# Patient Record
Sex: FEMALE | Race: BLACK OR AFRICAN AMERICAN | NOT HISPANIC OR LATINO | Employment: OTHER | URBAN - METROPOLITAN AREA
[De-identification: names, ages, dates, MRNs, and addresses within clinical notes are randomized per-mention and may not be internally consistent; named-entity substitution may affect disease eponyms.]

---

## 2017-01-13 ENCOUNTER — COMMUNICATION - HEALTHEAST (OUTPATIENT)
Dept: FAMILY MEDICINE | Facility: CLINIC | Age: 72
End: 2017-01-13

## 2017-01-13 DIAGNOSIS — I10 ESSENTIAL HYPERTENSION: ICD-10-CM

## 2017-01-19 ENCOUNTER — COMMUNICATION - HEALTHEAST (OUTPATIENT)
Dept: FAMILY MEDICINE | Facility: CLINIC | Age: 72
End: 2017-01-19

## 2017-01-19 ENCOUNTER — COMMUNICATION - HEALTHEAST (OUTPATIENT)
Dept: INTENSIVE CARE | Facility: CLINIC | Age: 72
End: 2017-01-19

## 2017-01-19 DIAGNOSIS — I10 UNSPECIFIED ESSENTIAL HYPERTENSION: ICD-10-CM

## 2017-01-20 ENCOUNTER — COMMUNICATION - HEALTHEAST (OUTPATIENT)
Dept: FAMILY MEDICINE | Facility: CLINIC | Age: 72
End: 2017-01-20

## 2017-01-22 ENCOUNTER — COMMUNICATION - HEALTHEAST (OUTPATIENT)
Dept: FAMILY MEDICINE | Facility: CLINIC | Age: 72
End: 2017-01-22

## 2017-01-22 DIAGNOSIS — K08.89 PAIN, DENTAL: ICD-10-CM

## 2017-02-03 ENCOUNTER — COMMUNICATION - HEALTHEAST (OUTPATIENT)
Dept: SCHEDULING | Facility: CLINIC | Age: 72
End: 2017-02-03

## 2017-02-08 ENCOUNTER — OFFICE VISIT - HEALTHEAST (OUTPATIENT)
Dept: FAMILY MEDICINE | Facility: CLINIC | Age: 72
End: 2017-02-08

## 2017-02-08 ENCOUNTER — RECORDS - HEALTHEAST (OUTPATIENT)
Dept: GENERAL RADIOLOGY | Facility: CLINIC | Age: 72
End: 2017-02-08

## 2017-02-08 ENCOUNTER — COMMUNICATION - HEALTHEAST (OUTPATIENT)
Dept: NURSING | Facility: CLINIC | Age: 72
End: 2017-02-08

## 2017-02-08 DIAGNOSIS — S06.0XAA CONCUSSION: ICD-10-CM
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
## 2017-02-08 DIAGNOSIS — M25.562 LEFT KNEE PAIN: ICD-10-CM
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
## 2017-02-08 DIAGNOSIS — R42 VERTIGO: ICD-10-CM

## 2017-02-08 DIAGNOSIS — M62.838 MUSCLE SPASMS OF NECK: ICD-10-CM

## 2017-02-08 DIAGNOSIS — M25.562 PAIN IN LEFT KNEE: ICD-10-CM

## 2017-02-17 ENCOUNTER — COMMUNICATION - HEALTHEAST (OUTPATIENT)
Dept: FAMILY MEDICINE | Facility: CLINIC | Age: 72
End: 2017-02-17

## 2017-02-21 ENCOUNTER — COMMUNICATION - HEALTHEAST (OUTPATIENT)
Dept: FAMILY MEDICINE | Facility: CLINIC | Age: 72
End: 2017-02-21

## 2017-02-21 DIAGNOSIS — K21.9 ESOPHAGEAL REFLUX: ICD-10-CM

## 2017-02-23 ENCOUNTER — HOSPITAL ENCOUNTER (OUTPATIENT)
Dept: NEUROLOGY | Facility: CLINIC | Age: 72
Setting detail: THERAPIES SERIES
Discharge: STILL ADMITTED OR ADMINISTRATIVE | End: 2017-02-23
Attending: FAMILY MEDICINE

## 2017-02-23 DIAGNOSIS — Z86.73 HX OF ISCHEMIC LEFT MCA STROKE: ICD-10-CM

## 2017-02-23 DIAGNOSIS — R41.0 CONFUSION: ICD-10-CM

## 2017-02-23 DIAGNOSIS — R42 DIZZINESS: ICD-10-CM

## 2017-02-23 DIAGNOSIS — F07.81 POST CONCUSSION SYNDROME: ICD-10-CM

## 2017-03-17 ENCOUNTER — COMMUNICATION - HEALTHEAST (OUTPATIENT)
Dept: SCHEDULING | Facility: CLINIC | Age: 72
End: 2017-03-17

## 2017-03-17 ENCOUNTER — COMMUNICATION - HEALTHEAST (OUTPATIENT)
Dept: FAMILY MEDICINE | Facility: CLINIC | Age: 72
End: 2017-03-17

## 2017-03-17 DIAGNOSIS — I10 HYPERTENSION: ICD-10-CM

## 2017-03-21 ENCOUNTER — AMBULATORY - HEALTHEAST (OUTPATIENT)
Dept: VASCULAR SURGERY | Facility: CLINIC | Age: 72
End: 2017-03-21

## 2017-03-21 ENCOUNTER — RECORDS - HEALTHEAST (OUTPATIENT)
Dept: ADMINISTRATIVE | Facility: OTHER | Age: 72
End: 2017-03-21

## 2017-03-21 ENCOUNTER — COMMUNICATION - HEALTHEAST (OUTPATIENT)
Dept: FAMILY MEDICINE | Facility: CLINIC | Age: 72
End: 2017-03-21

## 2017-03-21 DIAGNOSIS — L30.9 DERMATITIS: ICD-10-CM

## 2017-03-21 DIAGNOSIS — I73.9 PAD (PERIPHERAL ARTERY DISEASE) (H): ICD-10-CM

## 2017-03-29 ENCOUNTER — RECORDS - HEALTHEAST (OUTPATIENT)
Dept: VASCULAR ULTRASOUND | Facility: CLINIC | Age: 72
End: 2017-03-29

## 2017-03-29 ENCOUNTER — OFFICE VISIT - HEALTHEAST (OUTPATIENT)
Dept: VASCULAR SURGERY | Facility: CLINIC | Age: 72
End: 2017-03-29

## 2017-03-29 ENCOUNTER — AMBULATORY - HEALTHEAST (OUTPATIENT)
Dept: VASCULAR SURGERY | Facility: CLINIC | Age: 72
End: 2017-03-29

## 2017-03-29 ENCOUNTER — RECORDS - HEALTHEAST (OUTPATIENT)
Dept: ADMINISTRATIVE | Facility: OTHER | Age: 72
End: 2017-03-29

## 2017-03-29 DIAGNOSIS — I73.9 PERIPHERAL VASCULAR DISEASE, UNSPECIFIED (H): ICD-10-CM

## 2017-03-29 DIAGNOSIS — I83.813 VARICOSE VEINS OF LOWER EXTREMITY WITH PAIN, BILATERAL: ICD-10-CM

## 2017-03-29 DIAGNOSIS — M79.89 SWELLING OF LOWER EXTREMITY: ICD-10-CM

## 2017-03-30 ENCOUNTER — HOSPITAL ENCOUNTER (OUTPATIENT)
Dept: SPEECH THERAPY | Age: 72
Setting detail: THERAPIES SERIES
Discharge: STILL ADMITTED OR ADMINISTRATIVE | End: 2017-03-30
Attending: NURSE PRACTITIONER

## 2017-03-30 ENCOUNTER — HOSPITAL ENCOUNTER (OUTPATIENT)
Dept: PHYSICAL THERAPY | Age: 72
Setting detail: THERAPIES SERIES
Discharge: STILL ADMITTED OR ADMINISTRATIVE | End: 2017-03-30
Attending: NURSE PRACTITIONER

## 2017-03-30 DIAGNOSIS — R42 DIZZINESS: ICD-10-CM

## 2017-03-30 DIAGNOSIS — F07.81 POST CONCUSSION SYNDROME: ICD-10-CM

## 2017-03-30 DIAGNOSIS — Z86.73 HX OF ISCHEMIC LEFT MCA STROKE: ICD-10-CM

## 2017-04-17 ENCOUNTER — AMBULATORY - HEALTHEAST (OUTPATIENT)
Dept: SPEECH THERAPY | Facility: CLINIC | Age: 72
End: 2017-04-17

## 2017-04-18 ENCOUNTER — COMMUNICATION - HEALTHEAST (OUTPATIENT)
Dept: FAMILY MEDICINE | Facility: CLINIC | Age: 72
End: 2017-04-18

## 2017-04-18 ENCOUNTER — COMMUNICATION - HEALTHEAST (OUTPATIENT)
Dept: SCHEDULING | Facility: CLINIC | Age: 72
End: 2017-04-18

## 2017-04-18 DIAGNOSIS — I63.9 CVA (CEREBRAL VASCULAR ACCIDENT) (H): ICD-10-CM

## 2017-04-21 ENCOUNTER — COMMUNICATION - HEALTHEAST (OUTPATIENT)
Dept: FAMILY MEDICINE | Facility: CLINIC | Age: 72
End: 2017-04-21

## 2017-04-26 ENCOUNTER — COMMUNICATION - HEALTHEAST (OUTPATIENT)
Dept: SCHEDULING | Facility: CLINIC | Age: 72
End: 2017-04-26

## 2017-04-26 DIAGNOSIS — I10 HYPERTENSION: ICD-10-CM

## 2017-04-28 ENCOUNTER — OFFICE VISIT - HEALTHEAST (OUTPATIENT)
Dept: FAMILY MEDICINE | Facility: CLINIC | Age: 72
End: 2017-04-28

## 2017-04-28 DIAGNOSIS — R47.01 EXPRESSIVE APHASIA: ICD-10-CM

## 2017-04-28 DIAGNOSIS — I10 ESSENTIAL HYPERTENSION: ICD-10-CM

## 2017-04-28 DIAGNOSIS — M17.9 OSTEOARTHRITIS OF KNEE, UNSPECIFIED LATERALITY, UNSPECIFIED OSTEOARTHRITIS TYPE: ICD-10-CM

## 2017-05-15 ENCOUNTER — COMMUNICATION - HEALTHEAST (OUTPATIENT)
Dept: SCHEDULING | Facility: CLINIC | Age: 72
End: 2017-05-15

## 2017-05-15 DIAGNOSIS — I10 HTN (HYPERTENSION): ICD-10-CM

## 2017-05-19 ENCOUNTER — RECORDS - HEALTHEAST (OUTPATIENT)
Dept: ADMINISTRATIVE | Facility: OTHER | Age: 72
End: 2017-05-19

## 2017-05-22 ENCOUNTER — COMMUNICATION - HEALTHEAST (OUTPATIENT)
Dept: FAMILY MEDICINE | Facility: CLINIC | Age: 72
End: 2017-05-22

## 2017-06-14 ENCOUNTER — COMMUNICATION - HEALTHEAST (OUTPATIENT)
Dept: SCHEDULING | Facility: CLINIC | Age: 72
End: 2017-06-14

## 2017-06-21 ENCOUNTER — COMMUNICATION - HEALTHEAST (OUTPATIENT)
Dept: FAMILY MEDICINE | Facility: CLINIC | Age: 72
End: 2017-06-21

## 2017-06-29 ENCOUNTER — AMBULATORY - HEALTHEAST (OUTPATIENT)
Dept: SPEECH THERAPY | Facility: CLINIC | Age: 72
End: 2017-06-29

## 2017-07-07 ENCOUNTER — COMMUNICATION - HEALTHEAST (OUTPATIENT)
Dept: FAMILY MEDICINE | Facility: CLINIC | Age: 72
End: 2017-07-07

## 2017-07-07 ENCOUNTER — COMMUNICATION - HEALTHEAST (OUTPATIENT)
Dept: TELEHEALTH | Facility: CLINIC | Age: 72
End: 2017-07-07

## 2017-07-07 ENCOUNTER — HOSPITAL ENCOUNTER (OUTPATIENT)
Dept: ULTRASOUND IMAGING | Facility: CLINIC | Age: 72
Discharge: HOME | End: 2017-07-07
Attending: FAMILY MEDICINE

## 2017-07-07 DIAGNOSIS — I10 ESSENTIAL HYPERTENSION: ICD-10-CM

## 2017-07-20 ENCOUNTER — COMMUNICATION - HEALTHEAST (OUTPATIENT)
Dept: FAMILY MEDICINE | Facility: CLINIC | Age: 72
End: 2017-07-20

## 2017-07-20 DIAGNOSIS — I63.9 CVA (CEREBRAL VASCULAR ACCIDENT) (H): ICD-10-CM

## 2017-07-26 ENCOUNTER — COMMUNICATION - HEALTHEAST (OUTPATIENT)
Dept: FAMILY MEDICINE | Facility: CLINIC | Age: 72
End: 2017-07-26

## 2017-07-26 DIAGNOSIS — K08.89 PAIN, DENTAL: ICD-10-CM

## 2017-08-21 ENCOUNTER — COMMUNICATION - HEALTHEAST (OUTPATIENT)
Dept: FAMILY MEDICINE | Facility: CLINIC | Age: 72
End: 2017-08-21

## 2017-08-28 ENCOUNTER — COMMUNICATION - HEALTHEAST (OUTPATIENT)
Dept: FAMILY MEDICINE | Facility: CLINIC | Age: 72
End: 2017-08-28

## 2017-08-28 DIAGNOSIS — K21.9 ESOPHAGEAL REFLUX: ICD-10-CM

## 2017-09-13 ENCOUNTER — COMMUNICATION - HEALTHEAST (OUTPATIENT)
Dept: FAMILY MEDICINE | Facility: CLINIC | Age: 72
End: 2017-09-13

## 2017-09-20 ENCOUNTER — COMMUNICATION - HEALTHEAST (OUTPATIENT)
Dept: FAMILY MEDICINE | Facility: CLINIC | Age: 72
End: 2017-09-20

## 2017-09-20 DIAGNOSIS — L30.9 DERMATITIS: ICD-10-CM

## 2017-09-20 DIAGNOSIS — I10 ESSENTIAL HYPERTENSION: ICD-10-CM

## 2017-10-04 ENCOUNTER — AMBULATORY - HEALTHEAST (OUTPATIENT)
Dept: FAMILY MEDICINE | Facility: CLINIC | Age: 72
End: 2017-10-04

## 2017-10-04 ENCOUNTER — OFFICE VISIT - HEALTHEAST (OUTPATIENT)
Dept: FAMILY MEDICINE | Facility: CLINIC | Age: 72
End: 2017-10-04

## 2017-10-04 DIAGNOSIS — R51.9 HEAD ACHE: ICD-10-CM

## 2017-10-04 DIAGNOSIS — I10 ESSENTIAL HYPERTENSION: ICD-10-CM

## 2017-10-04 DIAGNOSIS — M17.9 OSTEOARTHRITIS OF KNEE, UNSPECIFIED LATERALITY, UNSPECIFIED OSTEOARTHRITIS TYPE: ICD-10-CM

## 2017-10-04 DIAGNOSIS — E66.01 MORBID OBESITY (H): ICD-10-CM

## 2017-10-04 DIAGNOSIS — G47.30 HYPERSOMNIA WITH SLEEP APNEA: ICD-10-CM

## 2017-10-04 DIAGNOSIS — Z86.73 HX OF ISCHEMIC LEFT MCA STROKE: ICD-10-CM

## 2017-10-04 DIAGNOSIS — Z12.31 VISIT FOR SCREENING MAMMOGRAM: ICD-10-CM

## 2017-10-04 DIAGNOSIS — H35.349 MACULAR HOLE: ICD-10-CM

## 2017-10-04 DIAGNOSIS — G47.10 HYPERSOMNIA WITH SLEEP APNEA: ICD-10-CM

## 2017-10-04 DIAGNOSIS — E78.00 PURE HYPERCHOLESTEROLEMIA: ICD-10-CM

## 2017-10-04 ASSESSMENT — MIFFLIN-ST. JEOR: SCORE: 1749.62

## 2017-10-20 ENCOUNTER — COMMUNICATION - HEALTHEAST (OUTPATIENT)
Dept: FAMILY MEDICINE | Facility: CLINIC | Age: 72
End: 2017-10-20

## 2017-10-20 DIAGNOSIS — L30.9 DERMATITIS: ICD-10-CM

## 2017-10-26 ENCOUNTER — HOSPITAL ENCOUNTER (OUTPATIENT)
Dept: MAMMOGRAPHY | Facility: HOSPITAL | Age: 72
Discharge: HOME | End: 2017-10-26
Attending: FAMILY MEDICINE

## 2017-10-26 ENCOUNTER — COMMUNICATION - HEALTHEAST (OUTPATIENT)
Dept: FAMILY MEDICINE | Facility: CLINIC | Age: 72
End: 2017-10-26

## 2017-10-26 DIAGNOSIS — Z12.31 VISIT FOR SCREENING MAMMOGRAM: ICD-10-CM

## 2017-11-10 ENCOUNTER — COMMUNICATION - HEALTHEAST (OUTPATIENT)
Dept: FAMILY MEDICINE | Facility: CLINIC | Age: 72
End: 2017-11-10

## 2017-11-15 ENCOUNTER — COMMUNICATION - HEALTHEAST (OUTPATIENT)
Dept: FAMILY MEDICINE | Facility: CLINIC | Age: 72
End: 2017-11-15

## 2017-11-15 DIAGNOSIS — R47.01 EXPRESSIVE APHASIA: ICD-10-CM

## 2017-11-15 DIAGNOSIS — Z86.73 HX OF ISCHEMIC LEFT MCA STROKE: ICD-10-CM

## 2017-11-15 DIAGNOSIS — R26.9 GAIT DISTURBANCE, POST-STROKE: ICD-10-CM

## 2017-11-15 DIAGNOSIS — I69.398 GAIT DISTURBANCE, POST-STROKE: ICD-10-CM

## 2017-11-20 ENCOUNTER — COMMUNICATION - HEALTHEAST (OUTPATIENT)
Dept: SCHEDULING | Facility: CLINIC | Age: 72
End: 2017-11-20

## 2017-11-20 ENCOUNTER — COMMUNICATION - HEALTHEAST (OUTPATIENT)
Dept: FAMILY MEDICINE | Facility: CLINIC | Age: 72
End: 2017-11-20

## 2017-11-21 ENCOUNTER — OFFICE VISIT - HEALTHEAST (OUTPATIENT)
Dept: FAMILY MEDICINE | Facility: CLINIC | Age: 72
End: 2017-11-21

## 2017-11-21 DIAGNOSIS — Z86.73 HX OF ISCHEMIC LEFT MCA STROKE: ICD-10-CM

## 2017-11-21 DIAGNOSIS — I87.2 VENOUS STASIS DERMATITIS OF BOTH LOWER EXTREMITIES: ICD-10-CM

## 2017-11-21 DIAGNOSIS — I10 ACCELERATED HYPERTENSION: ICD-10-CM

## 2017-11-30 ENCOUNTER — COMMUNICATION - HEALTHEAST (OUTPATIENT)
Dept: SCHEDULING | Facility: CLINIC | Age: 72
End: 2017-11-30

## 2017-12-01 ENCOUNTER — AMBULATORY - HEALTHEAST (OUTPATIENT)
Dept: FAMILY MEDICINE | Facility: CLINIC | Age: 72
End: 2017-12-01

## 2017-12-01 ENCOUNTER — COMMUNICATION - HEALTHEAST (OUTPATIENT)
Dept: FAMILY MEDICINE | Facility: CLINIC | Age: 72
End: 2017-12-01

## 2017-12-01 DIAGNOSIS — I10 HYPERTENSION: ICD-10-CM

## 2017-12-04 ENCOUNTER — COMMUNICATION - HEALTHEAST (OUTPATIENT)
Dept: FAMILY MEDICINE | Facility: CLINIC | Age: 72
End: 2017-12-04

## 2017-12-06 ENCOUNTER — OFFICE VISIT - HEALTHEAST (OUTPATIENT)
Dept: FAMILY MEDICINE | Facility: CLINIC | Age: 72
End: 2017-12-06

## 2017-12-06 ENCOUNTER — COMMUNICATION - HEALTHEAST (OUTPATIENT)
Dept: FAMILY MEDICINE | Facility: CLINIC | Age: 72
End: 2017-12-06

## 2017-12-06 DIAGNOSIS — Z86.73 HX OF ISCHEMIC LEFT MCA STROKE: ICD-10-CM

## 2017-12-06 DIAGNOSIS — I10 ESSENTIAL HYPERTENSION: ICD-10-CM

## 2017-12-06 DIAGNOSIS — R35.0 URINE FREQUENCY: ICD-10-CM

## 2017-12-07 ENCOUNTER — AMBULATORY - HEALTHEAST (OUTPATIENT)
Dept: LAB | Facility: CLINIC | Age: 72
End: 2017-12-07

## 2017-12-07 DIAGNOSIS — R35.0 URINE FREQUENCY: ICD-10-CM

## 2017-12-11 ENCOUNTER — COMMUNICATION - HEALTHEAST (OUTPATIENT)
Dept: FAMILY MEDICINE | Facility: CLINIC | Age: 72
End: 2017-12-11

## 2017-12-13 ENCOUNTER — COMMUNICATION - HEALTHEAST (OUTPATIENT)
Dept: FAMILY MEDICINE | Facility: CLINIC | Age: 72
End: 2017-12-13

## 2017-12-17 ENCOUNTER — COMMUNICATION - HEALTHEAST (OUTPATIENT)
Dept: SCHEDULING | Facility: CLINIC | Age: 72
End: 2017-12-17

## 2017-12-17 DIAGNOSIS — I10 HYPERTENSION: ICD-10-CM

## 2017-12-18 ENCOUNTER — COMMUNICATION - HEALTHEAST (OUTPATIENT)
Dept: FAMILY MEDICINE | Facility: CLINIC | Age: 72
End: 2017-12-18

## 2017-12-18 DIAGNOSIS — I10 ESSENTIAL HYPERTENSION: ICD-10-CM

## 2017-12-19 ENCOUNTER — COMMUNICATION - HEALTHEAST (OUTPATIENT)
Dept: FAMILY MEDICINE | Facility: CLINIC | Age: 72
End: 2017-12-19

## 2017-12-27 ENCOUNTER — OFFICE VISIT - HEALTHEAST (OUTPATIENT)
Dept: PHYSICAL THERAPY | Facility: REHABILITATION | Age: 72
End: 2017-12-27

## 2017-12-27 DIAGNOSIS — Z86.73 HX OF ISCHEMIC LEFT MCA STROKE: ICD-10-CM

## 2017-12-27 DIAGNOSIS — R26.9 GAIT DISTURBANCE, POST-STROKE: ICD-10-CM

## 2017-12-27 DIAGNOSIS — I69.398 GAIT DISTURBANCE, POST-STROKE: ICD-10-CM

## 2018-01-18 ENCOUNTER — OFFICE VISIT - HEALTHEAST (OUTPATIENT)
Dept: PHYSICAL THERAPY | Facility: REHABILITATION | Age: 73
End: 2018-01-18

## 2018-01-18 DIAGNOSIS — R26.9 GAIT DISTURBANCE, POST-STROKE: ICD-10-CM

## 2018-01-18 DIAGNOSIS — Z86.73 HX OF ISCHEMIC LEFT MCA STROKE: ICD-10-CM

## 2018-01-18 DIAGNOSIS — I69.398 GAIT DISTURBANCE, POST-STROKE: ICD-10-CM

## 2018-01-19 ENCOUNTER — COMMUNICATION - HEALTHEAST (OUTPATIENT)
Dept: FAMILY MEDICINE | Facility: CLINIC | Age: 73
End: 2018-01-19

## 2018-01-23 ENCOUNTER — COMMUNICATION - HEALTHEAST (OUTPATIENT)
Dept: NURSING | Facility: CLINIC | Age: 73
End: 2018-01-23

## 2018-01-25 ENCOUNTER — OFFICE VISIT - HEALTHEAST (OUTPATIENT)
Dept: PHYSICAL THERAPY | Facility: REHABILITATION | Age: 73
End: 2018-01-25

## 2018-01-25 DIAGNOSIS — I69.398 GAIT DISTURBANCE, POST-STROKE: ICD-10-CM

## 2018-01-25 DIAGNOSIS — R26.9 GAIT DISTURBANCE, POST-STROKE: ICD-10-CM

## 2018-01-25 DIAGNOSIS — Z86.73 HX OF ISCHEMIC LEFT MCA STROKE: ICD-10-CM

## 2018-01-29 ENCOUNTER — OFFICE VISIT - HEALTHEAST (OUTPATIENT)
Dept: PHYSICAL THERAPY | Facility: REHABILITATION | Age: 73
End: 2018-01-29

## 2018-01-29 DIAGNOSIS — Z86.73 HX OF ISCHEMIC LEFT MCA STROKE: ICD-10-CM

## 2018-01-29 DIAGNOSIS — I69.398 GAIT DISTURBANCE, POST-STROKE: ICD-10-CM

## 2018-01-29 DIAGNOSIS — R26.9 GAIT DISTURBANCE, POST-STROKE: ICD-10-CM

## 2018-01-30 ENCOUNTER — OFFICE VISIT - HEALTHEAST (OUTPATIENT)
Dept: OCCUPATIONAL THERAPY | Facility: REHABILITATION | Age: 73
End: 2018-01-30

## 2018-01-30 DIAGNOSIS — Z78.9 DECREASED ACTIVITIES OF DAILY LIVING (ADL): ICD-10-CM

## 2018-02-07 ENCOUNTER — OFFICE VISIT - HEALTHEAST (OUTPATIENT)
Dept: PHYSICAL THERAPY | Facility: REHABILITATION | Age: 73
End: 2018-02-07

## 2018-02-07 DIAGNOSIS — Z86.73 HX OF ISCHEMIC LEFT MCA STROKE: ICD-10-CM

## 2018-02-07 DIAGNOSIS — R26.9 GAIT DISTURBANCE, POST-STROKE: ICD-10-CM

## 2018-02-07 DIAGNOSIS — I69.398 GAIT DISTURBANCE, POST-STROKE: ICD-10-CM

## 2018-02-11 ENCOUNTER — COMMUNICATION - HEALTHEAST (OUTPATIENT)
Dept: FAMILY MEDICINE | Facility: CLINIC | Age: 73
End: 2018-02-11

## 2018-02-11 ENCOUNTER — COMMUNICATION - HEALTHEAST (OUTPATIENT)
Dept: INTENSIVE CARE | Facility: CLINIC | Age: 73
End: 2018-02-11

## 2018-02-11 DIAGNOSIS — I10 ESSENTIAL HYPERTENSION: ICD-10-CM

## 2018-02-12 ENCOUNTER — COMMUNICATION - HEALTHEAST (OUTPATIENT)
Dept: FAMILY MEDICINE | Facility: CLINIC | Age: 73
End: 2018-02-12

## 2018-02-22 ENCOUNTER — COMMUNICATION - HEALTHEAST (OUTPATIENT)
Dept: FAMILY MEDICINE | Facility: CLINIC | Age: 73
End: 2018-02-22

## 2018-02-23 ENCOUNTER — COMMUNICATION - HEALTHEAST (OUTPATIENT)
Dept: FAMILY MEDICINE | Facility: CLINIC | Age: 73
End: 2018-02-23

## 2018-03-01 ENCOUNTER — OFFICE VISIT - HEALTHEAST (OUTPATIENT)
Dept: FAMILY MEDICINE | Facility: CLINIC | Age: 73
End: 2018-03-01

## 2018-03-01 DIAGNOSIS — R21 RASH: ICD-10-CM

## 2018-03-01 DIAGNOSIS — R47.01 EXPRESSIVE APHASIA: ICD-10-CM

## 2018-03-01 DIAGNOSIS — M17.0 PRIMARY OSTEOARTHRITIS OF BOTH KNEES: ICD-10-CM

## 2018-03-01 DIAGNOSIS — I10 ESSENTIAL HYPERTENSION: ICD-10-CM

## 2018-03-01 DIAGNOSIS — K21.9 GASTROESOPHAGEAL REFLUX DISEASE, ESOPHAGITIS PRESENCE NOT SPECIFIED: ICD-10-CM

## 2018-03-01 DIAGNOSIS — E78.00 PURE HYPERCHOLESTEROLEMIA: ICD-10-CM

## 2018-03-01 ASSESSMENT — MIFFLIN-ST. JEOR: SCORE: 1739.64

## 2018-03-02 ENCOUNTER — COMMUNICATION - HEALTHEAST (OUTPATIENT)
Dept: FAMILY MEDICINE | Facility: CLINIC | Age: 73
End: 2018-03-02

## 2018-03-05 ENCOUNTER — COMMUNICATION - HEALTHEAST (OUTPATIENT)
Dept: SCHEDULING | Facility: CLINIC | Age: 73
End: 2018-03-05

## 2018-03-06 ENCOUNTER — OFFICE VISIT - HEALTHEAST (OUTPATIENT)
Dept: FAMILY MEDICINE | Facility: CLINIC | Age: 73
End: 2018-03-06

## 2018-03-06 ENCOUNTER — AMBULATORY - HEALTHEAST (OUTPATIENT)
Dept: LAB | Facility: HOSPITAL | Age: 73
End: 2018-03-06

## 2018-03-06 DIAGNOSIS — R35.0 URINE FREQUENCY: ICD-10-CM

## 2018-03-06 DIAGNOSIS — I10 HYPERTENSION: ICD-10-CM

## 2018-03-06 DIAGNOSIS — H11.31 SUBCONJUNCTIVAL HEMORRHAGE OF RIGHT EYE: ICD-10-CM

## 2018-03-06 DIAGNOSIS — R73.03 PREDIABETES: ICD-10-CM

## 2018-03-06 DIAGNOSIS — R73.03 DIABETES MELLITUS, LATENT: ICD-10-CM

## 2018-03-06 DIAGNOSIS — I10 ESSENTIAL HYPERTENSION, MALIGNANT: ICD-10-CM

## 2018-03-06 DIAGNOSIS — R35.0 URINARY FREQUENCY: ICD-10-CM

## 2018-03-06 LAB
ALBUMIN UR-MCNC: ABNORMAL MG/DL
ANION GAP SERPL CALCULATED.3IONS-SCNC: 11 MMOL/L (ref 5–18)
APPEARANCE UR: CLEAR
BACTERIA #/AREA URNS HPF: ABNORMAL HPF
BILIRUB UR QL STRIP: NEGATIVE
BUN SERPL-MCNC: 18 MG/DL (ref 8–28)
CALCIUM SERPL-MCNC: 9.4 MG/DL (ref 8.5–10.5)
CHLORIDE BLD-SCNC: 103 MMOL/L (ref 98–107)
CO2 SERPL-SCNC: 27 MMOL/L (ref 22–31)
COLOR UR AUTO: YELLOW
CREAT SERPL-MCNC: 0.59 MG/DL (ref 0.6–1.1)
GFR SERPL CREATININE-BSD FRML MDRD: >60 ML/MIN/1.73M2
GLUCOSE BLD-MCNC: 93 MG/DL (ref 70–125)
GLUCOSE UR STRIP-MCNC: NEGATIVE MG/DL
HGB UR QL STRIP: NEGATIVE
KETONES UR STRIP-MCNC: NEGATIVE MG/DL
LEUKOCYTE ESTERASE UR QL STRIP: NEGATIVE
MUCOUS THREADS #/AREA URNS LPF: ABNORMAL LPF
NITRATE UR QL: NEGATIVE
PH UR STRIP: 5.5 [PH] (ref 4.5–8)
POTASSIUM BLD-SCNC: 4.3 MMOL/L (ref 3.5–5)
RBC #/AREA URNS AUTO: ABNORMAL HPF
SODIUM SERPL-SCNC: 141 MMOL/L (ref 136–145)
SP GR UR STRIP: 1.02 (ref 1–1.03)
SQUAMOUS #/AREA URNS AUTO: ABNORMAL LPF
TRANS CELLS #/AREA URNS HPF: ABNORMAL LPF
UROBILINOGEN UR STRIP-ACNC: ABNORMAL
WBC #/AREA URNS AUTO: ABNORMAL HPF
WBC CLUMPS #/AREA URNS HPF: PRESENT /[HPF]

## 2018-03-06 ASSESSMENT — MIFFLIN-ST. JEOR: SCORE: 1738.74

## 2018-03-07 LAB — HBA1C MFR BLD: 6 % (ref 4.2–6.1)

## 2018-03-08 ENCOUNTER — COMMUNICATION - HEALTHEAST (OUTPATIENT)
Dept: FAMILY MEDICINE | Facility: CLINIC | Age: 73
End: 2018-03-08

## 2018-03-17 ENCOUNTER — COMMUNICATION - HEALTHEAST (OUTPATIENT)
Dept: FAMILY MEDICINE | Facility: CLINIC | Age: 73
End: 2018-03-17

## 2018-03-17 DIAGNOSIS — I10 ACCELERATED HYPERTENSION: ICD-10-CM

## 2018-03-18 ENCOUNTER — COMMUNICATION - HEALTHEAST (OUTPATIENT)
Dept: FAMILY MEDICINE | Facility: CLINIC | Age: 73
End: 2018-03-18

## 2018-03-18 DIAGNOSIS — I10 ACCELERATED HYPERTENSION: ICD-10-CM

## 2018-04-02 ENCOUNTER — OFFICE VISIT - HEALTHEAST (OUTPATIENT)
Dept: FAMILY MEDICINE | Facility: CLINIC | Age: 73
End: 2018-04-02

## 2018-04-02 DIAGNOSIS — M17.0 PRIMARY OSTEOARTHRITIS OF BOTH KNEES: ICD-10-CM

## 2018-04-02 DIAGNOSIS — G89.29 BILATERAL CHRONIC KNEE PAIN: ICD-10-CM

## 2018-04-02 DIAGNOSIS — M25.562 BILATERAL CHRONIC KNEE PAIN: ICD-10-CM

## 2018-04-02 DIAGNOSIS — E66.01 MORBID OBESITY DUE TO EXCESS CALORIES (H): ICD-10-CM

## 2018-04-02 DIAGNOSIS — M25.561 BILATERAL CHRONIC KNEE PAIN: ICD-10-CM

## 2018-04-02 DIAGNOSIS — H53.8 BLURRY VISION: ICD-10-CM

## 2018-04-02 DIAGNOSIS — I10 ACCELERATED HYPERTENSION: ICD-10-CM

## 2018-04-04 ENCOUNTER — COMMUNICATION - HEALTHEAST (OUTPATIENT)
Dept: SURGERY | Facility: CLINIC | Age: 73
End: 2018-04-04

## 2018-04-24 ENCOUNTER — COMMUNICATION - HEALTHEAST (OUTPATIENT)
Dept: FAMILY MEDICINE | Facility: CLINIC | Age: 73
End: 2018-04-24

## 2018-04-24 DIAGNOSIS — M17.0 PRIMARY OSTEOARTHRITIS OF BOTH KNEES: ICD-10-CM

## 2018-04-26 ENCOUNTER — COMMUNICATION - HEALTHEAST (OUTPATIENT)
Dept: FAMILY MEDICINE | Facility: CLINIC | Age: 73
End: 2018-04-26

## 2018-05-03 ENCOUNTER — COMMUNICATION - HEALTHEAST (OUTPATIENT)
Dept: FAMILY MEDICINE | Facility: CLINIC | Age: 73
End: 2018-05-03

## 2018-05-04 ENCOUNTER — COMMUNICATION - HEALTHEAST (OUTPATIENT)
Dept: SCHEDULING | Facility: CLINIC | Age: 73
End: 2018-05-04

## 2018-05-07 ENCOUNTER — COMMUNICATION - HEALTHEAST (OUTPATIENT)
Dept: SCHEDULING | Facility: CLINIC | Age: 73
End: 2018-05-07

## 2018-05-09 ENCOUNTER — OFFICE VISIT - HEALTHEAST (OUTPATIENT)
Dept: FAMILY MEDICINE | Facility: CLINIC | Age: 73
End: 2018-05-09

## 2018-05-09 DIAGNOSIS — R07.89 CHEST WALL PAIN: ICD-10-CM

## 2018-05-09 DIAGNOSIS — M25.511 BILATERAL SHOULDER PAIN: ICD-10-CM

## 2018-05-09 DIAGNOSIS — M25.512 BILATERAL SHOULDER PAIN: ICD-10-CM

## 2018-05-09 DIAGNOSIS — M67.919 ROTATOR CUFF DISORDER: ICD-10-CM

## 2018-05-09 DIAGNOSIS — I10 ESSENTIAL HYPERTENSION WITH GOAL BLOOD PRESSURE LESS THAN 140/90: ICD-10-CM

## 2018-05-09 ASSESSMENT — MIFFLIN-ST. JEOR: SCORE: 1666.73

## 2018-05-16 ENCOUNTER — COMMUNICATION - HEALTHEAST (OUTPATIENT)
Dept: SURGERY | Facility: CLINIC | Age: 73
End: 2018-05-16

## 2018-05-23 ENCOUNTER — COMMUNICATION - HEALTHEAST (OUTPATIENT)
Dept: FAMILY MEDICINE | Facility: CLINIC | Age: 73
End: 2018-05-23

## 2018-05-23 DIAGNOSIS — M17.0 PRIMARY OSTEOARTHRITIS OF BOTH KNEES: ICD-10-CM

## 2018-06-05 ENCOUNTER — COMMUNICATION - HEALTHEAST (OUTPATIENT)
Dept: FAMILY MEDICINE | Facility: CLINIC | Age: 73
End: 2018-06-05

## 2018-06-08 ENCOUNTER — COMMUNICATION - HEALTHEAST (OUTPATIENT)
Dept: SCHEDULING | Facility: CLINIC | Age: 73
End: 2018-06-08

## 2018-06-08 ENCOUNTER — OFFICE VISIT - HEALTHEAST (OUTPATIENT)
Dept: FAMILY MEDICINE | Facility: CLINIC | Age: 73
End: 2018-06-08

## 2018-06-08 ENCOUNTER — HOME CARE/HOSPICE - HEALTHEAST (OUTPATIENT)
Dept: HOME HEALTH SERVICES | Facility: HOME HEALTH | Age: 73
End: 2018-06-08

## 2018-06-08 DIAGNOSIS — I10 ESSENTIAL HYPERTENSION: ICD-10-CM

## 2018-06-08 ASSESSMENT — MIFFLIN-ST. JEOR: SCORE: 1666.16

## 2018-06-11 ENCOUNTER — COMMUNICATION - HEALTHEAST (OUTPATIENT)
Dept: FAMILY MEDICINE | Facility: CLINIC | Age: 73
End: 2018-06-11

## 2018-06-13 ENCOUNTER — OFFICE VISIT - HEALTHEAST (OUTPATIENT)
Dept: FAMILY MEDICINE | Facility: CLINIC | Age: 73
End: 2018-06-13

## 2018-06-13 DIAGNOSIS — M25.511 BILATERAL SHOULDER PAIN: ICD-10-CM

## 2018-06-13 DIAGNOSIS — T88.7XXA MEDICATION SIDE EFFECTS: ICD-10-CM

## 2018-06-13 DIAGNOSIS — M25.512 BILATERAL SHOULDER PAIN: ICD-10-CM

## 2018-06-13 DIAGNOSIS — I10 ESSENTIAL HYPERTENSION: ICD-10-CM

## 2018-06-16 ENCOUNTER — COMMUNICATION - HEALTHEAST (OUTPATIENT)
Dept: SCHEDULING | Facility: CLINIC | Age: 73
End: 2018-06-16

## 2018-06-16 DIAGNOSIS — I10 HTN (HYPERTENSION): ICD-10-CM

## 2018-06-19 ENCOUNTER — HOME CARE/HOSPICE - HEALTHEAST (OUTPATIENT)
Dept: HOME HEALTH SERVICES | Facility: HOME HEALTH | Age: 73
End: 2018-06-19

## 2018-06-21 ENCOUNTER — HOME CARE/HOSPICE - HEALTHEAST (OUTPATIENT)
Dept: HOME HEALTH SERVICES | Facility: HOME HEALTH | Age: 73
End: 2018-06-21

## 2018-06-22 ENCOUNTER — COMMUNICATION - HEALTHEAST (OUTPATIENT)
Dept: HOME HEALTH SERVICES | Facility: HOME HEALTH | Age: 73
End: 2018-06-22

## 2018-06-22 ENCOUNTER — COMMUNICATION - HEALTHEAST (OUTPATIENT)
Dept: FAMILY MEDICINE | Facility: CLINIC | Age: 73
End: 2018-06-22

## 2018-06-22 DIAGNOSIS — M17.0 PRIMARY OSTEOARTHRITIS OF BOTH KNEES: ICD-10-CM

## 2018-06-25 ENCOUNTER — HOME CARE/HOSPICE - HEALTHEAST (OUTPATIENT)
Dept: HOME HEALTH SERVICES | Facility: HOME HEALTH | Age: 73
End: 2018-06-25

## 2018-06-25 ENCOUNTER — COMMUNICATION - HEALTHEAST (OUTPATIENT)
Dept: FAMILY MEDICINE | Facility: CLINIC | Age: 73
End: 2018-06-25

## 2018-06-28 ENCOUNTER — HOME CARE/HOSPICE - HEALTHEAST (OUTPATIENT)
Dept: HOME HEALTH SERVICES | Facility: HOME HEALTH | Age: 73
End: 2018-06-28

## 2018-06-29 ENCOUNTER — HOME CARE/HOSPICE - HEALTHEAST (OUTPATIENT)
Dept: HOME HEALTH SERVICES | Facility: HOME HEALTH | Age: 73
End: 2018-06-29

## 2018-07-05 ENCOUNTER — HOME CARE/HOSPICE - HEALTHEAST (OUTPATIENT)
Dept: HOME HEALTH SERVICES | Facility: HOME HEALTH | Age: 73
End: 2018-07-05

## 2018-07-06 ENCOUNTER — HOME CARE/HOSPICE - HEALTHEAST (OUTPATIENT)
Dept: HOME HEALTH SERVICES | Facility: HOME HEALTH | Age: 73
End: 2018-07-06

## 2018-07-07 ENCOUNTER — HOME CARE/HOSPICE - HEALTHEAST (OUTPATIENT)
Dept: HOME HEALTH SERVICES | Facility: HOME HEALTH | Age: 73
End: 2018-07-07

## 2018-07-09 ENCOUNTER — HOME CARE/HOSPICE - HEALTHEAST (OUTPATIENT)
Dept: HOME HEALTH SERVICES | Facility: HOME HEALTH | Age: 73
End: 2018-07-09

## 2018-07-11 ENCOUNTER — HOME CARE/HOSPICE - HEALTHEAST (OUTPATIENT)
Dept: HOME HEALTH SERVICES | Facility: HOME HEALTH | Age: 73
End: 2018-07-11

## 2018-07-12 ENCOUNTER — HOME CARE/HOSPICE - HEALTHEAST (OUTPATIENT)
Dept: HOME HEALTH SERVICES | Facility: HOME HEALTH | Age: 73
End: 2018-07-12

## 2018-07-12 ENCOUNTER — COMMUNICATION - HEALTHEAST (OUTPATIENT)
Dept: HOME HEALTH SERVICES | Facility: HOME HEALTH | Age: 73
End: 2018-07-12

## 2018-07-13 ENCOUNTER — HOME CARE/HOSPICE - HEALTHEAST (OUTPATIENT)
Dept: HOME HEALTH SERVICES | Facility: HOME HEALTH | Age: 73
End: 2018-07-13

## 2018-07-17 ENCOUNTER — HOME CARE/HOSPICE - HEALTHEAST (OUTPATIENT)
Dept: HOME HEALTH SERVICES | Facility: HOME HEALTH | Age: 73
End: 2018-07-17

## 2018-07-19 ENCOUNTER — HOME CARE/HOSPICE - HEALTHEAST (OUTPATIENT)
Dept: HOME HEALTH SERVICES | Facility: HOME HEALTH | Age: 73
End: 2018-07-19

## 2018-07-20 ENCOUNTER — HOME CARE/HOSPICE - HEALTHEAST (OUTPATIENT)
Dept: HOME HEALTH SERVICES | Facility: HOME HEALTH | Age: 73
End: 2018-07-20

## 2018-07-23 ENCOUNTER — COMMUNICATION - HEALTHEAST (OUTPATIENT)
Dept: FAMILY MEDICINE | Facility: CLINIC | Age: 73
End: 2018-07-23

## 2018-07-23 DIAGNOSIS — M17.0 PRIMARY OSTEOARTHRITIS OF BOTH KNEES: ICD-10-CM

## 2018-07-25 ENCOUNTER — HOME CARE/HOSPICE - HEALTHEAST (OUTPATIENT)
Dept: HOME HEALTH SERVICES | Facility: HOME HEALTH | Age: 73
End: 2018-07-25

## 2018-07-31 ENCOUNTER — HOME CARE/HOSPICE - HEALTHEAST (OUTPATIENT)
Dept: HOME HEALTH SERVICES | Facility: HOME HEALTH | Age: 73
End: 2018-07-31

## 2018-08-22 ENCOUNTER — COMMUNICATION - HEALTHEAST (OUTPATIENT)
Dept: FAMILY MEDICINE | Facility: CLINIC | Age: 73
End: 2018-08-22

## 2018-08-23 ENCOUNTER — COMMUNICATION - HEALTHEAST (OUTPATIENT)
Dept: FAMILY MEDICINE | Facility: CLINIC | Age: 73
End: 2018-08-23

## 2018-08-23 DIAGNOSIS — M17.0 PRIMARY OSTEOARTHRITIS OF BOTH KNEES: ICD-10-CM

## 2018-08-30 ENCOUNTER — OFFICE VISIT - HEALTHEAST (OUTPATIENT)
Dept: PHARMACY | Facility: CLINIC | Age: 73
End: 2018-08-30

## 2018-08-30 DIAGNOSIS — K21.9 GASTROESOPHAGEAL REFLUX DISEASE, ESOPHAGITIS PRESENCE NOT SPECIFIED: ICD-10-CM

## 2018-08-30 DIAGNOSIS — Z86.73 HX OF ISCHEMIC LEFT MCA STROKE: ICD-10-CM

## 2018-08-30 DIAGNOSIS — J32.9 SINUSITIS, UNSPECIFIED CHRONICITY, UNSPECIFIED LOCATION: ICD-10-CM

## 2018-08-30 DIAGNOSIS — F41.1 ANXIETY STATE: ICD-10-CM

## 2018-08-30 DIAGNOSIS — I10 ESSENTIAL HYPERTENSION WITH GOAL BLOOD PRESSURE LESS THAN 140/90: ICD-10-CM

## 2018-08-30 DIAGNOSIS — M17.9 OSTEOARTHRITIS OF KNEE, UNSPECIFIED LATERALITY, UNSPECIFIED OSTEOARTHRITIS TYPE: ICD-10-CM

## 2018-08-31 ENCOUNTER — COMMUNICATION - HEALTHEAST (OUTPATIENT)
Dept: FAMILY MEDICINE | Facility: CLINIC | Age: 73
End: 2018-08-31

## 2018-09-10 ENCOUNTER — COMMUNICATION - HEALTHEAST (OUTPATIENT)
Dept: FAMILY MEDICINE | Facility: CLINIC | Age: 73
End: 2018-09-10

## 2018-09-17 ENCOUNTER — COMMUNICATION - HEALTHEAST (OUTPATIENT)
Dept: FAMILY MEDICINE | Facility: CLINIC | Age: 73
End: 2018-09-17

## 2018-09-17 DIAGNOSIS — K21.9 ESOPHAGEAL REFLUX: ICD-10-CM

## 2018-09-20 ENCOUNTER — COMMUNICATION - HEALTHEAST (OUTPATIENT)
Dept: SCHEDULING | Facility: CLINIC | Age: 73
End: 2018-09-20

## 2018-09-20 DIAGNOSIS — M17.0 PRIMARY OSTEOARTHRITIS OF BOTH KNEES: ICD-10-CM

## 2018-09-26 ENCOUNTER — HOME CARE/HOSPICE - HEALTHEAST (OUTPATIENT)
Dept: HOME HEALTH SERVICES | Facility: HOME HEALTH | Age: 73
End: 2018-09-26

## 2018-09-27 ENCOUNTER — AMBULATORY - HEALTHEAST (OUTPATIENT)
Dept: CARE COORDINATION | Facility: CLINIC | Age: 73
End: 2018-09-27

## 2018-09-27 DIAGNOSIS — I48.0 PAROXYSMAL ATRIAL FIBRILLATION (H): ICD-10-CM

## 2018-09-27 DIAGNOSIS — I63.9 CVA (CEREBRAL VASCULAR ACCIDENT) (H): ICD-10-CM

## 2018-09-28 ENCOUNTER — COMMUNICATION - HEALTHEAST (OUTPATIENT)
Dept: OTHER | Facility: CLINIC | Age: 73
End: 2018-09-28

## 2018-09-28 ENCOUNTER — COMMUNICATION - HEALTHEAST (OUTPATIENT)
Dept: CARE COORDINATION | Facility: CLINIC | Age: 73
End: 2018-09-28

## 2018-10-01 ENCOUNTER — HOME CARE/HOSPICE - HEALTHEAST (OUTPATIENT)
Dept: HOME HEALTH SERVICES | Facility: HOME HEALTH | Age: 73
End: 2018-10-01

## 2018-10-01 ENCOUNTER — COMMUNICATION - HEALTHEAST (OUTPATIENT)
Dept: NURSING | Facility: CLINIC | Age: 73
End: 2018-10-01

## 2018-10-01 ENCOUNTER — COMMUNICATION - HEALTHEAST (OUTPATIENT)
Dept: SCHEDULING | Facility: CLINIC | Age: 73
End: 2018-10-01

## 2018-10-01 DIAGNOSIS — R94.39 ABNORMAL RESULT OF OTHER CARDIOVASCULAR FUNCTION STUDY: ICD-10-CM

## 2018-10-01 DIAGNOSIS — R79.89 ELEVATED TROPONIN LEVEL: ICD-10-CM

## 2018-10-03 ENCOUNTER — HOME CARE/HOSPICE - HEALTHEAST (OUTPATIENT)
Dept: HOME HEALTH SERVICES | Facility: HOME HEALTH | Age: 73
End: 2018-10-03

## 2018-10-03 ENCOUNTER — OFFICE VISIT - HEALTHEAST (OUTPATIENT)
Dept: FAMILY MEDICINE | Facility: CLINIC | Age: 73
End: 2018-10-03

## 2018-10-03 ENCOUNTER — COMMUNICATION - HEALTHEAST (OUTPATIENT)
Dept: CARDIOLOGY | Facility: CLINIC | Age: 73
End: 2018-10-03

## 2018-10-03 DIAGNOSIS — I48.0 PAROXYSMAL ATRIAL FIBRILLATION (H): ICD-10-CM

## 2018-10-03 DIAGNOSIS — I16.1 HYPERTENSIVE EMERGENCY: ICD-10-CM

## 2018-10-03 DIAGNOSIS — E78.2 MIXED HYPERLIPIDEMIA: ICD-10-CM

## 2018-10-03 DIAGNOSIS — Z86.73 HISTORY OF STROKE: ICD-10-CM

## 2018-10-03 DIAGNOSIS — F03.90 DEMENTIA (H): ICD-10-CM

## 2018-10-03 DIAGNOSIS — R79.89 TROPONIN LEVEL ELEVATED: ICD-10-CM

## 2018-10-04 ENCOUNTER — HOME CARE/HOSPICE - HEALTHEAST (OUTPATIENT)
Dept: HOME HEALTH SERVICES | Facility: HOME HEALTH | Age: 73
End: 2018-10-04

## 2018-10-04 ENCOUNTER — COMMUNICATION - HEALTHEAST (OUTPATIENT)
Dept: SPEECH THERAPY | Facility: CLINIC | Age: 73
End: 2018-10-04

## 2018-10-05 ENCOUNTER — COMMUNICATION - HEALTHEAST (OUTPATIENT)
Dept: FAMILY MEDICINE | Facility: CLINIC | Age: 73
End: 2018-10-05

## 2018-10-08 ENCOUNTER — COMMUNICATION - HEALTHEAST (OUTPATIENT)
Dept: ADMINISTRATIVE | Facility: CLINIC | Age: 73
End: 2018-10-08

## 2018-10-08 ENCOUNTER — HOME CARE/HOSPICE - HEALTHEAST (OUTPATIENT)
Dept: HOME HEALTH SERVICES | Facility: HOME HEALTH | Age: 73
End: 2018-10-08

## 2018-10-09 ENCOUNTER — COMMUNICATION - HEALTHEAST (OUTPATIENT)
Dept: CARDIOLOGY | Facility: CLINIC | Age: 73
End: 2018-10-09

## 2018-10-09 ENCOUNTER — COMMUNICATION - HEALTHEAST (OUTPATIENT)
Dept: FAMILY MEDICINE | Facility: CLINIC | Age: 73
End: 2018-10-09

## 2018-10-09 DIAGNOSIS — G89.29 CHRONIC PAIN: ICD-10-CM

## 2018-10-09 DIAGNOSIS — R51.9 HEADACHE: ICD-10-CM

## 2018-10-10 ENCOUNTER — HOME CARE/HOSPICE - HEALTHEAST (OUTPATIENT)
Dept: HOME HEALTH SERVICES | Facility: HOME HEALTH | Age: 73
End: 2018-10-10

## 2018-10-10 ENCOUNTER — COMMUNICATION - HEALTHEAST (OUTPATIENT)
Dept: FAMILY MEDICINE | Facility: CLINIC | Age: 73
End: 2018-10-10

## 2018-10-11 ENCOUNTER — COMMUNICATION - HEALTHEAST (OUTPATIENT)
Dept: FAMILY MEDICINE | Facility: CLINIC | Age: 73
End: 2018-10-11

## 2018-10-11 ENCOUNTER — HOSPITAL ENCOUNTER (OUTPATIENT)
Dept: CT IMAGING | Facility: CLINIC | Age: 73
Discharge: HOME | End: 2018-10-11
Attending: FAMILY MEDICINE

## 2018-10-11 DIAGNOSIS — R79.89 ELEVATED TROPONIN LEVEL: ICD-10-CM

## 2018-10-11 DIAGNOSIS — R74.8 ABNORMAL LEVELS OF OTHER SERUM ENZYMES: ICD-10-CM

## 2018-10-11 DIAGNOSIS — R94.39 ABNORMAL RESULT OF OTHER CARDIOVASCULAR FUNCTION STUDY: ICD-10-CM

## 2018-10-11 LAB
BSA FOR ECHO PROCEDURE: 2.25 M2
CV CALCIUM SCORE AGATSTON LM: 0
CV CALCIUM SCORING AGATSON LAD: 1
CV CALCIUM SCORING AGATSTON CX: 0
CV CALCIUM SCORING AGATSTON RCA: 0
CV CALCIUM SCORING AGATSTON TOTAL: 1
LEFT VENTRICLE HEART RATE: 72 BPM

## 2018-10-11 ASSESSMENT — MIFFLIN-ST. JEOR: SCORE: 1604

## 2018-10-12 ENCOUNTER — HOME CARE/HOSPICE - HEALTHEAST (OUTPATIENT)
Dept: HOME HEALTH SERVICES | Facility: HOME HEALTH | Age: 73
End: 2018-10-12

## 2018-10-15 ENCOUNTER — COMMUNICATION - HEALTHEAST (OUTPATIENT)
Dept: CARDIOLOGY | Facility: CLINIC | Age: 73
End: 2018-10-15

## 2018-10-17 ENCOUNTER — HOME CARE/HOSPICE - HEALTHEAST (OUTPATIENT)
Dept: HOME HEALTH SERVICES | Facility: HOME HEALTH | Age: 73
End: 2018-10-17

## 2018-10-18 ENCOUNTER — HOME CARE/HOSPICE - HEALTHEAST (OUTPATIENT)
Dept: HOME HEALTH SERVICES | Facility: HOME HEALTH | Age: 73
End: 2018-10-18

## 2018-10-19 ENCOUNTER — HOME CARE/HOSPICE - HEALTHEAST (OUTPATIENT)
Dept: HOME HEALTH SERVICES | Facility: HOME HEALTH | Age: 73
End: 2018-10-19

## 2018-10-22 ENCOUNTER — COMMUNICATION - HEALTHEAST (OUTPATIENT)
Dept: FAMILY MEDICINE | Facility: CLINIC | Age: 73
End: 2018-10-22

## 2018-10-22 ENCOUNTER — HOME CARE/HOSPICE - HEALTHEAST (OUTPATIENT)
Dept: HOME HEALTH SERVICES | Facility: HOME HEALTH | Age: 73
End: 2018-10-22

## 2018-10-22 DIAGNOSIS — M17.0 PRIMARY OSTEOARTHRITIS OF BOTH KNEES: ICD-10-CM

## 2018-10-22 DIAGNOSIS — R21 RASH: ICD-10-CM

## 2018-10-22 DIAGNOSIS — I10 ACCELERATED HYPERTENSION: ICD-10-CM

## 2018-10-25 ENCOUNTER — HOME CARE/HOSPICE - HEALTHEAST (OUTPATIENT)
Dept: HOME HEALTH SERVICES | Facility: HOME HEALTH | Age: 73
End: 2018-10-25

## 2018-10-26 ENCOUNTER — HOME CARE/HOSPICE - HEALTHEAST (OUTPATIENT)
Dept: HOME HEALTH SERVICES | Facility: HOME HEALTH | Age: 73
End: 2018-10-26

## 2018-10-29 ENCOUNTER — COMMUNICATION - HEALTHEAST (OUTPATIENT)
Dept: SPEECH THERAPY | Facility: CLINIC | Age: 73
End: 2018-10-29

## 2018-10-29 ENCOUNTER — HOME CARE/HOSPICE - HEALTHEAST (OUTPATIENT)
Dept: HOME HEALTH SERVICES | Facility: HOME HEALTH | Age: 73
End: 2018-10-29

## 2018-10-30 ENCOUNTER — COMMUNICATION - HEALTHEAST (OUTPATIENT)
Dept: FAMILY MEDICINE | Facility: CLINIC | Age: 73
End: 2018-10-30

## 2018-10-31 ENCOUNTER — HOME CARE/HOSPICE - HEALTHEAST (OUTPATIENT)
Dept: HOME HEALTH SERVICES | Facility: HOME HEALTH | Age: 73
End: 2018-10-31

## 2018-11-01 ENCOUNTER — HOME CARE/HOSPICE - HEALTHEAST (OUTPATIENT)
Dept: HOME HEALTH SERVICES | Facility: HOME HEALTH | Age: 73
End: 2018-11-01

## 2018-11-01 ENCOUNTER — COMMUNICATION - HEALTHEAST (OUTPATIENT)
Dept: SPEECH THERAPY | Facility: CLINIC | Age: 73
End: 2018-11-01

## 2018-11-01 ENCOUNTER — COMMUNICATION - HEALTHEAST (OUTPATIENT)
Dept: OTHER | Facility: CLINIC | Age: 73
End: 2018-11-01

## 2018-11-05 ENCOUNTER — HOME CARE/HOSPICE - HEALTHEAST (OUTPATIENT)
Dept: HOME HEALTH SERVICES | Facility: HOME HEALTH | Age: 73
End: 2018-11-05

## 2018-11-06 ENCOUNTER — COMMUNICATION - HEALTHEAST (OUTPATIENT)
Dept: CARDIOLOGY | Facility: CLINIC | Age: 73
End: 2018-11-06

## 2018-11-07 ENCOUNTER — COMMUNICATION - HEALTHEAST (OUTPATIENT)
Dept: FAMILY MEDICINE | Facility: CLINIC | Age: 73
End: 2018-11-07

## 2018-11-07 DIAGNOSIS — I48.0 PAF (PAROXYSMAL ATRIAL FIBRILLATION) (H): ICD-10-CM

## 2018-11-08 ENCOUNTER — HOME CARE/HOSPICE - HEALTHEAST (OUTPATIENT)
Dept: HOME HEALTH SERVICES | Facility: HOME HEALTH | Age: 73
End: 2018-11-08

## 2018-11-09 ENCOUNTER — HOME CARE/HOSPICE - HEALTHEAST (OUTPATIENT)
Dept: HOME HEALTH SERVICES | Facility: HOME HEALTH | Age: 73
End: 2018-11-09

## 2018-11-12 ENCOUNTER — COMMUNICATION - HEALTHEAST (OUTPATIENT)
Dept: SCHEDULING | Facility: CLINIC | Age: 73
End: 2018-11-12

## 2018-11-13 ENCOUNTER — HOME CARE/HOSPICE - HEALTHEAST (OUTPATIENT)
Dept: HOME HEALTH SERVICES | Facility: HOME HEALTH | Age: 73
End: 2018-11-13

## 2018-11-13 ENCOUNTER — COMMUNICATION - HEALTHEAST (OUTPATIENT)
Dept: CARDIOLOGY | Facility: CLINIC | Age: 73
End: 2018-11-13

## 2018-11-14 ENCOUNTER — HOME CARE/HOSPICE - HEALTHEAST (OUTPATIENT)
Dept: HOME HEALTH SERVICES | Facility: HOME HEALTH | Age: 73
End: 2018-11-14

## 2018-11-16 ENCOUNTER — HOME CARE/HOSPICE - HEALTHEAST (OUTPATIENT)
Dept: HOME HEALTH SERVICES | Facility: HOME HEALTH | Age: 73
End: 2018-11-16

## 2018-11-19 ENCOUNTER — AMBULATORY - HEALTHEAST (OUTPATIENT)
Dept: FAMILY MEDICINE | Facility: CLINIC | Age: 73
End: 2018-11-19

## 2018-11-19 ENCOUNTER — OFFICE VISIT - HEALTHEAST (OUTPATIENT)
Dept: FAMILY MEDICINE | Facility: CLINIC | Age: 73
End: 2018-11-19

## 2018-11-19 DIAGNOSIS — F51.01 PRIMARY INSOMNIA: ICD-10-CM

## 2018-11-19 DIAGNOSIS — T88.7XXA MEDICATION SIDE EFFECTS: ICD-10-CM

## 2018-11-19 DIAGNOSIS — I48.0 PAROXYSMAL ATRIAL FIBRILLATION (H): ICD-10-CM

## 2018-11-19 DIAGNOSIS — I10 ESSENTIAL HYPERTENSION: ICD-10-CM

## 2018-11-20 ENCOUNTER — COMMUNICATION - HEALTHEAST (OUTPATIENT)
Dept: FAMILY MEDICINE | Facility: CLINIC | Age: 73
End: 2018-11-20

## 2018-11-20 DIAGNOSIS — M17.0 PRIMARY OSTEOARTHRITIS OF BOTH KNEES: ICD-10-CM

## 2018-11-26 ENCOUNTER — COMMUNICATION - HEALTHEAST (OUTPATIENT)
Dept: FAMILY MEDICINE | Facility: CLINIC | Age: 73
End: 2018-11-26

## 2018-11-26 DIAGNOSIS — M17.0 PRIMARY OSTEOARTHRITIS OF BOTH KNEES: ICD-10-CM

## 2018-11-27 ENCOUNTER — OFFICE VISIT - HEALTHEAST (OUTPATIENT)
Dept: CARDIOLOGY | Facility: CLINIC | Age: 73
End: 2018-11-27

## 2018-11-27 DIAGNOSIS — R79.89 TROPONIN LEVEL ELEVATED: ICD-10-CM

## 2018-11-27 DIAGNOSIS — E78.00 PURE HYPERCHOLESTEROLEMIA: ICD-10-CM

## 2018-11-27 DIAGNOSIS — I48.0 PAROXYSMAL ATRIAL FIBRILLATION (H): ICD-10-CM

## 2018-11-27 DIAGNOSIS — I63.20 CEREBROVASCULAR ACCIDENT (CVA) DUE TO OCCLUSION OF PRECEREBRAL ARTERY (H): ICD-10-CM

## 2018-12-11 ENCOUNTER — COMMUNICATION - HEALTHEAST (OUTPATIENT)
Dept: CARDIOLOGY | Facility: CLINIC | Age: 73
End: 2018-12-11

## 2018-12-26 ENCOUNTER — COMMUNICATION - HEALTHEAST (OUTPATIENT)
Dept: FAMILY MEDICINE | Facility: CLINIC | Age: 73
End: 2018-12-26

## 2018-12-26 DIAGNOSIS — M17.0 PRIMARY OSTEOARTHRITIS OF BOTH KNEES: ICD-10-CM

## 2018-12-26 DIAGNOSIS — R21 RASH: ICD-10-CM

## 2018-12-27 ENCOUNTER — AMBULATORY - HEALTHEAST (OUTPATIENT)
Dept: ADMINISTRATIVE | Facility: REHABILITATION | Age: 73
End: 2018-12-27

## 2018-12-27 DIAGNOSIS — R47.01 EXPRESSIVE APHASIA: ICD-10-CM

## 2019-01-02 ENCOUNTER — COMMUNICATION - HEALTHEAST (OUTPATIENT)
Dept: FAMILY MEDICINE | Facility: CLINIC | Age: 74
End: 2019-01-02

## 2019-01-02 DIAGNOSIS — M25.562 CHRONIC PAIN OF BOTH KNEES: ICD-10-CM

## 2019-01-02 DIAGNOSIS — G89.29 CHRONIC PAIN OF BOTH KNEES: ICD-10-CM

## 2019-01-02 DIAGNOSIS — M25.561 CHRONIC PAIN OF BOTH KNEES: ICD-10-CM

## 2019-01-02 DIAGNOSIS — M17.0 PRIMARY OSTEOARTHRITIS OF BOTH KNEES: ICD-10-CM

## 2019-01-22 ENCOUNTER — COMMUNICATION - HEALTHEAST (OUTPATIENT)
Dept: FAMILY MEDICINE | Facility: CLINIC | Age: 74
End: 2019-01-22

## 2019-01-24 ENCOUNTER — COMMUNICATION - HEALTHEAST (OUTPATIENT)
Dept: FAMILY MEDICINE | Facility: CLINIC | Age: 74
End: 2019-01-24

## 2019-01-24 DIAGNOSIS — M17.0 PRIMARY OSTEOARTHRITIS OF BOTH KNEES: ICD-10-CM

## 2019-02-22 ENCOUNTER — COMMUNICATION - HEALTHEAST (OUTPATIENT)
Dept: FAMILY MEDICINE | Facility: CLINIC | Age: 74
End: 2019-02-22

## 2019-02-22 DIAGNOSIS — M17.0 PRIMARY OSTEOARTHRITIS OF BOTH KNEES: ICD-10-CM

## 2019-02-24 ENCOUNTER — COMMUNICATION - HEALTHEAST (OUTPATIENT)
Dept: FAMILY MEDICINE | Facility: CLINIC | Age: 74
End: 2019-02-24

## 2019-03-21 ENCOUNTER — COMMUNICATION - HEALTHEAST (OUTPATIENT)
Dept: FAMILY MEDICINE | Facility: CLINIC | Age: 74
End: 2019-03-21

## 2019-03-21 DIAGNOSIS — R52 PAIN: ICD-10-CM

## 2019-03-21 DIAGNOSIS — M17.0 PRIMARY OSTEOARTHRITIS OF BOTH KNEES: ICD-10-CM

## 2019-03-28 ENCOUNTER — OFFICE VISIT - HEALTHEAST (OUTPATIENT)
Dept: FAMILY MEDICINE | Facility: CLINIC | Age: 74
End: 2019-03-28

## 2019-03-28 DIAGNOSIS — M17.0 OSTEOARTHRITIS OF BOTH KNEES, UNSPECIFIED OSTEOARTHRITIS TYPE: ICD-10-CM

## 2019-03-28 DIAGNOSIS — G47.33 OSA (OBSTRUCTIVE SLEEP APNEA): ICD-10-CM

## 2019-03-28 DIAGNOSIS — R73.03 PREDIABETES: ICD-10-CM

## 2019-03-28 DIAGNOSIS — I48.0 PAROXYSMAL ATRIAL FIBRILLATION (H): ICD-10-CM

## 2019-03-28 DIAGNOSIS — I10 BENIGN ESSENTIAL HYPERTENSION: ICD-10-CM

## 2019-03-28 DIAGNOSIS — E78.2 MIXED HYPERLIPIDEMIA: ICD-10-CM

## 2019-03-28 DIAGNOSIS — Z79.899 CONTROLLED SUBSTANCE AGREEMENT SIGNED: ICD-10-CM

## 2019-06-11 ENCOUNTER — OFFICE VISIT - HEALTHEAST (OUTPATIENT)
Dept: FAMILY MEDICINE | Facility: CLINIC | Age: 74
End: 2019-06-11

## 2019-06-11 DIAGNOSIS — I48.0 PAROXYSMAL ATRIAL FIBRILLATION (H): ICD-10-CM

## 2019-06-11 DIAGNOSIS — I63.20 CEREBROVASCULAR ACCIDENT (CVA) DUE TO OCCLUSION OF PRECEREBRAL ARTERY (H): ICD-10-CM

## 2019-06-11 DIAGNOSIS — R31.9 URINARY TRACT INFECTION WITH HEMATURIA, SITE UNSPECIFIED: ICD-10-CM

## 2019-06-11 DIAGNOSIS — N39.0 URINARY TRACT INFECTION WITH HEMATURIA, SITE UNSPECIFIED: ICD-10-CM

## 2019-06-11 DIAGNOSIS — M17.0 PRIMARY OSTEOARTHRITIS OF BOTH KNEES: ICD-10-CM

## 2019-06-11 DIAGNOSIS — R47.01 EXPRESSIVE APHASIA: ICD-10-CM

## 2019-06-11 LAB
ALBUMIN UR-MCNC: ABNORMAL MG/DL
APPEARANCE UR: ABNORMAL
BACTERIA #/AREA URNS HPF: ABNORMAL HPF
BILIRUB UR QL STRIP: NEGATIVE
COLOR UR AUTO: YELLOW
GLUCOSE UR STRIP-MCNC: NEGATIVE MG/DL
HGB UR QL STRIP: ABNORMAL
KETONES UR STRIP-MCNC: NEGATIVE MG/DL
LEUKOCYTE ESTERASE UR QL STRIP: ABNORMAL
NITRATE UR QL: POSITIVE
PH UR STRIP: 6.5 [PH] (ref 5–8)
RBC #/AREA URNS AUTO: ABNORMAL HPF
SP GR UR STRIP: 1.02 (ref 1–1.03)
SQUAMOUS #/AREA URNS AUTO: ABNORMAL LPF
UROBILINOGEN UR STRIP-ACNC: ABNORMAL
WBC #/AREA URNS AUTO: >100 HPF

## 2019-06-11 ASSESSMENT — MIFFLIN-ST. JEOR: SCORE: 1524.97

## 2019-06-13 LAB — BACTERIA SPEC CULT: ABNORMAL

## 2019-06-23 ENCOUNTER — COMMUNICATION - HEALTHEAST (OUTPATIENT)
Dept: SCHEDULING | Facility: CLINIC | Age: 74
End: 2019-06-23

## 2019-06-23 ENCOUNTER — OFFICE VISIT - HEALTHEAST (OUTPATIENT)
Dept: FAMILY MEDICINE | Facility: CLINIC | Age: 74
End: 2019-06-23

## 2019-06-23 DIAGNOSIS — R73.03 PRE-DIABETES: ICD-10-CM

## 2019-06-23 DIAGNOSIS — R80.9 PROTEINURIA, UNSPECIFIED TYPE: ICD-10-CM

## 2019-06-23 DIAGNOSIS — R35.0 URINARY FREQUENCY: ICD-10-CM

## 2019-06-23 DIAGNOSIS — I10 BENIGN ESSENTIAL HYPERTENSION: ICD-10-CM

## 2019-06-23 DIAGNOSIS — M25.512 ACUTE PAIN OF BOTH SHOULDERS: ICD-10-CM

## 2019-06-23 DIAGNOSIS — M25.511 ACUTE PAIN OF BOTH SHOULDERS: ICD-10-CM

## 2019-06-23 LAB
ALBUMIN UR-MCNC: ABNORMAL MG/DL
APPEARANCE UR: CLEAR
BACTERIA #/AREA URNS HPF: ABNORMAL HPF
BILIRUB UR QL STRIP: NEGATIVE
COLOR UR AUTO: YELLOW
GLUCOSE UR STRIP-MCNC: NEGATIVE MG/DL
HGB UR QL STRIP: NEGATIVE
KETONES UR STRIP-MCNC: NEGATIVE MG/DL
LEUKOCYTE ESTERASE UR QL STRIP: NEGATIVE
MUCOUS THREADS #/AREA URNS LPF: ABNORMAL LPF
NITRATE UR QL: NEGATIVE
PH UR STRIP: 7 [PH] (ref 5–8)
RBC #/AREA URNS AUTO: ABNORMAL HPF
SP GR UR STRIP: 1.01 (ref 1–1.03)
SQUAMOUS #/AREA URNS AUTO: ABNORMAL LPF
UROBILINOGEN UR STRIP-ACNC: ABNORMAL
WBC #/AREA URNS AUTO: ABNORMAL HPF

## 2019-06-24 ENCOUNTER — COMMUNICATION - HEALTHEAST (OUTPATIENT)
Dept: CARDIOLOGY | Facility: CLINIC | Age: 74
End: 2019-06-24

## 2019-06-24 DIAGNOSIS — E78.00 PURE HYPERCHOLESTEROLEMIA: ICD-10-CM

## 2019-06-25 ENCOUNTER — OFFICE VISIT - HEALTHEAST (OUTPATIENT)
Dept: FAMILY MEDICINE | Facility: CLINIC | Age: 74
End: 2019-06-25

## 2019-06-25 DIAGNOSIS — I10 ACCELERATED HYPERTENSION: ICD-10-CM

## 2019-06-28 LAB
ATRIAL RATE - MUSE: 61 BPM
DIASTOLIC BLOOD PRESSURE - MUSE: NORMAL MMHG
INTERPRETATION ECG - MUSE: NORMAL
P AXIS - MUSE: NORMAL DEGREES
PR INTERVAL - MUSE: NORMAL MS
QRS DURATION - MUSE: 74 MS
QT - MUSE: 434 MS
QTC - MUSE: 436 MS
R AXIS - MUSE: 141 DEGREES
SYSTOLIC BLOOD PRESSURE - MUSE: NORMAL MMHG
T AXIS - MUSE: 148 DEGREES
VENTRICULAR RATE- MUSE: 61 BPM

## 2019-07-03 ENCOUNTER — COMMUNICATION - HEALTHEAST (OUTPATIENT)
Dept: FAMILY MEDICINE | Facility: CLINIC | Age: 74
End: 2019-07-03

## 2019-07-03 DIAGNOSIS — M17.0 PRIMARY OSTEOARTHRITIS OF BOTH KNEES: ICD-10-CM

## 2019-07-24 ENCOUNTER — AMBULATORY - HEALTHEAST (OUTPATIENT)
Dept: LAB | Facility: HOSPITAL | Age: 74
End: 2019-07-24

## 2019-07-24 DIAGNOSIS — R73.03 PREDIABETES: ICD-10-CM

## 2019-07-24 DIAGNOSIS — E78.2 MIXED HYPERLIPIDEMIA: ICD-10-CM

## 2019-07-24 LAB
ALBUMIN SERPL-MCNC: 3.7 G/DL (ref 3.5–5)
ALP SERPL-CCNC: 87 U/L (ref 45–120)
ALT SERPL W P-5'-P-CCNC: 10 U/L (ref 0–45)
ANION GAP SERPL CALCULATED.3IONS-SCNC: 9 MMOL/L (ref 5–18)
AST SERPL W P-5'-P-CCNC: 11 U/L (ref 0–40)
BILIRUB SERPL-MCNC: 0.5 MG/DL (ref 0–1)
BUN SERPL-MCNC: 11 MG/DL (ref 8–28)
CALCIUM SERPL-MCNC: 9.8 MG/DL (ref 8.5–10.5)
CHLORIDE BLD-SCNC: 104 MMOL/L (ref 98–107)
CHOLEST SERPL-MCNC: 182 MG/DL
CO2 SERPL-SCNC: 28 MMOL/L (ref 22–31)
CREAT SERPL-MCNC: 0.63 MG/DL (ref 0.6–1.1)
FASTING STATUS PATIENT QL REPORTED: YES
GFR SERPL CREATININE-BSD FRML MDRD: >60 ML/MIN/1.73M2
GLUCOSE BLD-MCNC: 90 MG/DL (ref 70–125)
HDLC SERPL-MCNC: 64 MG/DL
LDLC SERPL CALC-MCNC: 105 MG/DL
POTASSIUM BLD-SCNC: 4 MMOL/L (ref 3.5–5)
PROT SERPL-MCNC: 6.9 G/DL (ref 6–8)
SODIUM SERPL-SCNC: 141 MMOL/L (ref 136–145)
TRIGL SERPL-MCNC: 67 MG/DL

## 2019-07-25 LAB — HBA1C MFR BLD: 5.8 % (ref 4.2–6.1)

## 2019-07-31 ENCOUNTER — COMMUNICATION - HEALTHEAST (OUTPATIENT)
Dept: CARDIOLOGY | Facility: CLINIC | Age: 74
End: 2019-07-31

## 2019-07-31 ENCOUNTER — COMMUNICATION - HEALTHEAST (OUTPATIENT)
Dept: FAMILY MEDICINE | Facility: CLINIC | Age: 74
End: 2019-07-31

## 2019-07-31 ENCOUNTER — OFFICE VISIT - HEALTHEAST (OUTPATIENT)
Dept: CARDIOLOGY | Facility: CLINIC | Age: 74
End: 2019-07-31

## 2019-07-31 DIAGNOSIS — E78.2 MIXED HYPERLIPIDEMIA: ICD-10-CM

## 2019-07-31 DIAGNOSIS — I63.20 CEREBROVASCULAR ACCIDENT (CVA) DUE TO OCCLUSION OF PRECEREBRAL ARTERY (H): ICD-10-CM

## 2019-07-31 DIAGNOSIS — E66.01 MORBID OBESITY WITH BMI OF 45.0-49.9, ADULT (H): ICD-10-CM

## 2019-07-31 DIAGNOSIS — M17.0 PRIMARY OSTEOARTHRITIS OF BOTH KNEES: ICD-10-CM

## 2019-07-31 DIAGNOSIS — G47.33 OSA (OBSTRUCTIVE SLEEP APNEA): ICD-10-CM

## 2019-07-31 DIAGNOSIS — I48.0 PAROXYSMAL ATRIAL FIBRILLATION (H): ICD-10-CM

## 2019-07-31 DIAGNOSIS — I10 BENIGN ESSENTIAL HYPERTENSION: ICD-10-CM

## 2019-07-31 ASSESSMENT — MIFFLIN-ST. JEOR: SCORE: 1511.36

## 2019-08-02 ENCOUNTER — COMMUNICATION - HEALTHEAST (OUTPATIENT)
Dept: CARDIOLOGY | Facility: CLINIC | Age: 74
End: 2019-08-02

## 2019-08-06 ENCOUNTER — COMMUNICATION - HEALTHEAST (OUTPATIENT)
Dept: GERIATRIC MEDICINE | Facility: CLINIC | Age: 74
End: 2019-08-06

## 2019-08-20 ENCOUNTER — COMMUNICATION - HEALTHEAST (OUTPATIENT)
Dept: GERIATRIC MEDICINE | Facility: CLINIC | Age: 74
End: 2019-08-20

## 2019-08-21 ENCOUNTER — COMMUNICATION - HEALTHEAST (OUTPATIENT)
Dept: GERIATRIC MEDICINE | Facility: CLINIC | Age: 74
End: 2019-08-21

## 2019-08-27 ENCOUNTER — COMMUNICATION - HEALTHEAST (OUTPATIENT)
Dept: GERIATRIC MEDICINE | Facility: CLINIC | Age: 74
End: 2019-08-27

## 2019-08-28 ENCOUNTER — COMMUNICATION - HEALTHEAST (OUTPATIENT)
Dept: GERIATRIC MEDICINE | Facility: CLINIC | Age: 74
End: 2019-08-28

## 2019-09-09 ENCOUNTER — COMMUNICATION - HEALTHEAST (OUTPATIENT)
Dept: GERIATRIC MEDICINE | Facility: CLINIC | Age: 74
End: 2019-09-09

## 2019-10-09 ENCOUNTER — COMMUNICATION - HEALTHEAST (OUTPATIENT)
Dept: SCHEDULING | Facility: CLINIC | Age: 74
End: 2019-10-09

## 2019-10-10 ENCOUNTER — COMMUNICATION - HEALTHEAST (OUTPATIENT)
Dept: FAMILY MEDICINE | Facility: CLINIC | Age: 74
End: 2019-10-10

## 2019-10-10 DIAGNOSIS — K21.9 ESOPHAGEAL REFLUX: ICD-10-CM

## 2019-10-10 DIAGNOSIS — I10 ACCELERATED HYPERTENSION: ICD-10-CM

## 2019-10-15 ENCOUNTER — COMMUNICATION - HEALTHEAST (OUTPATIENT)
Dept: GERIATRIC MEDICINE | Facility: CLINIC | Age: 74
End: 2019-10-15

## 2019-10-24 ENCOUNTER — COMMUNICATION - HEALTHEAST (OUTPATIENT)
Dept: SCHEDULING | Facility: CLINIC | Age: 74
End: 2019-10-24

## 2019-10-24 DIAGNOSIS — M62.81 GENERALIZED MUSCLE WEAKNESS: ICD-10-CM

## 2019-10-30 ENCOUNTER — COMMUNICATION - HEALTHEAST (OUTPATIENT)
Dept: GERIATRIC MEDICINE | Facility: CLINIC | Age: 74
End: 2019-10-30

## 2019-11-06 ENCOUNTER — COMMUNICATION - HEALTHEAST (OUTPATIENT)
Dept: GERIATRIC MEDICINE | Facility: CLINIC | Age: 74
End: 2019-11-06

## 2019-11-11 ENCOUNTER — RECORDS - HEALTHEAST (OUTPATIENT)
Dept: ADMINISTRATIVE | Facility: OTHER | Age: 74
End: 2019-11-11

## 2019-11-12 ENCOUNTER — COMMUNICATION - HEALTHEAST (OUTPATIENT)
Dept: FAMILY MEDICINE | Facility: CLINIC | Age: 74
End: 2019-11-12

## 2019-11-14 ENCOUNTER — COMMUNICATION - HEALTHEAST (OUTPATIENT)
Dept: GERIATRIC MEDICINE | Facility: CLINIC | Age: 74
End: 2019-11-14

## 2019-11-20 ENCOUNTER — COMMUNICATION - HEALTHEAST (OUTPATIENT)
Dept: GERIATRIC MEDICINE | Facility: CLINIC | Age: 74
End: 2019-11-20

## 2019-11-26 ASSESSMENT — ACTIVITIES OF DAILY LIVING (ADL)
DEPENDENT_IADLS:: CLEANING;COOKING;LAUNDRY;SHOPPING;MEAL PREPARATION;MEDICATION MANAGEMENT;MONEY MANAGEMENT;TRANSPORTATION;INCONTINENCE

## 2019-11-26 ASSESSMENT — PATIENT HEALTH QUESTIONNAIRE - PHQ9: SUM OF ALL RESPONSES TO PHQ QUESTIONS 1-9: 1

## 2019-12-03 ENCOUNTER — OFFICE VISIT - HEALTHEAST (OUTPATIENT)
Dept: CARDIOLOGY | Facility: CLINIC | Age: 74
End: 2019-12-03

## 2019-12-03 DIAGNOSIS — I10 ACCELERATED HYPERTENSION: ICD-10-CM

## 2019-12-03 DIAGNOSIS — I48.91 A-FIB (H): ICD-10-CM

## 2019-12-03 ASSESSMENT — MIFFLIN-ST. JEOR: SCORE: 1534.04

## 2019-12-04 ENCOUNTER — COMMUNICATION - HEALTHEAST (OUTPATIENT)
Dept: GERIATRIC MEDICINE | Facility: CLINIC | Age: 74
End: 2019-12-04

## 2019-12-05 ENCOUNTER — OFFICE VISIT - HEALTHEAST (OUTPATIENT)
Dept: FAMILY MEDICINE | Facility: CLINIC | Age: 74
End: 2019-12-05

## 2019-12-05 DIAGNOSIS — R47.01 EXPRESSIVE APHASIA: ICD-10-CM

## 2019-12-05 DIAGNOSIS — E78.2 MIXED HYPERLIPIDEMIA: ICD-10-CM

## 2019-12-05 DIAGNOSIS — Z12.31 VISIT FOR SCREENING MAMMOGRAM: ICD-10-CM

## 2019-12-05 DIAGNOSIS — Z00.00 ROUTINE MEDICAL EXAM: ICD-10-CM

## 2019-12-05 DIAGNOSIS — I10 BENIGN ESSENTIAL HYPERTENSION: ICD-10-CM

## 2019-12-05 DIAGNOSIS — E66.01 MORBID OBESITY WITH BMI OF 45.0-49.9, ADULT (H): ICD-10-CM

## 2019-12-05 DIAGNOSIS — I63.20 CEREBROVASCULAR ACCIDENT (CVA) DUE TO OCCLUSION OF PRECEREBRAL ARTERY (H): ICD-10-CM

## 2019-12-05 DIAGNOSIS — R73.03 PREDIABETES: ICD-10-CM

## 2019-12-05 LAB
ALBUMIN SERPL-MCNC: 3.4 G/DL (ref 3.5–5)
ALP SERPL-CCNC: 91 U/L (ref 45–120)
ALT SERPL W P-5'-P-CCNC: 25 U/L (ref 0–45)
ANION GAP SERPL CALCULATED.3IONS-SCNC: 12 MMOL/L (ref 5–18)
AST SERPL W P-5'-P-CCNC: 19 U/L (ref 0–40)
BILIRUB SERPL-MCNC: 0.4 MG/DL (ref 0–1)
BUN SERPL-MCNC: 17 MG/DL (ref 8–28)
CALCIUM SERPL-MCNC: 9.3 MG/DL (ref 8.5–10.5)
CHLORIDE BLD-SCNC: 105 MMOL/L (ref 98–107)
CHOLEST SERPL-MCNC: 244 MG/DL
CO2 SERPL-SCNC: 23 MMOL/L (ref 22–31)
CREAT SERPL-MCNC: 0.6 MG/DL (ref 0.6–1.1)
FASTING STATUS PATIENT QL REPORTED: YES
GFR SERPL CREATININE-BSD FRML MDRD: >60 ML/MIN/1.73M2
GLUCOSE BLD-MCNC: 89 MG/DL (ref 70–125)
HDLC SERPL-MCNC: 65 MG/DL
LDLC SERPL CALC-MCNC: 166 MG/DL
POTASSIUM BLD-SCNC: 4.5 MMOL/L (ref 3.5–5)
PROT SERPL-MCNC: 6.6 G/DL (ref 6–8)
SODIUM SERPL-SCNC: 140 MMOL/L (ref 136–145)
TRIGL SERPL-MCNC: 65 MG/DL

## 2019-12-05 ASSESSMENT — MIFFLIN-ST. JEOR: SCORE: 1539.16

## 2019-12-05 ASSESSMENT — PATIENT HEALTH QUESTIONNAIRE - PHQ9: SUM OF ALL RESPONSES TO PHQ QUESTIONS 1-9: 10

## 2019-12-06 ENCOUNTER — COMMUNICATION - HEALTHEAST (OUTPATIENT)
Dept: FAMILY MEDICINE | Facility: CLINIC | Age: 74
End: 2019-12-06

## 2019-12-13 ENCOUNTER — COMMUNICATION - HEALTHEAST (OUTPATIENT)
Dept: GERIATRIC MEDICINE | Facility: CLINIC | Age: 74
End: 2019-12-13

## 2019-12-16 ENCOUNTER — OFFICE VISIT - HEALTHEAST (OUTPATIENT)
Dept: CARDIOLOGY | Facility: CLINIC | Age: 74
End: 2019-12-16

## 2019-12-16 DIAGNOSIS — I48.0 PAROXYSMAL ATRIAL FIBRILLATION (H): ICD-10-CM

## 2019-12-16 DIAGNOSIS — G47.33 OSA (OBSTRUCTIVE SLEEP APNEA): ICD-10-CM

## 2019-12-16 DIAGNOSIS — I63.20 CEREBROVASCULAR ACCIDENT (CVA) DUE TO OCCLUSION OF PRECEREBRAL ARTERY (H): ICD-10-CM

## 2019-12-16 DIAGNOSIS — I10 BENIGN ESSENTIAL HYPERTENSION: ICD-10-CM

## 2019-12-18 ENCOUNTER — RECORDS - HEALTHEAST (OUTPATIENT)
Dept: ADMINISTRATIVE | Facility: OTHER | Age: 74
End: 2019-12-18

## 2019-12-20 ENCOUNTER — RECORDS - HEALTHEAST (OUTPATIENT)
Dept: ADMINISTRATIVE | Facility: OTHER | Age: 74
End: 2019-12-20

## 2019-12-20 ENCOUNTER — COMMUNICATION - HEALTHEAST (OUTPATIENT)
Dept: FAMILY MEDICINE | Facility: CLINIC | Age: 74
End: 2019-12-20

## 2019-12-24 ENCOUNTER — COMMUNICATION - HEALTHEAST (OUTPATIENT)
Dept: FAMILY MEDICINE | Facility: CLINIC | Age: 74
End: 2019-12-24

## 2019-12-24 DIAGNOSIS — R47.01 EXPRESSIVE APHASIA: ICD-10-CM

## 2019-12-24 DIAGNOSIS — I63.20 CEREBROVASCULAR ACCIDENT (CVA) DUE TO OCCLUSION OF PRECEREBRAL ARTERY (H): ICD-10-CM

## 2019-12-30 ENCOUNTER — COMMUNICATION - HEALTHEAST (OUTPATIENT)
Dept: GERIATRIC MEDICINE | Facility: CLINIC | Age: 74
End: 2019-12-30

## 2020-01-02 ENCOUNTER — TRANSCRIBE ORDERS (OUTPATIENT)
Dept: OTHER | Age: 75
End: 2020-01-02

## 2020-01-02 DIAGNOSIS — R47.01 EXPRESSIVE APHASIA: Primary | ICD-10-CM

## 2020-01-02 DIAGNOSIS — I63.20 CEREBROVASCULAR ACCIDENT (CVA) DUE TO OCCLUSION OF PRECEREBRAL ARTERY (H): ICD-10-CM

## 2020-01-03 ENCOUNTER — COMMUNICATION - HEALTHEAST (OUTPATIENT)
Dept: FAMILY MEDICINE | Facility: CLINIC | Age: 75
End: 2020-01-03

## 2020-01-07 ENCOUNTER — COMMUNICATION - HEALTHEAST (OUTPATIENT)
Dept: CARDIOLOGY | Facility: CLINIC | Age: 75
End: 2020-01-07

## 2020-01-08 ENCOUNTER — COMMUNICATION - HEALTHEAST (OUTPATIENT)
Dept: GERIATRIC MEDICINE | Facility: CLINIC | Age: 75
End: 2020-01-08

## 2020-01-10 ENCOUNTER — COMMUNICATION - HEALTHEAST (OUTPATIENT)
Dept: FAMILY MEDICINE | Facility: CLINIC | Age: 75
End: 2020-01-10

## 2020-01-13 ENCOUNTER — OFFICE VISIT - HEALTHEAST (OUTPATIENT)
Dept: CARDIOLOGY | Facility: CLINIC | Age: 75
End: 2020-01-13

## 2020-01-13 ENCOUNTER — HOSPITAL ENCOUNTER (OUTPATIENT)
Dept: CARDIOLOGY | Facility: HOSPITAL | Age: 75
Discharge: HOME | End: 2020-01-13

## 2020-01-13 DIAGNOSIS — I48.0 PAROXYSMAL ATRIAL FIBRILLATION (H): ICD-10-CM

## 2020-01-13 DIAGNOSIS — I10 ACCELERATED HYPERTENSION: ICD-10-CM

## 2020-01-13 ASSESSMENT — MIFFLIN-ST. JEOR: SCORE: 1516.48

## 2020-01-17 ENCOUNTER — COMMUNICATION - HEALTHEAST (OUTPATIENT)
Dept: CARDIOLOGY | Facility: CLINIC | Age: 75
End: 2020-01-17

## 2020-01-17 DIAGNOSIS — I10 ACCELERATED HYPERTENSION: ICD-10-CM

## 2020-01-28 ENCOUNTER — COMMUNICATION - HEALTHEAST (OUTPATIENT)
Dept: GERIATRIC MEDICINE | Facility: CLINIC | Age: 75
End: 2020-01-28

## 2020-01-29 ENCOUNTER — COMMUNICATION - HEALTHEAST (OUTPATIENT)
Dept: CARDIOLOGY | Facility: CLINIC | Age: 75
End: 2020-01-29

## 2020-02-10 ENCOUNTER — COMMUNICATION - HEALTHEAST (OUTPATIENT)
Dept: FAMILY MEDICINE | Facility: CLINIC | Age: 75
End: 2020-02-10

## 2020-02-19 ENCOUNTER — COMMUNICATION - HEALTHEAST (OUTPATIENT)
Dept: ADMINISTRATIVE | Facility: CLINIC | Age: 75
End: 2020-02-19

## 2020-02-25 ENCOUNTER — AMBULATORY - HEALTHEAST (OUTPATIENT)
Dept: CARDIOLOGY | Facility: CLINIC | Age: 75
End: 2020-02-25

## 2020-02-26 ENCOUNTER — COMMUNICATION - HEALTHEAST (OUTPATIENT)
Dept: GERIATRIC MEDICINE | Facility: CLINIC | Age: 75
End: 2020-02-26

## 2020-03-03 ENCOUNTER — COMMUNICATION - HEALTHEAST (OUTPATIENT)
Dept: GERIATRIC MEDICINE | Facility: CLINIC | Age: 75
End: 2020-03-03

## 2020-03-05 ENCOUNTER — COMMUNICATION - HEALTHEAST (OUTPATIENT)
Dept: CARDIOLOGY | Facility: CLINIC | Age: 75
End: 2020-03-05

## 2020-03-20 ENCOUNTER — OFFICE VISIT - HEALTHEAST (OUTPATIENT)
Dept: CARDIOLOGY | Facility: CLINIC | Age: 75
End: 2020-03-20

## 2020-03-20 DIAGNOSIS — R47.01 EXPRESSIVE APHASIA: ICD-10-CM

## 2020-03-20 DIAGNOSIS — I10 BENIGN ESSENTIAL HYPERTENSION: ICD-10-CM

## 2020-03-20 DIAGNOSIS — R42 DIZZINESS: ICD-10-CM

## 2020-03-20 DIAGNOSIS — I10 ACCELERATED HYPERTENSION: ICD-10-CM

## 2020-03-20 DIAGNOSIS — I48.0 PAROXYSMAL ATRIAL FIBRILLATION (H): ICD-10-CM

## 2020-03-23 ENCOUNTER — RECORDS - HEALTHEAST (OUTPATIENT)
Dept: ADMINISTRATIVE | Facility: OTHER | Age: 75
End: 2020-03-23

## 2020-03-31 ENCOUNTER — COMMUNICATION - HEALTHEAST (OUTPATIENT)
Dept: GERIATRIC MEDICINE | Facility: CLINIC | Age: 75
End: 2020-03-31

## 2020-04-29 ENCOUNTER — COMMUNICATION - HEALTHEAST (OUTPATIENT)
Dept: GERIATRIC MEDICINE | Facility: CLINIC | Age: 75
End: 2020-04-29

## 2020-04-29 ENCOUNTER — COMMUNICATION - HEALTHEAST (OUTPATIENT)
Dept: FAMILY MEDICINE | Facility: CLINIC | Age: 75
End: 2020-04-29

## 2020-04-29 DIAGNOSIS — R47.01 EXPRESSIVE APHASIA: ICD-10-CM

## 2020-05-04 ENCOUNTER — OFFICE VISIT - HEALTHEAST (OUTPATIENT)
Dept: FAMILY MEDICINE | Facility: CLINIC | Age: 75
End: 2020-05-04

## 2020-05-04 DIAGNOSIS — M54.41 ACUTE BILATERAL LOW BACK PAIN WITH RIGHT-SIDED SCIATICA: ICD-10-CM

## 2020-05-04 DIAGNOSIS — K21.9 ESOPHAGEAL REFLUX: ICD-10-CM

## 2020-05-04 ASSESSMENT — PATIENT HEALTH QUESTIONNAIRE - PHQ9: SUM OF ALL RESPONSES TO PHQ QUESTIONS 1-9: 0

## 2020-05-06 ENCOUNTER — COMMUNICATION - HEALTHEAST (OUTPATIENT)
Dept: FAMILY MEDICINE | Facility: CLINIC | Age: 75
End: 2020-05-06

## 2020-05-06 ENCOUNTER — AMBULATORY - HEALTHEAST (OUTPATIENT)
Dept: FAMILY MEDICINE | Facility: CLINIC | Age: 75
End: 2020-05-06

## 2020-05-06 DIAGNOSIS — M54.41 ACUTE BILATERAL LOW BACK PAIN WITH RIGHT-SIDED SCIATICA: ICD-10-CM

## 2020-05-19 ENCOUNTER — COMMUNICATION - HEALTHEAST (OUTPATIENT)
Dept: GERIATRIC MEDICINE | Facility: CLINIC | Age: 75
End: 2020-05-19

## 2020-06-19 ENCOUNTER — COMMUNICATION - HEALTHEAST (OUTPATIENT)
Dept: FAMILY MEDICINE | Facility: CLINIC | Age: 75
End: 2020-06-19

## 2020-06-30 ENCOUNTER — COMMUNICATION - HEALTHEAST (OUTPATIENT)
Dept: CARDIOLOGY | Facility: CLINIC | Age: 75
End: 2020-06-30

## 2020-06-30 DIAGNOSIS — I10 HTN (HYPERTENSION): ICD-10-CM

## 2020-07-08 ENCOUNTER — COMMUNICATION - HEALTHEAST (OUTPATIENT)
Dept: GERIATRIC MEDICINE | Facility: CLINIC | Age: 75
End: 2020-07-08

## 2020-07-14 ENCOUNTER — COMMUNICATION - HEALTHEAST (OUTPATIENT)
Dept: FAMILY MEDICINE | Facility: CLINIC | Age: 75
End: 2020-07-14

## 2020-09-18 ENCOUNTER — COMMUNICATION - HEALTHEAST (OUTPATIENT)
Dept: CARDIOLOGY | Facility: CLINIC | Age: 75
End: 2020-09-18

## 2020-09-21 ENCOUNTER — COMMUNICATION - HEALTHEAST (OUTPATIENT)
Dept: SCHEDULING | Facility: CLINIC | Age: 75
End: 2020-09-21

## 2020-09-21 ENCOUNTER — COMMUNICATION - HEALTHEAST (OUTPATIENT)
Dept: CARDIOLOGY | Facility: CLINIC | Age: 75
End: 2020-09-21

## 2020-09-21 DIAGNOSIS — G47.10 HYPERSOMNIA WITH SLEEP APNEA: ICD-10-CM

## 2020-09-21 DIAGNOSIS — G47.30 HYPERSOMNIA WITH SLEEP APNEA: ICD-10-CM

## 2020-09-30 ENCOUNTER — OFFICE VISIT - HEALTHEAST (OUTPATIENT)
Dept: CARDIOLOGY | Facility: CLINIC | Age: 75
End: 2020-09-30

## 2020-09-30 DIAGNOSIS — G47.30 HYPERSOMNIA WITH SLEEP APNEA: ICD-10-CM

## 2020-09-30 DIAGNOSIS — G47.33 OSA (OBSTRUCTIVE SLEEP APNEA): ICD-10-CM

## 2020-09-30 DIAGNOSIS — I10 BENIGN ESSENTIAL HYPERTENSION: ICD-10-CM

## 2020-09-30 DIAGNOSIS — I48.0 PAROXYSMAL ATRIAL FIBRILLATION (H): ICD-10-CM

## 2020-09-30 DIAGNOSIS — G47.10 HYPERSOMNIA WITH SLEEP APNEA: ICD-10-CM

## 2020-09-30 ASSESSMENT — MIFFLIN-ST. JEOR: SCORE: 1530.08

## 2020-10-01 ENCOUNTER — COMMUNICATION - HEALTHEAST (OUTPATIENT)
Dept: CARDIOLOGY | Facility: CLINIC | Age: 75
End: 2020-10-01

## 2020-10-01 DIAGNOSIS — I10 BENIGN ESSENTIAL HYPERTENSION: ICD-10-CM

## 2020-10-07 ENCOUNTER — COMMUNICATION - HEALTHEAST (OUTPATIENT)
Dept: GERIATRIC MEDICINE | Facility: CLINIC | Age: 75
End: 2020-10-07

## 2020-10-09 ENCOUNTER — COMMUNICATION - HEALTHEAST (OUTPATIENT)
Dept: CARDIOLOGY | Facility: CLINIC | Age: 75
End: 2020-10-09

## 2020-10-13 ENCOUNTER — COMMUNICATION - HEALTHEAST (OUTPATIENT)
Dept: FAMILY MEDICINE | Facility: CLINIC | Age: 75
End: 2020-10-13

## 2020-10-13 ENCOUNTER — COMMUNICATION - HEALTHEAST (OUTPATIENT)
Dept: GERIATRIC MEDICINE | Facility: CLINIC | Age: 75
End: 2020-10-13

## 2020-10-13 DIAGNOSIS — R30.0 DYSURIA: ICD-10-CM

## 2020-10-23 ENCOUNTER — COMMUNICATION - HEALTHEAST (OUTPATIENT)
Dept: GERIATRIC MEDICINE | Facility: CLINIC | Age: 75
End: 2020-10-23

## 2020-10-27 ENCOUNTER — COMMUNICATION - HEALTHEAST (OUTPATIENT)
Dept: CARDIOLOGY | Facility: CLINIC | Age: 75
End: 2020-10-27

## 2020-10-27 DIAGNOSIS — K59.01 SLOW TRANSIT CONSTIPATION: ICD-10-CM

## 2020-10-28 ASSESSMENT — ACTIVITIES OF DAILY LIVING (ADL)
DEPENDENT_IADLS:: CLEANING;COOKING;LAUNDRY;SHOPPING;MEAL PREPARATION;MEDICATION MANAGEMENT;MONEY MANAGEMENT;INCONTINENCE;TRANSPORTATION

## 2020-12-01 ENCOUNTER — OFFICE VISIT - HEALTHEAST (OUTPATIENT)
Dept: CARDIOLOGY | Facility: CLINIC | Age: 75
End: 2020-12-01

## 2020-12-01 ENCOUNTER — COMMUNICATION - HEALTHEAST (OUTPATIENT)
Dept: CARDIOLOGY | Facility: CLINIC | Age: 75
End: 2020-12-01

## 2020-12-01 DIAGNOSIS — I10 BENIGN ESSENTIAL HYPERTENSION: ICD-10-CM

## 2020-12-04 ENCOUNTER — RECORDS - HEALTHEAST (OUTPATIENT)
Dept: ADMINISTRATIVE | Facility: OTHER | Age: 75
End: 2020-12-04

## 2020-12-07 ENCOUNTER — OFFICE VISIT - HEALTHEAST (OUTPATIENT)
Dept: FAMILY MEDICINE | Facility: CLINIC | Age: 75
End: 2020-12-07

## 2020-12-07 DIAGNOSIS — T78.40XS ALLERGY, SEQUELA: ICD-10-CM

## 2020-12-07 DIAGNOSIS — I63.20 CEREBROVASCULAR ACCIDENT (CVA) DUE TO OCCLUSION OF PRECEREBRAL ARTERY (H): ICD-10-CM

## 2020-12-07 DIAGNOSIS — I10 BENIGN ESSENTIAL HYPERTENSION: ICD-10-CM

## 2020-12-07 DIAGNOSIS — E78.2 MIXED HYPERLIPIDEMIA: ICD-10-CM

## 2020-12-07 DIAGNOSIS — I48.0 PAROXYSMAL ATRIAL FIBRILLATION (H): ICD-10-CM

## 2020-12-07 DIAGNOSIS — Z00.00 ROUTINE GENERAL MEDICAL EXAMINATION AT A HEALTH CARE FACILITY: ICD-10-CM

## 2020-12-07 ASSESSMENT — MIFFLIN-ST. JEOR: SCORE: 1494.77

## 2020-12-07 ASSESSMENT — PATIENT HEALTH QUESTIONNAIRE - PHQ9: SUM OF ALL RESPONSES TO PHQ QUESTIONS 1-9: 8

## 2020-12-08 ENCOUNTER — COMMUNICATION - HEALTHEAST (OUTPATIENT)
Dept: SCHEDULING | Facility: CLINIC | Age: 75
End: 2020-12-08

## 2020-12-08 ENCOUNTER — AMBULATORY - HEALTHEAST (OUTPATIENT)
Dept: LAB | Facility: HOSPITAL | Age: 75
End: 2020-12-08

## 2020-12-08 DIAGNOSIS — I63.20 CEREBROVASCULAR ACCIDENT (CVA) DUE TO OCCLUSION OF PRECEREBRAL ARTERY (H): ICD-10-CM

## 2020-12-08 DIAGNOSIS — E78.2 MIXED HYPERLIPIDEMIA: ICD-10-CM

## 2020-12-08 DIAGNOSIS — I48.0 PAROXYSMAL ATRIAL FIBRILLATION (H): ICD-10-CM

## 2020-12-08 LAB
ALBUMIN SERPL-MCNC: 3.8 G/DL (ref 3.5–5)
ALP SERPL-CCNC: 94 U/L (ref 45–120)
ALT SERPL W P-5'-P-CCNC: 36 U/L (ref 0–45)
ANION GAP SERPL CALCULATED.3IONS-SCNC: 12 MMOL/L (ref 5–18)
AST SERPL W P-5'-P-CCNC: 18 U/L (ref 0–40)
BILIRUB SERPL-MCNC: 0.4 MG/DL (ref 0–1)
BUN SERPL-MCNC: 13 MG/DL (ref 8–28)
CALCIUM SERPL-MCNC: 9.2 MG/DL (ref 8.5–10.5)
CHLORIDE BLD-SCNC: 104 MMOL/L (ref 98–107)
CHOLEST SERPL-MCNC: 264 MG/DL
CO2 SERPL-SCNC: 26 MMOL/L (ref 22–31)
CREAT SERPL-MCNC: 0.58 MG/DL (ref 0.6–1.1)
ERYTHROCYTE [DISTWIDTH] IN BLOOD BY AUTOMATED COUNT: 15.3 % (ref 11–14.5)
FASTING STATUS PATIENT QL REPORTED: YES
GFR SERPL CREATININE-BSD FRML MDRD: >60 ML/MIN/1.73M2
GLUCOSE BLD-MCNC: 89 MG/DL (ref 70–125)
HBA1C MFR BLD: 5.7 %
HCT VFR BLD AUTO: 41.8 % (ref 35–47)
HDLC SERPL-MCNC: 62 MG/DL
HGB BLD-MCNC: 13 G/DL (ref 12–16)
LDLC SERPL CALC-MCNC: 188 MG/DL
MCH RBC QN AUTO: 26.1 PG (ref 27–34)
MCHC RBC AUTO-ENTMCNC: 31.1 G/DL (ref 32–36)
MCV RBC AUTO: 84 FL (ref 80–100)
PLATELET # BLD AUTO: 154 THOU/UL (ref 140–440)
PMV BLD AUTO: 12.6 FL (ref 8.5–12.5)
POTASSIUM BLD-SCNC: 3.6 MMOL/L (ref 3.5–5)
PROT SERPL-MCNC: 7.3 G/DL (ref 6–8)
RBC # BLD AUTO: 4.98 MILL/UL (ref 3.8–5.4)
SODIUM SERPL-SCNC: 142 MMOL/L (ref 136–145)
TRIGL SERPL-MCNC: 72 MG/DL
WBC: 5.1 THOU/UL (ref 4–11)

## 2020-12-11 ENCOUNTER — COMMUNICATION - HEALTHEAST (OUTPATIENT)
Dept: FAMILY MEDICINE | Facility: CLINIC | Age: 75
End: 2020-12-11

## 2020-12-11 ENCOUNTER — COMMUNICATION - HEALTHEAST (OUTPATIENT)
Dept: SCHEDULING | Facility: CLINIC | Age: 75
End: 2020-12-11

## 2020-12-11 DIAGNOSIS — E78.2 MIXED HYPERLIPIDEMIA: ICD-10-CM

## 2020-12-14 ENCOUNTER — COMMUNICATION - HEALTHEAST (OUTPATIENT)
Dept: CARDIOLOGY | Facility: CLINIC | Age: 75
End: 2020-12-14

## 2020-12-14 ENCOUNTER — COMMUNICATION - HEALTHEAST (OUTPATIENT)
Dept: SCHEDULING | Facility: CLINIC | Age: 75
End: 2020-12-14

## 2020-12-14 DIAGNOSIS — I10 HTN (HYPERTENSION): ICD-10-CM

## 2020-12-17 ENCOUNTER — COMMUNICATION - HEALTHEAST (OUTPATIENT)
Dept: FAMILY MEDICINE | Facility: CLINIC | Age: 75
End: 2020-12-17

## 2020-12-17 DIAGNOSIS — F41.9 ANXIETY DUE TO INVASIVE PROCEDURE: ICD-10-CM

## 2021-01-05 ENCOUNTER — COMMUNICATION - HEALTHEAST (OUTPATIENT)
Dept: FAMILY MEDICINE | Facility: CLINIC | Age: 76
End: 2021-01-05

## 2021-01-05 DIAGNOSIS — K21.9 GASTROESOPHAGEAL REFLUX DISEASE, UNSPECIFIED WHETHER ESOPHAGITIS PRESENT: ICD-10-CM

## 2021-01-15 ENCOUNTER — COMMUNICATION - HEALTHEAST (OUTPATIENT)
Dept: CARDIOLOGY | Facility: CLINIC | Age: 76
End: 2021-01-15

## 2021-01-15 ENCOUNTER — COMMUNICATION - HEALTHEAST (OUTPATIENT)
Dept: FAMILY MEDICINE | Facility: CLINIC | Age: 76
End: 2021-01-15

## 2021-01-15 DIAGNOSIS — F41.9 ANXIETY DUE TO INVASIVE PROCEDURE: ICD-10-CM

## 2021-01-22 ENCOUNTER — OFFICE VISIT - HEALTHEAST (OUTPATIENT)
Dept: CARDIOLOGY | Facility: CLINIC | Age: 76
End: 2021-01-22

## 2021-01-22 ENCOUNTER — COMMUNICATION - HEALTHEAST (OUTPATIENT)
Dept: CARDIOLOGY | Facility: CLINIC | Age: 76
End: 2021-01-22

## 2021-01-22 DIAGNOSIS — I10 HTN (HYPERTENSION): ICD-10-CM

## 2021-02-03 ENCOUNTER — RECORDS - HEALTHEAST (OUTPATIENT)
Dept: ADMINISTRATIVE | Facility: OTHER | Age: 76
End: 2021-02-03

## 2021-02-17 ENCOUNTER — COMMUNICATION - HEALTHEAST (OUTPATIENT)
Dept: CARDIOLOGY | Facility: CLINIC | Age: 76
End: 2021-02-17

## 2021-02-26 ENCOUNTER — COMMUNICATION - HEALTHEAST (OUTPATIENT)
Dept: CARDIOLOGY | Facility: CLINIC | Age: 76
End: 2021-02-26

## 2021-03-01 ENCOUNTER — OFFICE VISIT - HEALTHEAST (OUTPATIENT)
Dept: CARDIOLOGY | Facility: CLINIC | Age: 76
End: 2021-03-01

## 2021-03-01 ASSESSMENT — MIFFLIN-ST. JEOR: SCORE: 1485.78

## 2021-03-08 ENCOUNTER — COMMUNICATION - HEALTHEAST (OUTPATIENT)
Dept: CARDIOLOGY | Facility: CLINIC | Age: 76
End: 2021-03-08

## 2021-03-08 DIAGNOSIS — F41.9 ANXIETY DUE TO INVASIVE PROCEDURE: ICD-10-CM

## 2021-03-09 ENCOUNTER — OFFICE VISIT - HEALTHEAST (OUTPATIENT)
Dept: FAMILY MEDICINE | Facility: CLINIC | Age: 76
End: 2021-03-09

## 2021-03-09 DIAGNOSIS — I48.0 PAROXYSMAL ATRIAL FIBRILLATION (H): ICD-10-CM

## 2021-03-09 DIAGNOSIS — R21 RASH: ICD-10-CM

## 2021-03-10 LAB
ATRIAL RATE - MUSE: 147 BPM
DIASTOLIC BLOOD PRESSURE - MUSE: NORMAL
INTERPRETATION ECG - MUSE: NORMAL
P AXIS - MUSE: NORMAL
PR INTERVAL - MUSE: NORMAL
QRS DURATION - MUSE: 92 MS
QT - MUSE: 294 MS
QTC - MUSE: 458 MS
R AXIS - MUSE: 10 DEGREES
SYSTOLIC BLOOD PRESSURE - MUSE: NORMAL
T AXIS - MUSE: 142 DEGREES
VENTRICULAR RATE- MUSE: 146 BPM

## 2021-03-12 ENCOUNTER — OFFICE VISIT - HEALTHEAST (OUTPATIENT)
Dept: CARDIOLOGY | Facility: CLINIC | Age: 76
End: 2021-03-12

## 2021-03-12 DIAGNOSIS — I48.0 PAROXYSMAL ATRIAL FIBRILLATION (H): ICD-10-CM

## 2021-03-12 DIAGNOSIS — I10 BENIGN ESSENTIAL HYPERTENSION: ICD-10-CM

## 2021-03-12 DIAGNOSIS — G47.33 OSA (OBSTRUCTIVE SLEEP APNEA): ICD-10-CM

## 2021-03-12 ASSESSMENT — MIFFLIN-ST. JEOR: SCORE: 1485.7

## 2021-05-05 ENCOUNTER — COMMUNICATION - HEALTHEAST (OUTPATIENT)
Dept: GERIATRIC MEDICINE | Facility: CLINIC | Age: 76
End: 2021-05-05

## 2021-05-06 ENCOUNTER — COMMUNICATION - HEALTHEAST (OUTPATIENT)
Dept: GERIATRIC MEDICINE | Facility: CLINIC | Age: 76
End: 2021-05-06

## 2021-05-17 ENCOUNTER — OFFICE VISIT - HEALTHEAST (OUTPATIENT)
Dept: FAMILY MEDICINE | Facility: CLINIC | Age: 76
End: 2021-05-17

## 2021-05-17 DIAGNOSIS — I10 ACCELERATED HYPERTENSION: ICD-10-CM

## 2021-05-17 DIAGNOSIS — M79.89 LEG SWELLING: ICD-10-CM

## 2021-05-17 DIAGNOSIS — I48.0 PAROXYSMAL ATRIAL FIBRILLATION (H): ICD-10-CM

## 2021-05-17 ASSESSMENT — PATIENT HEALTH QUESTIONNAIRE - PHQ9: SUM OF ALL RESPONSES TO PHQ QUESTIONS 1-9: 1

## 2021-05-18 ENCOUNTER — COMMUNICATION - HEALTHEAST (OUTPATIENT)
Dept: CARDIOLOGY | Facility: CLINIC | Age: 76
End: 2021-05-18

## 2021-05-26 VITALS — SYSTOLIC BLOOD PRESSURE: 120 MMHG | OXYGEN SATURATION: 98 % | HEART RATE: 60 BPM | DIASTOLIC BLOOD PRESSURE: 80 MMHG

## 2021-05-26 ASSESSMENT — PATIENT HEALTH QUESTIONNAIRE - PHQ9
SUM OF ALL RESPONSES TO PHQ QUESTIONS 1-9: 10
SUM OF ALL RESPONSES TO PHQ QUESTIONS 1-9: 0
SUM OF ALL RESPONSES TO PHQ QUESTIONS 1-9: 1

## 2021-05-27 ASSESSMENT — PATIENT HEALTH QUESTIONNAIRE - PHQ9
SUM OF ALL RESPONSES TO PHQ QUESTIONS 1-9: 8
SUM OF ALL RESPONSES TO PHQ QUESTIONS 1-9: 1

## 2021-05-27 NOTE — PROGRESS NOTES
Assessment/Plan:     1. Benign essential hypertension     2. Mixed hyperlipidemia  Lipid [place] FASTING    Comprehensive Metabolic Panel   3. Controlled substance agreement signed  Drugs of Abuse 1,Urine    CANCELED: Drugs of Abuse 1,Urine   4. Paroxysmal atrial fibrillation (H)     5. Prediabetes  Glycosylated Hemoglobin A1c   6. BELA (obstructive sleep apnea)     7. Osteoarthritis of both knees, unspecified osteoarthritis type  Drugs of Abuse 1,Urine       Diagnoses and all orders for this visit:    Benign essential hypertension  Blood pressure has been under much better control.  She will continue current medications.  She does have concern about metoprolol causing bradycardia.  Discussed this is also meant to help manage her atrial fibrillation.  She does have appointment to see her cardiologist in about 2 weeks.  She will discuss these concerns at that time.  Otherwise recommend follow-up in 6 months.  She is difficult blood draw and prefers to have labs drawn at the hospital.  Future lab orders placed for conference of metabolic panel and lipid [place]    Mixed hyperlipidemia  -     Lipid [place] FASTING  -     Comprehensive Metabolic Panel  -She will continue simvastatin    Controlled substance agreement signed  -Discussed that [facility] policy is for patient to receive regular prescriptions for controlled substances to have a controlled substance agreement.  This was reviewed with her today in clinic and signed.  She has been stable on current regimen for over a year.  Does not experience significant adverse side effects and does get relief of her pain with this pain medication and improvement in function and quality of life.  Agreed to continue to provide Vicodin tablets.  Discussed need for urine drug screen today as well.  Follow-up in 6 months for medication check.  -     Drugs of Abuse 1,Urine    Paroxysmal atrial fibrillation (H)  Continue Eliquis for anticoagulation.  She is on metoprolol for  rate control.  She is planning to follow-up with cardiologist in April    Prediabetes  -     Glycosylated Hemoglobin A1c  -Weight is down 6 pounds.  Future order placed for A1c.  Continue efforts at weight loss.  Continue healthy diet    BELA (obstructive sleep apnea)  Continue CPAP.  Form was completed for [employer]    Osteoarthritis of both knees, unspecified osteoarthritis type  -     Drugs of Abuse 1,Urine  -She will continue Vicodin did help manage pain.  Has tried and failed multiple conservative treatments for osteoarthritis.  She will continue acetaminophen as well as topical Voltaren gel.             The following high BMI interventions were performed this visit: lifestyle education regarding diet    Subjective:      Yecenia Kwan is a 73 y.o. female who comes in today to follow-up on hypertension, chronic pain and for completion of paperwork.  She is accompanied today by her son.  She was last seen in clinic in November.  She has actually been doing quite well and has been stable over the past few months.  She has sleep apnea and uses a CPAP machine.  Has a form from her electric company that she would like filled out so that her electricity will not be turned off.  She is concerned about losing her electricity and not being able to use her CPAP machine.  She continues on all of her medications.  We reviewed and updated these in the chart.  She is back on Eliquis 5 mg twice daily for anticoagulation.  Had been prescribed Xarelto but felt this was causing side effects to return to Eliquis.  She does have appointment to see her cardiologist in about 2 weeks.  She is monitoring her blood pressure at home.  Systolic readings are typically between 140 and 150.  She rarely needs to use her hydralazine.  They do report some concern about lower pulse.  She does report feeling lightheaded at times but no significant increase in fatigue.  No syncopal episodes.  She did see her neurologist in December.  She was  advised to discontinue aspirin.  She has been doing some exercises at home.  She has lost about 6 pounds.  She has bilateral knee pain due to osteoarthritis of the knees.  Has been advised to have knee replacement surgery.  Has failed multiple conservative treatments including steroid injections.  Continues on Vicodin 3 tablets/day to manage her pain.  Does not experience side effects with this medication.  No significant drowsiness.  Occasional constipation but uses prune juice to keep bowel movements regular.  She also uses acetaminophen.  She still continues to get some headaches.  Describes a pressure in the back of her head.  Denies concerns with palpitations, chest pain or pressure.  No increased dyspnea.  No increased lower extremity edema.  Review of systems assessed and otherwise negative.  No other questions today.    Current Outpatient Medications   Medication Sig Dispense Refill     apixaban (ELIQUIS) 5 mg Tab tablet Take 1 tablet (5 mg total) by mouth 2 (two) times a day. 180 tablet 3     blood glucose test (ONETOUCH ULTRA BLUE TEST STRIP) strips bid        blood pressure monitor Kit Use to monitor blood pressure  up to 3 times a day 1 each 0     diclofenac sodium (VOLTAREN) 1 % Gel Lower extremities: 4 g to the affected area 4 times daily using dosing card.  Do not exceed 32 g total dose per day. 100 g 3     hydrALAZINE (APRESOLINE) 25 MG tablet TAKE 1 TABLET BY MOUTH FOUR TIMES DAILY as needed FOR BLOOD PRESSURE GREATER THAN 160/100.. 120 tablet 3     HYDROcodone-acetaminophen (NORCO )  mg per tablet Take 1 tablet by mouth every 6 (six) hours as needed for pain. 90 tablet 0     metoprolol succinate (TOPROL XL) 100 MG 24 hr tablet Take 1 tablet (100 mg total) by mouth daily. 90 tablet 3     olmesartan (BENICAR) 40 MG tablet Take 1 tablet (40 mg total) by mouth daily. 90 tablet 3     omeprazole (PRILOSEC) 40 MG capsule TAKE 1 CAPSULE(40 MG) BY MOUTH DAILY BEFORE BREAKFAST 90 capsule 3      "simvastatin (ZOCOR) 40 MG tablet Take 1 tablet (40 mg total) by mouth every evening. 90 tablet 3     triamcinolone (KENALOG) 0.1 % cream Apply topically 2 (two) times a day. As needed for rash 30 g 3     fluticasone (ALLERGY RELIEF, FLUTICASONE,) 50 mcg/actuation nasal spray 2 sprays into each nostril daily as needed for rhinitis. 16 g 12     polyethylene glycol (GLYCOLAX) 17 gram/dose powder Take 17 g by mouth daily. 1700 g 3     No current facility-administered medications for this visit.        Past Medical History, Family History, and Social History reviewed.  Past Medical History:   Diagnosis Date     Anxiety      Arthritis      Atrial fibrillation (H)      Cellulitis of left leg      CVA (cerebral vascular accident) (H)      GERD (gastroesophageal reflux disease)      Head injury      Hx of nicotine dependence      Hyperlipemia      Hypertension      Proteinuria      Past Surgical History:   Procedure Laterality Date     CT ARTHROTOMY/EXPLORE/TREAT KNEE JOINT      Description: Arthrotomy Of Knee With Joint Exploration;  Recorded: 07/29/2008;  Comments: due to \"accident\" and infection     CT CORRJ HALLUX VALGUS W/SESMDC W/DIST METAR OSTEOT      Description: Bunion Correction With Metatarsal Osteotomy;  Recorded: 07/29/2008;     Patient has no known allergies.  Family History   Problem Relation Age of Onset     Diabetes Mother      Prostate cancer Father      Social History     Socioeconomic History     Marital status: Single     Spouse name: Not on file     Number of children: Not on file     Years of education: Not on file     Highest education level: Not on file   Occupational History     Not on file   Social Needs     Financial resource strain: Not on file     Food insecurity:     Worry: Not on file     Inability: Not on file     Transportation needs:     Medical: Not on file     Non-medical: Not on file   Tobacco Use     Smoking status: Never Smoker     Smokeless tobacco: Never Used   Substance and Sexual " Activity     Alcohol use: No     Drug use: No     Sexual activity: Not on file   Lifestyle     Physical activity:     Days per week: Not on file     Minutes per session: Not on file     Stress: Not on file   Relationships     Social connections:     Talks on phone: Not on file     Gets together: Not on file     Attends [employer] service: Not on file     Active member of club or organization: Not on file     Attends meetings of clubs or organizations: Not on file     Relationship status: Not on file     Intimate partner violence:     Fear of current or ex partner: Not on file     Emotionally abused: Not on file     Physically abused: Not on file     Forced sexual activity: Not on file   Other Topics Concern     Not on file   Social History Narrative    Lives with grandson, and is exposed to second hand smoke.         Review of systems is as stated in HPI, and the remainder of the 10 system review is otherwise negative.    Objective:     Vitals:    03/28/19 1111   BP: 140/82   Patient Site: Left Arm   Patient Position: Sitting   Cuff Size: Adult Large   Pulse: (!) 51   Temp: 97.4  F (36.3  C)   TempSrc: Oral   SpO2: 99%   Weight: (!) 242 lb (109.8 kg)    Body mass index is 42.87 kg/m .    General appearance: alert, appears stated age and cooperative  Head: Normocephalic, without obvious abnormality, atraumatic  Lungs: clear to auscultation bilaterally  Heart: regular rate and rhythm, S1, S2 normal, no murmur, click, rub or gallop  Extremities: extremities normal, atraumatic, no cyanosis or edema  Pulses: 2+ and symmetric           This note has been dictated using voice recognition software. Any grammatical or context distortions are unintentional and inherent to the the software.

## 2021-05-27 NOTE — PATIENT INSTRUCTIONS - HE
Continue to work on weight loss    Continue current medications    Get labs drawn at [facility]

## 2021-05-29 NOTE — PROGRESS NOTES
"Subjective:      Patient ID: Yecenia Kwan is a 74 y.o. female.    Chief Complaint:    HPI  Yecenia Kwan is a 74 y.o. female who has had a past medical history for 2 CVAs as well as hypertension, hyperlipidemia, atrial fibrillation who presents today complaining of complaint for a urinary tract infection over the last 3 days.  The mother is accompanied by her daughter in the office encounter who helps with the history.  She states that she has been taking her high blood pressure medication.  At this time, she has no shortness of breath or dyspnea on exertion no fever chills or night sweats she has had some diaphoresis but no overt nausea or vomiting she has had bilateral shoulder pain.  The shoulder pain has been under the right arm and is infrascapular into the left shoulder.  The daughter shares that she has had rotator cuff injuries chronically.  The patient does not admit to having painful urination.  The shoulder pain does wake her up at night.    In regards to her urinary symptoms the patient states that she has not had burning with urination but more frequency with urination.  No gross hematuria flank or back pain.  No noted urinary frequency or urgency or urinary incontinence according to the patient.  Largely, the patient is not describing any urinary tract infection symptoms but the daughter was concerned that she may be \"having a urinary tract infection\".  The daughter shares that the mother was just recently treated for urinary tract infection in June 11.    Patient lives with the daughter who accompanies her on the appointment today and she helps with the history in the office.    Cardiovascular risk factors:  Female 74 years old  Positive hypertension  Positive hyperlipidemia  Positive atrial fibrillation  Positive past medical history of CVA x2  Positive obstructive sleep apnea  Positive depression  Positive anxiety  Positive prediabetes  Positive obesity    Past Medical History:   Diagnosis Date " "    Anxiety      Arthritis      Atrial fibrillation (H)      Cellulitis of left leg      CVA (cerebral vascular accident) (H)      GERD (gastroesophageal reflux disease)      Head injury      Hx of ischemic left MCA stroke 12/7/2015    posterior aspect of the left lobe involving middle frontal gyrus   12/2015      Hx of nicotine dependence      Hyperlipemia      Hypertension      Proteinuria        Past Surgical History:   Procedure Laterality Date     AK ARTHROTOMY/EXPLORE/TREAT KNEE JOINT      Description: Arthrotomy Of Knee With Joint Exploration;  Recorded: 07/29/2008;  Comments: due to \"accident\" and infection     AK CORRJ HALLUX VALGUS W/SESMDC W/DIST METAR OSTEOT      Description: Bunion Correction With Metatarsal Osteotomy;  Recorded: 07/29/2008;       Family History   Problem Relation Age of Onset     Diabetes Mother      Prostate cancer Father        Social History     Tobacco Use     Smoking status: Never Smoker     Smokeless tobacco: Never Used   Substance Use Topics     Alcohol use: No     Drug use: No       Review of Systems  As above in HPI, otherwise balance of Review of Systems are negative.    Objective:     BP (!) 193/92 (Patient Position: Sitting)   Pulse (!) 55   Temp 98.2  F (36.8  C) (Oral)   Resp 19   SpO2 98%   Breastfeeding? No     Physical Exam  General: Patient is resting comfortably no acute distress is afebrile  Patient is alert and oriented to person place and time.  She is ambulating with a help of a wheeled walker.  She does have expressive aphasia listed as a diagnoses but she is actually answering my questions clearly, albeit slowly  HEENT: Head is normocephalic atraumatic   eyes are PERRL EOMI sclera anicteric   There appears to be both direct and consensual stimulation to light no nystagmus  TMs are clear bilaterally  Throat is clear  No cervical lymphadenopathy present  LUNGS: Clear to auscultation bilaterally  HEART: Regular rate and rhythm with cardiac rate of 61 " "bpm.  Skin: Without rash non-diaphoretic  Musculoskeletal: She is able to raise both hands at least to 90 degrees at shoulder height and then slightly higher to about 100 degrees but then complains of shoulder pain located to the left and right shoulders. She also describes \"shoulder\" pain under the right axillae and on the right lateral chest wall and not into the shoulder joint itself.  Gentle passive range of motion is to 90 degrees of forward flexion and 90 degrees of abduction without pain.     Lab:  Recent Results (from the past 24 hour(s))   Urinalysis-UC if Indicated   Result Value Ref Range    Color, UA Yellow Colorless, Yellow, Straw, Light Yellow    Clarity, UA Clear Clear    Glucose, UA Negative Negative    Bilirubin, UA Negative Negative    Ketones, UA Negative Negative    Specific Gravity, UA 1.015 1.005 - 1.030    Blood, UA Negative Negative    pH, UA 7.0 5.0 - 8.0    Protein,  mg/dL (!) Negative mg/dL    Urobilinogen, UA 1.0 E.U./dL 0.2 E.U./dL, 1.0 E.U./dL    Nitrite, UA Negative Negative    Leukocytes, UA Negative Negative   Electrocardiogram Perform - Clinic   Result Value Ref Range    SYSTOLIC BLOOD PRESSURE  mmHg    DIASTOLIC BLOOD PRESSURE  mmHg    VENTRICULAR RATE 61 BPM    ATRIAL RATE 61 BPM    P-R INTERVAL  ms    QRS DURATION 74 ms    Q-T INTERVAL 434 ms    QTC CALCULATION (BEZET) 436 ms    P Axis  degrees    R AXIS 141 degrees    T AXIS 148 degrees    MUSE DIAGNOSIS       ** Suspect arm lead reversal, interpretation assumes no reversal  Sinus rhythm with Premature atrial complexes  Lateral infarct , age undetermined  Abnormal ECG  When compared with ECG of 24-SEP-2018 23:15,  Premature atrial complexes are now Present  QRS axis Shifted right  Lateral infarct is now Present  T wave inversion now evident in Lateral leads             Assessment:     Procedures    1. Urinary frequency  Urinalysis-UC if Indicated   2. Benign essential hypertension  Electrocardiogram Perform - Clinic   3. " Acute pain of both shoulders  Electrocardiogram Perform - Clinic   4. Pre-diabetes  Urine,Microscopic   5. Proteinuria, unspecified type         Plan:     I am concerned that the patient is giving a description of the potential more chest wall and chest pain that could be cardiac related rather than a rotator cuff injury or pain and urinary tract infection symptoms.  Additionally the patient's urine was negative with the exception of protein.  Patient also stated that she did not have any burning with urination.    Patient was seen in the clinic and was evaluated in a timely manner.  Multiple etiologies and diagnoses were considered.  Because the patient did drop off a urine sample right away the urine did come back preliminarily as only proteinuria.    Multiple etiologies and diagnoses were considered.  Because the patient does have quite a bit of cardiovascular risk factors, has elevated hypertension and is symptomatic with reported diaphoresis and shoulder pain that is left and right shoulders and infrascapular as well as manubrium or central chest pain, I am concerned that her elevated blood pressure despite use of her hypertensive medications does put her at risk for either a stroke or for work-up for further cardiac diagnoses.  Therefore I called and spoke with the ER attending physician Dr. Kirkland, and the daughter will take the patient by personal vehicle for definitive evaluation treatment at the [facility].

## 2021-05-29 NOTE — TELEPHONE ENCOUNTER
----- Message from Kierra Joe sent at 2019  9:45 AM CDT -----  Contact: Pt's daughter Estrellita  General phone call:    Caller: Pt's daughter  Primary cardiologist: Sher  Detailed reason for call: Pt was seen in  ED yesterday for high bp and uti- sched fu w [facility] first avail on - daughter has ques re appt and lab work orders  New or active symptoms?   Best phone number: Daughter Estrellita: [phone]  Best time to contact:   Ok to leave a detailed message?   Device? No    Additional Info:           Called back patient's daughter, Estrellita, to address concerns. They were in the ER yesterday for high BP. She thinks that hydralazine does not work for her mother anymore. Records state she is on hydralazine as needed 25 mg 4x a day for BP greater than 160/90. She saw [facility] last in 2018 and was to follow up in 3 months and had not yet. They want another order for repeat Lipids to have done before seeing Dr. Olivarez. New Lipid panel put in as previous had . They will use walk-in Lab at [facility]. Per AVS from ER yesterday- pt was due to follow up with PMD today or tomorrow. They have not scheduled that appointment and question if they should. Encouraged them to please make this appt to address high BP and the BP can be adjusted by PMD. RAC spots are booked until Friday at this time. They wonder if patient can see another cardiologist as it seems Dr. Olivarez is always booked out- explained that they all book out quickly and this is why the ER wants them to see PMD prior to seeing cardiologist to make sure they are seen and follow up on. They stated that BP is 137/90 this AM and HR is 120 on monitor. Explained that HR is not always accurate on monitor if patient has a hx of paroxsymal afib- which she does. Encouraged them to please follow up on PMD appt today or tomorrow so that this can be addressed. Estrellita expressed frustration that doctors do not have time to spend with patients and come in  "and out of rooms quickly- she was speaking about neurology. In last note with Dr. Olivarez in November, there was mention of patient seeing Neurology to see if hydralazine is safe to increase. She states they saw neurology but they recommended another drug, but she can't remember what it was. Again, strongly encouraged patient's daughter to get appt to see PMD today or tomorrow so needs can be addressed in a more urgent manner.         Dr. Olivarez-  For your return: Estrellita called in as her mother was in the ER for high blood pressure yesterday. They got an appt with you July 31st. Pt's BP is high and prn hydralazine does not seem to be as effective for her anymore. Dose is 25 mg 4x a day as needed for BP greater than 160/100. In your last note  11/27/2018, you mentioned increasing dose if OK with neuro, Dr. Rizo. Pt saw him 12/26/2018 and from the note in media tab, it says that he would recommend adding lisinopril to achieve lower BP but does not say dose and was not prescribed. I have deferred them to PMD this week in your absence and also per AVS recommendations from ER doctor to follow up with primary. This is for your return. Neuro's note is in Media tab in Epic dated 12/27/2018 and the document type reads \"Outside Order\".   Thanks,  Mal     "

## 2021-05-29 NOTE — TELEPHONE ENCOUNTER
Daughter (Estrellita) is the caller.  Pt dx with UTI 6/11/19 and has been on Nitrofurantoin, pt decreased taking two times daily to one time daily as she felt this antibiotic is causing her BP to be elevated.  Pt takes Metoprolol daily of which she has taken her dose today, and if BP > 165 systolic will take Hydralazine 25 mg four times daily as needed for this.  Pt took an Hydralazine last narcisa as -190 systolic.  This morning even with daily Metoprolol and Hydralazine 25 mg, /82 and 218/94.  No chest pain or shortness of breath, pt has a slight headache.   Daughter states pt has hx stroke x 2, neuro signs are her baseline, PEÑA's.  Daughter intends to take pt to [facility] now.  Renita Reed RN, [facility] RN Triage Nurse Advisor      Reason for Disposition    [1] Systolic BP  >= 200 OR Diastolic >= 120  AND [2] having NO cardiac or neurologic symptoms    Protocols used: HIGH BLOOD PRESSURE-A-

## 2021-05-29 NOTE — PROGRESS NOTES
1. Urinary tract infection with hematuria, site unspecified    Urinalysis does show that she has a UTI, so we put her on nitrofurantoin 100 mg twice a day for 10 days.  Good hydration encouraged as well.  We will do a culture and make sure that it is sensitive to this medication and follow-up accordingly.    - Urinalysis-UC if Indicated  - nitrofurantoin (MACRODANTIN) 100 MG capsule; Take 1 capsule (100 mg total) by mouth 2 (two) times a day for 10 days.  Dispense: 20 capsule; Refill: 0  - Culture, Urine    2. Primary osteoarthritis of both knees    We do lengthy discussion with the patient and her daughter regarding her pain management program.  I informed them that I do not do chronic narcotic therapy for patients.  I am willing to send her to the pain clinic for evaluation and management of pain medications.  I am also willing to give her a 1 or 2-month supply of the medication until she is able to get in with the pain clinic but I would not be willing to get them chronic narcotics long-term.  They are understanding of my position and are willing to accept going to a pain clinic for further made medication management.  I did give her 1 months worth today, being #90, with no refills and I did put in a referral for an urgent referral to the pain clinic.    - HYDROcodone-acetaminophen (NORCO )  mg per tablet; Take 1 tablet by mouth every 6 (six) hours as needed for pain.  Dispense: 90 tablet; Refill: 0  - Ambulatory referral to Pain Clinic    3. Paroxysmal atrial fibrillation (H)    I like her to see cardiology again because is been a bit since she has been seen by cardiologist.  She does have paroxysmal atrial fibrillation which was confirmed here because she was in it when she came here and then in a sinus rhythm when she left.  We will have to watch that in accordance with her blood pressure management and hopefully the cardiologist can help us out with that as well.  She is on a blood thinner as  mentioned which is good and she will continue to stay on that medication.    - Ambulatory referral to Cardiology    4. Cerebrovascular accident (CVA) due to occlusion of precerebral artery (H)    5. Expressive aphasia    These problems are related and she certainly can express herself, and just takes a little bit of extra time but she becomes frustrated pretty quickly when she can cut her point across in a timely manner.    So overall, I will send her to the pain clinic, get her to cardiology as well.  I like to see her again in about a month here to make sure that she is improving, and she is not getting better after her urinary tract infection treatment she can return and see us again.    25 minutes total time spent together with the patient today, more than 50% of that time spent in counseling, discussing the above topics.         Subjective: 74-year-old female new patient to our clinic but she has been seen by family practice in [facility].  She is transferring her care to us here.  She has multiple problems which are outlined above.    Most important today, there is that she has had some painful urination and frequency of urination over the last week or so.  She denies any fevers or chills..  She denies any flank pain.  She is not really sure if there is been any blood in her urine but she states that it has been a dark color.  She has had these in the past and often needed antibiotics for them.    She has chronic pain syndrome mostly it seems related to her knees but she also has upper back pain and shoulder pain the patient points to several areas of her body that chronically give her pain.  She is on Vicodin 10 mg 3 times a day taking 90 a month.  Sounds like she has done this for some time.  Informed her as mentioned above that I do not do this long-term and that we would need to get her to a pain clinic and they are willing to go through with this.    She has had a stroke 2 of them in the past which  have left her with expressive a aphasia which makes it a bit difficult to communicate but with patience and time she does get her points across.  Her daughter is often here with her that helps her a lot with her communication needs.     She has paroxysmal atrial fibrillation as well which is no doubt related to the strokes that she had before.  She is on Eliquis daily for blood thinning purposes.  She does go in and out of it with some regularity.  She also has hypertension and she certainly was hypertensive when she came into the office today.    Patient is establishing care with me today. Please see past medical history, surgical history, social history and family history, all of which were completed in their entirety today.      Objective: Well-appearing pleasant female in no acute distress she has the obvious expressive a aphasia.  Vital signs as noted.  Ears are clear eyes and nose are normal.  Chest clear to auscultation.  Heart went from irregularly irregular to more of a regular rhythm during the time that I was in the room with her.  Abdomen is benign.  Extremities without significant edema.

## 2021-05-30 VITALS — BODY MASS INDEX: 44.88 KG/M2 | WEIGHT: 269.7 LBS

## 2021-05-30 VITALS — BODY MASS INDEX: 45.26 KG/M2 | WEIGHT: 272 LBS

## 2021-05-30 VITALS — WEIGHT: 269 LBS | BODY MASS INDEX: 44.76 KG/M2

## 2021-05-30 NOTE — TELEPHONE ENCOUNTER
PMD discontinued hydralazine and starting new medication. She is being followed there for her htn - diltiazem started. Pt has appropriate follow up with [facility] scheduled. -[facility]

## 2021-05-30 NOTE — TELEPHONE ENCOUNTER
Message -----  From: Rachael Olivarez MD  Sent: 6/25/2019   8:51 AM  To: Angela Kelsey RN    If taking benicar should not start lisinopril. Would check if can do benicar 80 and if not take hydralazine 50 three times a day. Agree with lipids and we can make changes. Agree with primakry as we can only address a fib.  LF      Noted that medication changes at PMD office today. Will monitor for changes that were made. -daryl

## 2021-05-30 NOTE — TELEPHONE ENCOUNTER
Refill request for medication: 7/3/19  Last visit addressing this medication: 6/11/19  Follow up plan 1  Months around 7/23/19  Last refill on 6/11/19, quantity #90   CSA completed none    checked  07/03/19, last dispensed refill 6/11/19    Appointment: Not due     Vicki Reynolds, VENUSA

## 2021-05-30 NOTE — PROGRESS NOTES
1. Accelerated hypertension  diltiazem (CARDIZEM CD) 120 MG 24 hr capsule        Plan: At this point I will have her stop the hydralazine as taking on a as needed basis does not seem to be working out well to manage her hypertension.  We will start her on some diltiazem at a low dose of 120 mg a day.  We can follow-up with her in about 4 weeks to make sure that her blood pressure is improvin, and also to make sure she is not having any side effects or problems with the medication.    Otherwise she seems to be doing a lot better than last time we talked.  And we will continue with this new medication and follow-up accordingly in about 4 weeks.    Subjective: 74-year-old female is here with her daughter today.  She is recently in the emergency department for some elevated hypertension.  They suggested that the hydralazine may not be the best way to go about treating her hypertension and I would agree.  She was taking on an as-needed basis and by the time she would take it it would be already too high and we will try something different so we can try to prevent her from getting elevated.  She was having the symptoms as described in the ED note, but she is doing much better.  She is back to what I would consider her baseline.    She had no chest pain or shortness of breath or vision changes or blurry vision or double vision recently.    Objective: Well-appearing female no acute distress.  Vital signs as noted.  Blood pressure continues to be a bit elevated.  Chest clear heart regular rate and rhythm.  Extremities unremarkable.

## 2021-05-30 NOTE — TELEPHONE ENCOUNTER
Controlled Substance Refill Request  Medication:   Requested Prescriptions      No prescriptions requested or ordered in this encounter     Date Last Fill: 06/1119  Pharmacy: [id number] - [place], [place] - [address] AT [facility]  [phone]    Submit electronically to pharmacy  Controlled Substance Agreement on File:   Encounter-Level CSA Scan Date - 04/02/2018:    Scan on 4/5/2018  9:52 AM (below)         Last office visit: 6/25/2019 Zac Girjalva MD    PATIENT WAS ADVISED BY MD TO REQUEST EARLY REFILL PRIOR TO HIM BEING OFF.

## 2021-05-31 ENCOUNTER — RECORDS - HEALTHEAST (OUTPATIENT)
Dept: ADMINISTRATIVE | Facility: CLINIC | Age: 76
End: 2021-05-31

## 2021-05-31 VITALS — BODY MASS INDEX: 47.79 KG/M2 | HEIGHT: 64 IN | WEIGHT: 279.9 LBS

## 2021-05-31 NOTE — PROGRESS NOTES
[facility] Care Coordination Contact    TC from Yecenia's daughter Estrellita. They had an annual reassessment completed yesterday and would like a new care coordinator.   After reviewing the concerns a new care coordinator will be assigned 10/1/19.     I informed Estrellita that they will receive a letter with the new care coordinator's contact.     Rukhsana Parks, [facility]  [phone]

## 2021-05-31 NOTE — PATIENT INSTRUCTIONS - HE
Ms Yecenia Kwan,  I enjoyed visiting with you again today.  I am glad to hear you are doing well.  Per our conversation try the HYDRALAZINE 50 mg three times a day or 2 25 mg tablets in addition to the METOPROLOL and the BENICAR.  If able to tolerate this after a week I want you to try to cut the METOPROLOL in 1/2.  I will plan on seeing you in a few months.  Louie Olivarez

## 2021-05-31 NOTE — TELEPHONE ENCOUNTER
----- Message from Jayla Friend sent at 7/31/2019  4:14 PM CDT -----  Regarding: ORDER FOR 24 HOUR URINE TEST  Contact: [phone]  DR CHOW WOULD LIKE THIS PATIENT TO HAVE A 24 HOUR URINE TEST AND PUT IN WRONG ORDER AND COULDN'T FIGURE OUT WHAT ONE TO ORDER. THE PATIENT WANTS TO GO TO [facility] TO DO URINE TEST. I WANTED TO MAKE SURE ORDER WILL BE IN THERE WHEN THEY GO TO [facility].  THANKS

## 2021-05-31 NOTE — PROGRESS NOTES
[facility] Care Coordination Contact    Called adult daughter Estrellita to schedule annual HRA home visit. HRA has been scheduled for 8/13/19.     Roberto Siegel RN, PHN   [facility]  [phone]

## 2021-05-31 NOTE — PROGRESS NOTES
[facility] Care Coordination Contact    Late entry.     rescheduled for 8/20/19.    Roberto Siegel RN, PHN   [facility]  [phone]

## 2021-05-31 NOTE — PROGRESS NOTES
[facility] Care Coordination Contact    TC from Yecenia's daughter Estrellita. She appeared quite upset about the PCA reduction. The PCA agency had just called and went over the assessment with her and reduction amount.   Estrellita feels that the assessment was done incorrectly and her mom's condition hasn't changed.   I went over the policy and informed Estrellita that she would need to wait to receive the DTR information from [employer] and then she can appeal the decision.   We went over the ADL change of toileting and I explained that needing help getting to the bathroom and off the toilet were already credited in mobility and transferring and don't count for toileting. That brought her from 7 to 6 dependencies and changed her from a high ADL category to medium ADL category.   I e-mailed the PCA assessment to Estrellita to review.     She would like the care coordinator change completed for 9/1. I told her I would assign a new care coordinator next week.     Rukhsana Parks, [facility]  [phone]

## 2021-05-31 NOTE — TELEPHONE ENCOUNTER
"Call made to [facility] lab. Lab employee instructed caller which order is the correct lab according to Dr. Olivarez's notes \"24-hour urine for metanephrines to rule out pheochromocytoma as etiology\". Previous order discontinued.       "

## 2021-05-31 NOTE — PROGRESS NOTES
[facility] Care Coordination Contact    Received after visit chart from care coordinator.  Completed following tasks: Mailed plan of care to member.    [employer]:  Faxed completed PCA assessment to PCA Agency and mailed copies to member.  Faxed MD Communication to PCP.  Emailed referral form for auth to [employer].    Miguel Darnell  Care Management Specialist  [facility]  [phone]

## 2021-05-31 NOTE — TELEPHONE ENCOUNTER
Controlled Substance Refill Request  Medication Name:   Requested Prescriptions     Pending Prescriptions Disp Refills     HYDROcodone-acetaminophen (NORCO )  mg per tablet 90 tablet 0     Sig: Take 1 tablet by mouth every 6 (six) hours as needed for pain.     Date Last Fill: 7/5/19  Pharmacy: [facility]      Submit electronically to pharmacy  Controlled Substance Agreement Date Scanned:   Encounter-Level CSA Scan Date - 04/02/2018:    Scan on 4/5/2018  9:52 AM (below)         Last office visit with prescriber/PCP: 6/25/2019 Zac Grijalva MD OR same dept: 6/25/2019 Zac Grijalva MD OR same specialty: 6/25/2019 Zac Grijalva MD  Last physical: Visit date not found Last MTM visit: Visit date not found      LEO, Daughter reports there was a paper mix getting into the pain clinic. So she is still working on that.

## 2021-05-31 NOTE — TELEPHONE ENCOUNTER
Refill request for medication: 7/31/19  Last visit addressing this medication: 6/11/19  Follow up plan referred to pain clinic   months  Last refill on 7/5/19, quantity #90   CSA completed 3/28/19   checked  07/31/19, last dispensed refill 7/5/19    Appointment: Not due     VENUS RashidA

## 2021-05-31 NOTE — PROGRESS NOTES
[facility] Care Coordination Contact  [facility] Home Visit Assessment     Home visit for Health Risk Assessment with Yecenia CHAO Kwan completed on 8/20/2019    Type of residence:: Private home - stairs(stays on main level)  Current living arrangement:: I live in a private home with family     Assessment completed with:: Patient, Care Team Member, Children    Current Care Plan  Member currently receiving the following home care services:     Member currently receiving the following community resources: County Worker, PCA      Medication Review  Medication reconciliation completed in Epic: NO, family does not keep med bottles or have a list of current meds available. They fill monthly pill box with meds.   Medication set-up & administration: Family/informal caregiver sets up monthly  Family caregiver administers medications  Medication Risk Assessment Medication (1 or more, place referral to MTM):  Unknown as meds were not reviewed  MTM Referral Placed: No: meds not reviewed. Worked with MTM last yr.    Mental/Behavioral Health   Depression Screening: See PHQ assessment flowsheet.   Mental health DX:: Yes(Major depressive disorder)   Mental health DX how managed:: None  Mental Health Diagnosis: Yes: see above  Mental Health Services: None: No further intervention needed at this time.    Falls Assessment:   Fallen 2 or more times in the past year?: No   Any fall with injury in the past year?: No    ADL/IADL Dependencies:   Dependent ADLs:: Ambulation-cane, Ambulation-walker, Bathing, Dressing, Grooming, Positioning, Transfers  Dependent IADLs:: Cleaning, Cooking, Laundry, Shopping, Meal Preparation, Medication Management, Money Management, Transportation    [facility] sponsored benefits: Shared information re: Silver Sneakers/gym memberships, ASA, Calcium +D.    PCA Assessment completed at visit: Yes, PCA assessment indicate 4 hrs per day.    Elderly Waiver Eligibility: Yes, but member declines EW  service; will not open to     Care Plan & Recommendations: member will continue to live with family and continue PCA services with ADLs/IADLs. Family will provide informal support as needed.    See [facility] for detailed assessment information.    Follow-Up Plan: Member informed of future contact, plan to f/u with member with a 6 month telephone assessment.  Contact information shared with member and family, encouraged member to call with any questions or concerns at any time.    Roberto Siegel RN, PHN   [facility]  [phone]

## 2021-05-31 NOTE — TELEPHONE ENCOUNTER
Test Results  Who is calling?:  Daughter, Anastasia. consent to communicate is on file  Who ordered the test:  Dr. Gallegos  Type of test: Lab  Date of test:  7/24/19  Where was the test performed:  [facility]  What are your questions/concerns?:  Requesting results  Okay to leave a detailed message?:  Yes

## 2021-05-31 NOTE — PROGRESS NOTES
[facility] Care Coordination Contact    Attempted to reach member and daughter, not available and left  Voice message.    Roberto Siegel RN, PHN   [facility]  [phone]

## 2021-05-31 NOTE — TELEPHONE ENCOUNTER
Detailed message left with lab results which were already posted in patient's mychart.  Leatha Guzman CMA [facility] CMT

## 2021-05-31 NOTE — PROGRESS NOTES
[facility] Care Coordination Contact    Received call from Tila pressley [facility] ([phone]) that member/family is switching to their home care. Home care still in the process of completing PCA' s paperwork and will inform CC of transfer date.    Roberto Siegel RN, PHN   [facility]  [phone]

## 2021-05-31 NOTE — PROGRESS NOTES
[facility] Care Coordination Contact    Called daughter to schedule HV and she requested writer to call back later. Writer will call again on 8/8/19.    Roberto Siegel RN, PHN   [facility]  [phone]

## 2021-05-31 NOTE — TELEPHONE ENCOUNTER
----- Message from Romana Jc sent at 8/2/2019  1:10 PM CDT -----  Contact: Daughter Afua  General phone call:    Caller: Claudette Caal  Primary cardiologist: Dr. Olivarez  Detailed reason for call: Daughter called indicating her mom's pulse is rapid from the new medication change  New or active symptoms? Rapid Puls  Best phone number: Afua [phone]  Best time to contact: Now  Ok to leave a detailed message? Yes  Device? no    Additional Info:   
Called pt and spoke with her daughter regarding Dr. Olivarez's recommendations. Anastasia expresses that she disagrees with Dr. Olivarez's recommendations and does not think that afib is the issue today. She firmly believes that her heart rate issues are related to hydralazine. She expresses unhappiness with Dr. Olivarez's care and states that Yecenia will follow-up with a different cardiologist going forward. Sympathized with Christoph apologized for her unhappiness. Encouraged ED services if HR issues continue. Anastasia verbalized understanding and thanked caller. She stated that pt will be going to the ED today.   
From: Rachael Olivarez MD  Sent: 8/2/2019   1:42 PM  To: Yusra Contreras, TIANA  Sounds like she is back in atrial fibrillation.  I do not think that had anything to do with the drug but is coincidental.  If she is still having heart rates in the 140 suggest she go to urgent care or emergency department.  I would like her to please continue the medicine to control blood pressure.  Either that or I would want to switch to clonidine.  As per discussion I would need more than 1 dose to say that she is having a side effect from it.  If she is adamant to not take the hydralazine and I would strongly recommend clonidine.  LF        
Pt's daughter stated pt is having rapid heart rate after starting Hydralazine following 7/29 FU with Dr. Olivarez. Pt started Hydralazine yesterday and she complains of fatigue, tachycardia, and diaphoresis.  Confirmed pt is taking 50 mg of Hydralazine three times a dayin addition to 100 mg of Metoprolol and 120 mg of Cardizem. She states her HR now is 146 and her /79. Yesterday her BP was 151/67 and her HR she states was normal.        Dr Olivarez, Pt states her HR is 147 and her BP is 131/79. She feels a rapid heart rate, sweaty, and fatigue. Pt started taking 50 mg of hydralazine yesterday and feels this is a reaction to that. Today she has only taken the first 50 mg dose. Any new recommendations?   
n/a

## 2021-06-01 ENCOUNTER — RECORDS - HEALTHEAST (OUTPATIENT)
Dept: ADMINISTRATIVE | Facility: CLINIC | Age: 76
End: 2021-06-01

## 2021-06-01 VITALS — BODY MASS INDEX: 51.35 KG/M2 | HEIGHT: 61 IN | WEIGHT: 272 LBS

## 2021-06-01 VITALS — WEIGHT: 272.13 LBS | BODY MASS INDEX: 51.38 KG/M2 | HEIGHT: 61 IN

## 2021-06-01 VITALS — BODY MASS INDEX: 49.82 KG/M2 | WEIGHT: 281.2 LBS | HEIGHT: 63 IN

## 2021-06-01 VITALS — BODY MASS INDEX: 49.79 KG/M2 | HEIGHT: 63 IN | WEIGHT: 281 LBS

## 2021-06-01 NOTE — PROGRESS NOTES
Internal CC change effective 09/01/19.  Mailed member CC Change letter.    Janny Roblero  Care Management Specialist Manager  [facility]  [phone]

## 2021-06-01 NOTE — PROGRESS NOTES
[facility] Care Coordination Contact    Received request to mail POC, POC already mailed by CMS Miguel on 8.28.19.     Lou Kebede  Care Management Specialist  [facility]  [phone]

## 2021-06-02 ENCOUNTER — RECORDS - HEALTHEAST (OUTPATIENT)
Dept: ADMINISTRATIVE | Facility: CLINIC | Age: 76
End: 2021-06-02

## 2021-06-02 VITALS — BODY MASS INDEX: 43.96 KG/M2 | WEIGHT: 248.13 LBS

## 2021-06-02 VITALS — BODY MASS INDEX: 44.53 KG/M2 | WEIGHT: 251.32 LBS | HEIGHT: 63 IN

## 2021-06-02 VITALS — WEIGHT: 242 LBS | BODY MASS INDEX: 42.87 KG/M2

## 2021-06-02 VITALS — HEIGHT: 63 IN | BODY MASS INDEX: 43.95 KG/M2

## 2021-06-02 VITALS — WEIGHT: 251.31 LBS | BODY MASS INDEX: 44.52 KG/M2

## 2021-06-02 VITALS — BODY MASS INDEX: 49.32 KG/M2 | WEIGHT: 261 LBS

## 2021-06-02 NOTE — TELEPHONE ENCOUNTER
Refill Approved    Rx renewed per Medication Renewal Policy. Medication was last renewed on 9/19/18.    Pattie Whatley, Care Connection Triage/Med Refill 10/11/2019     Requested Prescriptions   Pending Prescriptions Disp Refills     omeprazole (PRILOSEC) 40 MG capsule [Pharmacy Med Name: OMEPRAZOLE 40MG CAPSULES] 90 capsule 0     Sig: TAKE 1 CAPSULE(40 MG) BY MOUTH DAILY BEFORE BREAKFAST       GI Medications Refill Protocol Passed - 10/10/2019 11:50 AM        Passed - PCP or prescribing provider visit in last 12 or next 3 months.     Last office visit with prescriber/PCP: 3/28/2019 Amber Gallegos MD OR same dept: 3/28/2019 Amber Gallegos MD OR same specialty: 6/25/2019 Zac Grijalva MD  Last physical: Visit date not found Last MTM visit: Visit date not found   Next visit within 3 mo: Visit date not found  Next physical within 3 mo: Visit date not found  Prescriber OR PCP: Amber Gallegos MD  Last diagnosis associated with med order: 1. Esophageal reflux  - omeprazole (PRILOSEC) 40 MG capsule [Pharmacy Med Name: OMEPRAZOLE 40MG CAPSULES]; TAKE 1 CAPSULE(40 MG) BY MOUTH DAILY BEFORE BREAKFAST  Dispense: 90 capsule; Refill: 0    If protocol passes may refill for 12 months if within 3 months of last provider visit (or a total of 15 months).

## 2021-06-02 NOTE — PROGRESS NOTES
[facility] Care Coordination Contact    Writer is covering for permanent care coordinator as she is on JASON.     Writer received a message asking to call member's daughter, Estrellita ([phone]) back.  Writer returned call to Estrellita and she asked why so many different care coordinators are covering for member's main care coordinator.  Writer explained that we often covered each other weeks at a time.  Veronica told writer that member's PCA services were drastically reduced and asked that writer's  Estrellita back.  Writer informed [facility] manager of Estrellita's request.    Mary Day RN  [facility]  [phone]

## 2021-06-02 NOTE — PROGRESS NOTES
[facility] Care Coordination Contact     for Estrellita to call back.     Received official notification from [employer] on 10/11 that the PCA appeal was denied. Our decision to reduce to 4 hours was upheld.  The letter included information on how to appeal [employer]'s decision and request a state fair hearing.     IRMA Israel  [facility]  [phone]

## 2021-06-02 NOTE — TELEPHONE ENCOUNTER
Referral Request  Type of referral: PT/Auquatics  Who s requesting: Patient  Why the request: Wishes to go for aches/pains.  The patients insurance will cover if a referral is written. Would like the referral to include the aquatic, massage and PT  Have you been seen for this request: N/A  Does patient have a preference on a group/provider? [facility]  Okay to leave a detailed message?  Yes

## 2021-06-02 NOTE — TELEPHONE ENCOUNTER
URI,  Congestion in sinus's, no fever. Runny nose, clear.  Past 3 days, over the weekend as well.  A little coughing., more congestion in nose area .    Using flonase     Patient daughter is asking for an antibiotic , and an appointment was made for patient to be seen today at clinic with Dr. Day.    Rosio Alvarez RN  Care Connection Triage/refill nurse      Reason for Disposition    Patient wants to be seen    Sinus congestion as part of a cold, present < 10 days    Protocols used: SINUS PAIN AND CONGESTION-A-OH

## 2021-06-02 NOTE — TELEPHONE ENCOUNTER
Refill Approved    Rx renewed per Medication Renewal Policy. Medication was last renewed on 10/22/18.    Pattie Whatley, Care Connection Triage/Med Refill 10/11/2019     Requested Prescriptions   Pending Prescriptions Disp Refills     olmesartan (BENICAR) 40 MG tablet [Pharmacy Med Name: OLMESARTAN MEDOXOMIL 40MG TABLETS] 90 tablet 3     Sig: TAKE 1 TABLET(40 MG) BY MOUTH DAILY       Angiotensin Receptor Blocker Protocol Passed - 10/10/2019 12:11 PM        Passed - PCP or prescribing provider visit in past 12 months       Last office visit with prescriber/PCP: 3/28/2019 Amber Gallegos MD OR same dept: 3/28/2019 Amber Gallegos MD OR same specialty: 6/25/2019 Zac Grijalva MD  Last physical: Visit date not found Last MTM visit: Visit date not found   Next visit within 3 mo: Visit date not found  Next physical within 3 mo: Visit date not found  Prescriber OR PCP: Amber Gallegos MD  Last diagnosis associated with med order: 1. Accelerated hypertension  - olmesartan (BENICAR) 40 MG tablet [Pharmacy Med Name: OLMESARTAN MEDOXOMIL 40MG TABLETS]; TAKE 1 TABLET(40 MG) BY MOUTH DAILY  Dispense: 90 tablet; Refill: 3    If protocol passes may refill for 12 months if within 3 months of last provider visit (or a total of 15 months).             Passed - Serum potassium within the past 12 months     Lab Results   Component Value Date    Potassium 4.0 07/24/2019             Passed - Blood pressure filed in past 12 months     BP Readings from Last 1 Encounters:   07/31/19 (!) 170/100             Passed - Serum creatinine within the past 12 months     Creatinine   Date Value Ref Range Status   07/24/2019 0.63 0.60 - 1.10 mg/dL Final

## 2021-06-02 NOTE — PROGRESS NOTES
[facility] Care Coordination Contact     CHW, contacted member and spoke w/ daughter Nuzhat who will have member schedule appt's for  Eye and Annual exam.      SAIMA Barbosa  [facility]  [phone]

## 2021-06-03 ENCOUNTER — RECORDS - HEALTHEAST (OUTPATIENT)
Dept: ADMINISTRATIVE | Facility: CLINIC | Age: 76
End: 2021-06-03

## 2021-06-03 VITALS — WEIGHT: 240 LBS | HEIGHT: 61 IN | BODY MASS INDEX: 45.31 KG/M2

## 2021-06-03 VITALS — WEIGHT: 237 LBS | BODY MASS INDEX: 44.75 KG/M2 | HEIGHT: 61 IN

## 2021-06-03 NOTE — PATIENT INSTRUCTIONS - HE
Thank you for allowing the [facility] clinic to be a part of your care. Please pay close attention to the following medications as they have been changed during this visit;    1.) Please stop taking hydralazine given the adverse effects of itching that you are exeperiencing  2.) Please start taking Imdur (isosorbide mononitrate) 30 mg one time daily.

## 2021-06-03 NOTE — PROGRESS NOTES
[facility] Care Coordination Contact    Care coordinator was informed by Rukhsana ESPINO To complete a new PCA Assessment for a CIC.  Family is also requesting 4 hours of homemaking due to the reduction of PCA previous.  If PCA hours stay the same, Rukhsana is ok with the 4 hours of homemaking/week.  If the hours increase in again, Homemaking hours will have to be rediscussed.      Care coordinator called joseline Haro and scheduled LTCC for Thursday Nov 14th at 10:00.      RYAN Yanez  [facility]  Phone: [phone]

## 2021-06-03 NOTE — PROGRESS NOTES
[facility] Care Coordination Contact    Care coordinator spoke with daughter Estrellita.  She reported that she is switching to [employer] beginning Dec in hopes to switch care coordination.  Care coordinator explained that care coordination follows the health clinic.  Care coordinator advised that her PCA hours may not change if she changes care coordination because the assessment is good for one year.  Care coordinator advised Estrellita that an exception could be made that homemaking could be approved for 4 hours/week per Rukhsana Parks due to the reduction in PCA.      Estrellita would like to get another PCA Assessment because her mom has tears in both shoulders which makes it hard to wipe.  Care coordinator referred Estrellita to speak to Rukhsana Parks regarding new PCA Assessment.      RYAN Yanez  [facility]  Phone: [phone]

## 2021-06-03 NOTE — PROGRESS NOTES
[facility] Care Coordination Contact    [employer]:  Faxed completed PCA assessment to PCA Agency and mailed copies to member.  Faxed MD Communication to PCP.  Emailed referral form for auth to [employer].    Mailed [employer] Application to member.    Helen Siegel  Care Management Specialist  [facility]  [phone]

## 2021-06-03 NOTE — PROGRESS NOTES
[facility] Care Coordination Contact  [facility] Change in Condition Assessment    Home visit for Change in Condition Health Risk Assessment with Yecenia Kwan completed on 11/14/2019    Reason for Early reassessment: Request for Elderly Waiver Services  Yes, if yes explain Homemaking, reassessment of PCA for CIC    Type of residence:: Private home - stairs  Current living arrangement:: I live in a private home with family     Assessment completed with:: Family, Patient    Current Care Plan  Member currently receiving the following home care services:     Member currently receiving the following community resources: PCA      Medication Review  Medication reconciliation completed in Epic: YES  Medication set-up & administration: Family/informal caregiver sets up daily  Self-administers medications and Family caregiver administers medications  Medication Risk Assessment Medication (1 or more, place referral to MTM):  N/A: No risk factors identified  MTM Referral Placed: No: No risk factors idenified    Mental/Behavioral Health   Depression Screening: See PHQ assessment flowsheet.   Mental health DX:: Yes   Mental health DX how managed:: None  Mental Health Diagnosis: Yes: Depression  Mental Health Services: None: No further intervention needed at this time.    Falls Assessment:   Fallen 2 or more times in the past year?: No   Any fall with injury in the past year?: No    ADL/IADL Dependencies:   Dependent ADLs:: Ambulation-walker, Bathing, Dressing, Eating, Grooming, Incontinence, Positioning, Transfers, Toileting  Dependent IADLs:: Cleaning, Cooking, Laundry, Shopping, Meal Preparation, Medication Management, Money Management, Transportation, Incontinence    [facility] Health Plan sponsored benefits: Shared information re: Silver Sneakers/gym memberships, ASA, Calcium +D.    PCA Assessment completed at visit: Yes     Elderly Waiver Eligibility: Yes - will open to EW    Care Plan & Recommendations: Yecenia  will open to Elderly Waiver.  A change in condition early assessment was completed due to increase in shoulder pain which she is receiving PT for.  Yecenia is approved for an increase of PCA hours of 10 hours daily.  Yecenia is also asking for the following DME: Lift Chair, BP Cuff, Chux 2 Bags x Month, Gloves 2 Boxes x Month Size Large, 2 Wipes Unscented Boxes x Month, and eating utenstils for stroke victims.  Yecenia is aware that she needs a face to face assessment for some of these items.  Yecenia is also interested in applying for [employer].  Care Coordinator will mail the application.  Yecenia will also be seeing Dr. Villalta at [facility] [place].      See [facility] for detailed assessment information.    Follow-Up Plan: Member informed of future contact, plan to f/u with member with a 6 month telephone assessment.  Contact information shared with member and family, encouraged member to call with any questions or concerns at any time.    [place] care continuum providers: Please refer to Health Care Home on the Epic Problem List to view this patient's [facility] Care Plan Summary.    [facility]  Phone: [phone]

## 2021-06-04 VITALS
SYSTOLIC BLOOD PRESSURE: 148 MMHG | HEART RATE: 66 BPM | BODY MASS INDEX: 46.07 KG/M2 | HEIGHT: 61 IN | WEIGHT: 244 LBS | DIASTOLIC BLOOD PRESSURE: 84 MMHG | OXYGEN SATURATION: 99 %

## 2021-06-04 VITALS
RESPIRATION RATE: 12 BRPM | WEIGHT: 239 LBS | HEIGHT: 61 IN | HEART RATE: 61 BPM | BODY MASS INDEX: 45.12 KG/M2 | DIASTOLIC BLOOD PRESSURE: 82 MMHG | SYSTOLIC BLOOD PRESSURE: 178 MMHG

## 2021-06-04 VITALS
HEART RATE: 60 BPM | BODY MASS INDEX: 45.69 KG/M2 | RESPIRATION RATE: 16 BRPM | DIASTOLIC BLOOD PRESSURE: 88 MMHG | HEIGHT: 61 IN | SYSTOLIC BLOOD PRESSURE: 170 MMHG | WEIGHT: 242 LBS

## 2021-06-04 NOTE — TELEPHONE ENCOUNTER
TCB for Pardeeplisette Estrellita (Consent to Comm on file 8/20/19) regarding Yecenia.   We have received many faxed requests for DME for Yeceina and have completed all but one. It is for Vinyl Gloves, Medium, but just need to know the reason for need of these to fill out the ppwk.    Also need to know about how many will be needed per month?  Thank You.

## 2021-06-04 NOTE — PATIENT INSTRUCTIONS - HE
Yecenia Kwan,    It was a pleasure to see you today at the [facility].     My recommendations after this visit include:    Continue Eliquis 5 mg two times a day to prevent stroke. Cut metoprolol down to 50 mg daily.     The heart monitor will tell us how much afib you are having and how fast your heart rates are with afib. It will also tell us if your heart rates are too slow, causing dizziness. If increase in dizziness please let us know at [phone].        Vani Willgohs, NP-C  [facility]  Electrophysiology  [phone]

## 2021-06-04 NOTE — TELEPHONE ENCOUNTER
Name of form/paperwork: Other:  [employer] Form  Have you been seen for this request: Yes:  12/05/2019  Do we have the form: Yes- 01/03/2020  When is form needed by: As soon as possible  How would you like the form returned: Fax  Fax Number: [phone]  Patient Notified form requests are processed in 3-5 business days: Yes  (If patient needs form sooner, please note that in this message.)  Okay to leave a detailed message? Yes

## 2021-06-04 NOTE — PROGRESS NOTES
[facility] Care Coordination Contact    Received after visit chart from care coordinator.  Completed following tasks: Updated services in access and Submitted referrals/auths for Wipes, Bendable Fork, Spoon and Knife w/ [facility].    Provider Signature - No POC Shared:  Member indicates that they do not want their POC shared with any EW providers.    Mailed HCD & AVS to member.    Helen Siegel  Care Management Specialist  [facility]  [phone]

## 2021-06-04 NOTE — PROGRESS NOTES
[facility] Care Coordination Contact    Received after visit chart from care coordinator.  Completed following tasks: Mailed copy of care plan to client.    Helen Siegel  Care Management Specialist  [facility]  [phone]

## 2021-06-04 NOTE — PROGRESS NOTES
[facility] Care Coordination Contact    Care coordinator spoke to Financial Worker at [facility].  She reported that the U code is not removed but will notify me once it is.      RYAN Yanez  [facility]  Phone: [phone]

## 2021-06-04 NOTE — PROGRESS NOTES
[facility] Care Coordination Contact    Care coordinator received email from [facility].  They received medical paperwork back from Yecenia's PCP stating that the lift chair was medically necessary.  However insurance will not cover the lift portion of the chair because she can still semi stand.  Care coordinator approved the cost of the lift chair to be paid under waiver due to her dx of ostheoarthritis in her knees.  Care coordinator has observed Yecenia having difficulty with standing and having an unsteady gait.      Care coordinator tasked cms to enter SA.      [facility]  Phone: [phone]

## 2021-06-04 NOTE — PROGRESS NOTES
[facility] Care Coordination Contact    Care coordinator spoke to Yecenia Haro's daughter and advised that EW is active now.  Estevan remove the u code.  The DME equipment should be received in the next couple of weeks.  Care coordinator also advised that she is due for an AVS Form.  She asked for care coordinator to mail one again.  Care coordinator will tasks CMS to do so.      RYAN Yanez  [facility]  Phone: [phone]

## 2021-06-04 NOTE — TELEPHONE ENCOUNTER
Medication Request  Medication name: Liquid Benadryl or something to help with itching  Pharmacy Name and Location: [facility]  Reason for request: Caller stated the patient complains of feeling itchy. Caller stated there is no rash seen and the patient has an itch on her back and arms. This has been going for 1.5 month now per caller. Caller stated the patient told her she thinks it because of a new medication that Zac Grijalva MD started the patient on recently. Caller does not know that name of the medication.  When did you use medication last?:  It's been awhile per caller  Patient offered appointment:  patient declined  Okay to leave a detailed message: yes  [phone]

## 2021-06-04 NOTE — TELEPHONE ENCOUNTER
Name of form/paperwork: Other:  Insurance forms for 4 wheel bariatric walker     Have you been seen for this request: Yes:  12/5/19     Do we have the form: Yes- Forms were sent on 12/5/19, Charles will fax them again today to: [phone]     When is form needed by: Asap    How would you like the form returned: Fax    Fax Number: [phone]    Patient Notified form requests are processed in 3-5 business days: No  (If patient needs form sooner, please note that in this message.)    Okay to leave a detailed message? Yes    Please expedite per Charles's request.

## 2021-06-04 NOTE — PROGRESS NOTES
[facility] Care Coordination Contact    Entered Liftchair Auth via [employer] electronic transaction. Faxed copy of Auth to [facility].  Access updated.    Helen Siegel  Care Management Specialist  [facility]  [phone]

## 2021-06-04 NOTE — TELEPHONE ENCOUNTER
Per call to Anastasia, pt's daughter, because of patient's shoulders - has tears in both shoulders which makes it hard to wipe.   Anastasia estimates she thinks that they would need about 6 gloves/3 sets daily.     Ppwk completed and faxed.     Anastasia asks if Dr. Grijalva had a recommendation for a referral for a Specialist similar to the Doctor who worked with Stroke/Ahlzeimers at [facility] who retired? She is aware that was a unique practice and wonders if there is a referral/s that could address these issues for Yecenia?

## 2021-06-04 NOTE — TELEPHONE ENCOUNTER
Left a detailed message for Anastasia to request the name of the medication that is causing issue so we can address the issue properly. I did give the number to call back to the clinic as well as reminded of the option to use [employer] Message as well.

## 2021-06-04 NOTE — PROGRESS NOTES
Assessment and Plan:       1. Routine medical exam    She overall is doing pretty well.  Her daughter takes excellent care of her.  She does have some need of some durable medical equipment and has paperwork to go with it today.  She also needs [employer] paperwork which obviously she has a need for given her situation.    2. Cerebrovascular accident (CVA) due to occlusion of precerebral artery (H)    As above, we will get her paperwork done for [employer] mobility and some no other durable medical equipment if there is any questions regarding this and monitors her looking at this chart and wanted to know more information they can obviously give us a call.  But this woman has a lot of disabilities including a aphasia and inability to get around safely without a lot of assistance.    - [facility] (fill in details in comments)    3. Benign essential hypertension    We will check some labs today but her blood pressures actually been pretty good under the current system.      - Comprehensive Metabolic Panel    4. Mixed hyperlipidemia    - Lipid Profile    5. Expressive aphasia    She is stable in this regard, and can eventually get the words out that she would like to in most that she will, she can understand fairly well but has more of an expressive a aphasia.    6. Visit for screening mammogram    - Mammo Screening Bilateral; Future    7. Morbid obesity with BMI of 45.0-49.9, adult (H)      8. Prediabetes          The patient's current medical problems were reviewed.    I have had an Advance Directives discussion with the patient.  The following health maintenance schedule was reviewed with the patient and provided in printed form in the after visit summary:   Health Maintenance   Topic Date Due     DEPRESSION ACTION PLAN  1945     DIABETIC FOOT EXAM  1945     DIABETIC EYE EXAM  1945     DXA SCAN  05/10/2011     MICROALBUMIN  06/28/2012     ZOSTER VACCINES (2 of 2) 02/15/2016     PNEUMOCOCCAL  IMMUNIZATION 65+ LOW/MEDIUM RISK (2 of 2 - PPSV23) 04/02/2016     MAMMOGRAM  10/26/2019     INFLUENZA VACCINE RULE BASED (1) 08/30/2020 (Originally 8/1/2019)     A1C  01/24/2020     MEDICARE ANNUAL WELLNESS VISIT  12/05/2020     BMP  12/05/2020     LIPID  12/05/2020     FALL RISK ASSESSMENT  12/05/2020     TD 18+ HE  04/26/2022     ADVANCE CARE PLANNING  10/03/2023     COLONOSCOPY  10/18/2023     HEPATITIS C SCREENING  Completed        Subjective:   Chief Complaint: Yecenia Kwan is an 74 y.o. female here for an Annual Wellness visit.   HPI: She is here today with her daughter.  She is pleasant and smiling but obviously gets frustrated with her aphasia as she is really not able to communicate terribly well.  Her daughter would like to get her some durable medical equipment and needs this face-to-face today for that.  She obviously needs a great deal of assistance, and she needs [employer] mobility because she cannot ride the public transportation due to her aphasia, and leg being able to communicate very well.  She also has significant physical disabilities which prevent her from walking very far and safely to get to public transportation easily.    She also needs devices to help her be safe and get around the house.    Review of Systems:    Please see above.  The rest of the review of systems are negative for all systems.    Patient Care Team:  Zac Grijalva MD as PCP - General (Family Medicine)  Zac Grijalva MD as Assigned PCP  Selena De La Cruz SW as Lead Care Coordinator (Primary Care - CC)     Patient Active Problem List   Diagnosis     Primary Hypersomnia With Sleep Apnea     Hyperlipidemia LDL goal less than 70     Major Depression, Recurrent     Anxiety     Esophageal reflux     Vitamin D Deficiency     Dizziness     Prediabetes     Expressive aphasia     Osteoarthritis, knee     Salivary duct stone     Gait disturbance, post-stroke     Benign essential hypertension     Morbid obesity with BMI of 45.0-49.9,  "adult (H)     Paroxysmal atrial fibrillation (H)     Cerebrovascular accident (CVA) due to occlusion of precerebral artery (H)     Open angle with borderline findings, low risk     BELA (obstructive sleep apnea)     Past Medical History:   Diagnosis Date     Anxiety      Arthritis      Atrial fibrillation (H)      Cellulitis of left leg      CVA (cerebral vascular accident) (H)      GERD (gastroesophageal reflux disease)      Head injury      Hx of ischemic left MCA stroke 12/7/2015    posterior aspect of the left lobe involving middle frontal gyrus   12/2015      Hx of nicotine dependence      Hyperlipemia      Hypertension      Proteinuria       Past Surgical History:   Procedure Laterality Date     MD ARTHROTOMY/EXPLORE/TREAT KNEE JOINT      Description: Arthrotomy Of Knee With Joint Exploration;  Recorded: 07/29/2008;  Comments: due to \"accident\" and infection     MD CORRJ HALLUX VALGUS W/SESMDC W/DIST METAR OSTEOT      Description: Bunion Correction With Metatarsal Osteotomy;  Recorded: 07/29/2008;      Family History   Problem Relation Age of Onset     Diabetes Mother      Prostate cancer Father       Social History     Socioeconomic History     Marital status: Single     Spouse name: Not on file     Number of children: Not on file     Years of education: Not on file     Highest education level: Not on file   Occupational History     Not on file   Social Needs     Financial resource strain: Not on file     Food insecurity:     Worry: Not on file     Inability: Not on file     Transportation needs:     Medical: Not on file     Non-medical: Not on file   Tobacco Use     Smoking status: Never Smoker     Smokeless tobacco: Never Used   Substance and Sexual Activity     Alcohol use: No     Drug use: No     Sexual activity: Not on file   Lifestyle     Physical activity:     Days per week: Not on file     Minutes per session: Not on file     Stress: Not on file   Relationships     Social connections:     Talks on " phone: Not on file     Gets together: Not on file     Attends [employer] service: Not on file     Active member of club or organization: Not on file     Attends meetings of clubs or organizations: Not on file     Relationship status: Not on file     Intimate partner violence:     Fear of current or ex partner: Not on file     Emotionally abused: Not on file     Physically abused: Not on file     Forced sexual activity: Not on file   Other Topics Concern     Not on file   Social History Narrative    Lives with grandson, and is exposed to second hand smoke.      Current Outpatient Medications   Medication Sig Dispense Refill     apixaban (ELIQUIS) 5 mg Tab tablet Take 1 tablet (5 mg total) by mouth 2 (two) times a day. 180 tablet 3     capsaicin (ZOSTRIX) 0.025 % cream APPLY A SMALL AMOUNT TO SKIN 4 TIMES A DAY AS NEEDED TO THE KNEES  2     diclofenac sodium (VOLTAREN) 1 % Gel Lower extremities: 4 g to the affected area 4 times daily using dosing card.  Do not exceed 32 g total dose per day. (Patient taking differently: Apply 4 g topically 4 (four) times a day as needed (pain). Lower extremities: 4 g to the affected area 4 times daily using dosing card.  Do not exceed 32 g total dose per day.      ) 100 g 3     fluticasone (ALLERGY RELIEF, FLUTICASONE,) 50 mcg/actuation nasal spray 2 sprays into each nostril daily as needed for rhinitis. 16 g 12     HYDROcodone-acetaminophen (NORCO )  mg per tablet Take 1 tablet by mouth every 6 (six) hours as needed for pain. 90 tablet 0     isosorbide mononitrate (IMDUR) 30 MG 24 hr tablet Take 1 tablet (30 mg total) by mouth daily. 90 tablet 3     miscellaneous medical supply (BLOOD PRESSURE CUFF) Misc Please take blood pressure readings two times daily and continue to log daily. 1 each 0     olmesartan (BENICAR) 40 MG tablet TAKE 1 TABLET(40 MG) BY MOUTH DAILY 90 tablet 2     omeprazole (PRILOSEC) 40 MG capsule TAKE 1 CAPSULE(40 MG) BY MOUTH DAILY BEFORE BREAKFAST 90  "capsule 2     oxyCODONE (ROXICODONE) 5 MG immediate release tablet Take 5 mg by mouth daily as needed.  0     polyethylene glycol (MIRALAX) 17 gram packet Take 17 g by mouth daily as needed.       simvastatin (ZOCOR) 40 MG tablet Take 1 tablet (40 mg total) by mouth every evening. (Patient taking differently: Take 40 mg by mouth daily.       ) 90 tablet 3     triamcinolone (KENALOG) 0.1 % cream Apply topically 2 (two) times a day. As needed for rash (Patient taking differently: Apply 1 application topically 2 (two) times a day as needed. As needed for rash      ) 30 g 3     metoprolol succinate (TOPROL-XL) 50 MG 24 hr tablet Take 1 tablet (50 mg total) by mouth daily. 30 tablet 2     No current facility-administered medications for this visit.       Objective:   Vital Signs:   Visit Vitals  /84 (Patient Site: Left Arm, Patient Position: Sitting, Cuff Size: Adult Large)   Pulse 66   Ht 5' 1\" (1.549 m)   Wt (!) 244 lb (110.7 kg)   SpO2 99%   BMI 46.10 kg/m         VisionScreening:  No exam data present     PHYSICAL EXAM      Assessment Results 12/5/2019   Activities of Daily Living 5-6 - Severe functional impairment   Instrumental Activities of Daily Living 2-4 - Moderate impairment   Get Up and Go Score Less than 12 seconds   Mini Cog Total Score 5   Some recent data might be hidden     A Mini-Cog score of 0-2 suggests the possibility of dementia, score of 3-5 suggests no dementia    Identified Health Risks:     "

## 2021-06-04 NOTE — TELEPHONE ENCOUNTER
Dr Randolph has retired, and there really is no one that does what he does with HE anymore.    We could offer a neurology referral if she doesn't already have one that she sees, or we could investigate Physical Medicine doc but [facility] be at [facility].    TS

## 2021-06-04 NOTE — TELEPHONE ENCOUNTER
Reached out to patient's daughter and relayed the below message. Patient's daughter stated she would like a referral placed for Neurology.  Patient's daughter stated she does not want to bring patient back to [facility].  Please advise. Thank you, Mary Virk

## 2021-06-04 NOTE — TELEPHONE ENCOUNTER
Patient's daughter notified a referral was placed was placed. No additional questions at this time. Mary Virk

## 2021-06-05 VITALS
SYSTOLIC BLOOD PRESSURE: 168 MMHG | RESPIRATION RATE: 16 BRPM | BODY MASS INDEX: 45.69 KG/M2 | WEIGHT: 242 LBS | HEART RATE: 92 BPM | DIASTOLIC BLOOD PRESSURE: 94 MMHG | HEIGHT: 61 IN

## 2021-06-05 VITALS
WEIGHT: 228 LBS | DIASTOLIC BLOOD PRESSURE: 90 MMHG | BODY MASS INDEX: 41.96 KG/M2 | HEART RATE: 79 BPM | HEIGHT: 62 IN | SYSTOLIC BLOOD PRESSURE: 166 MMHG | RESPIRATION RATE: 16 BRPM

## 2021-06-05 VITALS
WEIGHT: 233 LBS | BODY MASS INDEX: 44.02 KG/M2 | HEART RATE: 70 BPM | DIASTOLIC BLOOD PRESSURE: 88 MMHG | SYSTOLIC BLOOD PRESSURE: 146 MMHG | RESPIRATION RATE: 16 BRPM | OXYGEN SATURATION: 100 %

## 2021-06-05 VITALS
HEART RATE: 74 BPM | OXYGEN SATURATION: 97 % | SYSTOLIC BLOOD PRESSURE: 170 MMHG | WEIGHT: 230 LBS | HEIGHT: 62 IN | DIASTOLIC BLOOD PRESSURE: 72 MMHG | BODY MASS INDEX: 42.33 KG/M2

## 2021-06-05 VITALS
WEIGHT: 228 LBS | HEART RATE: 64 BPM | HEIGHT: 62 IN | BODY MASS INDEX: 41.96 KG/M2 | DIASTOLIC BLOOD PRESSURE: 72 MMHG | SYSTOLIC BLOOD PRESSURE: 150 MMHG | RESPIRATION RATE: 16 BRPM

## 2021-06-05 VITALS
OXYGEN SATURATION: 97 % | WEIGHT: 222 LBS | SYSTOLIC BLOOD PRESSURE: 177 MMHG | HEART RATE: 115 BPM | DIASTOLIC BLOOD PRESSURE: 87 MMHG | BODY MASS INDEX: 40.33 KG/M2

## 2021-06-05 NOTE — TELEPHONE ENCOUNTER
----- Message from Destinee Lea sent at 1/7/2020  9:56 AM CST -----  Regarding: ITCHING  General phone call:  PATIENTS DAUGHTER IS CALLING, PATIENT STATES SHE IS ITCHY ON HER BACK AND LEGS AND FEELS THIS IS FROM A REACTION FROM A  HEART MEDICATION, PLEASE CALL  Caller: ANNIA BOYER  Primary cardiologist: DR STAPLES  Detailed reason for call: SEE ABOVE  New or active symptoms? YES, SEE ABOVE  Best phone number: [phone]  Best time to contact: ANY TIME  Ok to leave a detailed message? YES  Device? NO    Additional Info:

## 2021-06-05 NOTE — PATIENT INSTRUCTIONS - HE
Thank you for allowing the [facility] to be a part of your care. Please pay close attention to the following medications as they have been changed during this visit.     1.) Please stop taking metoprolol succinate (Toprol XL).    2.) Please start taking carvedilol (Coreg) 12.5 mg two times daily.

## 2021-06-05 NOTE — PROGRESS NOTES
[facility] Care Coordination Contact    Care coordinator received call from Missy.  Yecenia has chosen to switch back to [facility] for PCA.  PCA was never started with [facility].    Care coordinator updated POC and has requested CMS to update SA.      RYAN Yanez  [facility]  Phone: [phone]

## 2021-06-05 NOTE — PROGRESS NOTES
[facility] Care Coordination Contact    Care coordinator called Daughter Estrellita and informed her that Rukhsana Parks contact [employer] to find out if past hours not used a different agency could be transferred to a new agency.  Estrellita reported that she did contact [place] to find out how many hours were not used for PCA for Yecenia from dates from mid Oct to December.  Care coordinator advised that it would take up to 3 days for Rukhsana to find out her answer.  Care coordinator informed Estrellita that PCA hours should not be flexed and used as they should.  Estrellita understands.      RYAN Yanez  [facility]  Phone: [phone]

## 2021-06-05 NOTE — PROGRESS NOTES
[facility] Care Coordination Contact    Care coordinator received call from Daughter Missy.  She reported that Yecenia decided to not have PCA through [facility] and have changed it to [facility].  Care coordinator received confirmation from [facility] and informed [facility].  Care coordinator updated POC and tasked CMS to enter new SA for [facility] and pull the one for [facility] as PCA never started through them.     RYAN Yanez  [facility]  Phone: [phone]

## 2021-06-05 NOTE — TELEPHONE ENCOUNTER
----- Message from Balbina Hillman MD sent at 1/15/2020  9:32 AM CST -----  Regarding: Follow-up  Chucho Estrella;  Can you give Mrs. Kwan a call on Friday (1/17/20) to see how she's doing with the new medication changes?    Thanks;  ~ Balbina

## 2021-06-05 NOTE — TELEPHONE ENCOUNTER
Contacted Yecenia's daughter Estrellita this morning to do a status update after the Carvedilol dose was increased to 25mg two times a day to begin on Saturday 1/18/2020.     She reports that the 25mg dose was given to her on Saturday AM but she did not feel well following this. Her b/p reading was 128/74 which they thought was a whole lot better, but she was unable to walk, she felt dizzy and lightheaded in the shower,and therefore when it was time for the second dose of the day, she only would take the 12.5mg dose of the Carvedilol.     Dr. Hillman, any new recommendations in light of her poor tolerance to this dose. sk/RN

## 2021-06-05 NOTE — PROGRESS NOTES
[facility] Care Coordination Contact    Care coordinator spoke to Yecenia today and advised that [employer] can accept unused hours that were not used since the most recent reassessment and transfer them to the new agency.  Care coordinator advised we need exact documentation from [employer].  Yecenia provided me with the number for Ciara at [facility].      Care coordinator spoke to Ciara and she said she would call this evening and fax me documentation.      RYAN Yanez  [facility]  Phone: [phone]

## 2021-06-05 NOTE — TELEPHONE ENCOUNTER
Patient Returning Call  Reason for call:  Checking on the status of [employer] Form  Information relayed to patient:  Clinic is looking for verify if they have the form  Patient has additional questions:  Yes  If YES, what are your questions/concerns:  Please call and let them know if the clinic has the forms and when they will be ready.  Please follow up with the family regarding the status of these forms.  Okay to leave a detailed message?: Yes

## 2021-06-05 NOTE — TELEPHONE ENCOUNTER
Contacted the patient's daughter Estrellita today to see how Yecenia is tolerating the medication change from the Metoprolol and Isosorbide over to the Carvedilol 12.5mg two times a day w/ meals.    She is feeling well.   B/p are variable some 140/72 up to 160/82   HR are consistent in the 60's     She is experiencing some slight ankle edema, no shortness of breath, not limited by fatigue at all.   Her rash is nearly all gone.     Dr. Hillman, any new recommendations at this time.sk/RN

## 2021-06-05 NOTE — TELEPHONE ENCOUNTER
I called Estrellita and let her know that we filled the [employer] ppwk out when they were here for pt's Physical on 12/5/19 and gave it to them completed before they left. I also let he know that if they cannot locate it they could have another faxed to us to complete. She will have [employer] fax the ppwk to us and then she would like to pick the ppwk up when completed.

## 2021-06-05 NOTE — PROGRESS NOTES
[facility] Care Coordination Contact    Entered Auth via [employer] online for Walker Bag.  Faxed copy to [employer].  Update Access.    Helen Siegel  Care Management Specialist  [facility]  [phone]

## 2021-06-05 NOTE — TELEPHONE ENCOUNTER
MARYANN Haro that per Dr. Hillman, the patient is to HOLD the Imdur medication for now. She is to f/u with Dr. Olivarez in clinic at the earliest opportunity. Message to scheduling to assist in arranging this appt. sk/RN

## 2021-06-05 NOTE — TELEPHONE ENCOUNTER
Name of form/paperwork: Other:  [employer]  Date form received by clinic:  unknown  When is the form needed by? asap  Patient Notified form requests are processed in 3-5 business days: No  (If patient needs for sooner, please note that in this message)  Okay to leave a detailed message: Yes     DaughterEstrellita calling . Consent to communicate is on file. Inquiring about the status of these forms. Reports [employer] has not received anything. Asking for Charmaine to please call her today.

## 2021-06-05 NOTE — PROGRESS NOTES
[facility] Care Coordination Contact    Care coordinator spoke to Estrellita - Daughter, regarding DME that was previously ordered.      Estrellita reported that Yecenia has not received the lift chair, BP Cuff, and rollator walker.  Care coordinator advised that she would call [employer] and look into the orders.      Care coordinator called [employer] and they confirmed that the BF Cuff was delivered to Yecenia on 01/16/20, the walker is in need of a SA for the walker bag, and the Lift Chair SA was approved through [employer] and needs to be released for delivery.      Care coordinator will update POC for the walker bag and task CMS to enter a SA.      Care coordinator left message for Estrellita advising her of the above information.      RYAN Yanez  [facility]  Phone: [phone]

## 2021-06-05 NOTE — TELEPHONE ENCOUNTER
Called and left detailed message on pharmacy phone line of medication plan of care as of 1/17/20. Given writers' direct phone number to return call if any continued questions or clarification needed. BRANDI,RN

## 2021-06-05 NOTE — PROGRESS NOTES
[facility] Care Coordination Contact    Estrellita called care coordinator and advised that she does have the BP Cuff.  It was in an unopened box that she never opened.      RYAN Yanez  [facility]  Phone: [phone]

## 2021-06-05 NOTE — TELEPHONE ENCOUNTER
Patient's daughter Veronica is calling in today to report that her mother Yecenia is experiencing itchy rash symptoms on her legs, back, underarms and and chest area. This began for her in a milder form about one month ago. She has noticed that the symptoms are progressively becoming more noticeable, and wider spread.     She has been on Eliquis since 2/2019  She started taking Metoprolol  10/2015  She started taking Imdur 12/3/19    Her previous use of the Hydralazine also appears to have caused similar rash side effects.     She is going to  some Benadryl OTC today to give to her mother for the itching. She has used some topical products for the itching without successful relief noted.    She denies any changes in her laundry detergents, or personal cleansers in the recent past month. Her correlation of the appearance of symptoms of the rash/itching is when she began the Imdur medication about a month ago.    Dr. Hillman, any additional orders/recommendations at this time? sk/RN

## 2021-06-05 NOTE — TELEPHONE ENCOUNTER
Patient Returning Call  Reason for call:  lmtcb   Information relayed to patient: Relayed msg and Patient states: She thinks the hives or itching is being caused by : isosorbide mononitrate (IMDUR) 30 MG 24 hr tablet, However she thinks all of her medications make her itch. CMT advised Pt to follow up w/ heart care MD. As he is the prescriber of this medication and advised she can also purchase benadryl as an OTC until advised by MD.   Patient has additional questions:  No  If YES, what are your questions/concerns:  As noted   Okay to leave a detailed message?:   Yes

## 2021-06-05 NOTE — TELEPHONE ENCOUNTER
Per Dr. Hillman, Yecenia should increase her Carvedilol dose to 18.75mg two times a day starting tonite. She will take 1.5 tablets in the am and 1.5mg in the pm.   They were advised that the goal of this smaller increment increase was to achieve the lower b/p without the symptoms of dizziness/lightheadedness. Estrellita will contact me mid-week with an update in her tolerance to this new dose. sk/RN

## 2021-06-05 NOTE — TELEPHONE ENCOUNTER
----- Message from Romana Jc sent at 1/29/2020  9:13 AM CST -----  General phone call:    Caller: [facility]  Primary cardiologist: Dr. Hillman  Detailed reason for call: Pharmacist is questioning the 2 medications just received for patient Carvedilol & Metoprolol.  He indicated it is the same class drug    New or active symptoms?  Best phone number: [phone]   Best time to contact: Anytime  Ok to leave a detailed message? Yes  Device? no    Additional Info:

## 2021-06-05 NOTE — PROGRESS NOTES
[facility] Care Coordination Contact    Faxed completed PCA assessment to PCA Agency. Emailed referral form for auth to Raisa Siegel  Care Management Specialist  [facility]  [phone]

## 2021-06-05 NOTE — TELEPHONE ENCOUNTER
Yecenia's daughter Estrellita is calling today with updates regarding the tolerance to the medication changes recently increased to Carvedilol 18.75mg two times a day with food. Since 1/20/2020.  She reports that Yecenia is not a big eater, and has had to modify her diet to increase the food intake at the time of her doses to attempt to reduce nausea. This has backfired on her.   Estrellita reports that she was up in the night around 2AM vomiting (a great deal)  And is now refusing the Carvedilol altogether, blaming the medication for her unsettled stomach over the last several days.    Estrellita is requesting that Dr. Hillman consider an alternative b/p medication to replace the Carvedilol given her sensitivity to it.     Dr. Hillman,   Any new recommendations or orders at this time? sk/RN

## 2021-06-05 NOTE — PROGRESS NOTES
[facility] Care Coordination Contact    1/27/20:   Received call from Sherrill with [facility]. She stated Yecenia's authorization still hasn't been processed and she is receiving angry calls daily from her RP and the PCA.   Yecenia receives 10 hours daily of PCA and the PCA staff haven't been paid since December.   Sherrill asked for support to get the authorization completed ASAP.     CMS Helen and I updated authorization and spoke with Dee at [facility]. Due to severity of payment issues she will get the authorization faxed to [facility] today.     E-mailed Sherrill with update.     IRMA Israel  [facility]  [phone]

## 2021-06-05 NOTE — TELEPHONE ENCOUNTER
The patient's daughter Veronica returned call to advise that Dr. Hillman was their pick to replace Dr. Olivarez as primary Cardiologist and therefore would prefer not to schedule with him.  Yecenia was scheduled for next week in clinic with Dr. Hillman for f/u and advised to HOLD Imdur, continue OTC Benadryl to control the itching /rash symptoms and that they would discuss an alternative medication for Imdur at this appointment that would not cause her reaction/side effects. sk/RN

## 2021-06-06 NOTE — PROGRESS NOTES
[facility] Care Coordination Contact    Care Coordinator left message with Estrellita advising that [employer] has received the unused flex hours and that they have added it on the existing PCA hours.     RYAN Yanez  [facility]  Phone: [phone]

## 2021-06-06 NOTE — PROGRESS NOTES
[facility] Care Coordination Contact    Emailed Sola to add unused flex pca hours from previous Provider to current Auth.  90.5 hours to be added to current date span.    Helen Siegel  Care Management Specialist  [facility]  [phone]

## 2021-06-06 NOTE — TELEPHONE ENCOUNTER
I called to ask if patient has had a DM Eye Exam - Spoke to Anastasia and she was unaware of the need for DM Eye Exam and will call to set up a DM Eye Exam for Yecenia. I did ask her to request that the OV notes/records be sent to Dr. Grijalva so we can update pt's chart and Anastasia is agreeable.

## 2021-06-06 NOTE — TELEPHONE ENCOUNTER
----- Message from Ashley Rios sent at 3/5/2020  8:03 AM CST -----  Regarding: SERGIO PT  General phone call:    Caller: Daughter   Primary cardiologist: SERGIO  Detailed reason for call: Yecenia has an appt with SERGIO but daughter states she has a upper respiratory cold and is wondering if its a good idea to come in today. Patient has appt today at 10:50, please advise     Best phone number: [phone]  Best time to contact: leena  Ok to leave a detailed message? yes  Device? no    Additional Info:

## 2021-06-06 NOTE — TELEPHONE ENCOUNTER
The daughter of Ms. Kwan, Veronica is calling today to cancel the appointment with Dr. Hillman later this morning as the patient is under the weather with a respiratory illness. She is not running a fever, just some sinus congestion etc.    This is the 3rd reschedule of this appointment that has been initiated by Veronica.   She asked if Dr. Hillman could call them at the appointment time today instead, which I told them was not generally an acceptable alternative to a clinic appointment. She shared that her mother's b/p readings have been in good range with 140/70 this morning, even in spite of the cold symptoms.     She was advised that rescheduling may put them out a significant timeframe, as Dr. Hillman's schedule is getting quite booked out. She then asked if seeing a NP instead would be an alternative?    Dr. Hillman any recommendations ?   sk/RN

## 2021-06-06 NOTE — PROGRESS NOTES
Notes recorded by Kenyatta King RN on 2/25/2020 at 2:24 PM CST  PC to pt, spoke to daughter who we have authorization to speak to   Reviewed below information with the pt and daughter  Pt is refusing to start AAD at this time, would like to discuss further options  Discussed need for apt with Vani MONK NP in addition to Dr Hillman apt  Daughter and pt are wondering if she truly needs to come in for apt with Dr Hillman as they have had multiple PC with Delores her nurse and management via phone  Advised pt I would reach out to her to review and determine need  Daughter agreeable  More  ------    Notes recorded by Kenyatta King RN on 2/20/2020 at 1:47 PM CST  Attempted to contact pt, voicemail message was left with contact information and instructing pt to call back.  2/20/2020 1:47 PM  Kenyatta King RN    ------    Notes recorded by Kenyatta King RN on 2/11/2020 at 2:43 PM CST  Attempted to contact pt, voicemail message was left with my contact information instructing pt to call back  2/11/2020 2:43 PM  Kenyatta King RN    ------    Notes recorded by Vani Rey, CNP on 2/11/2020 at 10:22 AM CST  Patient should start antiarrhythmic medication due to frequent episodes of A. fib and very fast episodes.  She can make appointment to discuss with myself.  Clarify this is not a specialty of Dr. Hillman as she has upcoming appointment with her.  That is for blood pressure control.  Otherwise, start flecainide 100 mg twice daily, come in for EKG and follow-up in 2 weeks.   FARHANA Monitor Hook-Up   Order: [id number]   Status:  Final result   Visible to patient:  Yes (MyChart) Dx:  Paroxysmal atrial fibrillation (H)   Details     Reading Physician Reading Date Result Priority   Kevin Alves MD 2/10/2020 Routine      Narrative & Impression     [facility]ARE     MULTI-DAY PATIENT ACTIVATED MONITOR (FARHANA) REPORT     Results:    Indication for study: Paroxysmal  "atrial fibrillation    The monitor was worn: A multichannel patient activated monitor was scheduled for 21 days and worn for 14 days.  Compliance with the monitor was fair with 11 days and 8 hours of data available for review.    The baseline rhythm transmission demonstrated normal sinus rhythm with heart rates in the 60s.  Normal NH, QRS and QT intervals.  PACs were present..    The patient had 2 manually activated rhythm recordings.  Symptoms were reported as \"heart racing\".  Both were associated with atrial fibrillation with rapid ventricular response one occurring on 21 January and the second on the 25th.  Heart rates during the recordings ranged from 136-149 bpm.    The patient had 26 auto triggered recordings.  Symptoms were not reported.  The patient's rhythm demonstrated normal sinus rhythm with frequent PACs estimated at 3% of the total atrial depolarizations.  Additionally the patient had atrial fibrillation demonstrated with a total burden of 16% for the entire recorded interval.  The patients greatest burden for any given 24-hour interval was 17.5 hours.  Ventricular response was frequently elevated above 100 bpm.     Impression:    Abnormal multi-day patient activated monitor by virtue of the presence of atrial fibrillation.    Atrial fibrillation was symptomatic, but there was also apparent atrial fibrillation which was not recorded as being symptomatic.  Ventricular responses tended to be higher in the symptomatic episodes.    No significant ventricular arrhythmia.    No profound bradycardia or pauses.             "

## 2021-06-06 NOTE — PROGRESS NOTES
[facility] Care Coordination Contact    Care Coordinator spoke to Ciara at [facility].  Ciara is in the process of typing up the letter verifying the amount of flex time for Yecenia and will forward to me.      RYAN Yanez  [facility]  Phone: [phone]

## 2021-06-06 NOTE — PROGRESS NOTES
[facility] Care Coordination Contact    Care Coordinator received call from Daughter Kathy.  She reported that they still do not have the flex hours on record.  Care Coordinator advised that Care Coordinator spoke to Ciara and she reported that she would get me hours today.  The owner  of cancer.  Staff have been preoccupied by the owner's death.     RYAN Yanez  [facility]  Phone: [phone]

## 2021-06-07 NOTE — TELEPHONE ENCOUNTER
Please inform patient that Dr Grijalva is out. I sent in a different Muscle relaxant to her pharmacy.

## 2021-06-07 NOTE — TELEPHONE ENCOUNTER
Medication Request  Medication name:    Disp  Refills  Start  End  JELANI    cyclobenzaprine (FLEXERIL) 5 MG tablet  30 tablet  0  2020   --    Si/2 to 1 tab po three times a day prn for back pain    Sent to pharmacy as: cyclobenzaprine 5 mg tablet (FLEXERIL)    E-Prescribing Status: Receipt confirmed by pharmacy (2020 10:01 AM CDT)        Requested Pharmacy: [facility]  Reason for request: caller stated that insurance is not covering this medication. Caller stated that pharmacy did say it will be to go for a prior auth but caller stated that patient can not wait that long and was wondering if Zac Grijalva MD can send an alternative.   When did you use medication last?:    Patient offered appointment:  patient declined  Okay to leave a detailed message: yes

## 2021-06-07 NOTE — PROGRESS NOTES
[facility] Care Coordination Contact    Care Coordinator spoke to Daughter Veronica.  Estrellita asked what the status was of the flex hours unused being transferred to [employer].   Care Coordinator advised that she needs to call the PCA Agency and they need to call [employer] to update their records.  Care Coordinator advised that a message was left on Veronica's voice mail weeks ago about this.  Veronica said she hasn't listened to her voice mail.  Veronica also asked about flexing hours.  Care Coordinator advised hours are to be used as approved - they should all be used daily.      Care Coordinator also asked about how Yecenia is doing.  Veronica reported that Yecenia is doing well and they are staying quarantined.      RYAN Yanez  [facility]  Phone: [phone]

## 2021-06-07 NOTE — TELEPHONE ENCOUNTER
Called pt's daughter, Anastasia, and gave message. Questions answered and others she will talk to the pharmacist about and call back if further questions.

## 2021-06-07 NOTE — TELEPHONE ENCOUNTER
Can you please generate an order, and I can print it and  fax it to [facility]. Thank you, Mary Virk

## 2021-06-07 NOTE — TELEPHONE ENCOUNTER
Orders being requested: Extra large vinyl powder free gloves. And masks  Reason service is needed/diagnosis: Patient has a PCA that needs to use these when she is with patient.  When are orders needed by: soon as can order.  Where to send Orders: [facility]  Okay to leave detailed message?  Yes

## 2021-06-07 NOTE — PROGRESS NOTES
"Yecenia Kwan is a 74 y.o. female who is being evaluated via a billable video visit.      The patient has been notified of following:     \"This video visit will be conducted via a call between you and your physician/provider. We have found that certain health care needs can be provided without the need for an in-person physical exam.  This service lets us provide the care you need with a video conversation.  If a prescription is necessary we can send it directly to your pharmacy.  If lab work is needed we can place an order for that and you can then stop by our lab to have the test done at a later time.    Video visits are billed at different rates depending on your insurance coverage. Please reach out to your insurance provider with any questions.    If during the course of the call the physician/provider feels a video visit is not appropriate, you will not be charged for this service.\"    Patient has given verbal consent to a Video visit? Yes    Patient would like to receive their AVS by AVS Preference: [place].    Patient would like the video invitation sent by: Text to cell phone: [phone]    Will anyone else be joining your video visit? No        Video Start Time: 0947    Additional provider notes:    ASSESSMENT/PLAN:  1. Acute bilateral low back pain with right-sided sciatica  74-year-old female with acute exacerbation of right lower back pain with some radiation down into her buttocks.  Knee suspect this is due to her severe osteoarthritis in her knees and walking a little it regularly as a result.  She also has osteoporosis and likely osteoarthritis in her lower back.  I do encourage her to take Tylenol although in the past she states it caused some stomach upset for her.  She cannot take ibuprofen due to being on an anticoagulant.  She is used Flexeril successfully in the past as she has had a history of some similar low back pain.  We prescribed 5 mg of Flexeril and encouraged her to take 1/2 to 1 " tablet every 8 hours as needed.  She can continue with alternating ice and heat.  Her daughter lives with her and can help to take care of her.  Currently she is able to still ambulate and perform the majority of her own self-care.  Will let me know if this pain is not improving or at any point is worsening.  - cyclobenzaprine (FLEXERIL) 5 MG tablet; 1/2 to 1 tab po three times a day prn for back pain  Dispense: 30 tablet; Refill: 0    2. Esophageal reflux  - omeprazole (PRILOSEC) 40 MG capsule; TAKE 1 CAPSULE(40 MG) BY MOUTH DAILY BEFORE BREAKFAST  Dispense: 90 capsule; Refill: 2      Dragon dictation was used for this note.  Speech recognition errors are a possibility.    No follow-ups on file.  There are no Patient Instructions on file for this visit.    No orders of the defined types were placed in this encounter.    Medications Discontinued During This Encounter   Medication Reason     omeprazole (PRILOSEC) 40 MG capsule Reorder         CHIEF COMPLAINT;  Chief Complaint   Patient presents with     Medication Question Or Clarification     muscle relaxer       HISTORY OF PRESENT ILLNESS:  Yecenia is a 74 y.o. female evaluated via video visit today for low back pain.  She states it started about a week ago.  She feels it is likely due to walking and she is favoring her knee due to severe osteoarthritis.  In the past she was walking fairly independently but now is exclusively needing to rely on her 4 pronged cane.  Her daughter lives with her and it helps with the history.  There is been no other injuries and no other new activities.  The pain radiates down into her buttocks but does not go any lower.  She has a history of similar pain and has used Flexeril in the past.  She does not like to take Tylenol because she feels it upsets her stomach.  She cannot take ibuprofen due to her other chronic medical conditions.  She is otherwise been staying healthy.  There is no numbness or tingling into her leg.  She does need  refills of her omeprazole..    Remainder of 12-point ROS is negative.    Objective:  She is sitting comfortably and is able to articulate for the most part her history  She is in no acute pain and does demonstrate that she can, using her cane, get from the seated to standing position and is able to ambulate a short distance without significant difficulty.  TOBACCO USE:  Social History     Tobacco Use   Smoking Status Never Smoker   Smokeless Tobacco Never Used       RECENT RESULTS  No results found for this or any previous visit (from the past 48 hour(s)).    MEDICATIONS:  Current Outpatient Medications   Medication Sig Dispense Refill     apixaban ANTICOAGULANT (ELIQUIS) 5 mg Tab tablet Take 1 tablet (5 mg total) by mouth 2 (two) times a day. 180 tablet 3     capsaicin (ZOSTRIX) 0.025 % cream APPLY A SMALL AMOUNT TO SKIN 4 TIMES A DAY AS NEEDED TO THE KNEES  2     diclofenac sodium (VOLTAREN) 1 % Gel Lower extremities: 4 g to the affected area 4 times daily using dosing card.  Do not exceed 32 g total dose per day. (Patient taking differently: Apply 4 g topically 4 (four) times a day as needed (pain). Lower extremities: 4 g to the affected area 4 times daily using dosing card.  Do not exceed 32 g total dose per day.      ) 100 g 3     flecainide (TAMBOCOR) 50 MG tablet Take 1 tablet (50 mg total) by mouth 2 (two) times a day. 180 tablet 2     fluticasone (ALLERGY RELIEF, FLUTICASONE,) 50 mcg/actuation nasal spray 2 sprays into each nostril daily as needed for rhinitis. 16 g 12     metoprolol succinate (TOPROL-XL) 50 MG 24 hr tablet Take 1 tablet (50 mg total) by mouth daily. 90 tablet 2     miscellaneous medical supply (BLOOD PRESSURE CUFF) Misc Please take blood pressure readings two times daily and continue to log daily. 1 each 0     olmesartan (BENICAR) 40 MG tablet Take 1 tablet (40 mg total) by mouth daily. 90 tablet 3     omeprazole (PRILOSEC) 40 MG capsule TAKE 1 CAPSULE(40 MG) BY MOUTH DAILY BEFORE  BREAKFAST 90 capsule 2     polyethylene glycol (MIRALAX) 17 gram packet Take 17 g by mouth daily as needed.       triamcinolone (KENALOG) 0.1 % cream Apply topically 2 (two) times a day. As needed for rash (Patient taking differently: Apply 1 application topically 2 (two) times a day as needed. As needed for rash      ) 30 g 3     cyclobenzaprine (FLEXERIL) 5 MG tablet 1/2 to 1 tab po three times a day prn for back pain 30 tablet 0     No current facility-administered medications for this visit.          Video-Visit Details    Type of service:  Video Visit    Video End Time (time video stopped): 0959  Originating Location (pt. Location): Home    Distant Location (provider location):  [facility]/OB     Platform used for Video Visit: Farhana Gonzalez MD

## 2021-06-07 NOTE — PATIENT INSTRUCTIONS - HE
Start flecainide 50 mg twice daily  Continue Eliquis for anticoagulation.    Follow-up in 2 to 3 months  If any concerns with a new medication, please call the [facility] nurses at [phone]    90-day refills have been sent for all cardiac medications

## 2021-06-07 NOTE — PROGRESS NOTES
ROS: Frequent urination, irregular heartbeat, joint and muscle pain, dizziness, depression and anxiety.

## 2021-06-07 NOTE — PROGRESS NOTES
[facility] Care Coordination Contact      [facility] Six-Month Telephone Assessment    6 month telephone assessment completed on 04/29/20.    ER visits: No  Hospitalizations: No  TCU stays: No  Significant health status changes: None  Falls/Injuries: No  ADL/IADL changes: No  Changes in services: No    Caregiver Assessment follow up:  N/A    Goals: See POC in chart for goal progress documentation.    Care Coordinator spoke to Yecenia's Cindy Haro.  She reported no changes or no new needs at this time for Yecenia.  Yecenia is stable with current services.       Will see member in 6 months for an annual health risk assessment.   Encouraged member to call CC with any questions or concerns in the meantime.     RYAN Yanez  [facility]  Phone: [phone]

## 2021-06-07 NOTE — TELEPHONE ENCOUNTER
Medication Question or Clarification    Who is calling: Pharmacy    What medication are you calling about (include dose and sig)?: methocarbamoL (ROBAXIN) 750 MG tablet     Who prescribed the medication?: Lorne    What is your question/concern?: Medication is not covered and needs alternative.  Tizanidine is cover under her insurance plan.    Requested Pharmacy: [id number] - [place], [place] - [address] AT [facility] [address]      Okay to leave a detailed message?: Yes    Please send alternative script to patients pharmacy and inform the patient of the outcome to this request.

## 2021-06-08 NOTE — PROGRESS NOTES
[facility] Care Coordination Contact    Care Coordinator received email from [facility] that  sent in script for an increase in gloves and masks.      Care Coordinator advised that no wipes should be paid under waiver since they are ins covered.  Care Coordinator also advised that masks are not covered under waiver.      Care Coordinator called [facility] and asked if they could send out gloves and masks for the member and PCA's.  They said they could and would call the Daughter Estrellita about this.     Care Coordinator left message for Yecenia's daughter Veronica advising of the above.     RYAN Yanez  [facility]  Phone: [phone]
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
## 2021-06-09 NOTE — TELEPHONE ENCOUNTER
Yecenia really isnt on any medication for BP management, only Toprol more for the AF. I agree they should speak with PCP for BP management. Ok to use hydralazine up to 4x daily as needed.     If her Hrs continue to be low like as noted, then let us know and it may be time for another FARHANA monitor.

## 2021-06-09 NOTE — TELEPHONE ENCOUNTER
We received ppwk from [employer] for pt. This was completed and faxed to: [phone].  A copy was scanned to the chart and the original was mailed to the patient.

## 2021-06-09 NOTE — TELEPHONE ENCOUNTER
Pt was called, corresponding information/recommendations reviewed, verbalized understanding, has no further questions at this time, contact information was given for further concerns/questions, RX was sent to pt pharmacy and medication list updated   Pt reports HR has stayed at her baseline of 50-60's, she will let us know if this changes   Also, Hydralizine was reported y pt as an allergy  I asked pt if she still c/o itching after taking the Hydralazine that she has been taking at home  Pt denied these side effects or response, and will monitor   7/1/2020 9:16 AM  Kenyatta King RN

## 2021-06-09 NOTE — TELEPHONE ENCOUNTER
----- Message from Alyssia Dsouza sent at 6/30/2020 11:10 AM CDT -----      Caller: Daughter    Primary cardiologist: Vani Rey    Detailed reason for call: Patient's daughter called to discuss her mom's BP and how it has been high since last night. Please advise    Best phone number: [phone]- joseline Martin  Best time to contact: today  Ok to leave a detailed message? yes  Device? no

## 2021-06-09 NOTE — TELEPHONE ENCOUNTER
Forms Request  Name of form/paperwork: Other:  [employer] . The caller was very impatient when this writer asked the reason for the form.    Have you been seen for this request: No  Do we have the form: Drop Off  When is form needed by: As soon as possible  How would you like the form returned: Fax:  The caller states the fax # is on the form  Patient Notified form requests are processed in 3-5 business days: Yes    Okay to leave a detailed message? Yes

## 2021-06-09 NOTE — PROGRESS NOTES
HPI: I am consulted in this 71 y.o. female regarding peripheral vascular disease. The patient has complaints of swelling in her bilateral lower extremities with no real discomfort or claudication symptoms with walking.  She does state she has intermittent cramps at night in her legs which resolved spontaneously.  She has no complaints of any nonhealing ulcers or any other wounds.  She has noted a swelling in her bilateral lower family for some time and has never been evaluated for either venous stasis or lymphedema.  No Known Allergies      Current Outpatient Prescriptions:      amLODIPine (NORVASC) 10 MG tablet, TAKE 1 TABLET BY MOUTH EVERY DAY, Disp: 90 tablet, Rfl: 3     aspirin 81 MG EC tablet, Take 81 mg by mouth daily., Disp: , Rfl:      atorvastatin (LIPITOR) 20 MG tablet, Take 1 tablet (20 mg total) by mouth daily., Disp: 90 tablet, Rfl: 1     blood pressure monitor Kit, Use as directed up to 3 times a day, Disp: 1 each, Rfl: 0     clotrimazole-betamethasone (LOTRISONE) cream, Apply topically 2 (two) times a day. To affected area, Disp: 30 g, Rfl: 1     hydroCHLOROthiazide (HYDRODIURIL) 25 MG tablet, TAKE 1 TABLET BY MOUTH EVERY DAY, Disp: 90 tablet, Rfl: 2     HYDROcodone-acetaminophen (NORCO )  mg per tablet, Take 1 tablet by mouth every 6 (six) hours as needed for pain., Disp: 90 tablet, Rfl: 0     loratadine (CLARITIN) 10 mg tablet, Take 1 tablet (10 mg total) by mouth daily., Disp: 30 tablet, Rfl: 0     metoprolol succinate (TOPROL-XL) 50 MG 24 hr tablet, Take 1 tablet (50 mg total) by mouth daily. Patient needs to be seen the end of August per CNP's request., Disp: 30 tablet, Rfl: 0     metoprolol succinate (TOPROL-XL) 50 MG 24 hr tablet, TAKE 1.5 TABLETS BY MOUTH EVERY MORNING, Disp: 45 tablet, Rfl: 10     omeprazole (PRILOSEC) 40 MG capsule, Take 1 capsule (40 mg total) by mouth Daily before breakfast., Disp: 90 capsule, Rfl: 1     valsartan (DIOVAN) 320 MG tablet, TAKE 1 TABLET BY MOUTH  "EVERY DAY, Disp: 90 tablet, Rfl: 3    Past Medical History:   Diagnosis Date     Arthritis      Cellulitis of left leg      GERD (gastroesophageal reflux disease)      Head injury      Hx of nicotine dependence      Hyperlipemia      Hypertension      Proteinuria        Past Surgical History:   Procedure Laterality Date     MA ARTHROTOMY/EXPLORE/TREAT KNEE JOINT      Description: Arthrotomy Of Knee With Joint Exploration;  Recorded: 07/29/2008;  Comments: due to \"accident\" and infection     MA CORRJ HALLUX VALGUS W/SESMDC W/DIST METAR OSTEOT      Description: Bunion Correction With Metatarsal Osteotomy;  Recorded: 07/29/2008;       Social History     Social History     Marital status: Single     Spouse name: N/A     Number of children: N/A     Years of education: N/A     Occupational History     Not on file.     Social History Main Topics     Smoking status: Never Smoker     Smokeless tobacco: Never Used     Alcohol use No     Drug use: No     Sexual activity: Not on file     Other Topics Concern     Not on file     Social History Narrative    Lives with grandson, and is exposed to second hand smoke.         Allergies:Review of patient's allergies indicates no known allergies.    Past Medical History:   Diagnosis Date     Arthritis      Cellulitis of left leg      GERD (gastroesophageal reflux disease)      Head injury      Hx of nicotine dependence      Hyperlipemia      Hypertension      Proteinuria        Past Surgical History:   Procedure Laterality Date     MA ARTHROTOMY/EXPLORE/TREAT KNEE JOINT      Description: Arthrotomy Of Knee With Joint Exploration;  Recorded: 07/29/2008;  Comments: due to \"accident\" and infection     MA CORRJ HALLUX VALGUS W/SESMDC W/DIST METAR OSTEOT      Description: Bunion Correction With Metatarsal Osteotomy;  Recorded: 07/29/2008;       CURRENT MEDS:  Current Outpatient Prescriptions   Medication Sig Dispense Refill     amLODIPine (NORVASC) 10 MG tablet TAKE 1 TABLET BY MOUTH EVERY " DAY 90 tablet 3     aspirin 81 MG EC tablet Take 81 mg by mouth daily.       atorvastatin (LIPITOR) 20 MG tablet Take 1 tablet (20 mg total) by mouth daily. 90 tablet 1     blood pressure monitor Kit Use as directed up to 3 times a day 1 each 0     clotrimazole-betamethasone (LOTRISONE) cream Apply topically 2 (two) times a day. To affected area 30 g 1     hydroCHLOROthiazide (HYDRODIURIL) 25 MG tablet TAKE 1 TABLET BY MOUTH EVERY DAY 90 tablet 2     HYDROcodone-acetaminophen (NORCO )  mg per tablet Take 1 tablet by mouth every 6 (six) hours as needed for pain. 90 tablet 0     loratadine (CLARITIN) 10 mg tablet Take 1 tablet (10 mg total) by mouth daily. 30 tablet 0     metoprolol succinate (TOPROL-XL) 50 MG 24 hr tablet Take 1 tablet (50 mg total) by mouth daily. Patient needs to be seen the end of August per CNP's request. 30 tablet 0     metoprolol succinate (TOPROL-XL) 50 MG 24 hr tablet TAKE 1.5 TABLETS BY MOUTH EVERY MORNING 45 tablet 10     omeprazole (PRILOSEC) 40 MG capsule Take 1 capsule (40 mg total) by mouth Daily before breakfast. 90 capsule 1     valsartan (DIOVAN) 320 MG tablet TAKE 1 TABLET BY MOUTH EVERY DAY 90 tablet 3     No current facility-administered medications for this visit.        Family History   Problem Relation Age of Onset     Diabetes Mother      Prostate cancer Father         reports that she has never smoked. She has never used smokeless tobacco. She reports that she does not drink alcohol or use illicit drugs.                      Review of Systems -   The 12 system review is within normal limits except for as mentioned above.  General ROS: No complaints or constitutional symptoms  Ophthalmic ROS: No complaints of visual changes  Skin: As per HPI  Endocrine: No complaints or symptoms  Hematologic/Lymphatic: No symptoms or complaints  Psychiatric: No symptoms or complaints  Respiratory ROS: no cough, shortness of breath, or wheezing  Cardiovascular ROS: no chest pain or  dyspnea on exertion  Gastrointestinal ROS: no GI symptoms or complaints  Genito-Urinary ROS: no dysuria, trouble voiding, or hematuria  Musculoskeletal ROS: As per HPI  Neurological ROS: no TIA or stroke symptoms      /64 (Patient Site: Right Arm, Patient Position: Sitting, Cuff Size: Adult Large)  Pulse 60  Resp 18  There is no height or weight on file to calculate BMI.    EXAM:  GENERAL: This is a 71 y.o. female in no distress.  SKIN: Grossly intact  HEAD AND NECK: Cranial nerves intact.   CARDIAC: RRR w/out murmur  CHEST/LUNG: Clear  ABDOMEN: Soft, non-tender, B/S present, no pulsatile mass  GROINS: Both femoral pulse palpable.  EXTREM: Bilateral lower extremity 1+ pitting edema.  Bilateral posterior tibial and dorsalis pedis pulses palpable.  Bilateral feet are warm  PSYCHIATRIC: Affect pleasant      IMAGES:   ABIs of 1.05 on the right and 1.06 on the left    Assessment/Plan: Yecenia Kwan has signs and symptoms consistent with lymphedema which may be attributed to venous stasis versus cardiac.  The plan will be for referral to lymphedema clinic as well as ordering compression stockings for the time being.  From an arterial vascular standpoint, her ABIs are normal and I do not think any further evaluation is warranted at this time.  She understands her treatment was discussed may follow up with me on a when necessary basis.  If at anytime she has claudication symptoms with ambulation she may return to see me for formal angiography.    Bill Fuentes ,DO ANY   Surgery
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
## 2021-06-09 NOTE — PROGRESS NOTES
[facility] Care Coordination Contact    Completed following tasks:      Re-submitted referrals/auths for Lift Chair of $1250.00 to [employer] for updating date span from 12/31/19-11/30/20 per member's daughter and CC advised. Faxed copy of auth to provider and updated access.     Miguel Darnell  Care Management Specialist  [facility]  [phone]

## 2021-06-10 NOTE — PROGRESS NOTES
Assessment/Plan:     1. Essential hypertension  Patient is somewhat hesitant to make medication changes and is on multiple medications.  She is tolerating the amlodipine okay at this point so she will plan to continue.  We will plan to continue Diovan.  We will plan to continue metoprolol.  Will change hydrochlorothiazide to Maxzide to see if she tolerates better.  If blood pressure still elevated may plan to change beta-blocker to more selective medication.  Also will take hydralazine regularly 1-2 tabs 3 times a day.  They will keep track and will call me with blood pressure readings early next week.  We will also recheck labs.  We will also get renal ultrasound to rule out renal artery stenosis due to the ongoing difficulty controlling patient's blood pressure.  - triamterene-hydrochlorothiazide (MAXZIDE-25) 37.5-25 mg per tablet; Take 1 tablet by mouth daily.  Dispense: 30 tablet; Refill: 3  - US Renal Artery Bilateral; Future  - Basic Metabolic Panel; Future    2.  Osteoarthritis  Gave patient and son a prescription for a new cane.  This is a repeated prescription but the to state that they will get it filled.  Patient does need a assistive device for ambulation due to stroke.  Current cane has a broken handle may put patient at risk for injury.      Subjective:      Yecenia Kwan is a 71 y.o. female comes in today with son to review blood pressure.  She has been difficult to keep blood pressure controlled.  We had found a regimen that has seemed to work but then patient stated that she thought amlodipine was causing some of her leg swelling so she stopped it.  She also stopped diuretic hydrochlorothiazide because she feels that she was using the bathroom too frequently at night.  She states that blood pressure did go up to around 160.  Reviewed the note from nurse triage.  Patient has restarted amlodipine and hydrochlorothiazide over the last week but still elevated blood pressure.  We did send in  hydralazine she used it just one time and states that it did seem to help.  She is tolerating her other medications.  She is wondering if there is a different diuretic she could try.  Had previously tried Spironolactone and clonidine.  She cannot tell particular reaction that she has had but notes that she does not like those.  Also states she may have gotten a cough with lisinopril.  She has no chest pain shortness of breath.  She does have some chronic swelling in her extremities.  Is actually improving over the last several months.  She has had consultation with vascular and is planning to get compression stockings but does not have them today.  The venous stasis changes in her leg have actually significantly improved since I met her.  She is intermittently using a Lotrisone cream on them.  Still has the broken cane.  Discussed with them that I was concerned that if she continues use of broken cane and be her put her at risk for injury.  The do state that they will get a cane and prescription was given.  Review of the chart shows that patient did go to one speech therapy appointment.  Has upcoming appointment coming up soon.  No other new concerns today.    Current Outpatient Prescriptions   Medication Sig Dispense Refill     amLODIPine (NORVASC) 10 MG tablet TAKE 1 TABLET BY MOUTH EVERY DAY 90 tablet 3     aspirin 81 MG EC tablet Take 81 mg by mouth daily.       atorvastatin (LIPITOR) 20 MG tablet Take 1 tablet (20 mg total) by mouth daily. 90 tablet 1     blood pressure monitor Kit Use as directed up to 3 times a day 1 each 0     clotrimazole-betamethasone (LOTRISONE) cream Apply topically 2 (two) times a day. To affected area 30 g 1     hydrALAZINE (APRESOLINE) 25 MG tablet Take 1 tablet up to 4 times a day for blood pressure greater than 160/100 90 tablet 0     HYDROcodone-acetaminophen (NORCO )  mg per tablet Take 1 tablet by mouth every 6 (six) hours as needed for pain. 90 tablet 0      "loratadine (CLARITIN) 10 mg tablet Take 1 tablet (10 mg total) by mouth daily. (Patient taking differently: Take 10 mg by mouth daily as needed. ) 30 tablet 0     metoprolol succinate (TOPROL-XL) 50 MG 24 hr tablet Take 1 tablet (50 mg total) by mouth daily. Patient needs to be seen the end of August per CNP's request. 30 tablet 0     metoprolol succinate (TOPROL-XL) 50 MG 24 hr tablet TAKE 1.5 TABLETS BY MOUTH EVERY MORNING 45 tablet 10     omeprazole (PRILOSEC) 40 MG capsule Take 1 capsule (40 mg total) by mouth Daily before breakfast. 90 capsule 1     valsartan (DIOVAN) 320 MG tablet TAKE 1 TABLET BY MOUTH EVERY DAY 90 tablet 3     clopidogrel (PLAVIX) 75 mg tablet TAKE 1 TABLET BY MOUTH DAILY 90 tablet 0     triamterene-hydrochlorothiazide (MAXZIDE-25) 37.5-25 mg per tablet Take 1 tablet by mouth daily. 30 tablet 3     No current facility-administered medications for this visit.        Past Medical History, Family History, and Social History reviewed.  Past Medical History:   Diagnosis Date     Arthritis      Cellulitis of left leg      GERD (gastroesophageal reflux disease)      Head injury      Hx of nicotine dependence      Hyperlipemia      Hypertension      Proteinuria      Past Surgical History:   Procedure Laterality Date     MD ARTHROTOMY/EXPLORE/TREAT KNEE JOINT      Description: Arthrotomy Of Knee With Joint Exploration;  Recorded: 07/29/2008;  Comments: due to \"accident\" and infection     MD CORRJ HALLUX VALGUS W/SESMDC W/DIST METAR OSTEOT      Description: Bunion Correction With Metatarsal Osteotomy;  Recorded: 07/29/2008;     Review of patient's allergies indicates no known allergies.  Family History   Problem Relation Age of Onset     Diabetes Mother      Prostate cancer Father      Social History     Social History     Marital status: Single     Spouse name: N/A     Number of children: N/A     Years of education: N/A     Occupational History     Not on file.     Social History Main Topics     " Smoking status: Never Smoker     Smokeless tobacco: Never Used     Alcohol use No     Drug use: No     Sexual activity: Not on file     Other Topics Concern     Not on file     Social History Narrative    Lives with grandson, and is exposed to second hand smoke.         Review of systems is as stated in HPI, and the remainder of the 10 system review is otherwise negative.    Objective:     Vitals:    04/28/17 1329 04/28/17 1332   BP: 148/70 154/70   Patient Site: Left Arm    Patient Position: Sitting    Cuff Size: Adult Large    Pulse: 60    Weight: (!) 269 lb 11.2 oz (122.3 kg)     Body mass index is 44.88 kg/(m^2).    General Appearance:    Alert, cooperative, no distress, appears stated age   Head:    Normocephalic, without obvious abnormality, atraumatic   Eyes:    PERRL, EOM's intact   Ears:    Normal TM's and external ear canals   Nose:   Mucosa normal, no drainage     or sinus tenderness   Throat:   Oropharynx is clear   Neck:   Supple, symmetrical, no adenopathy, no thyromegally, no carotid bruit        Lungs:     Clear to auscultation bilaterally, respirations unlabored   Chest Wall:    No tenderness or deformity    Heart:    Regular rate and rhythm, S1 and S2 normal, no murmur, rub    or gallop       Abdomen:     Soft, non-tender, bowel sounds active all four quadrants,     no masses, no organomegaly, no abdominal bruit            Extremities:   1+ edema to mid shin, using cane for ambulation    Pulses:   2+ and symmetric all extremities   Skin:   No rashes or lesions         This note has been dictated using voice recognition software. Any grammatical or context distortions are unintentional and inherent to the the software.

## 2021-06-10 NOTE — PROGRESS NOTES
Speech Language/Pathology  Patient called to cancel and reschedule appointment today.     Shirin Oliveros MA,CFY-SLP

## 2021-06-11 NOTE — TELEPHONE ENCOUNTER
----- Message from Urszula Siegel sent at 9/18/2020 12:18 PM CDT -----  Regarding: TSB PT / DISCUSS SYMPTOMS  General phone call:    Caller: Pt's Anastasia majano    Primary cardiologist: SERGIO     Detailed reason for call: Anastasia is requesting a call back to discuss if been pt's been over-medicating since she's been experiencing symptoms of low bp, lightheadedness and nausea. Please return call.     New or active symptoms? Yes     Best phone number: [phone], Pt's dtr Anastasai.     Best time to contact: Anytime     Ok to leave a detailed message? Yes     Device? No     Additional Info:

## 2021-06-11 NOTE — TELEPHONE ENCOUNTER
Yecenia's daughter Estrellita is calling today to f/u regarding her mother's b/p concerns from last week.   She had not heard back from the oncall provider so called back today.  Yecenia has a history of hypertension and PAF.   She takes  Metoprolol Succinate 50mg daily QAM,   Olmesartan 40mg daily QAM  Flecainide 50mg daily two times a day   Hydralazine is used PRN 25mg up to 4 tabs daily for b/p over 160 systolic. ( she has used this x 2  Over this past week)     She stays well hydrated with 60 oz of fluids daily. Her wt is stable.     She took a dose of Hydralazine on Thurs for b/p of 179/60, HR 69  and then responded poorly over the following 72 hrs with lower b/p 100/40 range HR 72  dizziness and generally feeling of fatigue. She has someone with her (nurse aide) during the day to assist her, but the daughter fears she may get up on her own and risk falling etc.     Since they did not hear back over the weekend, Estrellita decided to reduce the Flecainide medication to half dose as she was not sure if this was causing her more problems as it was most recently added to her regimen.    Dr. Mccollum, In Dr. Hillman's absence,  Can you offer some opinion/recommendations in Yecenia's medication regimen at this time? Sk/TIANA

## 2021-06-11 NOTE — TELEPHONE ENCOUNTER
Daughter and pt reports BP 9/17 was 179/60 so pt took one tablet of hydralazine.  Today she is dizzy with N/V, and her BP was 106/40 earlier.  They believe it is flecainide that is causing issues - pt gets up 4-5 times nightly to urinate.  HR has been 50 - 60, up to 80.  BP was up yesterday because pt's brother passed away last week.    Flecainide 50 mg two times a day was started 3/20/20 by Vani.  Pt also takes metoprolol succinate 50 mg daily, olmesartan 40 mg daily, and hydralazine 25 mg as needed up to 4 tabs per day for elevated BP.    Pt will see Jan 9/30/20.  Had a hard time really defining what the issue is.  One moment they are concerned about dizziness and low BP today.  Discussion quickly turned to blaming flecainide for having to get up multiple times at night to void.  Pt denies shortness of breath or weight gain.    Message sent to scheduling, pt overdue to see Dr Rafy Hillman -   Hany - Do either of you have any recommendations?  -[facility]

## 2021-06-11 NOTE — TELEPHONE ENCOUNTER
Estrellita is contacted today in f/u to Yecenia's  med changes to discuss b/p readings and pt tolerance. Yecenia's b/p readings are improved with most falling between 140/54 and 161/55 she generally feels well. She at times will have some lightheadedness. 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 She has not used the prn Hydralazine since our last discussion on 9/21/20.     Yecenia will be seeing Hany Simmons CNP tomorrow in f/u clinic appointment. Sk/RN    FYI to Dr. Hillman

## 2021-06-11 NOTE — TELEPHONE ENCOUNTER
Estrellita is calling with concerns that Yecenia's b/p this morning is elevated at 142/108   irreg  She states that they did not take the Metoprolol Succinate this AM today as they were directed to wait and take it in the PM at , as per Hany Simmons CNP from her visit yesterday.    She is wondering if there is any other recommendations at this time.    She denies dizziness, no lightheadedness, no headache at this time. Sk/TIANA Simmons CNP what are your recommendations ? sk/RN

## 2021-06-11 NOTE — TELEPHONE ENCOUNTER
----- Message -----  From: Marysol Simmons CNP  Sent: 9/22/2020   4:39 PM CDT  To: Bernice Kwan RN    No further changes since Dr. Mccollum made 2.  Hany

## 2021-06-11 NOTE — PATIENT INSTRUCTIONS - HE
Yecenia Kwan,    It was a pleasure to see you today at the [facility].     My recommendations after this visit include:    Metoprolol 50 mg 11/2 tabs orally at bedtime.    Call if needing to use hydralazine more than 3 times in a week or 2 doses in a day.    Please call if atrial fibrillation episodes more frequent .      To followup with me in 6 months and see Dr. Grijalva in next month or so.      My contact information:  Hany Simmons, ENMANUEL  After Hours or Scheduling  [phone]  My Nurse---Jasmyne Arreola [phone]

## 2021-06-11 NOTE — TELEPHONE ENCOUNTER
Discussed with Marysol Simmons CNP plan IF patient has elevated b/p greater than 140/90 tomorrow with return phone call. To discontinue the Metoprolol Succinate, and begin Diltiazem CD 120mg once daily in AM. Then to continue monitoring b/p daily with reports to the clinic the end of the next week. FIDE Simmons CNP/[place]

## 2021-06-11 NOTE — TELEPHONE ENCOUNTER
"Daughter calling., she is reporting that   Her mom has had recent Low BP readings.  She is seen at the [facility]  For her cardiac care. She was transferred to the front  Desk at [facility] to discuss her mother's medication with a cardiologist.  BP has been low since Friday.  Readings ;  114/85      Rosio Alvarez RN  Care Connection Triage/refill nurse             Reason for Disposition    Diastolic BP < 50 mm Hg    Additional Information    Negative: Systolic BP < 90 and feeling dizzy, lightheaded, or weak    Negative: Started suddenly after an allergic medicine, an allergic food, or bee sting    Negative: Shock suspected (e.g., cold/pale/clammy skin, too weak to stand, low BP, rapid pulse)    Negative: Difficult to awaken or acting confused  (e.g., disoriented, slurred speech)    Negative: Fainted    Negative: Chest pain    Negative: Bleeding (e.g., vomiting blood, rectal bleeding or tarry stools, severe vaginal bleeding)    Negative: Extra heart beats or heart is beating fast  (i.e., \"palpitations\")    Negative: Sounds like a life-threatening emergency to the triager    Negative: Systolic BP < 80 and NOT dizzy, lightheaded or weak (feels normal)    Negative: Abdominal pain    Negative: Major surgery in the past month    Negative: Fever > 100.5 F (38.1 C)    Negative: Drinking very little and has signs of dehydration (e.g., no urine > 12 hours, very dry mouth, very lightheaded)    Negative: Fall in systolic BP > 20 mm Hg from normal and feeling dizzy, lightheaded, or weak    Negative: Patient sounds very sick or weak to the triager    Negative: Systolic BP < 90 and NOT dizzy, lightheaded or weak    Negative: Systolic BP  while taking blood pressure medications and NOT dizzy, lightheaded or weak    Negative: Fall in systolic BP > 20 mmHg after eating a meal    Negative: Fall in systolic BP > 20 mm Hg from normal and NOT dizzy, lightheaded, or weak    Protocols used: LOW BLOOD PRESSURE-A-OH      "

## 2021-06-11 NOTE — TELEPHONE ENCOUNTER
Estrellita was contacted with the recommendation to wait until tomorrow to see how the b/p responds once her mother has taken the Metoprolol at HS dose time this PM. She will plan to call me tomorrow with this reading to share with Hany GONZALEZ.   If her b/p is running higher than 140/90 tomorrow AM then Estrellita will want to have recommendations for the Calcium Channel blocker dosing that you discussed with her.   Since you are not in the office on Friday nor Monday, do you wish to offer this information in advance? Sk/TIANA Simmons CNP what are your thoughts? sk/RN

## 2021-06-11 NOTE — TELEPHONE ENCOUNTER
Estrellita is contacted with Dr. Mccollum's recommendations to increase the Metoprolol to 75mg daily, to return the Flecainide dose to 50mg two times a day. She will have f/u with Hany Simmons CNP on 9/30/20, so will have Hany review her response to this change. It is the hope that she will need the prn Hydralazine less in the future.   It is also recognized that her emotional distress due to her recent loss of her brother was contributing to the elevation at this time.   I have asked Estrellita to call with a week of b/p tracking next Tuesday to share with me to see how things are going.This will make sure that the f/u is managed sk/RN      FYI to Dr. Hillman

## 2021-06-11 NOTE — PROGRESS NOTES
Speech Language/Pathology   Discharge Summary    Yecenia Kwan  YOB: 1945      [id number]   Referring Provider: Iraida Pedroza FNP  Date of Onset:17  Start of Care: 3/30/17  Date of Last Visit: 3/30/17    Medical Diagnosis: mTBI  Treatment Diagnosis: cognition  The patient was seen for a total of 1 visits with 1 canceled/no show visits since the start of care.    Summary of Goals and Functional Progress  Long term goals: Improve cognitive/linguistic skills to allow for completion of daily activities and interactions. - NOT MET  Short term goals:   1) demonstrate use of word finding strategies in 3/5 opportunities per therapist audit. -NOT MET  2) complete further cognitive evaluation to best determine cognitive goals. -NOT MET  3) complete simple memory tasks with 80% acc with min cues. -NOT MET  4) recall 3/4 functional memory strategies per therapist audit -NOT MET    Goals were not addressed. Pt only showed up for initial evaluation. Care plan .     Plan  Discontinue speech therapy. Pt did not show beyond initial evaluation.  patient did not return      Shirin Oliveros MA, CCC-SLP      Physician Recommendation:  1. I certify the need for these services furnished within this jplan and while under my care.  I agree with the therapist's recommendtion for plan of care.  2. If there is any recommendation for modification of therapy plan, please indicate below.    Physician Signature: ____________________________________________

## 2021-06-12 NOTE — PROGRESS NOTES
[facility] Care Coordination Contact    Called adult daughter Estrellita to schedule annual HRA home visit. HRA has been scheduled for 10/13/20 at 2:30.     RYAN Yanez  [facility]  Phone: [phone]

## 2021-06-12 NOTE — TELEPHONE ENCOUNTER
Estrellita is calling today with b/p of her mother from this morning of 185/83. She was advised to discontinue the Metoprolol and to start the Diltiazem CD 120mg daily as per Hany Simmons CNP. They will take b/p readings daily and track them, report them to the clinic next week or sooner if concerned. She was in agreement with this plan. sk/RN

## 2021-06-12 NOTE — TELEPHONE ENCOUNTER
Orders being requested: UA/UC  Reason service is needed/diagnosis: Frequency  When are orders needed by: ASAP  Where to send Orders: Epic, daughter would like to collect and bring it in.  Okay to leave detailed message?  Yes

## 2021-06-12 NOTE — PROGRESS NOTES
[facility] Care Coordination Contact    [facility]:  Faxed completed PCA assessment to PCA Agency and mailed copies to member.  Faxed MD Communication to PCP.  Emailed referral form for auth to [employer].    Mailed PCA Signature page and POC Signature page to member w/ self-stamped envelope for return.    Helen Siegel  Care Management Specialist  [facility]  [phone]

## 2021-06-12 NOTE — PROGRESS NOTES
[facility] Care Coordination Contact    Received after visit chart from care coordinator.  Completed following tasks: Mailed copy of care plan to client, Updated services in access and Submitted referrals/auths for [facility].     Provider Signature - No POC Shared:  Member indicates that they do not want their POC shared with any EW providers.    Helen Siegel  Care Management Specialist  [facility]  [phone]

## 2021-06-12 NOTE — TELEPHONE ENCOUNTER
Estrellita is calling today to discuss her concerns with regard to constipation issues that have arisen since the start of the Cardizem medication use for her mom.  Although her Epic chart reflects Miralax has been used in the past, she does not recall this, and she states she does not have this, and feels a Dulcolax tablet or other tablet is preferable.    Dr. Grijalva, can you order something for Yecenia to assist with her Constipation symptoms both as a preventative, and as a more aggressive treatment ?    Thank you so much     Delores/TIANA

## 2021-06-12 NOTE — TELEPHONE ENCOUNTER
Estrellita is calling to share her concerns with taking the Cardiazem CD. She takes her dose in the PM as she did the Metoprolol. She has had some heartburn with this.   She will try taking this with some food to reduce this.   She also experiences some warm facial and neck area flushing for 30-60 minutes afterwards. Yecenia has also noticed a tendency for her Afib to act up more now than it has on the beta blocker.     She is wondering if this is normal or if something else should be done. Sk/RN      Hany Simmons CNP what are your thoughts ?

## 2021-06-12 NOTE — TELEPHONE ENCOUNTER
Estrellita is contacted with the response from Hany Simmons CNP regarding the Cardizem CD use for HTN. sk/RN

## 2021-06-12 NOTE — PROGRESS NOTES
[facility] Care Coordination Contact  [facility] Home Visit Assessment     Home visit for Health Risk Assessment with Yecenia Kwan completed on 10/30/20.    Type of residence:: Private home - stairs  Current living arrangement:: I live in a private home with family     Assessment completed with: Patient, Children    Current Care Plan  Member currently receiving the following home care services:   EW - PCA  Member currently receiving the following community resources: PCA      Phone assessment due to COVID - 19.      Medication Review  Medication reconciliation completed in Epic: IF NO, PLEASE EXPLAIN No face to face reconcile due to COVID-19.  Medication set-up & administration: Family/informal caregiver sets up daily  Family caregiver administers medications  Medication Risk Assessment Medication (1 or more, place referral to MTM):  N/A: No risk factors identified  MTM Referral Placed: No: No risk factors idenified    Mental/Behavioral Health   Depression Screening:    PHQ-2 Total Score: 0       Mental health DX:: No        Falls Assessment:   Fallen 2 or more times in the past year?: No   Any fall with injury in the past year?: No    ADL/IADL Dependencies:   Dependent ADLs:: Ambulation-walker, Ambulation-cane, Bathing, Dressing, Eating, Grooming, Incontinence, Positioning, Transfers, Toileting  Dependent IADLs:: Cleaning, Cooking, Laundry, Shopping, Meal Preparation, Medication Management, Money Management, Incontinence, Transportation    [facility] sponsored benefits: Shared information re: Silver Sneakers/gym memberships, ASA, Calcium +D.    PCA Assessment completed at visit: Yes Annual PCA assessment indicated 40 units per day of PCA. This is the same as the previous assessment.     Elderly Waiver Eligibility: Yes - will continue on EW    Care Plan & Recommendations:     Yecenia is active on Elderly Waiver.  She receives PCA.  She currently lives with her Daughter and Son. She is stable  with the current services.  She has no new DME needs at this time.  She is currently dealing with A-Fib.  She has been working with her Cardiologist on this.    See [facility] for detailed assessment information.    Follow-Up Plan: Member informed of future contact, plan to f/u with member with a 6 month telephone assessment.  Contact information shared with member and family, encouraged member to call with any questions or concerns at any time.    [place] care continuum providers: Please refer to Health Care Home on the [facility] Problem List to view this patient's [facility] Care Plan Summary.    RYAN Yanez  [facility]  Phone: [phone]

## 2021-06-12 NOTE — TELEPHONE ENCOUNTER
Yecenia's daughter Estrellita is calling today to report her morning b/p reading after a few days of being on the Diltiazem CD 120mg. She is taking this at HS.to replace the Metoprolol Succinate.   Her b/p reading this morning is improved at 142/87 and her HR is 104 irregular.   She feels generally well.   Estrellita wanted to share that she had a concern from the standpoint that when she picked up the medication from the pharmacy last week, that the pharmacist stated that this is not a good longterm medication for b/p control.     She is wondering then, why would Hany Simmons CNP have recommended this ? sk/RN

## 2021-06-12 NOTE — TELEPHONE ENCOUNTER
Last Visit: 3/20/20 - Virtual Visit  Virtual Visit: 5/4/20 with Dr. Gonzalez for Acute bilateral low back pain with right-sided sciatica

## 2021-06-13 NOTE — TELEPHONE ENCOUNTER
Daughter Estrellita is calling and states that Yecenia saw MD Gonzalez last week.  Currently blood pressure is 211/80.  Yecenia is currently at dentist.  Dentist Siva Still is requesting to speak with Martha Lazar.  Please phone [facility] as soon as possible as Yecenia is suppose to have her tooth pulled today.  Yecenia denies and chest pain or any symptoms.  Dentist is wanting to ask questions about blood pressure and cholesterol and blood thinner.  Please phone asap at [phone].    COVID 19 Nurse Triage Plan/Patient Instructions    Please be aware that novel coronavirus (COVID-19) may be circulating in the community. If you develop symptoms such as fever, cough, or SOB or if you have concerns about the presence of another infection including coronavirus (COVID-19), please contact your health care provider or visit [email or web address].     Disposition/Instructions    Virtual Visit with provider recommended. Reference Visit Selection Guide.    Thank you for taking steps to prevent the spread of this virus.  o Limit your contact with others.  o Wear a simple mask to cover your cough.  o Wash your hands well and often.    Resources    [facility]: About COVID-19: [email or web address]    CDC: What to Do If You're Sick: www.cdc.gov/coronavirus/2019-ncov/about/steps-when-sick.html    CDC: Ending Home Isolation: www.cdc.gov/coronavirus/2019-ncov/hcp/disposition-in-home-patients.html     CDC: Caring for Someone: www.cdc.gov/coronavirus/2019-ncov/if-you-are-sick/care-for-someone.html     [facility]: Interim Guidance for Hospital Discharge to Home: www.health.[facility].[place].us/diseases/coronavirus/hcp/hospdischarge.pdf    [facility] clinical trials (COVID-19 research studies): clinicalaffairs.[facility]/umn-clinical-trials     Below are the COVID-19 hotlines at the [place] Department of [facility] ([facility]). Interpreters are available.   o For health questions: Call 558-161-1331 or 1-831.477.5971 (7 a.m. to 7  p.m.)  o For questions about schools and childcare: Call 557-195-7912 or 1-440.829.7090 (7 a.m. to 7 p.m.)       Reason for Disposition    BP Systolic BP >= 140 OR Diastolic >= 90 and postpartum (from 0 to 6 weeks after delivery)    Additional Information    Negative: Sounds like a life-threatening emergency to the triager    Negative: Pregnant > 20 weeks or postpartum (< 6 weeks after delivery) and new hand or face swelling    Negative: Pregnant > 20 weeks and BP > 140/90    Negative: Systolic BP >= 160 OR Diastolic >= 100, and any cardiac or neurologic symptoms (e.g., chest pain, difficulty breathing, unsteady gait, blurred vision)    Negative: Patient sounds very sick or weak to the triager    Protocols used: HIGH BLOOD PRESSURE-A-OH

## 2021-06-13 NOTE — TELEPHONE ENCOUNTER
Who is calling:  Patient's daughter  Reason for Call:    Patient's daughter is requesting the cholesterol medication be sent to the pharmacy.  Patient has reacted to simvastatin and atorvastatin.  Please have Covering Provider send an alternate medication to pharmacy.  Sharda [id number].  Thank you.  Date of last appointment with primary care: 12/7/2020  Okay to leave a detailed message: Yes
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
## 2021-06-13 NOTE — TELEPHONE ENCOUNTER
Reason contacted:  Results   Information relayed:  Below message. Spoke with patient's daughter Estrellita and notified her of the below message.    Patients daughter was surprised to hear patients cholesterol is significantly elevated as she is very active and eats a healthy diet.     She states patient had side effects of nausea and itching with both simvastatin and atorvastatin.     Is there a different cholesterol lowering medication you would recommend?  Additional questions:  No  Further follow-up needed:  Yes  Okay to leave a detailed message:  Yes

## 2021-06-13 NOTE — TELEPHONE ENCOUNTER
----- Message from Martha Gonzalez MD sent at 12/11/2020  7:52 AM CST -----  Can you please call pt with her results- her cholesterol is significantly elevated.  I see she has taken simvastatin and atorvastatin in the past.  Is she still taking this medication?  If not,did she have side effects or can we restart.

## 2021-06-13 NOTE — TELEPHONE ENCOUNTER
Patient Returning Call  Reason for call:  Returning clinic call.  Information relayed to patient:  The daughter is calling back.  States that she would like to have the Crestor started, however she would also appreciate a call back with more information on this medication please.  Patient has additional questions:  Yes  If YES, what are your questions/concerns:  At this point the family plans to continue to follow with [facility], daughter will again confirm this when she speaks with the patient today.  Okay to leave a detailed message?: Yes

## 2021-06-13 NOTE — TELEPHONE ENCOUNTER
Question following Office Visit  When did you see your provider:  12/07  What is your question: Daughter states there was some confusion with patient at appointment regarding whether or not she should be off Eliquis for a few days for upcoming 12/14 tooth extraction. Daughter needs to clarify if she should stay on or off medication for a few days.  Okay to leave a detailed message: Yes

## 2021-06-13 NOTE — TELEPHONE ENCOUNTER
Please send Crestor to the pharmacy if appropriate.  What side effects should patient watch for with this medication?

## 2021-06-13 NOTE — TELEPHONE ENCOUNTER
Called with question if elevated cholesterol will cause her dentist to not pull her tooth on Monday.  Reviewed with patent that this will most likely not be the case, that they will go ahead, but she could discuss with the dentist on Monday, since they are the ones doing the procedure.  No further questions.    Rachel Lobo RN 12/11/20 5:39 PM     Additional Information    Health Information question, no triage required and triager able to answer question    Protocols used: INFORMATION ONLY CALL-A-AH

## 2021-06-13 NOTE — TELEPHONE ENCOUNTER
I am not familiar with prescribing cyclodextrin and do not know if it is safe to take with her other medications.  I will ask the pharmacist to help me with this and get back to the family

## 2021-06-13 NOTE — TELEPHONE ENCOUNTER
Left message to call back for: medication question / status update  Information to relay to patient:  Below message

## 2021-06-13 NOTE — TELEPHONE ENCOUNTER
It shouldn't make her cholesterol higher that I am aware of.  I recommend Crestor for cholesterol treatment.  Can you please ask pt's daughter if she will be continuing here for primary care or [place]?  I would like to include her primary there if she will continuing her care there.

## 2021-06-13 NOTE — PROGRESS NOTES
Assessment and Plan:   76 yo female here for AWV and discussion of chronic medical conditions  Patient has been advised of split billing requirements and indicates understanding: Yes  1. Routine general medical examination at a health care facility  - UTD on immunizations including influenza  - Reviewed risk of falls and assistance with ambulation  - due for screening mammogram    2. Cerebrovascular accident (CVA) due to occlusion of precerebral artery (H)  Left temporal ischemia - embolic from AF, on Eliquis.  Word finding difficulties.  - HM2(CBC w/o Differential); Future    3. Paroxysmal atrial fibrillation (H)  Following with cardiology- on flecainide.  - HM2(CBC w/o Differential); Future    4. Benign essential hypertension  Elevated pressures today however just found out she needed to delay her dental procedure. Reviewed BPs at recent visits, including cardiology.  Will hold off on adjusting medications and have them continue to monitor with goals< 140/90    5. Mixed hyperlipidemia  Did not tolerate simvastatin or atorvastatin. Consider Crestor as an alternative, start low dose.  - Comprehensive Metabolic Panel; Future  - Glycosylated Hemoglobin A1c; Future  - Lipid [place] FASTING; Future    6. Allergy, sequela  - fluticasone propionate (ALLERGY RELIEF, FLUTICASONE,) 50 mcg/actuation nasal spray; 2 sprays into each nostril daily as needed for rhinitis.  Dispense: 16 g; Refill: 12        The patient's current medical problems were reviewed.    The following health maintenance schedule was reviewed with the patient and provided in printed form in the after visit summary:   Health Maintenance   Topic Date Due     DEPRESSION ACTION PLAN  1945     DEXA SCAN  1945     ZOSTER VACCINES (2 of 2) 02/15/2016     Pneumococcal Vaccine: 65+ Years (2 of 2 - PPSV23) 04/02/2016     MAMMOGRAM  10/26/2019     INFLUENZA VACCINE RULE BASED (1) 08/01/2020     MEDICARE ANNUAL WELLNESS VISIT  12/07/2021     FALL RISK  ASSESSMENT  12/07/2021     TD 18+ HE  04/26/2022     COLORECTAL CANCER SCREENING  10/18/2023     ADVANCE CARE PLANNING  12/05/2024     LIPID  12/08/2025     HEPATITIS C SCREENING  Completed     Pneumococcal Vaccine: Pediatrics (0 to 5 Years) and At-Risk Patients (6 to 64 Years)  Aged Out     HEPATITIS B VACCINES  Aged Out        Subjective:   Chief Complaint: Yecenia Kwan is an 75 y.o. female here for an Annual Wellness visit.   HPI:  74 yo female here for AWV and discussion regarding recommendations for upcoming dental procedure.  Dental concerns:  Expected to have dental work done today however the dentist recommended ov with primary to make recommendations on anticoagulant.  This was stressful for her and her blood pressure is elevated now.  Hx CVA:  Embolic due to A fib. Has ongoing word finding difficulties.  On Eliquis.  Family helps to care for her  Paroxysmal Afib:  Currently rate controlled, on flecainide  Hyperlipidemia:  Did not tolerate simvastatin and atorvastatin    Review of Systems:    Please see above.  The rest of the review of systems are negative for all systems.    Patient Care Team:  Zac Grijalva MD as PCP - General (Family Medicine)  Zac Grijalva MD as Assigned PCP  Selena De La Cruz SW as Lead Care Coordinator (Primary Care - CC)  Vani Rey CNP as Assigned Heart and Vascular Provider     Patient Active Problem List   Diagnosis     Primary Hypersomnia With Sleep Apnea     Hyperlipidemia LDL goal less than 70     Major Depression, Recurrent     Anxiety     Esophageal reflux     Vitamin D Deficiency     Dizziness     Prediabetes     Expressive aphasia     Osteoarthritis, knee     Salivary duct stone     Gait disturbance, post-stroke     Benign essential hypertension     Morbid obesity with BMI of 45.0-49.9, adult (H)     Paroxysmal atrial fibrillation (H)     Cerebrovascular accident (CVA) due to occlusion of precerebral artery (H)     Open angle with borderline findings, low  "risk     BELA (obstructive sleep apnea)     Past Medical History:   Diagnosis Date     Anxiety      Arthritis      Atrial fibrillation (H)      Cellulitis of left leg      CVA (cerebral vascular accident) (H)      GERD (gastroesophageal reflux disease)      Head injury      Hx of ischemic left MCA stroke 12/7/2015    posterior aspect of the left lobe involving middle frontal gyrus   12/2015      Hx of nicotine dependence      Hyperlipemia      Hypertension      Proteinuria       Past Surgical History:   Procedure Laterality Date     FL ARTHROTOMY/EXPLORE/TREAT KNEE JOINT      Description: Arthrotomy Of Knee With Joint Exploration;  Recorded: 07/29/2008;  Comments: due to \"accident\" and infection     FL CORRJ HALLUX VALGUS W/SESMDC W/DIST METAR OSTEOT      Description: Bunion Correction With Metatarsal Osteotomy;  Recorded: 07/29/2008;      Family History   Problem Relation Age of Onset     Diabetes Mother      Prostate cancer Father       Social History     Socioeconomic History     Marital status: Single     Spouse name: Not on file     Number of children: Not on file     Years of education: Not on file     Highest education level: Not on file   Occupational History     Not on file   Social Needs     Financial resource strain: Not on file     Food insecurity     Worry: Not on file     Inability: Not on file     Transportation needs     Medical: Not on file     Non-medical: Not on file   Tobacco Use     Smoking status: Never Smoker     Smokeless tobacco: Never Used   Substance and Sexual Activity     Alcohol use: No     Drug use: No     Sexual activity: Not on file   Lifestyle     Physical activity     Days per week: Not on file     Minutes per session: Not on file     Stress: Not on file   Relationships     Social connections     Talks on phone: Not on file     Gets together: Not on file     Attends [employer] witness service: Not on file     Active member of club or organization: Not on file     Attends meetings of clubs or " organizations: Not on file     Relationship status: Not on file     Intimate partner violence     Fear of current or ex partner: Not on file     Emotionally abused: Not on file     Physically abused: Not on file     Forced sexual activity: Not on file   Other Topics Concern     Not on file   Social History Narrative    Lives with grandson, and is exposed to second hand smoke.      Current Outpatient Medications   Medication Sig Dispense Refill     apixaban ANTICOAGULANT (ELIQUIS) 5 mg Tab tablet Take 1 tablet (5 mg total) by mouth 2 (two) times a day. 180 tablet 3     bisacodyL (DULCOLAX) 5 mg EC tablet Take 1 tablet (5 mg total) by mouth daily as needed for constipation. 90 tablet 3     capsaicin (ZOSTRIX) 0.025 % cream APPLY A SMALL AMOUNT TO SKIN 4 TIMES A DAY AS NEEDED TO THE KNEES  2     cyclobenzaprine (FLEXERIL) 5 MG tablet 1/2 to 1 tab po three times a day prn for back pain 30 tablet 0     diclofenac sodium (VOLTAREN) 1 % Gel Lower extremities: 4 g to the affected area 4 times daily using dosing card.  Do not exceed 32 g total dose per day. (Patient taking differently: Apply 4 g topically 4 (four) times a day as needed (pain). Lower extremities: 4 g to the affected area 4 times daily using dosing card.  Do not exceed 32 g total dose per day.      ) 100 g 3     diltiazem (CARDIZEM CD) 120 MG 24 hr capsule Take 1 capsule (120 mg total) by mouth daily. 90 capsule 3     flecainide (TAMBOCOR) 50 MG tablet Take 1 tablet (50 mg total) by mouth 2 (two) times a day. 180 tablet 2     fluticasone propionate (ALLERGY RELIEF, FLUTICASONE,) 50 mcg/actuation nasal spray 2 sprays into each nostril daily as needed for rhinitis. 16 g 12     hydrALAZINE (APRESOLINE) 25 MG tablet Take 1 tablet as needed for hypertension, up to 4 times daily to control blood pressure 30 tablet 3     miscellaneous medical supply (BLOOD PRESSURE CUFF) Misc Please take blood pressure readings two times daily and continue to log daily. 1 each 0      "olmesartan (BENICAR) 40 MG tablet Take 1 tablet (40 mg total) by mouth daily. 90 tablet 3     omeprazole (PRILOSEC) 40 MG capsule TAKE 1 CAPSULE(40 MG) BY MOUTH DAILY BEFORE BREAKFAST 90 capsule 2     polyethylene glycol (MIRALAX) 17 gram packet Take 17 g by mouth daily as needed.       TiZANidine (ZANAFLEX) 4 MG capsule Take 1 capsule (4 mg total) by mouth 3 (three) times a day as needed for muscle spasms. 45 capsule 0     triamcinolone (KENALOG) 0.1 % cream Apply topically 2 (two) times a day. As needed for rash (Patient taking differently: Apply 1 application topically 2 (two) times a day as needed. As needed for rash      ) 30 g 3     No current facility-administered medications for this visit.       Objective:   Vital Signs:   Visit Vitals  /72   Pulse 74   Ht 5' 2.21\" (1.58 m)   Wt (!) 230 lb (104.3 kg)   SpO2 97%   BMI 41.79 kg/m           VisionScreening:  No exam data present     PHYSICAL EXAM  Physical Exam  Constitutional:       Appearance: Normal appearance.   HENT:      Head: Normocephalic.      Nose: Nose normal.   Eyes:      Pupils: Pupils are equal, round, and reactive to light.   Neck:      Musculoskeletal: No neck rigidity.   Cardiovascular:      Rate and Rhythm: Normal rate and regular rhythm.      Heart sounds: No murmur.   Pulmonary:      Effort: Pulmonary effort is normal. No respiratory distress.      Breath sounds: Normal breath sounds.   Abdominal:      General: Abdomen is flat. Bowel sounds are normal.      Palpations: Abdomen is soft.   Musculoskeletal: Normal range of motion.   Skin:     General: Skin is warm.      Capillary Refill: Capillary refill takes less than 2 seconds.   Neurological:      Mental Status: She is alert and oriented to person, place, and time.      Comments: Word finding difficuty   Psychiatric:         Mood and Affect: Mood normal.           Assessment Results 12/7/2020   Activities of Daily Living 5-6 - Severe functional impairment   Instrumental Activities of " Daily Living 5-6 - Severe functional impairment   Get Up and Go Score -   Mini Cog Total Score 3   Some recent data might be hidden     A Mini-Cog score of 0-2 suggests the possibility of dementia, score of 3-5 suggests no dementia    Identified Health Risks:     The patient was provided with suggestions to help her develop a healthy physical lifestyle.   The patient reports that she does not have all recommended working emergency equipment available. She was provided with information about emergency preparedness, including smoke detectors.  The patient reports that she has difficulty with activities of daily living.   The patient reports that she has difficulty with instrumental activities of daily living.   The patient was provided with suggestions to help her develop a healthy emotional lifestyle.   The patient's PHQ-9 score is consistent with mild depression. She was provided with information regarding depression  Patient's advanced directive was discussed and I am comfortable with the patient's wishes.

## 2021-06-13 NOTE — TELEPHONE ENCOUNTER
Called and spoke with Estrellita and dentist.  Estrellita called cardiology regarding blood pressure.  Recommended using their equipment at the dentis. Discussed giving blood thinner today and tomorrow, hold the day or procedure

## 2021-06-13 NOTE — TELEPHONE ENCOUNTER
I called daughter Anastasia, who's phone number is only listed on pt's demographics and we spoke. I relayed message from Dr. Gonzalez- to hold eliquis medication day of procedure ONLY. Resume next day. Pt's daughter understands.    Daughter has a question for provider: will the BP meds, valdo the new one prescribed, make pt's cholesterol go up?

## 2021-06-13 NOTE — TELEPHONE ENCOUNTER
Who is calling:  Patient's daughterEstrellita  Reason for Call:  Patient's daughter is asking if medication by the name of cyclodextrin could be prescribed and if this would interfere with other medications? Daughter would like call back to discuss as soon as possible as she would like medication called into pharmacy.  Date of last appointment with primary care:   Okay to leave a detailed message: Yes

## 2021-06-13 NOTE — TELEPHONE ENCOUNTER
Left a detailed message with response from Dr. Grijalva and left the clinic number for ball back for any further questions.

## 2021-06-14 NOTE — TELEPHONE ENCOUNTER
Estrellita is contacted to obtain updates after the interruption of her Diltiazem after she developed GI disturbances that she felt were due to the medication. She has stopped the Diltiazem (last dose Friday 1/22/2021) she continues to have GI Symptoms of burping, denies dizziness, no lightheadedness, no other complaints. Her b/p are as follows:  153/61  HR 63   155/60  HR 70  148/66  HR 72  156/77  HR 70    Will plan to f/u with her on Friday this week to see if there are any improvements in her GI symptoms off of the Diltiazem. Sk/RN    FYI to Dr. Rafy hernandez/RN

## 2021-06-14 NOTE — TELEPHONE ENCOUNTER
Reason for call:  Patient reporting a symptom- daughter calling for pt/Yecenia    Symptom or request: a lot of burping (unusual for pt/yecenia)    Duration (how long have symptoms been present): X1 week    Have you been treated for this before? No    Additional comments: please contact # provided for daughter/Anastasia Haro    Phone Number patient can be reached at:  Home number on file [phone] (home)    Best Time:  anytime    Can we leave a detailed message on this number: Yes    Call taken on 1/5/2021 at 11:35 AM by Joanie Felipe

## 2021-06-14 NOTE — PROGRESS NOTES
Assessment/Plan:     1. Hx of ischemic left MCA stroke  2. Venous stasis dermatitis of both lower extremities  3. Accelerated hypertension  Patient unwilling to continue amlodipine.  So we will have to stop medication.  Blood pressure has been difficult to control and I do not believe it will remain controlled on metoprolol and Diovan alone but cannot increase medication.  At high dose of Diovan.  Metoprolol pulse is already 60 so cannot increase.  Does have hydralazine to use.  Have tried diuretics in the past and unable to tolerate unable to try again.  Wants to try to only take 2 regular medication so we will change Diovan to Benicar in hopes that the regeneration medication may provide patient some stronger relief.  They will keep track let us know and come in 2 weeks for nurse only blood pressure check.  Let us know if symptoms of swelling worsen or do not improve.  Venous stasis rash is significantly improved with the use of the steroid cream.  Declines further workup or treatment at this time.  - olmesartan (BENICAR) 40 MG tablet; Take 1 tablet (40 mg total) by mouth daily.  Dispense: 30 tablet; Refill: 3        Subjective:      Yecenia Kwan is a 72 y.o. female comes in today with her daughter.  They are concerned about swelling in extremities that they believe is from the amlodipine.  States that in the past 4 months he believes that having more swelling.  Describes it as a hard sensation and not a significant pitting sensation but sometimes she does get pitting.  They had called yesterday and had stopped amlodipine.  Has not had significant change.  Blood pressure has been very difficult to control and is elevated today.  She continues to take metoprolol and Diovan.  Takes hydralazine when above 160.  History of stroke has been stable over the last year.  Feels well on the right for all no other significant concerns.  Reviewed last labs.    Current Outpatient Prescriptions   Medication Sig Dispense  Refill     aspirin 81 MG EC tablet Take 81 mg by mouth daily.       atorvastatin (LIPITOR) 20 MG tablet TAKE 1 TABLET(20 MG) BY MOUTH DAILY 90 tablet 2     bisacodyl (LAXATIVE, BISACODYL,) 10 mg suppository Insert 1 suppository (10 mg total) into the rectum 2 (two) times a day as needed (constipation). 12 suppository 1     blood pressure monitor Kit Use as directed up to 3 times a day 1 each 0     fluticasone (FLONASE) 50 mcg/actuation nasal spray 2 sprays into each nostril daily. 48 g 3     hydrALAZINE (APRESOLINE) 25 MG tablet TAKE 1 TABLET BY MOUTH FOUR TIMES DAILY FOR BLOOD PRESSURE GREATER THAN 160/100. 90 tablet 3     HYDROcodone-acetaminophen (NORCO )  mg per tablet Take 1 tablet by mouth every 6 (six) hours as needed for pain. 90 tablet 0     loratadine (CLARITIN) 10 mg tablet Take 1 tablet (10 mg total) by mouth daily. (Patient taking differently: Take 10 mg by mouth daily as needed. ) 30 tablet 0     metoprolol succinate (TOPROL-XL) 50 MG 24 hr tablet Take 1 tablet (50 mg total) by mouth daily. Patient needs to be seen the end of August per CNP's request. 30 tablet 0     omeprazole (PRILOSEC) 40 MG capsule TAKE 1 CAPSULE(40 MG) BY MOUTH DAILY BEFORE BREAKFAST 90 capsule 3     triamcinolone (KENALOG) 0.1 % cream Apply topically 2 (two) times a day. 30 g 3     olmesartan (BENICAR) 40 MG tablet Take 1 tablet (40 mg total) by mouth daily. 30 tablet 3     No current facility-administered medications for this visit.      No Known Allergies  Past Medical History, Family History, and Social History reviewed.  Past Medical History:   Diagnosis Date     Arthritis      Cellulitis of left leg      GERD (gastroesophageal reflux disease)      Head injury      Hx of nicotine dependence      Hyperlipemia      Hypertension      Proteinuria      Past Surgical History:   Procedure Laterality Date     [place] ARTHROTOMY/EXPLORE/TREAT KNEE JOINT      Description: Arthrotomy Of Knee With Joint Exploration;  Recorded:  "07/29/2008;  Comments: due to \"accident\" and infection     [place] CORRJ HALLUX VALGUS W/SESMDC W/DIST METAR OSTEOT      Description: Bunion Correction With Metatarsal Osteotomy;  Recorded: 07/29/2008;     Review of patient's allergies indicates no known allergies.  Family History   Problem Relation Age of Onset     Diabetes Mother      Prostate cancer Father      Social History     Social History     Marital status: Single     Spouse name: N/A     Number of children: N/A     Years of education: N/A     Occupational History     Not on file.     Social History Main Topics     Smoking status: Never Smoker     Smokeless tobacco: Never Used     Alcohol use No     Drug use: No     Sexual activity: Not on file     Other Topics Concern     Not on file     Social History Narrative    Lives with grandson, and is exposed to second hand smoke.         Review of systems is as stated in HPI, and the remainder of the 10 system review is otherwise negative.    Objective:     Vitals:    11/21/17 1523   BP: 158/70   Patient Site: Right Arm   Patient Position: Sitting   Cuff Size: Adult Large   Pulse: 60   SpO2: 98%    There is no height or weight on file to calculate BMI.  Wt Readings from Last 3 Encounters:   10/04/17 (!) 279 lb 14.4 oz (127 kg)   04/28/17 (!) 269 lb 11.2 oz (122.3 kg)   02/23/17 (!) 272 lb (123.4 kg)       General Appearance:    Alert, cooperative, no distress, appears stated age    Head:    Normocephalic, without obvious abnormality, atraumatic   Eyes:    PERRL, , no conjunctivitis    Ears:    Normal  external ear canals   Nose:   Mucosa normal, no drainage        Throat:   Oropharynx is clear   Neck:   Supple, symmetrical, no adenopathy, no thyromegally, no carotid bruit        Lungs:     Clear to auscultation bilaterally, respirations unlabored   Chest Wall:    No tenderness or deformity    Heart:    Regular rate and rhythm, S1 and S2 normal, no murmur, rub    or gallop                   Extremities:  Uses cane for " ambulation.  Chronic degenerative changes.  Patient has edema to ankles and just trace edema above ankle.  There are chronic venous stasis changes.   Pulses:   2+ and symmetric all extremities   Neuro:   cranial nerves grossly intact, grossly normal sensation and reflexes in extremities    Psych:   grossly normal mood and affect without acute anxiety or psychosis    Skin:  Stasis rash bilateral ankles no rashes or lesions   :          This note has been dictated using voice recognition software. Any grammatical or context distortions are unintentional and inherent to the software.

## 2021-06-14 NOTE — TELEPHONE ENCOUNTER
Controlled Substance Refill Request  Medication Name:   Requested Prescriptions     Pending Prescriptions Disp Refills     LORazepam (ATIVAN) 0.5 MG tablet 2 tablet 0     Sig: Take 1/2 tab po 30 min prior to procedure, repeat x1 prn     Date Last Fill: 12/7/20  Requested [facility]acy: [facility]  Submit electronically to pharmacy  Controlled Substance Agreement on file:   Encounter-Level CSA Scan Date - 04/02/2018:    Scan on 4/5/2018  9:52 AM        Last office visit:  12/7/20

## 2021-06-14 NOTE — PROGRESS NOTES
Assessment/Plan:     1. Essential hypertension  At this point blood pressure is stable.  Patient has been very difficult to control in the past.  Advised that she could continue to take hydralazine 2-3 times per day as a regular medication rather than a medication on an as-needed basis.  Plan to continue Benicar and metoprolol.  Advise could consider clonidine.  Unfortunately patient has had multiple reported adverse reactions to medications in the past so it makes choosing or adding other blood pressure medications somewhat difficult.  She has been appointment next week for consultation with our pharmacist and appreciate her recommendations in helping to control patient's blood pressure.  They will let me know if her blood pressure begins to elevate again or if she has any further symptoms.    2. Urine frequency  Patient unable to leave a sample today but took a cup and states she will drop it off.  Will rule out infection as cause of urinary frequency at this point she is not really interested in starting a incontinence medication but could consider.  I do not believe that her medications should be worsening her urination issue.  - Urinalysis-UC if Indicated; Future    3.  History of old stroke.  Patient continues to follow with therapy speech is somewhat improving from when I first met her.  Reviewed last CT done in emergency room which was thankfully normal.      Subjective:      Yecenia Kwan is a 72 y.o. female comes in today with daughter Estrellita to follow-up on recent emergency room visit and blood pressure check.  Last time I saw her she had reported that she wanted to get off of amlodipine because she believes that it was causing some leg swelling.  Even before the switch patient's blood pressure was unfortunately suboptimally controlled.  She has reported many side effects with other medications in the past.  We were unable to increase her beta-blocker due to her typically having a low heart rate.  We  had changed her losartan to Benicar to see if that would provide her stronger blood pressure control and advised her she could take her hydralazine regularly 2-3 times a day to help control blood pressure.  States that after she stopped the amlodipine she noticed she had a headache.  Blood pressure was high so she went to the emergency room.  Reviewed the emergency room visit notes as well as the labs and imaging that were done.  They were able to get her blood pressure under control and she was discharged without any acute concerns.  She has been monitoring her blood pressure at home and states it has been typically around 140/60-70.  States the highest it ever was was 156.  She feels good her headache is gone away she thinks that some of the swelling has gone down in her leg.  She states that she feels she is urinating more.  This has been a chronic concern for her.  We reassured her that she is not on a diuretic.  She does not have any burning with urination reviewed labs she has not had recent urinalysis and suggested we should check for that.  Perhaps could try a bladder incontinence medication but at this point she does not want to start any other medications.  She feels well overall is happy with the plan.    Current Outpatient Prescriptions   Medication Sig Dispense Refill     aspirin 81 MG EC tablet Take 81 mg by mouth daily.       atorvastatin (LIPITOR) 20 MG tablet TAKE 1 TABLET(20 MG) BY MOUTH DAILY 90 tablet 2     bisacodyl (LAXATIVE, BISACODYL,) 10 mg suppository Insert 1 suppository (10 mg total) into the rectum 2 (two) times a day as needed (constipation). 12 suppository 1     blood pressure monitor Kit Use as directed up to 3 times a day 1 each 0     fluticasone (FLONASE) 50 mcg/actuation nasal spray 2 sprays into each nostril daily. 48 g 3     hydrALAZINE (APRESOLINE) 25 MG tablet TAKE 1 TABLET BY MOUTH FOUR TIMES DAILY FOR BLOOD PRESSURE GREATER THAN 160/100. 90 tablet 3      "HYDROcodone-acetaminophen (NORCO )  mg per tablet Take 1 tablet by mouth every 6 (six) hours as needed for pain. 90 tablet 0     loratadine (CLARITIN) 10 mg tablet Take 1 tablet (10 mg total) by mouth daily. (Patient taking differently: Take 10 mg by mouth daily as needed. ) 30 tablet 0     metoprolol succinate (TOPROL-XL) 50 MG 24 hr tablet Take 1 tablet (50 mg total) by mouth daily. Patient needs to be seen the end of August per CNP's request. 30 tablet 0     olmesartan (BENICAR) 40 MG tablet Take 1 tablet (40 mg total) by mouth daily. 30 tablet 3     omeprazole (PRILOSEC) 40 MG capsule TAKE 1 CAPSULE(40 MG) BY MOUTH DAILY BEFORE BREAKFAST 90 capsule 3     triamcinolone (KENALOG) 0.1 % cream Apply topically 2 (two) times a day. 30 g 3     No current facility-administered medications for this visit.      No Known Allergies  Past Medical History, Family History, and Social History reviewed.  Past Medical History:   Diagnosis Date     Arthritis      Cellulitis of left leg      CVA (cerebral vascular accident)      GERD (gastroesophageal reflux disease)      Head injury      Hx of nicotine dependence      Hyperlipemia      Hypertension      Proteinuria      Past Surgical History:   Procedure Laterality Date     [place] ARTHROTOMY/EXPLORE/TREAT KNEE JOINT      Description: Arthrotomy Of Knee With Joint Exploration;  Recorded: 07/29/2008;  Comments: due to \"accident\" and infection     [place] CORRJ HALLUX VALGUS W/SESMDC W/DIST METAR OSTEOT      Description: Bunion Correction With Metatarsal Osteotomy;  Recorded: 07/29/2008;     Review of patient's allergies indicates no known allergies.  Family History   Problem Relation Age of Onset     Diabetes Mother      Prostate cancer Father      Social History     Social History     Marital status: Single     Spouse name: N/A     Number of children: N/A     Years of education: N/A     Occupational History     Not on file.     Social History Main Topics     Smoking status: " Never Smoker     Smokeless tobacco: Never Used     Alcohol use No     Drug use: No     Sexual activity: Not on file     Other Topics Concern     Not on file     Social History Narrative    Lives with grandson, and is exposed to second hand smoke.         Review of systems is as stated in HPI, and the remainder of the 10 system review is otherwise negative.    Objective:     Vitals:    12/06/17 1355   BP: 148/76   Patient Site: Right Arm   Patient Position: Sitting   Cuff Size: Adult Large   Pulse: 66   SpO2: 93%    There is no height or weight on file to calculate BMI.  Wt Readings from Last 3 Encounters:   11/30/17 (!) 270 lb (122.5 kg)   10/04/17 (!) 279 lb 14.4 oz (127 kg)   04/28/17 (!) 269 lb 11.2 oz (122.3 kg)       General Appearance:    Alert, cooperative, no distress, appears stated age    Head:    Normocephalic, without obvious abnormality, atraumatic   Eyes:    PERRL, EOM's intact, no conjunctivitis    Ears:    Normal TM's and external ear canals   Nose:   Mucosa normal, no drainage     or sinus tenderness   Throat:   Oropharynx is clear   Neck:   Supple, symmetrical, no adenopathy, no thyromegally, no carotid bruit        Lungs:     Clear to auscultation bilaterally, respirations unlabored   Chest Wall:    No tenderness or deformity    Heart:    Regular rate and rhythm, S1 and S2 normal, no murmur, rub    or gallop                   Extremities:  Chronic weakness is due to old CVA but otherwise extremities normal, atraumatic, no cyanosis or edema   Pulses:   2+ and symmetric all extremities   Neuro:   cranial nerves grossly intact, grossly normal sensation and reflexes in extremities    Psych:   grossly normal mood and affect without acute anxiety or psychosis    Skin:   No rashes or lesions   :          This note has been dictated using voice recognition software. Any grammatical or context distortions are unintentional and inherent to the software.

## 2021-06-14 NOTE — PROGRESS NOTES
Vitals - Patient Reported  Systolic (Patient Reported): 131  Diastolic (Patient Reported): 64  Weight (Patient Reported): 224 lb (101.6 kg)  Pulse (Patient Reported): 64    Review of Systems - ALL WNL    NO OTHER CONCERNS    VIDEO VISIT    Angélica Flood, CMA

## 2021-06-14 NOTE — TELEPHONE ENCOUNTER
Estrellita was contacted today to discuss her mother's continued status following holding Diltiazem throughout this past week. Yecenia underwent an endometrial biopsy this week which went well.  She has had very good b/p control lately, even off of the Diltiazem. 124/84  HR 71 this AM at the MD office  And 135/54 HR 76 at Home.  She continues to have the GI symptoms, although Estrellita feels they are somewhat to a lesser degree now (calming down). They are mostly noted after drinking cold fluids, but sometimes observed at other intervals.    No other new symptoms noted at this time.     Dr. Hillman what are your recommendations at this time? sk/RN

## 2021-06-14 NOTE — TELEPHONE ENCOUNTER
----- Message from Balbina Hillman MD sent at 1/22/2021  3:08 PM CST -----  Regarding: Follow-up  Chucho Estrella;  Can you give Mrs. Kwan a call on Monday (1/25/21) to follow-up her BP log. During today's visit we stopped her diltiazem 2/2 GI concerns with the plan to see if her symptoms resolved, but we will need to follow her BP closely while she's off that Rx.    Thanks;  ~ Balbina

## 2021-06-14 NOTE — TELEPHONE ENCOUNTER
Looks like last time med was prescribed it was prescribed for a qty of 2 to use prior to a procedure. Spoke with pt's daughter who states pt has a biopsy and cortisone injection scheduled for next week and would benefit from the medication.     To PCP to advise request.

## 2021-06-14 NOTE — TELEPHONE ENCOUNTER
Estrellita is calling today to discuss her mother's symptoms of bothersome burping/belching after taking the dose of Diltiazem in the evening. She confirms that she is taking this with food. She is also on the Pantoprazole 40mg daily which she takes at noon. This was recently changed from the Omeprazole by PCP. She is also eliminating carbonated drinks.  Estrellita states that she notices this burping is occurring after the dose is taken, and through out the night.  She is wondering if there is anything that can be done to prevent this.    Hany Simmons, do you have any recommendations?  Dr. Gonzalez, do you have any recommendations ?   Since this is not cardiac in origin should be addressed by her PCP.   Thank you,  Delores/TIANA

## 2021-06-15 ENCOUNTER — COMMUNICATION - HEALTHEAST (OUTPATIENT)
Dept: FAMILY MEDICINE | Facility: CLINIC | Age: 76
End: 2021-06-15

## 2021-06-15 DIAGNOSIS — R21 RASH: ICD-10-CM

## 2021-06-15 NOTE — PATIENT INSTRUCTIONS - HE
Yecenia Kwan,    It was a pleasure to see you today at the [facility].     My recommendations after this visit include:    Continue flecainide 50 mg two times a day  Continue metoprolol tartrate 25 mg two times a day.  Take an extra 25 mg tab at onset of A fib.    Keep a log of A fib episodes (how often, how long, how do you feel)  Call if A fib is frequent or prolonged (lasting for days)    Follow up in 6-8 weeks, or sooner if needed    Caitlin Madsen, [facility], Electrophysiology  [phone]  EP nurses [phone]
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
## 2021-06-15 NOTE — TELEPHONE ENCOUNTER

## 2021-06-15 NOTE — PROGRESS NOTES
[facility] Certification Request    January 30, 2018      Patient: Yecenia Kwan  MR Number: [id number]  YOB: 1945  Date of Visit: 1/30/2018      Dear Dr. Tila Aguilera:    Thank you for this referral.   We are seeing Yecenia Kwan in Occupational Therapy for stroke rehab.    Medicare and/or Medicaid requires physician review and approval of the treatment plan. Please review the plan of care and verify that you agree with the therapy plan of care by co-signing this note.      Plan of Care  Authorization / Certification Start Date: 01/30/18  Authorization / Certification End Date: 04/30/18  Authorization / Certification Number of Visits: 12  Communication with: Referral Source  Patient Related Instruction: Nature of Condition;Treatment plan and rationale;Basis of treatment;Expected outcome  Times per Week: 1  Number of Weeks: 12  Number of Visits: 12  Therapeutic Exercise: Strengthening;ROM  Neuromuscular Reeducation: stroke rehabilitation  Functional Training (ADL's): self care;ADL's;ergonomics  Orthotic Fitting: OTC;custom    Goals:  Pt will: complete LACL to assist in determining level of care needed and appropriate living environment      If you have any questions or concerns, please don't hesitate to call.    Sincerely,      Brigitte Beatty, OT        Physician recommendation:     ___ Follow therapist's recommendation        ___ Modify therapy      *Physician co-signature indicates they certify the need for these services furnished within this plan and while under their care.      [facility]   ADL Initial Evaluation    Patient Name: Yecenia Kwan  Date of evaluation: 1/30/2018  Referral Diagnosis: ischemic left MCA stroke  Referring provider: Tila Aguilera DO  Visit Diagnosis:     ICD-10-CM    1. Decreased activities of daily living (ADL) Z78.9        Assessment:      Pt. is appropriate for skilled OT intervention as outlined in the Plan of Care  (POC).    Goals:  Pt will: complete LACL to assist in determining level of care needed and appropriate living environment    Patient's expectations/goals are realistic.    Barriers to Learning or Achieving Goals:  No Barriers.       Plan / Patient Instructions:        Plan of Care:   Authorization / Certification Start Date: 18  Authorization / Certification End Date: 18  Authorization / Certification Number of Visits: 12  Communication with: Referral Source  Patient Related Instruction: Nature of Condition;Treatment plan and rationale;Basis of treatment;Expected outcome  Times per Week: 1  Number of Weeks: 12  Number of Visits: 12  Therapeutic Exercise: Strengthening;ROM  Neuromuscular Reeducation: stroke rehabilitation  Functional Training (ADL's): self care;ADL's;ergonomics  Orthotic Fitting: OTC;custom    Plan for next visit: no treatment indicated     Subjective:        Social information:   Living Situation:single family home, lives with daughter and family   Occupation:retired   Work Status:NA     History of Present Illness:    Yecenia is a 72 y.o. female who presents to therapy today with complaints of poor memory and difficulty with speech. Date of onset/duration of symptoms is several years ago since she suffered an ischemic left MCA stroke. Onset was sudden. Symptoms are not improving. She describes their previous level of function as not limited prior to the stroke. Functional limitations are described as occurring with ADL's and IADL's.    Pain Ratin in her legs      Objective:      Note: Items left blank indicates the item was not performed or not indicated at the time of the evaluation.    Patient Outcome Measures:    Large Aamir Cognitive Level Screen (_/6.0): 3.8    ADL Examination  1. Decreased activities of daily living (ADL)       Precautions/Restrictions: None  Involved side: Left  Posture Observation:      General standing posture is fair.    Prior Level of Function: same                            Current level of function           I  SBA  MIN  MOD MAX DEP   Equipment   Transfers           Bed x         Toilet   x        Tub/shower   x    Shower with help from family, chair didn't fit   Car   x        Bed mobility x      Railing on bed   ADL's          Eating x         Grooming x         Bathing   x    Daughter helps her   UE dressing x         LE dressing   x       Toileting  x        IADL's          Meal prep      x    House work      x    Laundry       x    Telephone      x    Driving      x           n/a   Finances      x    Outdoor ADL      x    Grocery shop      x    Medication mgmt      x    Writing x         Typing       x    Dynamic sitting x         Dynamic standing x               Upper Extremity ROM/Strength:        Right                     Left     * indicates pain   AROM   MMT/5   AROM   MMT/5     Shoulder Flexion 180    WNL      105        Shoulder Extension  60    WNL              Shoulder Abduction  180                  Elbow Flexion  150    WNL      WNL        Elbow Extension 0    WNL      WNL        Forearm Supination  80    WNL      WNL        Forearm Pronation  80    WNL      WNL        Wrist Flexion  80    WNL      WNL        Wrist Extension  80    WNL      WNL         Strength         3 point pinch          Lateral pinch                  Sensation: Patient reports left arm is numb and tingling    Coordination:                            Right                                           Left                    NT   WNL   Impaired    NT     WNL    Impaired     Gross motor      x            x     Fine motor      x            x     9 hole peg   x        x            Hand AROM:                             Right                                           Left                    NT   WNL   Impaired    NT     WNL    Impaired     Claw fist         x         x     Flat fist      x         x        Full fist      x         x        LE Orthoses:   No    UE  Orthoses:  No    Cognitive Evaluation:  Aamir Cognitive Level (ACL) Score: 3.8    Visual - perceptual Assessment:  Visual-perceptual assessment not indicated.    Pre-Driving Screening:  Not Indicated    Treatment Today: Performed LACL today. Patient scored a 3.8 indicating patient needs 24 hour supervision. She should have someone manage her money and administer all medications.   TREATMENT MINUTES COMMENTS   Evaluation 35    Self-care/ Home management     Manual therapy     Neuromuscular Re-education     Therapeutic Exercises     Orthotic fitting           Total 35    Blank areas are intentional and mean the treatment did not include these items.     GOALS AND PLAN OF CARE WERE ESTABLISHED IN COOPERATION WITH THE PATIENT    OT Evaluation Code: (Please list factors)   Comorbidities: depression, anxiety, HTN, ischemic left MCA stroke, expressive aphasia, knee OA, legally blind in right eye  Profile/History Review: Brief    Need for eval modification: No    # Treatment options:  Several   Clinical Decision Making:  Low      Occupational Profile/ Medical and Therapy History and Comorbidities Occupational Performance Clinical Decision Making   (Complexity)   brief history with review of medical/therapy records related to the presenting problem.  No comorbidities 1-3 Performance deficits that result in activity limitations and/or participation restrictions.    No Assessment Modification  Low complexity, which includes  problem-focused assessments, and consideration of a limited number of treatment options.      expanded review of medical/therapy records and additional review of physical, cognitive and psychosocial history.    May have comorbidities 3-5 Performance deficits that result in activity limitations and/or participation restrictions.    Minimal to moderate modification of assessment Moderate complexity, which includes analysis of data from detailed assessments, and consideration of several treatment options.          Review of medical/therapy records and extensive additional review of physical, cognitive and psychosocial history.  Comorbidities affect occupational performance 5 or more Performance deficits that result in activity limitations and/or participation restrictions.    Significant modification of assessment High complexity, analysis of  Occupational profile and data,  Comprehensive assessments, multiple treatment options.          Brigitte Beatty  1/30/2018  1:58 PM
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
## 2021-06-15 NOTE — PROGRESS NOTES
147/55   HR79  ASSESSMENT/PLAN:  1. Paroxysmal atrial fibrillation (H)  75-year-old female with known history of paroxysmal atrial fibrillation noted to be in A. fib with RVR in the office.  She has continued with flecainide.  She had severe reflux with diltiazem so this was discontinued.  We restarted metoprolol 25 mg twice daily and attempted to reach her cardiologist.  As vital signs were stable, she was discharged to home.  I was not able to connect with her cardiologist.  When I called to see how she was doing that afternoon, her daughter states that she was feeling much better.  Her heart rate was now 79 and her blood pressure was 147/55.  We arrange for her to be seen in cardiology clinic for follow-up.  She is anticoagulated using Eliquis and continues to take her flecainide.  She knows when she is having atrial fibrillation and will be seen if it recurs.  - Electrocardiogram Perform and Read  - metoprolol tartrate (LOPRESSOR) 25 MG tablet; Take 1 tablet (25 mg total) by mouth 2 (two) times a day.  Dispense: 60 tablet; Refill: 11    2. Rash  - hydrocortisone 2.5 % cream; Apply to affected area BID for no more than 2 weeks  Dispense: 30 g; Refill: 0    We discussed that she does need to follow-up with the dentist to get this tooth removed.  She is going to see a different dentist, in fact an oral surgeon for next evaluation.  I did recommend that they use the lorazepam as needed for anxiety related to seeing the dentist.    [employer] dictation was used for this note.  Speech recognition errors are a possibility.      Orders Placed This Encounter   Procedures     Electrocardiogram Perform and Read     There are no discontinued medications.      CHIEF COMPLAINT;  Chief Complaint   Patient presents with     Concerns     Left leg     Other     take out tooth       HISTORY OF PRESENT ILLNESS:  Yecenia is a 75 y.o. female presenting to the clinic today for several concerns.  The first is that of a rash on her left  knee, second is that of tooth.,  And finally just to discuss her progress with gynecology.  Yecenia has multiple medical problems including history of CVA, paroxysmal atrial fibrillation and hypertension.  She has been having difficulty getting her tooth pulled which is quite painful as every time she shows up at the dentist, she becomes very anxious and her blood pressure is elevated.  They are also questioning the accuracy of the blood pressure monitor at the dentist.  At one point he was able to pull just half the tooth but did not pull the whole thing so she still painful.  He is referred her to the oral surgeon for best next steps.  They have noticed a rash on her left medial knee.  When area is more dry and scaling in the surrounding area has a little bit more of a lacy appearance to the skin.  Is not particularly pruritic, no open sores or drainage.  Her blood pressure had been well controlled until recently when she started having indigestion issues with the diltiazem.  Her cardiologist had discontinued this medication as it seems she did not need it and she was having significant heartburn per patient report with this.  Today she states she feels a fast beating in her chest and it feels uncomfortable.  She states it started after she was at the dental office yesterday and has been persistent.    Her son is with her today and he provides most of her transportation.  Her daughter Estrellita is on the phone.  They expressed frustration with their inability to get the tooth fixed.  Estrellita also notes that Yecenia feels that the lorazepam makes her heart beat faster.    Remainder of 12-point ROS is negative.    TOBACCO USE:  Social History     Tobacco Use   Smoking Status Never Smoker   Smokeless Tobacco Never Used       VITALS:  Vitals:    03/09/21 1228 03/09/21 1313   BP: 158/90 177/87   Patient Site: Right Arm Left Arm   Patient Position: Sitting Sitting   Cuff Size: Adult Large Adult Large   Pulse: (!) 144 (!)  115   SpO2: 97%    Weight: 222 lb (100.7 kg)      Wt Readings from Last 3 Encounters:   03/09/21 222 lb (100.7 kg)   03/01/21 (!) 228 lb (103.4 kg)   12/07/20 (!) 230 lb (104.3 kg)     Body mass index is 40.33 kg/m .    PHYSICAL EXAM:  GENERAL APPEARANCE: Alert, cooperative, no distress, appears stated age  NECK: Supple, symmetrical, trachea midline, no adenopathy;    thyroid:  No enlargement/tenderness/nodules; no carotid    bruit or JVD  BACK: Symmetric, no curvature, ROM normal, no CVA tenderness  LUNGS: Clear to auscultation bilaterally, respirations unlabored  CHEST WALL: No tenderness or deformity  HEART: Irregularly irregular with tachycardia  ABDOMEN: Soft, non-tender, bowel sounds active all four quadrants,     no masses, no organomegaly  Skin: Left knee shows a lacy brown discoloration with some scaling to dry patching on the medial knee.    RECENT RESULTS  No results found for this or any previous visit (from the past 48 hour(s)).    MEDICATIONS:  Current Outpatient Medications   Medication Sig Dispense Refill     apixaban ANTICOAGULANT (ELIQUIS) 5 mg Tab tablet Take 1 tablet (5 mg total) by mouth 2 (two) times a day. 180 tablet 3     bisacodyL (DULCOLAX) 5 mg EC tablet Take 1 tablet (5 mg total) by mouth daily as needed for constipation. 90 tablet 3     capsaicin (ZOSTRIX) 0.025 % cream APPLY A SMALL AMOUNT TO SKIN 4 TIMES A DAY AS NEEDED TO THE KNEES  2     diclofenac sodium (VOLTAREN) 1 % Gel Lower extremities: 4 g to the affected area 4 times daily using dosing card.  Do not exceed 32 g total dose per day. (Patient taking differently: Apply 4 g topically 4 (four) times a day as needed (pain). Lower extremities: 4 g to the affected area 4 times daily using dosing card.  Do not exceed 32 g total dose per day.      ) 100 g 3     flecainide (TAMBOCOR) 50 MG tablet Take 1 tablet (50 mg total) by mouth 2 (two) times a day. 180 tablet 2     fluticasone propionate (ALLERGY RELIEF, FLUTICASONE,) 50  mcg/actuation nasal spray 2 sprays into each nostril daily as needed for rhinitis. 16 g 12     LORazepam (ATIVAN) 0.5 MG tablet Take 1/2 tab po 30 min prior to procedure, repeat x1 prn 4 tablet 01     miscellaneous medical supply (BLOOD PRESSURE CUFF) Misc Please take blood pressure readings two times daily and continue to log daily. 1 each 0     olmesartan (BENICAR) 40 MG tablet Take 1 tablet (40 mg total) by mouth daily. 90 tablet 3     pantoprazole (PROTONIX) 40 MG tablet Take 1 tablet (40 mg total) by mouth daily. 30 tablet 1     polyethylene glycol (MIRALAX) 17 gram packet Take 17 g by mouth daily as needed.       rosuvastatin (CRESTOR) 5 MG tablet Take 1/2 tab (2.5 mg) po daily 45 tablet 4     TiZANidine (ZANAFLEX) 4 MG capsule Take 1 capsule (4 mg total) by mouth 3 (three) times a day as needed for muscle spasms. 45 capsule 0     triamcinolone (KENALOG) 0.1 % cream Apply topically 2 (two) times a day. As needed for rash (Patient taking differently: Apply 1 application topically 2 (two) times a day as needed. As needed for rash      ) 30 g 3     hydrocortisone 2.5 % cream Apply to affected area BID for no more than 2 weeks 30 g 0     metoprolol tartrate (LOPRESSOR) 25 MG tablet Take 1 tablet (25 mg total) by mouth 2 (two) times a day. 60 tablet 11     No current facility-administered medications for this visit.

## 2021-06-15 NOTE — TELEPHONE ENCOUNTER
Estrellita is calling in today to share that her mother Yecenia has experienced a normal b/p reading at home today, then took 0.5mg of Ativan prior to leaving for her Dental appt today. When she arrived to the Dental office for her procedure to remove the broken tooth, she had b/p of 228/78.   The dentist declined to do her procedure, and this was upsetting to the entire family.    When it was recommended that she take an additional 0.5mg of the Ativan, her daughter advised me that she would require a refill since they have been referred to an Oral Surgeon.    Dr. Grijalva, can you please refill this for them ?  Thank you,  Delores MONTIEL, Cardiology
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
## 2021-06-16 NOTE — PROGRESS NOTES
Assessment and Plan:     1. Primary osteoarthritis of both knees  I provided a prescription for 90 tablets of Vicodin.  Educated on its indications and side effects.  Discussed that the medication is addictive and habit-forming.  I do not prescribe long-term narcotics.  I offered referral to pain clinic, but she declines.  She says that she will establish with a different provider in the clinic.   - HYDROcodone-acetaminophen (NORCO )  mg per tablet; Take 1 tablet by mouth every 6 (six) hours as needed for pain.  Dispense: 90 tablet; Refill: 0    2. Essential hypertension  This is uncontrolled.  She is unsure of her current dose of metoprolol.  I encouraged her to bring in her medication bottles to her next appointment.  She does not want adjustments made to her antihypertensives today.  She will continue to monitor blood pressure.    3. Pure hypercholesterolemia  She is not fasting and does not want labs performed today.  She continues atorvastatin 20 mg daily.    4. Expressive aphasia    5. Gastroesophageal reflux disease, esophagitis presence not specified  She continues omeprazole 40 mg daily.    6. Rash  She continues triamcinolone cream as needed for dermatitis.  She is content with the plan.  - triamcinolone (KENALOG) 0.1 % cream; Apply topically 2 (two) times a day.  Dispense: 30 g; Refill: 3    I spent 40 minutes with the patient with greater than 50% spent discussing symptoms, treatment options, and coordination of care.       Subjective:     Yecenia is a 72 y.o. female presenting to the clinic for medication management.  Patient has a history of an ischemic stroke 2 years ago.  She continues to experience left-sided weakness and her speech was affected.  She sees Dr. Rizo at [facility].  She is taking atorvastatin for hyperlipidemia.  Last cholesterol check was December 2015 with a total cholesterol 159, triglycerides 73, HDL 49, LDL 95.  She is insisting that she does not want  blood work performed today.  Her hypertension has been difficult to control.  She is currently taking Benicar, metoprolol, and hydralazine.  She denies chest pain, shortness of breath with exertion, edema, orthopnea, syncope.  She has been taking omeprazole for esophageal reflux.  She uses triamcinolone as needed for rash present around her ankles.  This is providing improvement.  She complains of mild pruritus.  She does have bilateral knee osteoarthritis and was told that she needs a knee replacement.  She is currently taking Vicodin 90 tablets per month.  She was frustrated as her last refill was only 30 tablets and she was told to establish with a new provider as Dr. Aguilera has left the clinic.    Review of Systems: A complete 14 point review of systems was obtained and is negative or as stated in the history of present illness.    Social History     Social History     Marital status: Single     Spouse name: N/A     Number of children: N/A     Years of education: N/A     Occupational History     Not on file.     Social History Main Topics     Smoking status: Never Smoker     Smokeless tobacco: Never Used     Alcohol use No     Drug use: No     Sexual activity: Not on file     Other Topics Concern     Not on file     Social History Narrative    Lives with grandson, and is exposed to second hand smoke.       Active Ambulatory Problems     Diagnosis Date Noted     Primary Hypersomnia With Sleep Apnea      Hyperlipidemia LDL goal less than 70      Morbid Obesity      Major Depression, Recurrent      Anxiety      Esophageal Reflux      Hypertension      Menopause      Vitamin D Deficiency      Hx of ischemic left MCA stroke 12/07/2015     Accelerated hypertension 12/08/2015     Prediabetes 12/23/2015     Expressive aphasia 12/23/2015     Goals of care, counseling/discussion- [facility] enrollment 01/15/2016     Osteoarthritis, knee 05/12/2016     Salivary duct stone 05/12/2016     Gait disturbance, post-stroke 11/16/2017  "    Resolved Ambulatory Problems     Diagnosis Date Noted     Temporomandibular Joint-pain Dysfunction Syndrome      Trigger Finger Of The Right Thumb      Joint Pain, Localized In The Knee      Arthritis      Thrombosed External Hemorrhoids      Joint Pain, Localized In The Shoulder      Hand Injury      Rheumatoid Arthritis      Feeling Full Before Finishing Meals (Early Satiety)      Anterior Wall Chest Pain With Respiration      Neck Strain      Visual Disturbances      Salivary Gland Disorder      CHELSIE (acute kidney injury) 12/08/2015     Dizziness 12/21/2015     Dysarthria 12/23/2015     Persistent headaches 01/15/2016     Past Medical History:   Diagnosis Date     Arthritis      Cellulitis of left leg      CVA (cerebral vascular accident)      GERD (gastroesophageal reflux disease)      Head injury      Hx of nicotine dependence      Hyperlipemia      Hypertension      Proteinuria        Family History   Problem Relation Age of Onset     Diabetes Mother      Prostate cancer Father        Objective:     /86  Pulse (!) 56  Ht 5' 3\" (1.6 m)  Wt (!) 281 lb 3.2 oz (127.6 kg)  SpO2 100%  BMI 49.81 kg/m2    Patient is alert, in no obvious distress. Patient is morbidly obese.   Skin: Warm, dry.    Lungs:  Clear to auscultation. Respirations even and unlabored.  No wheezing or rales noted.   Heart:  Regular rate and rhythm.  No murmurs, S3, S4, gallops, or rubs.    Abdomen: Soft, nontender.  No organomegaly. Bowel sounds normoactive. No guarding or masses noted.   Musculoskeletal:  +1 nonpitting edema is present in bilateral lower extremities.                "

## 2021-06-16 NOTE — PROGRESS NOTES
[facility] Discharge Summary  Patient Name: Yecenia Kwan  Date: 3/9/2018  Referral Diagnosis: ischemic left MCA stroke  Referring provider: Tila Aguilera DO  Visit Diagnosis:   1. Decreased activities of daily living (ADL)         Goal status: Unable to assess as patient did not return.    Patient was seen for 1 visit. The patient discontinued therapy, did not return.    The patient will need a new referral to resume.    Thank you for your referral.  Brigitte Beatty  3/9/2018  2:24 PM

## 2021-06-16 NOTE — PROGRESS NOTES
" Patient ID: Yecenia Kwan is a 72 y.o. female.  /78  Pulse 61  Ht 5' 3\" (1.6 m)  Wt (!) 281 lb (127.5 kg)  SpO2 99%  BMI 49.78 kg/m2    Assessment/Plan:                   Diagnoses and all orders for this visit:    Hypertension    Urine frequency  -     Urinalysis-UC if Indicated    Subconjunctival hemorrhage of right eye    Prediabetes         DISCUSSION  Check labs as above.  Return urine test to analyze given urinary frequency.  Continue current and hypertensive therapy.  Schedule visit with pharmacist to workup better blood pressure control.  Subjective:     HPI    Yecenia Kwan is a 72 y.o. female she is history of stroke and rather resistant hypertension.  She has not had good control of blood pressure.  In review of her chart she has been on multiple medications.  She has been intolerant to many.  Her current regiment includes metoprolol, hydralazine and olmesartan.  Patient is here today primarily concerned because her blood pressure was quite elevated yesterday when it was checked on a routine basis at home.  It is reported at 191 systolic.  They were quite concerned about this.  There are a couple of other concerns including a subconjunctival hemorrhage and urinary frequency that were brought up his recent concerns as well.  Repeat blood pressure done at home was 150 systolic this morning.  Her blood pressure readings here are in the 140 range.    In review of her charts from her primary care physician Dr. Aguilera multiple medications have been tried and there have been concerns about reactions.  That settled in on the current regimen.  There seems to be some confusion as to how she is administering the hydralazine.  There is a parameter for blood pressure control however it is uncertain if she is using this and exactly how much she is taking, she told me she took 3 tablets at one point but then at another point told me she only took one during the course of the day yesterday.  She is " agreeable to seeing our pharmacist to help work out a better plan for blood pressure control and to discuss her current medications for blood pressure.  We discussed that she had no other symptoms such as headache, shortness of breath or chest pain yesterday when this blood pressure was elevated.  She does report feeling somewhat anxious yesterday and she does not today.  We discussed checking some laboratory tests to ensure normal renal function and electrolytes.    She does have a history of prediabetes with A1c readings recorded in the 6 range.  The last evaluation was in 2015.  Review of blood sugars taken at the time of basic metabolic profiles does not show any significant elevation.  We discussed reassessment with an A1c.    She reports urinary frequency and a strange color to the urine.  She is concerned about the possibility of infection.  She denies dysuria, hematuria or fevers/chills.  Have a strange discomfort in her right side which she associates with having previous tract infection.    Review of Systems  Complete review of systems is obtained.  Other than the specific considerations noted above complete review of systems is negative.        Objective:   Medications:  Current Outpatient Prescriptions   Medication Sig     aspirin 81 MG EC tablet Take 81 mg by mouth daily.     atorvastatin (LIPITOR) 20 MG tablet TAKE 1 TABLET(20 MG) BY MOUTH DAILY     bisacodyl (LAXATIVE, BISACODYL,) 10 mg suppository Insert 1 suppository (10 mg total) into the rectum 2 (two) times a day as needed (constipation).     blood pressure monitor Kit Use as directed up to 3 times a day     hydrALAZINE (APRESOLINE) 25 MG tablet TAKE 1 TABLET BY MOUTH FOUR TIMES DAILY FOR BLOOD PRESSURE GREATER THAN 160/100.     HYDROcodone-acetaminophen (NORCO )  mg per tablet Take 1 tablet by mouth every 6 (six) hours as needed for pain.     metoprolol succinate (TOPROL-XL) 50 MG 24 hr tablet Take 1 tablet (50 mg total) by mouth  "daily. Patient needs to be seen the end of August per CNP's request.     metoprolol succinate (TOPROL-XL) 50 MG 24 hr tablet TAKE 1.5 TABLETS BY MOUTH EVERY MORNING     olmesartan (BENICAR) 40 MG tablet Take 1 tablet (40 mg total) by mouth daily.     omeprazole (PRILOSEC) 40 MG capsule TAKE 1 CAPSULE(40 MG) BY MOUTH DAILY BEFORE BREAKFAST     triamcinolone (KENALOG) 0.1 % cream Apply topically 2 (two) times a day.     Allergies:  No Known Allergies    Tobacco:   reports that she has never smoked. She has never used smokeless tobacco.     Physical Exam      /78  Pulse 61  Ht 5' 3\" (1.6 m)  Wt (!) 281 lb (127.5 kg)  SpO2 99%  BMI 49.78 kg/m2      General Appearance:    Alert, cooperative, no distress   Eyes:   There is a small subconjunctival hemorrhage in the area superior to the iris the inner portion of the right eye.  Her visual acuity is normal extra ocular movements are normal no other significant allergies no evidence of eye trauma.      Lungs:     Clear to auscultation bilaterally, respirations unlabored   Heart:    Regular rate and rhythm, S1 and S2 normal, no murmur, rub   or gallop   Abdomen:     Soft, non-tender,   Extremities:  Chronic baseline edema   Skin:  Mild venous stasis dermatitis without ulceration   Neurologic:   Normal strength and sensation                        "

## 2021-06-16 NOTE — TELEPHONE ENCOUNTER
Telephone Encounter by Delores Doe RN at 1/23/2020  3:10 PM     Author: Delores Doe RN Service: -- Author Type: Registered Nurse    Filed: 1/23/2020  3:12 PM Encounter Date: 1/17/2020 Status: Signed    : Delores Doe RN (Registered Nurse)       Balbina Hillman MD Keune, Shelly A, RN   Caller: Unspecified (6 days ago,  2:47 PM)             Thanks Delores. Please have her stop the carvedilol and resume her prior dose of metoprolol XL 50 mg one time daily.     Thanks;   ~ Balbina Haro was contacted with advise to have patient take Metoprolol Succinate 50mg daily   Discontinue the Carvedilol medication. She will plan to f/u with us early next week to give updates on status. sk/RN

## 2021-06-16 NOTE — TELEPHONE ENCOUNTER
Telephone Encounter by Delores Doe RN at 1/17/2020  4:29 PM     Author: Delores Doe RN Service: -- Author Type: Registered Nurse    Filed: 1/17/2020  4:34 PM Encounter Date: 1/17/2020 Status: Signed    : Delores Doe RN (Registered Nurse)       Balbina Hillman MD Keune, Shelly A, RN   Caller: Unspecified (Today,  2:47 PM)             Thanks Delores. Can you instruct her to increase her carvedilol to 25 mg two times daily?       ~ Balbina      Contacted her daughter Estrellita with the dose increase for Carvedilol to 25mg two times a day (instructed 2 tablets twice a day)starting tonite. She will call me Monday with an update. Will watch for dizziness, lightheadedness, fatigue. sk/RN

## 2021-06-17 NOTE — TELEPHONE ENCOUNTER
Telephone Encounter by Delores Doe RN at 5/18/2021  1:24 PM     Author: Delores Doe RN Service: -- Author Type: Registered Nurse    Filed: 5/18/2021  1:26 PM Encounter Date: 5/18/2021 Status: Signed    : Delores Doe RN (Registered Nurse)       Balbina Hillman MD  You 39 minutes ago (12:41 PM)     Thanks Delores. No further recommendations at this time.     ~ Balbina    Message carolina Keene and her daughter Estrellita are contacted with agreement of PCP managed medications. She noted that they will continue to monitor b/p and HR twice daily to see if she is tolerating the increased dose of the Metoprolol. Reassured that the Lasix at 20mg daily dose is a very low dose, and if this is helpful to reduce the lower extremity swelling, then that is wonderful.      Delores Doe RN BSN, CHFN

## 2021-06-17 NOTE — PROGRESS NOTES
[facility] Discharge Summary    Patient Name: Yecenia Kwan  Date: 4/4/2018  Referral Diagnosis: S/P Stroke  Referring provider: Tila Aguilera DO      Patient was seen for 5 visits from 12/27/2017 to 2/7/2018 with multiple cancels. Patient did not return for further PT sessions.  See most recent PT note for updated status.   Goals below were not met d/t patient not returning:  Pt. will demonstrate/verbalize independence in self-management of condition in : 12 weeks  Pt. will be independent with home exercise program in : 12 weeks  Pt will: walk with SEC safely 300 ft for in home ambulation in 12 weeks  Pt will: walk 500 ft with 4WW safely for community ambulation in 12 weeks    Physical Therapy will be discharged at this time.    Thank you for your referral.  Suzy Moore, TONYT, CLT  4/4/2018  9:14 AM

## 2021-06-17 NOTE — PROGRESS NOTES
"Yecenia Kwan is a 76 y.o. female who is being evaluated via a billable telephone visit.      What phone number would you like to be contacted at? [phone]  How would you like to obtain your AVS? AVS Preference: [place].    Assessment & Plan     Paroxysmal atrial fibrillation (H)  Heart rates are still maintaining above 100.  We will increase metoprolol to 50 mg twice daily.  - metoprolol tartrate (LOPRESSOR) 50 MG tablet  Dispense: 60 tablet; Refill: 11    Leg swelling  I do not feel the leg swelling is actually due to the metoprolol.  I am wondering if it is due to lack of rate control with her atrial fibrillation.  We will increase the metoprolol to 50 mg twice daily and start a low-dose of Lasix for about 5 to 7 days and encourage support stockings.  Let me know if it is worsening.  - furosemide (LASIX) 20 MG tablet  Dispense: 7 tablet; Refill: 1    Accelerated hypertension  The requesting refills for olmesartan.  - olmesartan (BENICAR) 40 MG tablet  Dispense: 90 tablet; Refill: 3       BMI:   Estimated body mass index is 41.43 kg/m  as calculated from the following:    Height as of 3/12/21: 5' 2.21\" (1.58 m).    Weight as of 3/12/21: 228 lb (103.4 kg).     Return in about 3 weeks (around 6/7/2021) for Recheck leg swelling.    Martha Gonzalez MD  [facility]   Yecenia Kwan is 76 y.o. and presents today for the following health issues   HPI   Swelling in both legs since starting metoprolol.  Indents when pushing into it.  No shortness of breath, activity level is the same.  No chest pain.  Feeling A fib is up, feeling it more.  BPs 139/78 and rate was 108.  She still feels like she can feel her heart beat.  She is feeling like is probably the metoprolol that is creating this issue because ever since starting at the swelling has been present.  It was started however because she was experiencing A. fib without rate control.  Her daughter is the historian and I can " hear Yecenia in the background as they are talking and she asks about the direct questions.  Rate has been hovering above 100.    No chest pain or breathing difficulties.  Her energy levels and except exercise tolerance are normal.      Objective       Vitals:  No vitals were obtained today due to virtual visit.          Phone call duration: 12 minutes

## 2021-06-17 NOTE — TELEPHONE ENCOUNTER
Estrellita is calling in today to share updates for her mother.  Yecenia was seen by her PCP Dr. Gonzalez who identified that she was experiencing some mild swelling in her lower legs, ankles. Her b/p was 139/73  at her visit.  Her PCP added Lasix 20mg daily to her regimen, and also recommended increasing the Metoprolol Tartrate from 25mg two times a day up to 50mg two times a day.  Estrellita has asked for Dr. Hillman to review these recommendations and offer her opinion or agreement of the plan.    Estrellita also would like you to know that she is anticipating Yecenia will have an Oncology consult prior to a total hysterectomy probably in the next couple of months. She may need to sneak in a H&P visit with (you)  Cardiology prior.    Dr. Hillman what are your recommendations regarding the proposed med changes?      Delores Doe RN BSN, CHFN

## 2021-06-17 NOTE — TELEPHONE ENCOUNTER
Telephone Encounter by Delores Doe RN at 1/29/2021  3:17 PM     Author: Delores Doe RN Service: -- Author Type: Registered Nurse    Filed: 1/29/2021  3:30 PM Encounter Date: 1/22/2021 Status: Signed    : Delores Doe RN (Registered Nurse)       Balbina Hillman MD Keune, Shelly A, RN   Caller: Unspecified (4 days ago,  1:30 PM)             Thanks Delores. Let's continue to hold the diltiazem for now. Please have patient continue monitor her BP and contact us if her readings start the trend up.     ~ Balbina Haro was contacted with recommendations to continue to HOLD Diltiazem and track her b/p readings daily. She will contact us with updates. sk/RN

## 2021-06-17 NOTE — PROGRESS NOTES
[facility] Care Coordination    Called adult daughter Estrellita to complete six month assessment and left a message requesting a return call.    RYAN Yanez  [facility]  Phone: [phone]
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
## 2021-06-17 NOTE — PROGRESS NOTES
Assessment/Plan:     1. Accelerated hypertension  olmesartan (BENICAR) 40 MG tablet   2. Primary osteoarthritis of both knees  HYDROcodone-acetaminophen (NORCO )  mg per tablet   3. Morbid obesity due to excess calories  Ambulatory referral to Bariatric Care: Surgical and Non-Surgical   4. Blurry vision  Ambulatory referral to Ophthalmology   5. Bilateral chronic knee pain  Ambulatory referral to Pain Clinic       Diagnoses and all orders for this visit:    Accelerated hypertension  -     olmesartan (BENICAR) 40 MG tablet; Take 1 tablet (40 mg total) by mouth daily.  Dispense: 90 tablet; Refill: 3  -She will continue her current regimen.  Blood pressure today is normal.      Primary osteoarthritis of both knees  -     HYDROcodone-acetaminophen (NORCO )  mg per tablet; Take 1 tablet by mouth every 6 (six) hours as needed for pain.  Dispense: 90 tablet; Refill: 0  -New controlled substance agreement is signed today.  Discussed stipulations of controlled substance agreement.  Encouraged her to continue to work on weight loss efforts so that she can have the knee replacement surgery.  Referral placed to bariatric care clinic for nonsurgical weight loss program.  Recommend referral to pain clinic.  She is agreeable to that and referral to pain clinic is as well.    Morbid obesity due to excess calories  -     Ambulatory referral to Bariatric Care: Surgical and Non-Surgical    Blurry vision  -     Ambulatory referral to Ophthalmology    Bilateral chronic knee pain  -     Ambulatory referral to Pain Clinic             Subjective:      Yecenia Kwan is a 72 y.o. female who comes in today to establish care.  She was previously followed by Dr. Aguilera who has left the practice.  Past medical history notable for prediabetes, sleep apnea, osteoarthritis of both knees, morbid obesity, esophageal reflux, hypertension that has been difficult to control, hyperlipidemia and history of ischemic stroke.   Currently her medications for management of her blood pressure seem to be working well.  Her blood pressure is normal today in clinic.  She has significant osteoarthritis of both knees.  She has been advised that she needs knee replacement surgery.  She reports she has tried cortisone injections and other types of injections for treatment of osteoarthritis with her orthopedic surgeon.  These have not provided significant relief of her pain.  Before she is able to have surgery, she has been advised that she needs to lose weight.  She is limited in her ability to exercise.  She is currently prescribed Vicodin for pain and that does help relieve the pain.  Was receiving 90 tablets per month from Dr. Aguilear.  She reports that this dosage was decreased as she is to receive 120 tablets per month.  She has never been to pain clinic.  She would be open to going to pain clinic to see if there are other treatments for her chronic pain.  She has never been seen at a weight loss clinic and she would be interested in going to  for assistance with weight loss.  She reports that she will be seeing her neurologist soon for follow-up of her stroke.  She reports that she tolerates the Vicodin well.  She has normal bowel movements.  She does not feel drowsiness or nausea with this medication.  Consistently takes it 3 times a day.  She also has tried topical creams and rubs in these have not provided any significant relief of pain either.  We reviewed medications and allergies.  She requests referral for an eye exam and she has noticed some changes in her vision.  She has no other concerns or questions.  Review of systems is otherwise negative.    Current Outpatient Prescriptions   Medication Sig Dispense Refill     aspirin 81 MG EC tablet Take 81 mg by mouth daily.       atorvastatin (LIPITOR) 20 MG tablet TAKE 1 TABLET(20 MG) BY MOUTH DAILY 90 tablet 2     bisacodyl (LAXATIVE, BISACODYL,) 10 mg suppository Insert 1 suppository (10  "mg total) into the rectum 2 (two) times a day as needed (constipation). 12 suppository 1     blood pressure monitor Kit Use as directed up to 3 times a day 1 each 0     hydrALAZINE (APRESOLINE) 25 MG tablet TAKE 1 TABLET BY MOUTH FOUR TIMES DAILY FOR BLOOD PRESSURE GREATER THAN 160/100. 90 tablet 3     HYDROcodone-acetaminophen (NORCO )  mg per tablet Take 1 tablet by mouth every 6 (six) hours as needed for pain. 90 tablet 0     metoprolol succinate (TOPROL-XL) 50 MG 24 hr tablet Take 1 tablet (50 mg total) by mouth daily. Patient needs to be seen the end of August per CNP's request. 30 tablet 0     olmesartan (BENICAR) 40 MG tablet Take 1 tablet (40 mg total) by mouth daily. 90 tablet 3     omeprazole (PRILOSEC) 40 MG capsule TAKE 1 CAPSULE(40 MG) BY MOUTH DAILY BEFORE BREAKFAST 90 capsule 3     triamcinolone (KENALOG) 0.1 % cream Apply topically 2 (two) times a day. 30 g 3     No current facility-administered medications for this visit.        Past Medical History, Family History, and Social History reviewed.  Past Medical History:   Diagnosis Date     Arthritis      Cellulitis of left leg      CVA (cerebral vascular accident)      GERD (gastroesophageal reflux disease)      Head injury      Hx of nicotine dependence      Hyperlipemia      Hypertension      Proteinuria      Past Surgical History:   Procedure Laterality Date     SD ARTHROTOMY/EXPLORE/TREAT KNEE JOINT      Description: Arthrotomy Of Knee With Joint Exploration;  Recorded: 07/29/2008;  Comments: due to \"accident\" and infection     SD CORRJ HALLUX VALGUS W/SESMDC W/DIST METAR OSTEOT      Description: Bunion Correction With Metatarsal Osteotomy;  Recorded: 07/29/2008;     Review of patient's allergies indicates no known allergies.  Family History   Problem Relation Age of Onset     Diabetes Mother      Prostate cancer Father      Social History     Social History     Marital status: Single     Spouse name: N/A     Number of children: N/A "     Years of education: N/A     Occupational History     Not on file.     Social History Main Topics     Smoking status: Never Smoker     Smokeless tobacco: Never Used     Alcohol use No     Drug use: No     Sexual activity: Not on file     Other Topics Concern     Not on file     Social History Narrative    Lives with grandson, and is exposed to second hand smoke.         Review of systems is as stated in HPI, and the remainder of the 10 system review is otherwise negative.    Objective:     Vitals:    04/02/18 1025   BP: 122/80   Patient Site: Left Arm   Patient Position: Sitting   Cuff Size: Adult Large   Pulse: 67   Temp: 97.8  F (36.6  C)   TempSrc: Tympanic   SpO2: 96%    There is no height or weight on file to calculate BMI.    General appearance: alert, appears stated age and cooperative  Psych: mood appropriate    This was a 25 minute visit with greater than 50% of time spent in counseling and coordination of care, including review  of chart and medical records, discussion of treatment options and establishing plan of care.        This note has been dictated using voice recognition software. Any grammatical or context distortions are unintentional and inherent to the the software.

## 2021-06-17 NOTE — PROGRESS NOTES
[facility] Care Coordination      [facility] Six-Month Telephone Assessment    6 month telephone assessment completed on 05/06/21.    ER visits: No  Hospitalizations: No  TCU stays: No  Significant health status changes: none  Falls/Injuries: No  ADL/IADL changes: No  Changes in services: No    Caregiver Assessment follow up:  None    Goals: See POC in chart for goal progress documentation.  Care Coordinator spoke to daughter Estrellita.  She reported that Yecenia is doing good.  Her motion is good and she is walking good.  She is doing excersises with Estrellita 3 x week.    Yecenia is asking for a bath mat.    Care Coordinator requested this with [facility].  They will be sending out to Yecenia shortly.      Will see member in 6 months for an annual health risk assessment.   Encouraged member to call CC with any questions or concerns in the meantime.     RYAN Yanez  [facility]  Phone: [phone]

## 2021-06-17 NOTE — TELEPHONE ENCOUNTER
Telephone Encounter by Delores Doe RN at 9/21/2020  2:31 PM     Author: Delores Doe RN Service: -- Author Type: Registered Nurse    Filed: 9/21/2020  3:03 PM Encounter Date: 9/21/2020 Status: Signed    : Delores Doe RN (Registered Nurse)       Isael Mccollum MD Keune, Shelly A, RN    Caller: Unspecified (Today, 11:12 AM)               Delores,     Would increase metoprolol to 75 mg/day and go back to prior dose of flecainide.     Would see Iraida Zabala or Dorian later this week.     Thanks,     Isael

## 2021-06-17 NOTE — TELEPHONE ENCOUNTER
Telephone Encounter by Delores Doe RN at 1/20/2021 12:30 PM     Author: Delores Doe RN Service: -- Author Type: Registered Nurse    Filed: 1/20/2021 12:33 PM Encounter Date: 1/15/2021 Status: Signed    : Delores Doe RN (Registered Nurse)       Marysol Simmons, CNP  You 50 minutes ago (11:36 AM)     Ok to go back on metoprolol succinate 50 or 75 mg whatever her last dose was.   Hany Leach  Yecenia's daughter Estrellita was contacted with recommendations as above. With review of the records, Yecenia was changed to the Diltiazem from Metoprolol due to poor b/p control. Estrellita is reluctant to return to this medication given this historical result.  Is there another medication that you would recommend as an alternative at this time?    sk/RN

## 2021-06-18 NOTE — PROGRESS NOTES
Assessment/Plan:     1. Essential hypertension  blood pressure monitor Kit    Ambulatory referral to Medication Management   2. Medication side effects  Ambulatory referral to Medication Management       Diagnoses and all orders for this visit:    Essential hypertension  -     blood pressure monitor Kit; Use to monitor blood pressure  up to 3 times a day  Dispense: 1 each; Refill: 0  -     Ambulatory referral to Medication Management  -Encouraged her to obtain new blood pressure monitor kit to monitor blood pressure at home.  Prescription provided today.  Referral placed to medication management pharmacist.  Reviewed past medications.  It appears that she previously took hydrochlorothiazide 25 and 50 mg dosages.  Discussed that these are quite high doses for hydrochlorothiazide and she may benefit from a trial of hydrochlorothiazide at a lower dose of 12.5 mg daily.  She is agreeable to that so prescription for hydrochlorothiazide 12.5 mg daily was sent to her pharmacy to take once daily.  Over the past few days she has been taking metoprolol 100 mg daily but she is having some bradycardia with heart rate of 58 today.  She does complain of some dizziness and fatigue so we will have her reduce the dose back to 50 mg daily of metoprolol.  She will continue olmesartan 40 mg daily.  She will monitor blood pressure at home and bring readings into her appointment with the pharmacist.  Appreciate further recommendations or suggestions from pharmacist regarding alternative medications to manage blood pressure.  Consider possibility of diltiazem or nifedipine but these would also potentially cause lower extremity edema so elected to avoid those at this time.  Clonidine would also potentially be an alternative option but will start with the lower dose of hydrochlorothiazide and see how she tolerates it.  Will plan to recheck blood pressure at her visit with pharmacist.  Otherwise would like to see her back in 1  month.    Medication side effects  -     Ambulatory referral to Medication Management    Other orders  -     hydroCHLOROthiazide (HYDRODIURIL) 12.5 MG tablet; Take 1 tablet (12.5 mg total) by mouth daily.  Dispense: 90 tablet; Refill: 3           Subjective:      Yecenia Kwan is a 73 y.o. female who comes in today to follow-up on uncontrolled hypertension.  She is accompanied by her son.  She was seen at the walk-in clinic last week on June 8, 2018 due to concern about elevated blood pressure.  She has a blood pressure cuff at home that she uses to monitor her blood pressure.  Her blood pressure was quite elevated so she presented to the walk-in clinic.  When her blood pressure machine was checked against the machine at the walk-in clinic, it was found that her blood pressure machine was giving higher readings.  She was advised to get a new blood pressure cuff for home monitoring of her blood pressure.  She did receive a prescription but needs a prescription to state that she needs a large adult cough.  She was advised to increase her dose of metoprolol to a total of 100 mg daily.  She has been taking that for the past few days.  She continues on her olmesartan 40 mg daily.  She also has hydralazine that she takes as needed for blood pressure greater than 160/100.  States that on average she uses a hydralazine about once or twice a day.  She historically has had difficult to control blood pressure.  Was previously working with Dr. Aguilera on medication adjustments.  She had been referred to the medication therapy management pharmacist but has not been able to schedule an appointment.  She would like a new referral.  She has not tolerated multiple medications in the past due to variable side effects.  She previously has not tolerated higher doses of metoprolol due to bradycardia.  She previously was on a diuretic but this was stopped due to urinary frequency at night.  She previously took amlodipine but  experienced increased swelling in her ankles that resolved when the medication was discontinued.  She recently saw an orthopedic specialist for pain in both of her shoulders and was referred to physical therapy.  This is scheduled to start next week.  We reviewed her medications and allergies.  She has no other concerns or questions.  Review of systems is otherwise negative.    Current Outpatient Prescriptions   Medication Sig Dispense Refill     aspirin 81 MG EC tablet Take 81 mg by mouth daily.       blood glucose test (ONETOUCH ULTRA BLUE TEST STRIP) strips bid        blood-gluc,BP meter,adult cuff Melinda Blood pressure cuff. Use as directed. 1 Device 0     hydrALAZINE (APRESOLINE) 25 MG tablet Take 25 mg by mouth 4 (four) times a day as needed (for blood pressure greater than 160/100).       HYDROcodone-acetaminophen (NORCO )  mg per tablet Take 1 tablet by mouth every 6 (six) hours as needed for pain. 90 tablet 0     metoprolol succinate (TOPROL-XL) 50 MG 24 hr tablet Take 1 tablet (50 mg total) by mouth daily. 90 tablet 3     naproxen (NAPROSYN) 500 MG tablet TAKE 1 TABLET BY MOUTH TWICE DAILY WITH FOOD AS NEEDED FOR PAIN 60 tablet 0     olmesartan (BENICAR) 40 MG tablet Take 1 tablet (40 mg total) by mouth daily. 90 tablet 3     omeprazole (PRILOSEC) 40 MG capsule Take 40 mg by mouth daily as needed.       triamcinolone (KENALOG) 0.1 % cream Apply topically 2 (two) times a day. 30 g 3     blood pressure monitor Kit Use to monitor blood pressure  up to 3 times a day 1 each 0     hydroCHLOROthiazide (HYDRODIURIL) 12.5 MG tablet Take 1 tablet (12.5 mg total) by mouth daily. 90 tablet 3     No current facility-administered medications for this visit.        Past Medical History, Family History, and Social History reviewed.  Past Medical History:   Diagnosis Date     Arthritis      Cellulitis of left leg      CVA (cerebral vascular accident) (H)      GERD (gastroesophageal reflux disease)      Head  "injury      Hx of nicotine dependence      Hyperlipemia      Hypertension      Proteinuria      Past Surgical History:   Procedure Laterality Date     KS ARTHROTOMY/EXPLORE/TREAT KNEE JOINT      Description: Arthrotomy Of Knee With Joint Exploration;  Recorded: 07/29/2008;  Comments: due to \"accident\" and infection     KS CORRJ HALLUX VALGUS W/SESMDC W/DIST METAR OSTEOT      Description: Bunion Correction With Metatarsal Osteotomy;  Recorded: 07/29/2008;     Review of patient's allergies indicates no known allergies.  Family History   Problem Relation Age of Onset     Diabetes Mother      Prostate cancer Father      Social History     Social History     Marital status: Single     Spouse name: N/A     Number of children: N/A     Years of education: N/A     Occupational History     Not on file.     Social History Main Topics     Smoking status: Never Smoker     Smokeless tobacco: Never Used     Alcohol use No     Drug use: No     Sexual activity: Not on file     Other Topics Concern     Not on file     Social History Narrative    Lives with grandson, and is exposed to second hand smoke.         Review of systems is as stated in HPI, and the remainder of the 10 system review is otherwise negative.    Objective:     Vitals:    06/13/18 0930   BP: 162/90   Patient Site: Right Arm   Patient Position: Sitting   Cuff Size: Adult Large   Pulse: (!) 58   Temp: 98.7  F (37.1  C)   TempSrc: Tympanic   SpO2: 96%    There is no height or weight on file to calculate BMI.    General appearance: alert, appears stated age and cooperative  Lungs: clear to auscultation bilaterally  Heart: regular rate and rhythm, S1, S2 normal, no murmur, click, rub or gallop  Extremities: mild swelling of ankles  Neurologic: alert and oriented, expressive aphasia present          This note has been dictated using voice recognition software. Any grammatical or context distortions are unintentional and inherent to the the software.       "

## 2021-06-18 NOTE — LETTER
Letter by Amber Gallegos MD at      Author: Amber Gallegos MD Service: -- Author Type: --    Filed:  Encounter Date: 1/22/2019 Status: (Other)        [facility] MEDICINE/OB  [address]  [place] [address]  [phone]         Yecenia Kwan  [address] [place] [address]        01/22/19    Dear Yecenia Kwan,     At [facility] we care about your health and well-being. Your primary care provider is committed to ensuring you receive high quality care and has chosen a network of specialists to assist in providing that care. [facility] Dr [facility].       It is important to your overall health to follow through with the recommendation from your provider. Please call [facility] ([phone]) at your earliest convenience for assistance in scheduling an appointment. If you have already scheduled this appointment, please disregard this notice. Thank you for choosing [facility] for your healthcare needs.       Sincerely,       [facility] Specialty Scheduling   Dr Amber Gallegos

## 2021-06-18 NOTE — PROGRESS NOTES
Assessment/Plan:     1. Essential hypertension  It appears that patient's blood pressure cuff is reading significantly higher than that here in the clinic.  Prescription written for a new blood pressure cuff.  Blood pressure is still much higher than I would like to see it.  Will trial increasing her Metoprolol to 100 mg daily, but will need to monitor for bradycardia.  Recommend continuing hydralazine up to 4x daily for BPs > 160/100.  Can consider adding a CCB but will defer to PCP.  Appointment made for patient follow-up with PCP next week and for blood pressure recheck.  Recommend going to ER with any CP or shortness of breath.  Patient and son agree with plan of care and verbalize understanding.   - blood-gluc,BP meter,adult cuff Melinda; Blood pressure cuff. Use as directed.  Dispense: 1 Device; Refill: 0        Subjective:     Yecenia Kwan is a 73 y.o. female MD by her son who presents with complaints of elevated blood pressure.  Patient has a long history of hypertension.  She was hospitalized last month for complaints of chest wall pain and elevated blood pressure, but left AMA.  She is taking her blood pressures at home and has had elevated readings up to 197/90.  She is taking blood pressures with an electric arm cuff.  Patient currently on olmesartan 40 mg daily, metoprolol 50 mg daily, and hydralazine up to 4 times daily as needed.  She was instructed to take the hydralazine if blood pressures greater than 160/100.  She has been under some increased stress over the last couple of weeks, as she attended the  of her sister.  Previously on amlodipine, which worked quite well for her blood pressure but caused ankle swelling.  Patient denies any chest pain or shortness of breath.  She does have some headaches, but he notices this when she takes the hydralazine.  She does not have any lower extremity swelling.  Echocardiogram completed in  was unremarkable.  History of obesity, ischemic stroke,  "expressive dysphasia, and chronic shoulder pain.      The following portions of the patient's history were reviewed and updated as appropriate: allergies, current medications, past family history, past medical history, past social history, past surgical history and problem list.    Review of Systems  A comprehensive review of systems was performed and was otherwise negative    Objective:     /76 (Patient Site: Right Arm)  Pulse 63  Ht 5' 1\" (1.549 m)  Wt (!) 272 lb (123.4 kg)  SpO2 98%  BMI 51.39 kg/m2    General Appearance: Alert, cooperative, no distress, appears stated age  Lungs: Clear to auscultation bilaterally, respirations unlabored  Heart: Regular rate and rhythm, S1 and S2 normal, no murmur, rub, or gallop. No edema.    Shavon Cartwright NP-C    "

## 2021-06-19 NOTE — LETTER
Letter by Amber Gallegos MD at      Author: Amber Gallegos MD Service: -- Author Type: --    Filed:  Encounter Date: 3/28/2019 Status: (Other)         [facility]/OB  03/28/19    Patient: Yecenia Kwan  YOB: 1945  Medical Record Number: [id number]                                                                  Opioid / Opioid Plus Controlled Substance Agreement    I understand that my care provider has prescribed an opioid (narcotic) controlled substance to help manage my condition(s). I am taking this medicine to help me function or work. I know this is strong medicine, and that it can cause serious side effects. Opioid medicine can be sedating, addicting and may cause a dependency on the drug. They can affect my ability to drive or think, and cause depression. They need to be taken exactly as prescribed. Combining opioids with certain medicines or chemicals (such as cocaine, sedatives and tranquilizers, sleeping pills, meth) can be dangerous or even fatal. Also, if I stop opioids suddenly, I may have severe withdrawal symptoms. Last, I understand that opioids do not work for all types of pain nor for all patients. If not helpful, I may be asked to stop them.        The risks, benefits, and side effects of these medicine(s) were explained to me. I agree that:    1. I will take part in other treatments as advised by my care team. This may be psychiatry or counseling, physical therapy, behavioral therapy, group treatment or a referral to a pain clinic. I will reduce or stop my medicine when my care team tells me to do so.  2. I will take my medicines as prescribed. I will not change the dose or schedule unless my care team tells me to. There will be no refills if I run out early.  I may be contactedwithout warning and asked to complete a urine drug test or pill count at any time.   3. I will keep all my appointments, and understand this is part of the monitoring of opioids. My care team may  require an office visit for EVERY opioid/controlled substance refill. If I miss appointments or dont follow instructions, my care team may stop my medicine.  4. I will not ask other providers to prescribe controlled substances, and I will not accept controlled substances from other people. If I need another prescribed controlled substance for a new reason, I will tell my care team within 1 business day.  5. I will use one pharmacy to fill all of my controlled substance prescriptions, and it is up to me to make sure that I do not run out of my medicines on weekends or holidays. If my care team is willing to refill my opioid prescription without a visit, I must request refills only during office hours, refills may take up to 3 days to process, and it may take up to 5 to 7 days for my medicine to be mailed and ready at my pharmacy. Prescriptions will not be mailed anywhere except my pharmacy.        [id number]  Rev 12/18         Registration to scan to EHR                             Page 1 of 2               Controlled Substance Agreement Opioid        [facility] MEDICINE/OB  03/28/19  Patient: Yecenia Kwan  YOB: 1945  Medical Record Number: [id number]                                                                  6. I am responsible for my prescriptions. If the medicine/prescription is lost or stolen, it will not be replaced. I also agree not to share controlled substance medicines with anyone.  7. I agree to not use ANY illegal or recreational drugs. This includes marijuana, cocaine, bath salts or other drugs. I agree not to use alcohol unless my care team says I may.          I agree to give urine samples whenever asked. If I dont give a urine sample, the care team may stop my medicine.    8. If I enroll in the [place] Medical Marijuana program, I will tell my care team. I will also sign an agreement to share my medical records with my care team.   9. I will bring in my list of medicines (or my  medicine bottles) each time I come to the clinic.   10. I will tell my care team right away if I become pregnant or have a new medical problem treated outside of my regular clinic.  11. I understand that this medicine can affect my thinking and judgment. It may be unsafe for me to drive, use machinery and do dangerous tasks. I will not do any of these things until I know how the medicine affects me. If my dose changes, I will wait to see how it affects me. I will contact my care team if I have concerns about medicine side effects.    I understand that if I do not follow any of the conditions above, my prescriptions or treatment may be stopped.      I agree that my provider, clinic care team, and pharmacy may work with any city, state or federal law enforcement agency that investigates the misuse, sale, or other diversion of my controlled medicine. I will allow my provider to discuss my care with or share a copy of this agreement with any other treating provider, pharmacy or emergency room where I receive care. I agree to give up (waive) any right of privacy or confidentiality with respect to these consents.     I have read this agreement and have asked questions about anything I did not understand.      ________________________________________________________________________  Patient signature - Date/Time -  Yecenia Kwan                                      ________________________________________________________________________  Witness signature                                                            ________________________________________________________________________  Provider signature - Amber Gallegos MD      [id number]  Rev 12/18         Registration to scan to EHR                         Page 2 of 2                   Controlled Substance Agreement Opioid           Page 1 of 2  Opioid Pain Medicines (also known as Narcotics)  What You Need to Know    What are opioids?   Opioids are pain medicines that  must be prescribed by a doctor.  They are also known as narcotics.    Examples are:     morphine (MS Contin, Claudia)    oxycodone (Oxycontin)    oxycodone and acetaminophen (Percocet)    hydrocodone and acetaminophen (Vicodin, Norco)     fentanyl patch (Duragesic)     hydromorphone (Dilaudid)     methadone     What do opioids do well?   Opioids are best for short-term pain after a surgery or injury. They also work well for cancer pain. Unlike other pain medicines, they do not cause liver or kidney failure or ulcers. They may help some people with long-lasting (chronic) pain.     What do opioids NOT do well?   Opioids never get rid of pain entirely, and they do not work well for most patients with chronic pain. Opioids do not reduce swelling, one of the causes of pain. They also dont work well for nerve pain.                           For informational purposes only.  Not to replace the advice of your care provider.  Copyright [facility]. All right reserved. [employer] [id number] 02/18.      Page 2 of 2    Risks and side effects   Talk to your doctor before you start or decide to keep taking one of these medicines. Side effects include:    Lowering your breathing rate enough to cause death    Overdose, including death, especially if taking higher than prescribed doses    Long-term opioid use    Worse depression symptoms; less pleasure in things you usually enjoy    Feeling tired or sluggish    Slower thoughts or cloudy thinking    Being more sensitive to pain over time; pain is harder to control    Trouble sleeping or restless sleep    Changes in hormone levels (for example, less testosterone)    Changes in sex drive or ability to have sex    Constipation    Unsafe driving    Itching and sweating    Feeling dizzy    Nausea, vomiting and dry mouth    What else should I know about opioids?  When someone takes opioids for too long or too often, they become dependent. This means that if you stop or  reduce the medicine too quickly, you will have withdrawal symptoms.    Dependence is not the same as addiction. Addiction is when people keep using a substance that harms their body, their mind or their relations with others. If you have a history of drug or alcohol abuse, taking opioids can cause a relapse.    Over time, opioids dont work as well. Most people will need higher and higher doses. The higher the dose, the more serious the side effects. We dont know the long-term effects of opioids.      Prescribed opioids aren't the best way to manage chronic pain    Other ways to manage pain include:      Ibuprofen or acetaminophen.  You should always try this first.      Treat health problems that may be causing pain.      acupuncture or massage, deep breathing, meditation, visual imagery, aromatherapy.      Use heat or ice at the pain site      Physical therapy and exercise      Stop smoking      See a counselor or therapist                                                  People who have used opioids for a long time may have a lower quality of life, worse depression, higher levels of pain and more visits to doctors.    Never share your opioids with others. Be sure to store opioids in a secure place, locked if possible.Young children can easily swallow them and overdose.     You can overdose on opioids.  Signs of overdose include decrease or loss of consciousness, slowed breathing, trouble waking and blue lips.  If someone is worried about overdose, they should call 911.    If you are at risk for overdose, you may get naloxone (Narcan, a medicine that reverses the effects of opioids.  If you overdose, a friend or family member can give you Narcan while waiting for the ambulance.  They need to know the signs of overdose and how to give Narcan.    While you're taking opioids:    Don't use alcohol or street drugs. Taking them together can cause death.    Don't take any of these medicines unless your doctor says its  okay.  Taking these with opioids can cause death.    Benzodiazepines (such as lorazepam         or diazepam)    Muscle relaxers (such as cyclobenzaprine)    sleeping pills    other opioids    Safe disposal of opioids  Find your area drug take-back program, your pharmacy mail-back program, buy a special disposal bag (such as Deterra) from your pharmacy or flush them down the toilet.  Use the guidelines at:  www.fda.gov/drugs/resourcesforyou

## 2021-06-19 NOTE — LETTER
Letter by Selena De La Cruz SW at      Author: Selena De La Cruz SW Service: -- Author Type: --    Filed:  Encounter Date: 9/9/2019 Status: (Other)           September 9, 2019    Important [employer] Information    GURVINDER DE DIOS  [address] [place] [address]    Your New Care Coordinator  Dear Gurvinder,  My name is RYAN Yanez and I am your new Care Coordinator. You may reach me by calling [phone]. I will be in touch with you shortly to address any questions you may have.   I have also been in contact with Roberto Siegel RN, PHN, your previous care coordinator, to ensure a smooth transition.  Questions?  Call me at [phone] Monday-Friday between 8am and 5pm. TTY/TDD: 711. I look forward to working with you as a [employer]  member.  Sincerely,      RYAN Yanez    E-mail: [email or web address]  Phone: [phone]    Care Manager  [facility]        cc: member records                                    Civil Rights Notice  Discrimination is against the law. [employer] does not discriminate on the basis of any of the following:    Race    Color    National Origin    Creed    Taoist    Age    Public Assistance Status    Receipt of Health Care Services    Disability (including physical or mental impairment)    Sex (including sex stereotypes and gender identity)    Marital Status    Political Beliefs    Medical Condition    Genetic Information    Sexual Orientation    Claims Experience    Medical History    Health Status    Auxiliary Aids and Services:  [employer] provides auxiliary aids and services, like qualified interpreters or information in accessible formats, free of charge and in a timely manner, to ensure an equal opportunity to participate in our health care programs. Contact [employer] Customer Service at [email or web address] or call [phone] (toll free); TTY:711 or at [email or web address].    Language Assistance Services:  [employer] provides translated documents  and spoken language interpreting, free of charge and in a timely manner, when language assistance services are necessary to ensure limited English speakers have meaningful access to our information and services. Contact [employer] at -[phone] (toll free); TTY: 711 or [email or web address].     Civil Rights Complaints  You have the right to file a discrimination complaint if you believe you were treated in a discriminatory way by [employer]. You may contact any of the following four agencies directly to file a discrimination complaint.    U.S. Department of Health and Human Services Office for Civil Rights (OCR)  You have the right to file a complaint with the OCR, a federal agency, if you believe you have been discriminated against because of any of the following:    Race    Disability    Color    Sex    National Origin    Age      Contact the OCR directly to file a complaint:         Director         U.S[place] Department of Health and Human Services Office for Civil Rights         [address] [place] [address]         [phone] (Voice)         [phone] (TDD)         Complaint Portal - https://ocrportal.hhs.gov/ocr/portal/lobby.jsf     [place] Department of Human Rights (MDHR)  In [place], you have the right to file a complaint with the MDHR if you believe you have been discriminated against because of any of the following:      Race    Color    National Origin    Religious    Creed    Sex    Sexual Orientation    Marital Status    Public Assistance Status    Disability    Contact the MDHR directly to file a complaint:         [place] Department of Human Rights         [address] [address]         [phone] (voice)          [phone] (toll free)         [phone] or [phone] ([place] Relay)         [phone] (Fax)         [email or web address] (Email)     [place] Department of Human Services  (DHS)  You have the right to file a complaint with [facility] if you believe you have been discriminated against in our health care programs because of any of the following:    Race    Color    National Origin    Creed    Mosque    Age    Public Assistance Status    Receipt of Health Care Services    Disability (including physical or mental impairment)    Sex (including sex stereotypes and gender identity)    Marital Status    Political Beliefs    Medical Condition    Genetic Information    Sexual Orientation    Claims Experience    Medical History    Health Status    Complaints must be in writing and filed within 180 days of the date you discovered the alleged discrimination. The complaint must contain your name and address and describe the discrimination you are complaining about. After we get your complaint, we will review it and notify you in writing about whether we have authority to investigate. If we do, we will investigate the complaint.      [facility] will notify you in writing of the investigations outcome. You have a right to appeal the outcome if you disagree with the decision. To appeal, you must send a written request to have [facility] review the investigation outcome period. Be brief and state why you disagree with the decision. Include additional information you think is important.      If you file a complaint in this way, the people who work for the agency named in the complaint cannot retaliate against you. This means they cannot punish you in any way for filing a complaint. Filing a complaint in this way does not stop you from seeking out other legal or administration actions.     Contact [facility] directly to file a discrimination complaint:        Civil Rights Coordinator        [place] Department of Human Services        Equal Opportunity and Access Division        [address] [address]        [place] [address] [phone] (voice) or use your preferred relay service     [facility] Complaint Notice   You have the  right to file a complaint with [employer] if you believe you have been discriminated against because of any of the following:       Medical condition    Health status    Receipt of health care services    Claims experience    Medical history    Genetic information    Disability (including mental or physical impairment)    Marital status    Age    Sex (including sex stereotypes and gender identity)    Sexual orientation    National origin    Race    Color    Jainism    Creed    Public assistance status    Political beliefs    You can file a complaint and ask for help in filing a complaint in person or by mail, phone, fax, or email at:     [facility] Civil Rights Coordinator  [email or web address]  [address], [place] [address] [phone] (voice and fax) or TTH:647  Email: [email or web address]    American Indians can continue to use [facility] and [facility] ([facility]) clinics. We will not require prior approval or impose any conditions for you to get services at these clinics. For elders age 65 years and older this includes Elderly Waiver (EW) services accessed through the [facility]. If a doctor or other provider in a [facility] or [facility] clinic refers you to a provider in our network, we will not require you to see your primary care provider prior to the referral.    For accessible formats of this publication or assistance with equal or access to our services, visit [email or web address], or call [phone] (toll free) or use your preferred relay service.

## 2021-06-19 NOTE — LETTER
Letter by Roberto Siegel RN at      Author: Roberto Siegel RN Service: -- Author Type: --    Filed:  Encounter Date: 8/27/2019 Status: (Other)       August 28, 2019    Important [employer] Information    GURVINDER DE DIOS  [address] [place] [address]  Your Care Plan  Dear Gurvinder,  When we spoke recently, I promised to send you a Care Plan. The plan enclosed is a summary of our discussion. It includes the steps we agreed would help you meet your health goals. In addition, I can help you with:  Ntbtqum-S-FyjpTL  This program is available to members who need a ride to medical and dental visits. To schedule a ride, call [phone] or [phone] (toll free). TTY/TTD: 711. You can call 8 a.m. to 8 p.m. Seven days a week. Access to a representative may be limited at times.      [employer]   The [employer] program empowers you to improve your health through education and exercise. To learn more, visit [employer]sic[employer].it, or call [employer] Customer Service at [phone] (toll free) (TTY:711) from 7 a.m. - 7 p.m. Central Time, Monday-Friday.  Health Care Directive   This form helps you outline your health care wishes. You can request a form from me and I will answer any questions you have before you discuss it with your doctor.   Annual Physical  Take a key step on your path to good health and set up an annual physical at your clinic.  Questions?  Call me at [phone] Monday-Friday between 8am and 5pm.  TTY/TTD: 711. As we discussed, I plan to be in touch with you again in 6 months to follow up via phone.  Sincerely,    Roberto Siegel RN, PHN    E-mail: [email or web address]  Phone: [phone]    Care Manager  [place]          cc: member records                Civil Rights Notice  Discrimination is against the law. [employer] does not discriminate on the basis of any of the following:    Race    Color    National Origin    Creed    Pentecostalism    Age    Public Assistance  Status    Receipt of Health Care Services    Disability (including physical or mental impairment)    Sex (including sex stereotypes and gender identity)    Marital Status    Political Beliefs    Medical Condition    Genetic Information    Sexual Orientation    Claims Experience    Medical History    Health Status    Auxiliary Aids and Services:  [employer] provides auxiliary aids and services, like qualified interpreters or information in accessible formats, free of charge and in a timely manner, to ensure an equal opportunity to participate in our health care programs. Contact [employer] Customer Service at [employer]/contact medicaid or call [phone] (toll free); TTY:714 or at [employer]/contactmedicaid.    Language Assistance Services:  [employer] provides translated documents and spoken language interpreting, free of charge and in a timely manner, when language assistance services are necessary to ensure limited English speakers have meaningful access to our information and services. Contact [employer] at [phone] (toll free); TTY: 144 or [employer]/contactmedicaid.     Civil Rights Complaints  You have the right to file a discrimination complaint if you believe you were treated in a discriminatory way by [facility]. You may contact any of the following four agencies directly to file a discrimination complaint.    U.S. Department of Health and Human Services Office for Civil Rights (OCR)  You have the right to file a complaint with the OCR, a federal agency, if you believe you have been discriminated against because of any of the following:    Race    Disability    Color    Sex    National Origin    Age      Contact the OCR directly to file a complaint:         Director         U.S. Department of Health and Human Services Office for Civil Rights         [address] [place] [address], [place] [address] [phone] (Voice)         [phone] (TDD)         Complaint  Portal - [email or web address]     [place] Department of Human Rights (MDHR)  In [place], you have the right to file a complaint with the MDHR if you believe you have been discriminated against because of any of the following:      Race    Color    National Origin    Tenriism    Creed    Sex    Sexual Orientation    Marital Status    Public Assistance Status    Disability    Contact the MDHR directly to file a complaint:         [place] Department of Human Rights         [facility], [address] [place] [address]         [phone] (voice)          [phone] (toll free)         [phone] or [phone] ([place] Relay)         [phone] (Fax)         [email or web address] (Email)     [place] Department of Human Services (DHS)  You have the right to file a complaint with [facility] if you believe you have been discriminated against in our health care programs because of any of the following:    Race    Color    National Origin    Creed    Tenriism    Age    Public Assistance Status    Receipt of Health Care Services    Disability (including physical or mental impairment)    Sex (including sex stereotypes and gender identity)    Marital Status    Political Beliefs    Medical Condition    Genetic Information    Sexual Orientation    Claims Experience    Medical History    Health Status    Complaints must be in writing and filed within 180 days of the date you discovered the alleged discrimination. The complaint must contain your name and address and describe the discrimination you are complaining about. After we get your complaint, we will review it and notify you in writing about whether we have authority to investigate. If we do, we will investigate the complaint.      [facility] will notify you in writing of the investigations outcome. You have a right to appeal the outcome if you disagree with the decision. To appeal, you must send a written request to have [facility] review the  investigation outcome period. Be brief and state why you disagree with the decision. Include additional information you think is important.      If you file a complaint in this way, the people who work for the agency named in the complaint cannot retaliate against you. This means they cannot punish you in any way for filing a complaint. Filing a complaint in this way does not stop you from seeking out other legal or administration actions.     Contact DHS directly to file a discrimination complaint:        Civil Rights Coordinator        [place] Department of Human Services        Equal Opportunity and Access Division        [address] [address]        [place] [address] [phone] (voice) or use your preferred relay service     [employer] Complaint Notice   You have the right to file a complaint with [employer] if you believe you have been discriminated against because of any of the following:       Medical condition    Health status    Receipt of health care services    Claims experience    Medical history    Genetic information    Disability (including mental or physical impairment)    Marital status    Age    Sex (including sex stereotypes and gender identity)    Sexual orientation    National origin    Race    Color    Nondenominational    Creed    Public assistance status    Political beliefs    You can file a complaint and ask for help in filing a complaint in person or by mail, phone, fax, or email at:     [employer] Civil Rights Coordinator  [facility]  [address], [place] [address] [phone] (voice and fax) or TTD:594  Email: [email or web address]    American Indians can continue to use [facility] and [facility] clinics. We will not require prior approval or impose any conditions for you to get services at these clinics. For elders age 65 years and older this includes Elderly Waiver (EW) services accessed through the [place]. If a doctor or other  provider in a [facility] or [facility] clinic refers you to a provider in our network, we will not require you to see your primary care provider prior to the referral.    For accessible formats of this publication or assistance with equal or access to our services, visit [employer]/enModusmedicaid, or call [phone] (toll free) or use your preferred relay service.

## 2021-06-19 NOTE — LETTER
Letter by Amber Gallegos MD at      Author: Amber Gallegos MD Service: -- Author Type: --    Filed:  Encounter Date: 3/28/2019 Status: (Other)         March 28, 2019     Patient: Yecenia Kwan   YOB: 1945   Date of Visit: 3/28/2019       To Whom it May Concern:    Yecenia Kwan was seen in my clinic on 3/28/2019.    If you have any questions or concerns, please don't hesitate to call.    Sincerely,         Electronically signed by Amber Gallegos MD

## 2021-06-19 NOTE — LETTER
Letter by Selena De La Cruz SW at      Author: Selena De La Cruz SW Service: -- Author Type: --    Filed:  Encounter Date: 11/20/2019 Status: Signed       [address], [address]  [place] [address]  Phone:  [phone]  Fax:  [phone]        November 20, 2019    GURVINDER DE DIOS  [address] [place] [address]    Dear Alvaro Keene is a copy of your completed PCA Assessment and Service Plan.  This is for your records and no action is required by you.  If you have additional questions regarding your assessment please contact me at [phone]. If you feel that your needs are not being met, please contact the Clinical Supervisor at [phone].    Sincerely,      RYAN Yanez    E-mail: [email or web address]  Phone: [phone]    Care Manager  [facility]            Enclosure:  Completed PCA assessment

## 2021-06-20 NOTE — PROGRESS NOTES
The Clinic Care Guide called the patient  today at the request of the PCP to discuss possible clinic care coordination enrollment. Clinic care coordination was described to the patient and immediate needs were discussed. The patient declined enrollment in clinic care coordination at this time. The patient was provided with contact information for the clinic care guide if their needs changed.    Order Date: 9/27/2018 Proc Category: Outpatient Referral Orderables   Priority: Routine Class: Internal Referral   Standing Status: Normal Status: Sent [2]   Ordering User: Delores Mustafa RN Department: [facility]   Auth Provider: ELODIA HERNANDEZ [facility] Provider: Delores Mustafa RN   Diagnosis: CVA (cerebral vascular accident) (H)  Paroxysmal atrial fibrillation (H)

## 2021-06-20 NOTE — PROGRESS NOTES
"TCM DISCHARGE FOLLOW UP CALL    Discharge Date:  9/27/2018  Reason for hospital stay (discharge diagnosis)::  Hypertensive emergency. slurred speech, left facial droop, word finding difficulties found to have small acute stroke left lateral temporal lobe with probable type II/III NSTEMi   Are you feeling better, the same or worse since your discharge?:  Patient is feeling better  Do you feel like you have a plan in the event of a health emergency?: Yes    (RN) Patient provided with information to call us in the event of a health emergency: Yes (spoke with daughter)    As part of your discharge plan, were  home care services ordered for you?: Yes    Have you seen them yet, or are they scheduled to visit?: Yes    Do you have any follow up visits scheduled with your PCP or Specialist?:  Yes, with PCP  (RN) Is PCP appt scheduled soon enough (within 14 days of discharge date)?: Yes    RN NOTES::  Hospital discharge follow up call to pt, no answer. LVM and appointment follow up information.  RN will attempt call back at another time.    Spoke with Pts daughter. Pt currently had PT in home for evaluation. Home care scheduled. Per daughter, during follow up with PCP on 10/3 coming up pt and daughter want to discuss new med's as pt states Lipitor \"makes her feel funny.\" and she wants to discuss Eliquis as well. Daughter will be present for appointment.  Given number to call and make CT appointment. Daughter will make.     Hospital discharge follow up call to pt, no answer. LVM and appointment follow up information.  RN will attempt call back at another time.  "

## 2021-06-20 NOTE — PROGRESS NOTES
MTM Initial Encounter  Assessment & Plan                                                     Hypertension: BP is at goal of <140/90 mmHg per JNC8 today after a recheck.  Reviewed that hydralazine was prescribed to take as needed and taking it once a day would not be appropriate due to its short duration (half-life 3-7 hours).  Therefore, will increase metoprolol to 100 mg today and have her stop hydralazine to avoid confusion.  PLAN:   1.  Stop hydralazine.   2.  Increase metoprolol succinate to 100 mg daily.     Stroke History: Patient is on a baby aspirin which is appropriate for stroke history.  Unclear why atorvastatin was discontinued--will restart considering her stroke history.  Consider checking lipids in the future.  Plan:  1.  Restart atorvastatin 20 mg daily  Future consideration = fasting lipid panel    Pain: Stable.  Continue current regimen and avoiding NSAIDs.  Recommended a trial of diclofenac 1% gel to rub on her knees--reviewed dosing and dosing card.  Plan:  1.  Start diclofenac 1% gel 4 g on knees as needed.    GERD: Stable.  Patient did not bring with her today, but will have her continue using for now.    Anxiety/insomnia: Patient is no longer on lorazepam, which is safe considering her memory issues and age.  Okay to continue melatonin as she appears to have no issues with it.    Sinusitis: Okay to restart fluticasone nasal spray--refilled.    Follow Up  1 month    Subjective & Objective                                                     Yecenia Kwan is a 73 y.o. female coming in for an initial visit for Medication Therapy Management. She was referred to me from Amber Gallegos MD for hypertension.  Of note, patient does have poor memory.    Medication Adherence/Access:  Brings pill box today - son helps her set it up.     Hypertension: Currently taking olmesartan 40 mg every morning, hydralazine 25 mg every morning, and metoprolol succinate 50 mg every morning.  Was prescribed  hydrochlorothiazide 12.5 mg, but she reports that she never started it.  It is not in her pillbox today.  Hydralazine directions state to take only one blood pressure is over 160/100, but she is taking it daily.  At home BP 140s/65-70 mmHg, home pulse ?   Daughter had a synovial cyst and cant walk -- is at home which is a stressor for her.    Thinks metoprolol makes her swollen. Likes metoprolol better than hydralazine.   Hydrlazine -- decreases her appetite. Has a fast heartbeat, dizziness, joint pain.   Goal weight is 190 lbs.   Denies chest pain, has headaches with hydralazine.   Denies lightheadedness/dizziness only once in a while.   BP Readings from Last 3 Encounters:   08/30/18 142/80   07/06/18 161/68   06/25/18 138/82     Stroke History: History of ischemic CVA.  Currently taking aspirin 81 mg every morning.  Some memory/speech issues.  Used to be on atorvastatin 20 mg per chart review, but she is not taking anymore.  Last lipids checked in 2015.    Pain: Taking hydrocodone/APAP as needed for joint pain, but it is in her pillbox every day in the evening. Thinks that if her weight went down her pain would improve. Trying to eat vegetables and fruits. Lean ground beef, tuekey, chicken.  No fatigue or constipation.   Told to not take ibu, but she takes APAP.   Naproxen - she stopped.   Knees, shoulder. Will rub bengay.   Wt Readings from Last 3 Encounters:   08/30/18 (!) 261 lb (118.4 kg)   06/08/18 (!) 272 lb (123.4 kg)   05/09/18 (!) 272 lb 2 oz (123.4 kg)     GERD: Currently taking omeprazole 40 mg for acid reflux.  Does not have it with her today, but believes it is at home.  Reports helpful.     Anxiety/insomnia: No longer on lorazepam -- taking melatonin to help her sleep, helpful.     Sinusitis: Would like more flonase nasal spray-- helps with dryness, stuffy, and sinuses.     PMH: reviewed in EPIC   Allergies/ADRs: reviewed in EPIC   Alcohol: reviewed in EPIC   Tobacco:   History   Smoking Status      Never Smoker   Smokeless Tobacco     Never Used     Today's Vitals:   Vitals:    08/30/18 1311   BP: 142/80   Pulse: 66   Weight: (!) 261 lb (118.4 kg)     ----------------    Much or all of the text in this note was generated through the use of Dragon Dictate voice-to-text software. Errors in spelling or words which seem out of context are unintentional. Sound alike errors, in particular, may have escaped editing.    The patient was given a summary of these recommendations as an after visit summary    I spent 30 minutes with this patient today.   All changes were made via collaborative practice agreement with Amber Gallegos MD. A copy of the visit note was provided to the patient's provider.     Meagan Melgar, Pharm.D., [facility]  Medication Therapy Management Pharmacist  [facility]     Current Outpatient Prescriptions   Medication Sig Dispense Refill     aspirin 81 MG EC tablet Take 81 mg by mouth daily.       blood glucose test (ONETOUCH ULTRA BLUE TEST STRIP) strips bid        blood pressure monitor Kit Use to monitor blood pressure  up to 3 times a day 1 each 0     blood-gluc,BP meter,adult cuff Melinda Blood pressure cuff. Use as directed. 1 Device 0     hydrALAZINE (APRESOLINE) 25 MG tablet Take 25 mg by mouth 4 (four) times a day as needed (for blood pressure greater than 160/100).       hydrALAZINE (APRESOLINE) 25 MG tablet TAKE 1 TABLET BY MOUTH FOUR TIMES DAILY FOR BLOOD PRESSURE GREATER THAN 160/100. 90 tablet 1     hydroCHLOROthiazide (HYDRODIURIL) 12.5 MG tablet Take 1 tablet (12.5 mg total) by mouth daily. 90 tablet 3     HYDROcodone-acetaminophen (NORCO )  mg per tablet Take 1 tablet by mouth every 6 (six) hours as needed for pain. 90 tablet 0     metoprolol succinate (TOPROL-XL) 50 MG 24 hr tablet Take 1 tablet (50 mg total) by mouth daily. 90 tablet 3     naproxen (NAPROSYN) 500 MG tablet TAKE 1 TABLET BY MOUTH TWICE DAILY WITH FOOD AS NEEDED FOR PAIN 60 tablet 0      olmesartan (BENICAR) 40 MG tablet Take 1 tablet (40 mg total) by mouth daily. 90 tablet 3     omeprazole (PRILOSEC) 40 MG capsule Take 40 mg by mouth daily as needed.       triamcinolone (KENALOG) 0.1 % cream Apply topically 2 (two) times a day. 30 g 3     No current facility-administered medications for this visit.

## 2021-06-20 NOTE — LETTER
Letter by Asiya Herman at      Author: Asiya Herman Service: -- Author Type: --    Filed:  Encounter Date: 2/19/2020 Status: (Other)         Yecenia Kwan  [address] [place] [address]           02/19/20       Dear Yecenia:      At [facility], we care about your health and well-being.  Your primary care provider is committed to ensuring you receive high quality care and has chosen a network of specialists to assist in providing that care.  Recently Dr. Zac Grijalva referred you to the [facility] for specialty care.      It is important to your overall health to follow through with the referral from your care provider.  Please call [facility] scheduling [phone] at your earliest convenience for assistance in scheduling an appointment with the recommended specialist.  If you have already scheduled an appointment, please disregard this notice.  Thank you for choosing the [facility] for your healthcare needs.        Sincerely,        Electronically signed by Asiya Herman      Scheduling/Referral Coordinator   [facility]

## 2021-06-20 NOTE — PROGRESS NOTES
Assessment/Plan:     1. Hypertensive emergency     2. Dementia  Ambulatory referral to Neurology   3. History of stroke  Ambulatory referral to Neurology   4. Troponin level elevated     5. Paroxysmal atrial fibrillation (H)     6. Mixed hyperlipidemia         Diagnoses and all orders for this visit:    Hypertensive emergency  Resolved.  Continue metoprolol and losartan    Dementia  -     Ambulatory referral to Neurology  -Family has concern about vascular dementia.  Referral placed to neurology    History of stroke  -     Ambulatory referral to Neurology  -Follow-up with neurology.  Continue aspirin.  Continue Eliquis due to concern about cardioembolic stroke.  Continue statin    Troponin level elevated  Discussed reason for cardiology recommendation of CT angiogram.  Order for this has been placed.  Referral has been placed to cardiology for follow-up.    Paroxysmal atrial fibrillation (H)  Continue Eliquis.  Follow-up with cardiology.  Rate controlled with metoprolol    Mixed hyperlipidemia  Patient reports atorvastatin is causing body aches.  She would like to go back to simvastatin which she has previously taken.  Discussed that this is a lower dose of statin medication.  However patient feels that she would be more compliant.  Will discontinue atorvastatin and resume simvastatin 40 mg daily.  Prescription sent to pharmacy.               Subjective:      Yecenia Kwan is a 73 y.o. female who comes in today accompanied by her son for a hospital follow-up visit.  She was hospitalized the 24th through September 27 at [facility] due to hypertensive emergency.  Hospital records are reviewed including discharge summary, admission history and physical, pertinent imaging and blood work as well as specialty consult notes.  Medication reconciliation is performed.  Presented with sudden onset of slurred speech, left facial and word finding difficulties.  Blood pressure was significantly elevated.  Has  history of stroke as well as difficult to control hypertension.  She was evaluated in the emergency room and found to have small acute stroke in the left lateral temporal lobe.  Troponin level was also elevated and it was felt that she had probable N STEMI in setting of hypertensive emergency.  She was also diagnosed with atrial fibrillation.  There was concern stroke could be cardioembolic.  She was seen in consultation by cardiology and neurology.  Previously had been on aspirin.  Also had been on atorvastatin.  Worse of atorvastatin was increased to 80 mg.  She was started on Eliquis.  She also received PT/OT and speech therapy.  Stabilized and was discharged home with goal systolic blood pressure of 160.  Cardiology advised outpatient coronary CT angiogram and cardiology follow-up.  Neurology recommended follow-up in 1 month's time.  Patient reports she is doing well currently.  Tolerating new medications.  Family has several questions.  They wish to discuss whether it is truly necessary to proceed with CT angiogram or whether she could continue to be medically managed.  They also are requesting referral to neurology for evaluation of possible dementia.  Patient reports she is not experiencing any new symptoms of headaches or dizziness.  No lightheadedness.  No new chest pain or pressure.  No dyspnea.  She is eating and drinking okay.  No increased lower extremity edema.  Review of systems is otherwise negative.  No other concerns or questions.    Current Outpatient Prescriptions   Medication Sig Dispense Refill     apixaban (ELIQUIS) 5 mg Tab tablet Take 1 tablet (5 mg total) by mouth 2 (two) times a day. 60 tablet 0     aspirin 81 MG EC tablet Take 81 mg by mouth daily.       blood glucose test (ONETOUCH ULTRA BLUE TEST STRIP) strips bid        blood pressure monitor Kit Use to monitor blood pressure  up to 3 times a day 1 each 0     diclofenac sodium (VOLTAREN) 1 % Gel Lower extremities: 4 g to the affected  "area 4 times daily using dosing card.  Do not exceed 32 g total dose per day. 100 g 0     fluticasone (ALLERGY RELIEF, FLUTICASONE,) 50 mcg/actuation nasal spray 2 sprays into each nostril daily as needed for rhinitis. 16 g 12     HYDROcodone-acetaminophen (NORCO )  mg per tablet Take 1 tablet by mouth every 6 (six) hours as needed for pain. 90 tablet 0     metoprolol succinate (TOPROL-XL) 50 MG 24 hr tablet Take 2 tablets (100 mg total) by mouth daily. 60 tablet 3     olmesartan (BENICAR) 40 MG tablet Take 1 tablet (40 mg total) by mouth daily. 90 tablet 3     omeprazole (PRILOSEC) 40 MG capsule TAKE 1 CAPSULE(40 MG) BY MOUTH DAILY BEFORE BREAKFAST 90 capsule 3     triamcinolone (KENALOG) 0.1 % cream Apply topically 2 (two) times a day. 30 g 3     polyethylene glycol (GLYCOLAX) 17 gram/dose powder Take 17 g by mouth daily. 1700 g 3     simvastatin (ZOCOR) 40 MG tablet Take 1 tablet (40 mg total) by mouth every evening. 90 tablet 3     No current facility-administered medications for this visit.        Past Medical History, Family History, and Social History reviewed.  Past Medical History:   Diagnosis Date     Anxiety      Arthritis      Cellulitis of left leg      CVA (cerebral vascular accident) (H)      GERD (gastroesophageal reflux disease)      Head injury      Hx of nicotine dependence      Hyperlipemia      Hypertension      Proteinuria      Past Surgical History:   Procedure Laterality Date     IL ARTHROTOMY/EXPLORE/TREAT KNEE JOINT      Description: Arthrotomy Of Knee With Joint Exploration;  Recorded: 07/29/2008;  Comments: due to \"accident\" and infection     IL CORRJ HALLUX VALGUS W/SESMDC W/DIST METAR OSTEOT      Description: Bunion Correction With Metatarsal Osteotomy;  Recorded: 07/29/2008;     Review of patient's allergies indicates no known allergies.  Family History   Problem Relation Age of Onset     Diabetes Mother      Prostate cancer Father      Social History     Social History     " Marital status: Single     Spouse name: N/A     Number of children: N/A     Years of education: N/A     Occupational History     Not on file.     Social History Main Topics     Smoking status: Never Smoker     Smokeless tobacco: Never Used     Alcohol use No     Drug use: No     Sexual activity: Not on file     Other Topics Concern     Not on file     Social History Narrative    Lives with grandson, and is exposed to second hand smoke.         Review of systems is as stated in HPI, and the remainder of the 10 system review is otherwise negative.    Objective:     Vitals:    10/03/18 1300 10/03/18 1348   BP: 148/90 142/90   Patient Site: Left Arm Left Arm   Patient Position: Sitting Sitting   Cuff Size: Adult Large Adult Large   Pulse: 64    Temp: 97.4  F (36.3  C)    TempSrc: Oral    SpO2: 96%    Weight: (!) 251 lb 5 oz (114 kg)     Body mass index is 44.52 kg/(m^2).    General appearance: alert, appears stated age and cooperative  Head: Normocephalic, without obvious abnormality, atraumatic  Lungs: clear to auscultation bilaterally  Heart: irregularly irregular rhythm  Extremities: extremities normal, atraumatic, no cyanosis or edema  Neurologic: alert and oriented, slow to speak with some word finding difficulties        This note has been dictated using voice recognition software. Any grammatical or context distortions are unintentional and inherent to the the software.

## 2021-06-20 NOTE — LETTER
Letter by Selena De La Cruz SW at      Author: Selena De La Cruz SW Service: -- Author Type: --    Filed:  Encounter Date: 11/20/2019 Status: Signed       December 24, 2019    Important [employer] Information    GURVINDER DE DIOS  [address] [place] [address]  Your Care Plan  Dear Gurvinder,  When we spoke recently, I promised to send you a Care Plan. The plan enclosed is a summary of our discussion. It includes the steps we agreed would help you meet your health goals. In addition, I can help you with:  [id number]-[id number]  This program is available to members who need a ride to medical and dental visits. To schedule a ride, call [phone] or [phone] (toll free). TTY/TTD: 711. You can call 8 a.m. to 8 p.m. Seven days a week. Access to a representative may be limited at times.      5k Fans   The [employer]k Fans program empowers you to improve your health through education and exercise. To learn more, visit [employer], or call [employer] at [phone] (toll free) (TTY:711) from 7 a.m. - 7 p.m. Central Time, Monday-Friday.  Health Care Directive   This form helps you outline your health care wishes. You can request a form from me and I will answer any questions you have before you discuss it with your doctor.   Annual Physical  Take a key step on your path to good health and set up an annual physical at your clinic.  Questions?  Call me at [phone] Monday-Friday between 8am and 5pm.  TTY/TTD: 711. As we discussed, I plan to be in touch with you again in 6 months to follow up via phone.  Sincerely,      RYAN Yanez    E-mail: [email or web address]  Phone: [phone]    Care Manager  [facility]          cc: member records                Civil Rights Notice  Discrimination is against the law. [employer] does not discriminate on the basis of any of the following:    Race    Color    National Origin    Creed    Mu-ism    Age    Public Assistance  Status    Receipt of Health Care Services    Disability (including physical or mental impairment)    Sex (including sex stereotypes and gender identity)    Marital Status    Political Beliefs    Medical Condition    Genetic Information    Sexual Orientation    Claims Experience    Medical History    Health Status    Auxiliary Aids and Services:  [employer] provides auxiliary aids and services, like qualified interpreters or information in accessible formats, free of charge and in a timely manner, to ensure an equal opportunity to participate in our health care programs. Contact [employer] Customer Service at [employer]/contact medicaid or call [phone] (toll free); TTY:718 or at [employer]/contactmedicaid.    Language Assistance Services:  [employer] provides translated documents and spoken language interpreting, free of charge and in a timely manner, when language assistance services are necessary to ensure limited English speakers have meaningful access to our information and services. Contact [employer] at [phone] (toll free); TTY: 894 or [employer]/contactmedicaid.     Civil Rights Complaints  You have the right to file a discrimination complaint if you believe you were treated in a discriminatory way by [facility]. You may contact any of the following four agencies directly to file a discrimination complaint.    U.S. Department of Health and Human Services Office for Civil Rights (OCR)  You have the right to file a complaint with the OCR, a federal agency, if you believe you have been discriminated against because of any of the following:    Race    Disability    Color    Sex    National Origin    Age      Contact the OCR directly to file a complaint:         Director         U.S. Department of Health and Human Services Office for Civil Rights         [address] [place] [address], [place] [address] [phone] (Voice)         [phone] (TDD)         Complaint  Portal - [email or web address]     [place] Department of Human Rights (MDHR)  In [place], you have the right to file a complaint with the MDHR if you believe you have been discriminated against because of any of the following:      Race    Color    National Origin    Congregation    Creed    Sex    Sexual Orientation    Marital Status    Public Assistance Status    Disability    Contact the MDHR directly to file a complaint:         [place] Department of Human Rights         [facility], [address] [place] [address]         [phone] (voice)          [phone] (toll free)         [phone] or [phone] ([place] Relay)         [phone] (Fax)         [email or web address] (Email)     [place] Department of Human Services (DHS)  You have the right to file a complaint with [facility] if you believe you have been discriminated against in our health care programs because of any of the following:    Race    Color    National Origin    Creed    Congregation    Age    Public Assistance Status    Receipt of Health Care Services    Disability (including physical or mental impairment)    Sex (including sex stereotypes and gender identity)    Marital Status    Political Beliefs    Medical Condition    Genetic Information    Sexual Orientation    Claims Experience    Medical History    Health Status    Complaints must be in writing and filed within 180 days of the date you discovered the alleged discrimination. The complaint must contain your name and address and describe the discrimination you are complaining about. After we get your complaint, we will review it and notify you in writing about whether we have authority to investigate. If we do, we will investigate the complaint.      [facility] will notify you in writing of the investigations outcome. You have a right to appeal the outcome if you disagree with the decision. To appeal, you must send a written request to have [facility] review the  investigation outcome period. Be brief and state why you disagree with the decision. Include additional information you think is important.      If you file a complaint in this way, the people who work for the agency named in the complaint cannot retaliate against you. This means they cannot punish you in any way for filing a complaint. Filing a complaint in this way does not stop you from seeking out other legal or administration actions.     Contact DHS directly to file a discrimination complaint:        Civil Rights Coordinator        [place] Department of Human Services        Equal Opportunity and Access Division        [address] [address]        [place] [address] [phone] (voice) or use your preferred relay service     [employer] Complaint Notice   You have the right to file a complaint with [employer] if you believe you have been discriminated against because of any of the following:       Medical condition    Health status    Receipt of health care services    Claims experience    Medical history    Genetic information    Disability (including mental or physical impairment)    Marital status    Age    Sex (including sex stereotypes and gender identity)    Sexual orientation    National origin    Race    Color    Buddhism    Creed    Public assistance status    Political beliefs    You can file a complaint and ask for help in filing a complaint in person or by mail, phone, fax, or email at:     [employer] Civil Rights Coordinator  [facility]  [address], [place] [address] [phone] (voice and fax) or TTO:803  Email: [email or web address]    American Indians can continue to use [facility] and [facility] ([facility]) clinics. We will not require prior approval or impose any conditions for you to get services at these clinics. For elders age 65 years and older this includes Elderly Waiver (EW) services accessed through the [employer]. If a doctor or other  provider in a [facility] or [facility] clinic refers you to a provider in our network, we will not require you to see your primary care provider prior to the referral.    For accessible formats of this publication or assistance with equal or access to our services, visit [employer] Registrymedicaid, or call [phone] (toll free) or use your preferred relay service.

## 2021-06-21 NOTE — LETTER
Letter by Selena De La Cruz SW at      Author: Selena De La Cruz SW Service: -- Author Type: --    Filed:  Encounter Date: 10/23/2020 Status: (Other)       [facility]  [address], [address]  [place] [address]  Phone:  [phone]  Fax:  [phone]        October 23, 2020    GURVINDER DE DIOS  [address] [place] [address]    Dear Alvaro Keene is a copy of your completed PCA Assessment and Service Plan.  This is for your records and no action is required by you.  If you have additional questions regarding your assessment please contact me at [phone]. If you feel that your needs are not being met, please contact the Clinical Supervisor at [phone].    Sincerely,      RYAN Yanez    E-mail: [email or web address]  Phone: [phone]    Care Manager  [facility]            Enclosure:  Completed PCA assessment

## 2021-06-21 NOTE — PROGRESS NOTES
[facility] Follow-up Note    Assessment & Plan        1. Paroxysmal atrial fibrillation (H) -briefly seen on telemetry and may have had been the etiology for the stroke.  Given this would keep her on chronic anticoagulation.  Family is concerned that the Eliquis could be causing headaches and peripheral edema.  They do not want to use Coumadin or warfarin.  We will switch to Xarelto 20 mg in place, 30-day prescription sent.   2. Pure hypercholesterolemia -LDL markedly elevated to 168.  Will check fasting lipids and if not under good control switch over to atorvastatin 80 mg.  Suspect she may need PCSK9 inhibitor.   3. Troponin level elevated -CTA showed relatively low score with no significant obstructive coronary artery disease.   4. Cerebrovascular accident (CVA) due to occlusion of precerebral artery (H) -felt to be small foci of acute ischemia involving the lateral left temporal lobe, possibly embolic based on transient atrial fibrillation.  Anticoagulation as above.   5.  Hypertension-not under good control.  Would recommend increasing hydralazine if okay with neurology.  6.  Obesity-weight loss.  Would use diet without other supplements.    Plan  1.  Change Eliquis over to Xarelto to see if headaches and edema improved.  2.  Ask neurology to image brain given increased headaches and if able increase hydralazine to 50 mg 3 times a day.  3.  Check fasting lipid profile and if LDL still elevated switch over to atorvastatin 80 mg.  4.  Follow-up with me in about 3 months to address all these issues.  5.  Lose weight.    Subjective  CC: 73-year-old -American female being seen in post hospital discharge follow-up today.  She is back living with her daughter and son-in-law, she is doing a little bit of driving and putting dishes away.  She is walking with a walker which she did beforehand.  She is still aphasic and does admit to occasional palpitations.  She denies any chest discomfort,  "shortness of breath, PND, orthopnea.  There is also a little bit of a headache and some peripheral edema.    Medications  Current Outpatient Medications   Medication Sig Note     aspirin 81 MG EC tablet Take 81 mg by mouth daily.      blood glucose test (ONETOUCH ULTRA BLUE TEST STRIP) strips bid  6/13/2018: Received from: [facility]     blood pressure monitor Kit Use to monitor blood pressure  up to 3 times a day      diclofenac sodium (VOLTAREN) 1 % Gel Lower extremities: 4 g to the affected area 4 times daily using dosing card.  Do not exceed 32 g total dose per day.      fluticasone (ALLERGY RELIEF, FLUTICASONE,) 50 mcg/actuation nasal spray 2 sprays into each nostril daily as needed for rhinitis. 10/3/2018: As needed      hydrALAZINE (APRESOLINE) 25 MG tablet TAKE 1 TABLET BY MOUTH FOUR TIMES DAILY as needed FOR BLOOD PRESSURE GREATER THAN 160/100..      HYDROcodone-acetaminophen (NORCO )  mg per tablet Take 1 tablet by mouth every 6 (six) hours as needed for pain.      metoprolol succinate (TOPROL XL) 100 MG 24 hr tablet Take 1 tablet (100 mg total) by mouth daily.      olmesartan (BENICAR) 40 MG tablet Take 1 tablet (40 mg total) by mouth daily.      omeprazole (PRILOSEC) 40 MG capsule TAKE 1 CAPSULE(40 MG) BY MOUTH DAILY BEFORE BREAKFAST      polyethylene glycol (GLYCOLAX) 17 gram/dose powder Take 17 g by mouth daily.      simvastatin (ZOCOR) 40 MG tablet Take 1 tablet (40 mg total) by mouth every evening.      triamcinolone (KENALOG) 0.1 % cream Apply topically 2 (two) times a day. As needed for rash      rivaroxaban 15 mg (42)- 20 mg (9) DsPk Take 20 mg by mouth daily with supper Take 1 tablet (15mg) po bid for 21 days then take 1 tablet (20mg) daily .        Objective  /80 (Patient Site: Left Arm, Patient Position: Sitting, Cuff Size: Adult Large)   Pulse 60   Resp 16   Ht 5' 3\" (1.6 m)   BMI 43.95 kg/m      General Appearance:    Alert, cooperative, no distress, appears stated age, " moderately obese   Head:    Normocephalic, without obvious abnormality, atraumatic   Throat:   Lips, mucosa, and tongue normal; teeth and gums normal   Neck:   Supple, symmetrical, trachea midline, no adenopathy;        thyroid:  No enlargement/tenderness/nodules; no carotid    bruit or JVD   Back:     Symmetric, no curvature, ROM normal, no CVA tenderness   Lungs:     Clear to auscultation bilaterally, respirations unlabored   Chest wall:    No tenderness or deformity   Heart:    Regular rate and rhythm, S1 and S2 normal, no murmur, rub   or gallop   Abdomen:     Soft, non-tender, bowel sounds active all four quadrants,     no masses, no organomegaly   Extremities:   Normal, atraumatic, no cyanosis, mild 1/4 bipedal edema   Pulses:   2+ and symmetric all extremities   Skin:   Skin color, texture, turgor normal, no rashes or lesions     Results    Lab Results personally reviewed   Lab Results   Component Value Date    CHOL 230 (H) 09/25/2018    CHOL 159 12/08/2015     Lab Results   Component Value Date    HDL 50 09/25/2018    HDL 49 (L) 12/08/2015     Lab Results   Component Value Date    LDLCALC 168 (H) 09/25/2018    LDLCALC 95 12/08/2015     Lab Results   Component Value Date    TRIG 62 09/25/2018    TRIG 73 12/08/2015     Lab Results   Component Value Date    WBC 8.1 09/27/2018    HGB 12.6 09/27/2018    HCT 39.9 09/27/2018     09/27/2018     Lab Results   Component Value Date    CREATININE 0.64 09/27/2018    BUN 22 09/27/2018     09/27/2018    K 4.1 09/27/2018    CO2 23 09/27/2018     Review of Systems:   General: Weight Loss  Eyes: WNL  Ears/Nose/Throat: WNL  Lungs: Cough  Heart: WNL  Stomach: WNL  Bladder: WNL  Muscle/Joints: Joint Pain  Skin: WNL  Nervous System: Dizziness, Loss of Balance  Mental Health: WNL     Blood: Easy Bleeding, Easy Bruising

## 2021-06-21 NOTE — PROGRESS NOTES
Assessment/Plan:     1. Essential hypertension     2. Paroxysmal atrial fibrillation (H)     3. Primary insomnia     4. Medication side effects         Diagnoses and all orders for this visit:    Essential hypertension  Medication reconciliation is performed.  When patient was last seen for hospital discharge in early October she was on metoprolol 100 mg daily and olmesartan 40 mg daily.  Patient now has losartan 50 mg daily on her medication list.  Patient's family reports that she has been taken both the losartan and the olmesartan.  It is unclear where she got the losartan from.  I discussed that losartan and olmesartan are from the same class of antihypertensive medication and taking both as essentially duplication of therapy.  We will have her discontinue the losartan and continue olmesartan and metoprolol.  Discussed other possible medications to add to her regimen.  She does not want to take hydrochlorothiazide.  It has caused adverse side effects in the past.  When she has taken amlodipine in the past it has caused swelling in her legs want to go back on this medication.  Another calcium channel blocker may be an option but it would also potentially cause lower extremity edema and may affect heart rate and she is already slightly bradycardic with current dose of metoprolol.  At this time, daughter would like to discuss further with cardiologist about potential medication changes and continue metoprolol and olmesartan at current doses and continue to use hydralazine as needed for blood pressure above 160/90.    Paroxysmal atrial fibrillation (H)  Currently in sinus rhythm.  Unclear how often she is in atrial fibrillation.  Discussed that there is increased risk of stroke and anticoagulation is recommended.  Discussed there are other options for anticoagulation.  Patient and family would like to discuss these in more detail at their upcoming appointment with cardiology    Primary insomnia  She has tried  melatonin in the past which has been ineffective.  She does not want any more prescription medications to help with sleep.  We discussed sleep hygiene.  Encouraged regular bedtime and wake time.  Discussed avoidance of caffeine in afternoon and evening.  Encouraged regular physical activity.  Avoid watching TV and use of electronics right before bed.    Medication side effects  They will discuss side effects of Eliquis and potential medication changes in more detail with cardiologist.                 Subjective:      Yecenia Kwan is a 73 y.o. female who comes in today to follow-up on hypertension.  She is accompanied by her son and daughter today.  She has history of hypertension that has been difficult to control as well multiple medication intolerances.  Most recently has been on metoprolol 100 mg daily and olmesartan 40 mg daily to manage her blood pressure.  She was hospitalized in September due to a stroke and elevation of troponin.  Subsequently underwent coronary CT angiogram which revealed calcium score of 1 placing her in the 25th percentile for risk of coronary events over the next 10 years.  She does have a follow-up appointment scheduled with cardiology next week.  In the hospital she was found to have an episode of atrial fibrillation and she was started on Eliquis for anticoagulation due to underlying paroxysmal atrial fibrillation.  Since then she has been bothered by side effects that she attributes to the Eliquis.  Complains of headaches as well as sleep difficulties.  Son and daughter are wondering about alternative options to the Eliquis.  Patient's blood pressure has also been running higher over the past week.  Daughter contacted the clinic regarding this concern and patient was scheduled for office visit today.  Previously patient had a prescription for hydralazine to use on an as-needed basis for blood pressure above 160/90.  Patient was given a refill of this prescription last week.   Daughter reports that she has only had to use it once over the past several days.  They cannot identify any changes that would account for the recent increase in her blood pressure.  She has been active and is walking twice daily.  She has not had any dietary changes.  She has not been recently sick.  She is not having any new chest pain or pressure in the chest.  No dyspnea.  No increased lower extremity edema.  Does not notice any racing heart.  Review of systems is assessed and is otherwise negative.  No other questions today.    Current Outpatient Medications   Medication Sig Dispense Refill     apixaban (ELIQUIS) 5 mg Tab tablet Take 1 tablet (5 mg total) by mouth 2 (two) times a day. 60 tablet 5     aspirin 81 MG EC tablet Take 81 mg by mouth daily.       blood glucose test (ONETOUCH ULTRA BLUE TEST STRIP) strips bid        blood pressure monitor Kit Use to monitor blood pressure  up to 3 times a day 1 each 0     diclofenac sodium (VOLTAREN) 1 % Gel Lower extremities: 4 g to the affected area 4 times daily using dosing card.  Do not exceed 32 g total dose per day. 100 g 0     fluticasone (ALLERGY RELIEF, FLUTICASONE,) 50 mcg/actuation nasal spray 2 sprays into each nostril daily as needed for rhinitis. 16 g 12     hydrALAZINE (APRESOLINE) 25 MG tablet TAKE 1 TABLET BY MOUTH FOUR TIMES DAILY as needed FOR BLOOD PRESSURE GREATER THAN 160/100.. 120 tablet 3     HYDROcodone-acetaminophen (NORCO )  mg per tablet Take 1 tablet by mouth every 6 (six) hours as needed for pain. 90 tablet 0     olmesartan (BENICAR) 40 MG tablet Take 1 tablet (40 mg total) by mouth daily. 90 tablet 3     omeprazole (PRILOSEC) 40 MG capsule TAKE 1 CAPSULE(40 MG) BY MOUTH DAILY BEFORE BREAKFAST 90 capsule 3     polyethylene glycol (GLYCOLAX) 17 gram/dose powder Take 17 g by mouth daily. 1700 g 3     simvastatin (ZOCOR) 40 MG tablet Take 1 tablet (40 mg total) by mouth every evening. 90 tablet 3     triamcinolone (KENALOG) 0.1 %  "cream Apply topically 2 (two) times a day. As needed for rash 30 g 3     metoprolol succinate (TOPROL XL) 100 MG 24 hr tablet Take 1 tablet (100 mg total) by mouth daily. 90 tablet 3     No current facility-administered medications for this visit.        Past Medical History, Family History, and Social History reviewed.  Past Medical History:   Diagnosis Date     Anxiety      Arthritis      Atrial fibrillation (H)      Cellulitis of left leg      CVA (cerebral vascular accident) (H)      GERD (gastroesophageal reflux disease)      Head injury      Hx of nicotine dependence      Hyperlipemia      Hypertension      Proteinuria      Past Surgical History:   Procedure Laterality Date     CO ARTHROTOMY/EXPLORE/TREAT KNEE JOINT      Description: Arthrotomy Of Knee With Joint Exploration;  Recorded: 07/29/2008;  Comments: due to \"accident\" and infection     CO CORRJ HALLUX VALGUS W/SESMDC W/DIST METAR OSTEOT      Description: Bunion Correction With Metatarsal Osteotomy;  Recorded: 07/29/2008;     Patient has no known allergies.  Family History   Problem Relation Age of Onset     Diabetes Mother      Prostate cancer Father      Social History     Socioeconomic History     Marital status: Single     Spouse name: Not on file     Number of children: Not on file     Years of education: Not on file     Highest education level: Not on file   Social Needs     Financial resource strain: Not on file     Food insecurity - worry: Not on file     Food insecurity - inability: Not on file     Transportation needs - medical: Not on file     Transportation needs - non-medical: Not on file   Occupational History     Not on file   Tobacco Use     Smoking status: Never Smoker     Smokeless tobacco: Never Used   Substance and Sexual Activity     Alcohol use: No     Drug use: No     Sexual activity: Not on file   Other Topics Concern     Not on file   Social History Narrative    Lives with grandson, and is exposed to second hand smoke. "         Review of systems is as stated in HPI, and the remainder of the 10 system review is otherwise negative.    Objective:     Vitals:    11/19/18 1138 11/19/18 1218   BP: (!) 164/92 (!) 170/98   Patient Site: Right Arm    Patient Position: Sitting    Cuff Size: Adult Large    Pulse: (!) 56    Temp: 97.4  F (36.3  C)    TempSrc: Oral    SpO2: 98%    Weight: (!) 248 lb 2 oz (112.5 kg)     Body mass index is 43.96 kg/m .    General appearance: alert, appears stated age and cooperative  Lungs: clear to auscultation bilaterally  Heart: regular rate and rhythm, S1, S2 normal, no murmur, click, rub or gallop  Neurologic: awake and alert, speech clear, word finding difficulty present      This was a 25 minute visit with greater than 50% of time spent in counseling and coordination of care      This note has been dictated using voice recognition software. Any grammatical or context distortions are unintentional and inherent to the the software.

## 2021-06-21 NOTE — LETTER
Letter by Selena De La Cruz SW at      Author: Selena De La Cruz SW Service: -- Author Type: --    Filed:  Encounter Date: 10/23/2020 Status: (Other)       October 30, 2020    Important [employer] Information    GURVINDER DE DIOS  [address] [place] [address]  Your Care Plan  Dear Gurvinder,  When we spoke recently, I promised to send you a Care Plan. The plan enclosed is a summary of our discussion. It includes the steps we agreed would help you meet your health goals. In addition, I can help you with:  [id number]  This program is available to members who need a ride to medical and dental visits. To schedule a ride, call [phone] or [phone] (toll free). TTY/TTD: 711. You can call 8 a.m. to 8 p.m. Seven days a week. Access to a representative may be limited at times.      [employer]   The [employer] program empowers you to improve your health through education and exercise. To learn more, visit [employer], or call [employer] at [phone] (toll free) (TTY:711) from 7 a.m. - 7 p.m. Central Time, Monday-Friday.  Health Care Directive   This form helps you outline your health care wishes. You can request a form from me and I will answer any questions you have before you discuss it with your doctor.   Annual Physical  Take a key step on your path to good health and set up an annual physical at your clinic.  Questions?  Call me at [phone] Monday-Friday between 8am and 5pm.  TTY/TTD: 711. As we discussed, I plan to be in touch with you again in 6 months to follow up via phone.  Sincerely,      RYAN Yanez    E-mail: [email or web address]  Phone: [phone]    Care Manager  [facility]          cc: member records                Civil Rights Notice  Discrimination is against the law. [employer] does not discriminate on the basis of any of the following:    Race    Color    National Origin    Creed    Gnosticist    Age    Public Assistance  Status    Receipt of Health Care Services    Disability (including physical or mental impairment)    Sex (including sex stereotypes and gender identity)    Marital Status    Political Beliefs    Medical Condition    Genetic Information    Sexual Orientation    Claims Experience    Medical History    Health Status    Auxiliary Aids and Services:  [employer] provides auxiliary aids and services, like qualified interpreters or information in accessible formats, free of charge and in a timely manner, to ensure an equal opportunity to participate in our health care programs. Contact [employer] Customer Service at [employer]/contact medicaid or call [phone] (toll free); TTY:718 or at [employer]/contactmedicaid.    Language Assistance Services:  [employer] provides translated documents and spoken language interpreting, free of charge and in a timely manner, when language assistance services are necessary to ensure limited English speakers have meaningful access to our information and services. Contact [employer] at [phone] (toll free); TTY: 505 or [employer]/contactmedicaid.     Civil Rights Complaints  You have the right to file a discrimination complaint if you believe you were treated in a discriminatory way by [facility]. You may contact any of the following four agencies directly to file a discrimination complaint.    U.S. Department of Health and Human Services Office for Civil Rights (OCR)  You have the right to file a complaint with the OCR, a federal agency, if you believe you have been discriminated against because of any of the following:    Race    Disability    Color    Sex    National Origin    Age      Contact the OCR directly to file a complaint:         Director         U.S. Department of Health and Human Services Office for Civil Rights         [address] [place] [address], [place] [address] [phone] (Voice)         [phone] (TDD)         Complaint  Portal - [email or web address]     [place] Department of Human Rights (MDHR)  In [place], you have the right to file a complaint with the MDHR if you believe you have been discriminated against because of any of the following:      Race    Color    National Origin    Synagogue    Creed    Sex    Sexual Orientation    Marital Status    Public Assistance Status    Disability    Contact the MDHR directly to file a complaint:         [place] Department of Human Rights         [facility], [address] [place] [address]         [phone] (voice)          [phone] (toll free)         716 or [phone] ([place] Relay)         [phone] (Fax)         [email or web address] (Email)     [place] Department of Human Services (DHS)  You have the right to file a complaint with [facility] if you believe you have been discriminated against in our health care programs because of any of the following:    Race    Color    National Origin    Creed    Synagogue    Age    Public Assistance Status    Receipt of Health Care Services    Disability (including physical or mental impairment)    Sex (including sex stereotypes and gender identity)    Marital Status    Political Beliefs    Medical Condition    Genetic Information    Sexual Orientation    Claims Experience    Medical History    Health Status    Complaints must be in writing and filed within 180 days of the date you discovered the alleged discrimination. The complaint must contain your name and address and describe the discrimination you are complaining about. After we get your complaint, we will review it and notify you in writing about whether we have authority to investigate. If we do, we will investigate the complaint.      [facility] will notify you in writing of the investigations outcome. You have a right to appeal the outcome if you disagree with the decision. To appeal, you must send a written request to have [facility] review the  investigation outcome period. Be brief and state why you disagree with the decision. Include additional information you think is important.      If you file a complaint in this way, the people who work for the agency named in the complaint cannot retaliate against you. This means they cannot punish you in any way for filing a complaint. Filing a complaint in this way does not stop you from seeking out other legal or administration actions.     Contact DHS directly to file a discrimination complaint:        Civil Rights Coordinator        [place] Department of Human Services        Equal Opportunity and Access Division        [address] [address]        [place] [address] [phone] (voice) or use your preferred relay service     [facility] Complaint Notice   You have the right to file a complaint with [employer] if you believe you have been discriminated against because of any of the following:       Medical condition    Health status    Receipt of health care services    Claims experience    Medical history    Genetic information    Disability (including mental or physical impairment)    Marital status    Age    Sex (including sex stereotypes and gender identity)    Sexual orientation    National origin    Race    Color    Anabaptism    Creed    Public assistance status    Political beliefs    You can file a complaint and ask for help in filing a complaint in person or by mail, phone, fax, or email at:     [employer] Civil Rights Coordinator  [facility]  [address], [place] [address] [phone] (voice and fax) or TTW:106  Email: [email or web address]    American Indians can continue to use [facility] and [facility] ([facility]) clinics. We will not require prior approval or impose any conditions for you to get services at these clinics. For elders age 65 years and older this includes Elderly Waiver (EW) services accessed through the [employer]. If a doctor or other  provider in a [facility] or [facility] clinic refers you to a provider in our network, we will not require you to see your primary care provider prior to the referral.    For accessible formats of this publication or assistance with equal or access to our services, visit [place] [employer], or call [phone] (toll free) or use your preferred relay service.

## 2021-06-21 NOTE — LETTER
Letter by Balbina Hillman MD at      Author: Balbina Hillman MD Service: -- Author Type: --    Filed:  Encounter Date: 12/1/2020 Status: (Other)

## 2021-06-22 NOTE — TELEPHONE ENCOUNTER
Medication Question or Clarification  Who is calling: Other: Daughter  What medication are you calling about?: HYDROcodone-acetaminophen (NORCO )  mg per tablet  What dose do you take?:  mg  How often are you taking the medication?: Prn every 6 hours  Who prescribed the medication?: Amber Gallegos MD  What is your question/concern?: daughter calling because only 60 tablets got sent in on medication and they normally get 90.  They already picked up the 60 tablets.  Can Amber Gallegos MD adjust RX for the refills of 90 start again.  Pharmacy: [facility] #[id number]  Okay to leave a detailed message?: No  Site CMT - Please call the pharmacy to obtain any additional needed information.

## 2021-06-22 NOTE — TELEPHONE ENCOUNTER
Referral Request  Type of referral: Pool therapy  Who s requesting: Daughter Estrellita  Why the request: Wanting to have a referral for pool therapy.  Have you been seen for this request: No:  Appointment Offered:  declined  Does patient have a preference on a group/provider? [facility] - off of [address]  Okay to leave a detailed message?  Yes

## 2021-06-22 NOTE — TELEPHONE ENCOUNTER
Please see message below from patient. Please place order for pool therapy if appropriate. Thank you!

## 2021-06-23 NOTE — TELEPHONE ENCOUNTER
Controlled Substance Refill Request  Medication Name:   Requested Prescriptions     Pending Prescriptions Disp Refills     HYDROcodone-acetaminophen (NORCO )  mg per tablet 30 tablet 0     Sig: Take 1 tablet by mouth every 6 (six) hours as needed for pain.     Date Last Fill: 01/04/2019  Pharmacy: [facility] [place]      Submit electronically to pharmacy  Controlled Substance Agreement Date Scanned:   Encounter-Level CSA Scan Date - 04/02/2018:    Scan on 4/5/2018  9:52 AM (below)         Last office visit with prescriber/PCP: 11/19/2018 Amber Gallegos MD OR same dept: 11/19/2018 Amber Gallegos MD OR same specialty: 11/19/2018 Amber Gallegos MD  Last physical: Visit date not found Last MTM visit: Visit date not found      Patient requesting 90 tablets.

## 2021-06-24 NOTE — TELEPHONE ENCOUNTER
Please check with patient on refill request for atorvastatin.  Simvastatin is currently on patient's med list.  Does she take simvastatin or atorvastatin?

## 2021-06-24 NOTE — TELEPHONE ENCOUNTER
Reason contacted:  Medication question  Information relayed:  Estrellita notified of the below message and will pick this prescription up for patient.   Additional questions:  No  Further follow-up needed:  No  Okay to leave a detailed message:  No

## 2021-06-24 NOTE — TELEPHONE ENCOUNTER
RN cannot approve Refill Request    RN can NOT refill this medication medication not on med list.         Pattie Whatley, [facility] Triage/Med Refill 2/25/2019    Requested Prescriptions   Pending Prescriptions Disp Refills     atorvastatin (LIPITOR) 20 MG tablet [Pharmacy Med Name: ATORVASTATIN 20MG TABLETS] 90 tablet 0     Sig: TAKE 1 TABLET(20 MG) BY MOUTH DAILY    Statins Refill Protocol (Hmg CoA Reductase Inhibitors) Passed - 2/24/2019  3:12 AM       Passed - PCP or prescribing provider visit in past 12 months     Last office visit with prescriber/PCP: 11/19/2018 Amber Gallegos MD OR same dept: 11/19/2018 Amber Gallegos MD OR same specialty: 11/19/2018 Amber Gallegos MD  Last physical: Visit date not found Last MTM visit: Visit date not found   Next visit within 3 mo: Visit date not found  Next physical within 3 mo: Visit date not found  Prescriber OR PCP: Amber Gallegos MD  Last diagnosis associated with med order: There are no diagnoses linked to this encounter.  If protocol passes may refill for 12 months if within 3 months of last provider visit (or a total of 15 months).

## 2021-06-24 NOTE — TELEPHONE ENCOUNTER
"Medication Question or Clarification  Who is calling: Patient  What medication are you calling about?: Eliquis   What dose do you take?: 5 mg  How often are you taking the medication?: two times a day   Who prescribed the medication?: Dr Gallegos  What is your question/concern?: Patient states she had tried Xarelto for 1 day, but had a \"bad reaction\", therefor switched back to Eliquis.    Addendum:  Patient was switched to Xarelto due to concerns that her headaches could have been from the Eliquis. (please see telephone encounter dated 12/11/2018) It appears patient did not wish to go on warfarin and may have restarted Eliquis on her own. She has since been on this medication and has been getting refills from the prescription that was sent on 11/07/2018 under Dr Gallegos's name.  Pharmacy: [facility] #[id number]  Okay to leave a detailed message?: Yes  Site CMT - Please call the pharmacy to obtain any additional needed information.  "

## 2021-06-24 NOTE — TELEPHONE ENCOUNTER
Patient states she is running low on this medication and is hopful it could be sent in today.        Controlled Substance Refill Request  Medication Name:   Requested Prescriptions     Pending Prescriptions Disp Refills     HYDROcodone-acetaminophen (NORCO )  mg per tablet 90 tablet 0     Sig: Take 1 tablet by mouth every 6 (six) hours as needed for pain.     Date Last Fill: 1/24/2019  Pharmacy: [id number]      Submit electronically to pharmacy  Controlled Substance Agreement Date Scanned:   Encounter-Level CSA Scan Date - 04/02/2018:    Scan on 4/5/2018  9:52 AM (below)         Last office visit with prescriber/PCP: 11/19/2018 Amber Gallegos MD OR same dept: 11/19/2018 Amber Gallegos MD OR same specialty: 11/19/2018 Amber Gallegos MD  Last physical: Visit date not found Last MTM visit: Visit date not found

## 2021-06-24 NOTE — TELEPHONE ENCOUNTER
Spoke with patient and her daughter Estrellita.   Patient takes simvastatin 40 mg     Rx pended for approval

## 2021-06-24 NOTE — TELEPHONE ENCOUNTER
Spoke with patients daughter   She will be home in the next 25 minutes and will check to see if patient is taking simvastatin or atorvastatin.

## 2021-06-25 NOTE — TELEPHONE ENCOUNTER
Controlled Substance Refill Request  Medication Name:   Requested Prescriptions     Pending Prescriptions Disp Refills     HYDROcodone-acetaminophen (NORCO )  mg per tablet 90 tablet 0     Sig: Take 1 tablet by mouth every 6 (six) hours as needed for pain.     Date Last Fill: 02/22/2019  Pharmacy: [facility] [place]     Submit electronically to pharmacy  Controlled Substance Agreement Date Scanned:   Encounter-Level CSA Scan Date - 04/02/2018:    Scan on 4/5/2018  9:52 AM (below)         Last office visit with prescriber/PCP: 11/19/2018 Amber Gallegos MD OR same dept: 11/19/2018 Amber Gallegos MD OR same specialty: 11/19/2018 Amber Gallegos MD  Last physical: Visit date not found Last MTM visit: Visit date not found

## 2021-06-25 NOTE — TELEPHONE ENCOUNTER
RN cannot approve Refill Request    RN can NOT refill this medication Protocol failed and NO refill given. Last office visit: 6/25/2019 Zac Grijalva MD Last Physical: 12/5/2019 Last MTM visit: Visit date not found Last visit same specialty: 3/9/2021 Martha Gonzalez MD.  Next visit within 3 mo: Visit date not found  Next physical within 3 mo: Visit date not found      Leidy Stahl, [facility] Triage/Med Refill 6/15/2021    Requested Prescriptions   Pending Prescriptions Disp Refills     hydrocortisone 2.5 % cream 30 g 0     Sig: Apply to affected area BID for no more than 2 weeks       GI Medications Refill Protocol Failed - 6/15/2021  2:04 PM        Failed - PCP or prescribing provider visit in last 12 or next 3 months.     Last office visit with prescriber/PCP: 6/25/2019 Zac Grijalva MD OR same dept: 3/9/2021 Martha Gonzalez MD OR same specialty: 3/9/2021 Martha Gonzalez MD  Last physical: 12/5/2019 Last MTM visit: Visit date not found   Next visit within 3 mo: Visit date not found  Next physical within 3 mo: Visit date not found  Prescriber OR PCP: Zac Grijalva MD  Last diagnosis associated with med order: 1. Rash  - hydrocortisone 2.5 % cream; Apply to affected area BID for no more than 2 weeks  Dispense: 30 g; Refill: 0    If protocol passes may refill for 12 months if within 3 months of last provider visit (or a total of 15 months).

## 2021-06-25 NOTE — TELEPHONE ENCOUNTER
RN cannot approve Refill Request    RN can NOT refill this medication med is not covered by policy/route to provider.    Stu Jones, [facility] Triage/Med Refill 3/21/2019    Requested Prescriptions   Pending Prescriptions Disp Refills     diclofenac sodium (VOLTAREN) 1 % Gel 100 g 0     Sig: Lower extremities: 4 g to the affected area 4 times daily using dosing card.  Do not exceed 32 g total dose per day.    There is no refill protocol information for this order

## 2021-06-28 NOTE — PROGRESS NOTES
"Progress Notes by Vani Rey CNP at 3/20/2020 12:50 PM     Author: Vani Rey CNP Service: -- Author Type: Nurse Practitioner    Filed: 3/20/2020  1:19 PM Encounter Date: 3/20/2020 Status: Signed    : Vani Rey CNP (Nurse Practitioner)           The patient has been notified of following:     \"This telephone visit will be conducted via a call between you and your physician/provider. We have found that certain health care needs can be provided without the need for a physical exam.  This service lets us provide the care you need with a phone conversation.  If a prescription is necessary we can send it directly to your pharmacy.  If lab work is needed we can place an order for that and you can then stop by our lab to have the test done at a later time. If during the course of the call the physician/provider feels a telephone visit is not appropriate, you will not be charged for this service.\" Verbal consent has been obtained for this service by care team member:         HEART CARE PHONE ENCOUNTER        The patient has chosen to have the visit conducted as a telephone visit, to reduce risk of exposure given the current status of Coronavirus in our community. This telephone visit is being conducted via a call between the patient and physician/provider. Health care needs are being provided without a physical exam.     Assessment/Recommendations   Assessment:    1.  Paroxysmal atrial fibrillation  2.  Hypertension  3.  Expressive aphasia  4.  Dizziness    Plan:  1.  As previously recommended to the patient she should start antiarrhythmic therapy due to A. fib with RVR, and being symptomatic on FARHANA monitor with rapid ventricular rates.  She will start flecainide 50 mg twice daily in addition to her Toprol-XL 50 mg daily.  We again discussed that we cannot uptitrate the Toprol due to prior bradycardia.  Last option would be pacemaker due to tachybradycardia syndrome and " uptitrate Toprol.  2.  Estrellita reports much better blood pressure control, blood pressure today 140/85 with a heart rate of 85  3.  Secondary to CVA.  This is the reason when it is involved in the phone visit  4.  Symptoms resolved after decreasing Toprol.        Follow Up Plan: Follow up in 2 to 3 months  I have reviewed the note as documented.  This accurately captures the substance of my conversation with the patient.    Estrellita was also given the phone number for our EP nurse group, if Yecenia does not tolerate the flecainide, advised to inform us.    Start Time:1243   Stop Time:1250       History of Present Illness/Subjective    Yecenia Kwan is a 74 y.o. female who is being evaluated via a billable telephone visit.  All communication is being done through patient's daughter and emergency contact Estrellita because patient has expressive aphasia from prior stroke.    She has a past medical history significant for hyperlipidemia, on statin therapy, obstructive sleep apnea on CPAP, morbid obesity, CVA, and paroxysmal atrial fibrillation.  She was first documented to have A. fib in 2018 when she presented with CVA which was thought to be cardioembolic.  She noticed an increase in feelings of anxiety and feeling her heart race in the recent past.  She also had intermittent complaints of dizziness when she saw me back in December 2019.    She did wear a heart monitor which did not show any bradycardia.  Due to the dizziness at the office visit in December we decreased her metoprolol to 50 mg daily.  That did resolve the dizziness.  She did make some medication changes for her blood pressure by Dr. Hillman.  She was intolerant to carvedilol so is back on the metoprolol 50 daily.    The reason Estrellita wanted an appointment today is because her mom has been complaining of increasing palpitations when she wakes up at night to urinate.  No associated dizziness.    FARHANA 1/13/2020    Abnormal multi-day patient activated  "monitor by virtue of the presence of atrial fibrillation.    Atrial fibrillation was symptomatic, but there was also apparent atrial fibrillation which was not recorded as being symptomatic.  Ventricular responses tended to be higher in the symptomatic episodes.    No significant ventricular arrhythmia.    No profound bradycardia or pauses.    I have reviewed and updated the patient's Past Medical History, Social History, Family History and Medication List.     Physical Examination not performed given phone encounter Review of Systems                                                Medical History  Surgical History Family History Social History   Past Medical History:   Diagnosis Date   ? Anxiety    ? Arthritis    ? Atrial fibrillation (H)    ? Cellulitis of left leg    ? CVA (cerebral vascular accident) (H)    ? GERD (gastroesophageal reflux disease)    ? Head injury    ? Hx of ischemic left MCA stroke 12/7/2015    posterior aspect of the left lobe involving middle frontal gyrus   12/2015    ? Hx of nicotine dependence    ? Hyperlipemia    ? Hypertension    ? Proteinuria     Past Surgical History:   Procedure Laterality Date   ? [place] ARTHROTOMY/EXPLORE/TREAT KNEE JOINT      Description: Arthrotomy Of Knee With Joint Exploration;  Recorded: 07/29/2008;  Comments: due to \"accident\" and infection   ? [place] CORRJ HALLUX VALGUS W/SESMDC W/DIST METAR OSTEOT      Description: Bunion Correction With Metatarsal Osteotomy;  Recorded: 07/29/2008;    Family History   Problem Relation Age of Onset   ? Diabetes Mother    ? Prostate cancer Father     Social History     Socioeconomic History   ? Marital status: Single     Spouse name: Not on file   ? Number of children: Not on file   ? Years of education: Not on file   ? Highest education level: Not on file   Occupational History   ? Not on file   Social Needs   ? Financial resource strain: Not on file   ? Food insecurity     Worry: Not on file     Inability: Not on file   ? " Transportation needs     Medical: Not on file     Non-medical: Not on file   Tobacco Use   ? Smoking status: Never Smoker   ? Smokeless tobacco: Never Used   Substance and Sexual Activity   ? Alcohol use: No   ? Drug use: No   ? Sexual activity: Not on file   Lifestyle   ? Physical activity     Days per week: Not on file     Minutes per session: Not on file   ? Stress: Not on file   Relationships   ? Social connections     Talks on phone: Not on file     Gets together: Not on file     Attends Jainism service: Not on file     Active member of club or organization: Not on file     Attends meetings of clubs or organizations: Not on file     Relationship status: Not on file   ? Intimate partner violence     Fear of current or ex partner: Not on file     Emotionally abused: Not on file     Physically abused: Not on file     Forced sexual activity: Not on file   Other Topics Concern   ? Not on file   Social History Narrative    Lives with grandson, and is exposed to second hand smoke.          Medications  Allergies   Current Outpatient Medications   Medication Sig Dispense Refill   ? apixaban ANTICOAGULANT (ELIQUIS) 5 mg Tab tablet Take 1 tablet (5 mg total) by mouth 2 (two) times a day. 180 tablet 3   ? capsaicin (ZOSTRIX) 0.025 % cream APPLY A SMALL AMOUNT TO SKIN 4 TIMES A DAY AS NEEDED TO THE KNEES  2   ? diclofenac sodium (VOLTAREN) 1 % Gel Lower extremities: 4 g to the affected area 4 times daily using dosing card.  Do not exceed 32 g total dose per day. (Patient taking differently: Apply 4 g topically 4 (four) times a day as needed (pain). Lower extremities: 4 g to the affected area 4 times daily using dosing card.  Do not exceed 32 g total dose per day.      ) 100 g 3   ? fluticasone (ALLERGY RELIEF, FLUTICASONE,) 50 mcg/actuation nasal spray 2 sprays into each nostril daily as needed for rhinitis. 16 g 12   ? metoprolol succinate (TOPROL-XL) 50 MG 24 hr tablet Take 1 tablet (50 mg total) by mouth daily. 90  tablet 2   ? miscellaneous medical supply (BLOOD PRESSURE CUFF) Misc Please take blood pressure readings two times daily and continue to log daily. 1 each 0   ? olmesartan (BENICAR) 40 MG tablet Take 1 tablet (40 mg total) by mouth daily. 90 tablet 3   ? omeprazole (PRILOSEC) 40 MG capsule TAKE 1 CAPSULE(40 MG) BY MOUTH DAILY BEFORE BREAKFAST 90 capsule 2   ? polyethylene glycol (MIRALAX) 17 gram packet Take 17 g by mouth daily as needed.     ? triamcinolone (KENALOG) 0.1 % cream Apply topically 2 (two) times a day. As needed for rash (Patient taking differently: Apply 1 application topically 2 (two) times a day as needed. As needed for rash      ) 30 g 3   ? flecainide (TAMBOCOR) 50 MG tablet Take 1 tablet (50 mg total) by mouth 2 (two) times a day. 180 tablet 2     No current facility-administered medications for this visit.     Allergies   Allergen Reactions   ? Clonidine Swelling   ? Hydralazine Itching   ? Coreg [Carvedilol] Nausea Only         Lab Results    Chemistry/lipid CBC Cardiac Enzymes/BNP/TSH/INR   Lab Results   Component Value Date    CHOL 244 (H) 12/05/2019    HDL 65 12/05/2019    LDLCALC 166 (H) 12/05/2019    TRIG 65 12/05/2019    CREATININE 0.60 12/05/2019    BUN 17 12/05/2019    K 4.5 12/05/2019     12/05/2019     12/05/2019    CO2 23 12/05/2019    Lab Results   Component Value Date    WBC 6.8 06/23/2019    HGB 10.8 (L) 06/23/2019    HCT 35.4 06/23/2019    MCV 84 06/23/2019     06/23/2019    Lab Results   Component Value Date    CKTOTAL 131 02/22/2016    CKMB 13 (HH) 09/25/2018    TROPONINI 0.01 06/23/2019    TSH 2.50 10/26/2015    INR 1.01 09/24/2018        Arnulfo Rey

## 2021-06-28 NOTE — PROGRESS NOTES
Progress Notes by Balbina Hillman MD at 1/13/2020  2:30 PM     Author: Balbina Hillman MD Service: -- Author Type: Physician    Filed: 1/13/2020  3:14 PM Encounter Date: 1/13/2020 Status: Signed    : Balbina Hillman MD (Physician)             [facility] Note      Assessment:       Yecenia Kwan is a 74 y.o. old female with a PMhx significant for Htn on olmesartan, CVA (? Cardio-embolic) on Eliquis, HLP on statin therapy, BELA on CPAP, morbid obesity and paroxysmal afib on Eliquis and metoprolol XL who presents to cardiology clinic for follow-up care.     ECG: Personally reviewed. sinus bradycardia.     ECHO (personnaly Reviewed):    When compared to the previous study dated 12/8/2015, no significant change.    Left ventricle ejection fraction is normal. The calculated left ventricular ejection fraction is 67%.    Normal left ventricular size and systolic function.    Normal right ventricular size and systolic function.    No hemodynamically significant valvular heart abnormalities.     Plan:  - Patient reports side effects of itching with isosorbide mononitrate causing her to stop that Rx and take benadryl for relief.  - Will stop metoprolol XL and start carvedilol in the hopes of getting better BP control.    - [facility] clinic will call on Friday (1/17/20) to follow-up      ______________________________________________________________________     Subjective:  CC: Today, Mrs. Kwan and daughter (patient has some expressive aphagia) report that she has been doing relatively well since her last clinic visit with the exception of the itching caused by the Imdur. She reports that while in the Imdur her BP was well controlled at home, but has been trending up since stopping. She reports BP as high as 160s ar home. Patient denies any lightheadedness, or chest pain. Does report some intermittent palpitations which she believes is her afib (FARHANA placed today). She  "denies any HF symptoms of orthopnea, PND or fluid retention/edema.   ______________________________________________________________________      Review of Systems:   A complete 10 systems ROS was reviewed  And is negative except what is listed in the HPI.       Problem List:  Patient Active Problem List   Diagnosis   ? Primary Hypersomnia With Sleep Apnea   ? Hyperlipidemia LDL goal less than 70   ? Major Depression, Recurrent   ? Anxiety   ? Esophageal reflux   ? Vitamin D Deficiency   ? Dizziness   ? Prediabetes   ? Expressive aphasia   ? Osteoarthritis, knee   ? Salivary duct stone   ? Gait disturbance, post-stroke   ? Benign essential hypertension   ? Morbid obesity with BMI of 45.0-49.9, adult (H)   ? Paroxysmal atrial fibrillation (H)   ? Cerebrovascular accident (CVA) due to occlusion of precerebral artery (H)   ? Open angle with borderline findings, low risk   ? BELA (obstructive sleep apnea)     Medical History:  Past Medical History:   Diagnosis Date   ? Anxiety    ? Arthritis    ? Atrial fibrillation (H)    ? Cellulitis of left leg    ? CVA (cerebral vascular accident) (H)    ? GERD (gastroesophageal reflux disease)    ? Head injury    ? Hx of ischemic left MCA stroke 12/7/2015    posterior aspect of the left lobe involving middle frontal gyrus   12/2015    ? Hx of nicotine dependence    ? Hyperlipemia    ? Hypertension    ? Proteinuria      Surgical History:  Past Surgical History:   Procedure Laterality Date   ? [place] ARTHROTOMY/EXPLORE/TREAT KNEE JOINT      Description: Arthrotomy Of Knee With Joint Exploration;  Recorded: 07/29/2008;  Comments: due to \"accident\" and infection   ? [place] CORRJ HALLUX VALGUS W/SESMDC W/DIST METAR OSTEOT      Description: Bunion Correction With Metatarsal Osteotomy;  Recorded: 07/29/2008;     Social History:  Social History     Socioeconomic History   ? Marital status: Single     Spouse name: Not on file   ? Number of children: Not on file   ? Years of education: Not on file   ? " Highest education level: Not on file   Occupational History   ? Not on file   Social Needs   ? Financial resource strain: Not on file   ? Food insecurity:     Worry: Not on file     Inability: Not on file   ? Transportation needs:     Medical: Not on file     Non-medical: Not on file   Tobacco Use   ? Smoking status: Never Smoker   ? Smokeless tobacco: Never Used   Substance and Sexual Activity   ? Alcohol use: No   ? Drug use: No   ? Sexual activity: Not on file   Lifestyle   ? Physical activity:     Days per week: Not on file     Minutes per session: Not on file   ? Stress: Not on file   Relationships   ? Social connections:     Talks on phone: Not on file     Gets together: Not on file     Attends Yazidi service: Not on file     Active member of club or organization: Not on file     Attends meetings of clubs or organizations: Not on file     Relationship status: Not on file   ? Intimate partner violence:     Fear of current or ex partner: Not on file     Emotionally abused: Not on file     Physically abused: Not on file     Forced sexual activity: Not on file   Other Topics Concern   ? Not on file   Social History Narrative    Lives with grandson, and is exposed to second hand smoke.       Family History:  Family History   Problem Relation Age of Onset   ? Diabetes Mother    ? Prostate cancer Father        Allergies:  Allergies   Allergen Reactions   ? Clonidine Swelling   ? Hydralazine Itching       Medications:  Current Outpatient Medications   Medication Sig Dispense Refill   ? apixaban (ELIQUIS) 5 mg Tab tablet Take 1 tablet (5 mg total) by mouth 2 (two) times a day. 180 tablet 3   ? capsaicin (ZOSTRIX) 0.025 % cream APPLY A SMALL AMOUNT TO SKIN 4 TIMES A DAY AS NEEDED TO THE KNEES  2   ? diclofenac sodium (VOLTAREN) 1 % Gel Lower extremities: 4 g to the affected area 4 times daily using dosing card.  Do not exceed 32 g total dose per day. (Patient taking differently: Apply 4 g topically 4 (four) times a  "day as needed (pain). Lower extremities: 4 g to the affected area 4 times daily using dosing card.  Do not exceed 32 g total dose per day.      ) 100 g 3   ? fluticasone (ALLERGY RELIEF, FLUTICASONE,) 50 mcg/actuation nasal spray 2 sprays into each nostril daily as needed for rhinitis. 16 g 12   ? HYDROcodone-acetaminophen (NORCO )  mg per tablet Take 1 tablet by mouth every 6 (six) hours as needed for pain. 90 tablet 0   ? metoprolol succinate (TOPROL-XL) 50 MG 24 hr tablet Take 1 tablet (50 mg total) by mouth daily. 30 tablet 2   ? miscellaneous medical supply (BLOOD PRESSURE CUFF) Misc Please take blood pressure readings two times daily and continue to log daily. 1 each 0   ? olmesartan (BENICAR) 40 MG tablet TAKE 1 TABLET(40 MG) BY MOUTH DAILY 90 tablet 2   ? omeprazole (PRILOSEC) 40 MG capsule TAKE 1 CAPSULE(40 MG) BY MOUTH DAILY BEFORE BREAKFAST 90 capsule 2   ? oxyCODONE (ROXICODONE) 5 MG immediate release tablet Take 5 mg by mouth daily as needed.  0   ? polyethylene glycol (MIRALAX) 17 gram packet Take 17 g by mouth daily as needed.     ? simvastatin (ZOCOR) 40 MG tablet Take 1 tablet (40 mg total) by mouth every evening. (Patient taking differently: Take 40 mg by mouth daily.       ) 90 tablet 3   ? triamcinolone (KENALOG) 0.1 % cream Apply topically 2 (two) times a day. As needed for rash (Patient taking differently: Apply 1 application topically 2 (two) times a day as needed. As needed for rash      ) 30 g 3     No current facility-administered medications for this visit.        Objective:   Vital signs:  /82 (Patient Site: Left Arm, Patient Position: Sitting, Cuff Size: Adult Regular)   Pulse 61   Resp 12   Ht 5' 1\" (1.549 m)   Wt (!) 239 lb (108.4 kg)   BMI 45.16 kg/m        Physical Exam:    GENERAL APPEARANCE: Alert, cooperative and in no acute distress.   HEENT: No scleral icterus. Oral mucuos membranes pink and moist.   NECK: no JVD. No Hepatojugular reflux. Thyroid not " visualized. No lymphadenopathy   CHEST: clear to auscultation   CARDIOVASCULAR: S1, S2 without murmur ,clicks or rubs. Brachial, radial and posterior tibial pulses are intact and symetric. No carotid bruits noted. No edema  ABDOMEN: Nontender. BS+. No bruits.   SKIN: No Xanthelasma   Musculoskeletal: No cyanosis, clubbing or swelling.      Lab Results:  LIPIDS:  Lab Results   Component Value Date    CHOL 244 (H) 12/05/2019    CHOL 182 07/24/2019    CHOL 230 (H) 09/25/2018     Lab Results   Component Value Date    HDL 65 12/05/2019    HDL 64 07/24/2019    HDL 50 09/25/2018     Lab Results   Component Value Date    LDLCALC 166 (H) 12/05/2019    LDLCALC 105 07/24/2019    LDLCALC 168 (H) 09/25/2018     Lab Results   Component Value Date    TRIG 65 12/05/2019    TRIG 67 07/24/2019    TRIG 62 09/25/2018     No components found for: CHOLHDL    BMP:  Lab Results   Component Value Date    CREATININE 0.60 12/05/2019    BUN 17 12/05/2019     12/05/2019    K 4.5 12/05/2019     12/05/2019    CO2 23 12/05/2019         Yan Hillman MD., S  [facility]

## 2021-06-28 NOTE — PROGRESS NOTES
Progress Notes by Vani Rey CNP at 12/16/2019  1:30 PM     Author: Vani Rey CNP Service: -- Author Type: Nurse Practitioner    Filed: 12/16/2019  2:23 PM Encounter Date: 12/16/2019 Status: Signed    : Vani Rey CNP (Nurse Practitioner)         Thank you, Dr. Hillman, for asking the [facility] team to see Ms. Yecenia Kwan.      Assessment/Recommendations   Assessment:    Diagnoses and all orders for this visit:    Paroxysmal atrial fibrillation (H)  Discussed with patient that she will be on lifelong Eliquis, she does reiterate that is what she was told by Dr. Hillman as well, due to documented PAF and CVA.  She does have a [id number] score of 5.  It is unclear how frequent her episodes of A. fib are based on symptoms.  Her current heart rate is 60 bpm, controlled in normal rhythm.    Because of dizziness, decrease metoprolol to 50 mg daily and placed FARHANA monitor as below    Dizziness  Discussed with patient, and her daughter over the phone, will decrease dose of metoprolol to 50 mg daily, have her wear a FARHANA monitor to assess for etiology of dizziness.    Cerebrovascular accident (CVA) due to occlusion of precerebral artery (H)  Continues to have expressive aphasia, difficult to get a full subjective history.  She will continue on lifelong anticoagulation as cause could be cardioembolic    Benign essential hypertension  Some degree of dizziness could be related to actually controlling her blood pressure.  Continue the Imdur for now although she does have some lower extremity edema, which could be related to the vasodilator    BELA (obstructive sleep apnea)  Continue with CPAP        [id number] score of 5 and on Eliquis.  Follow up in 1 month.    Plan:  Cut metoprolol to 50 mg daily, FARHANA monitor in 1 week and follow-up with me in 1 month     History of Present Illness/Subjective    Ms. Yecenia Kwan is a 74 y.o. female whom is a very sweet  woman, with expressive aphasia following CVA 2018.  She presents today with her grandson, him and his sister help her with her medications at home.  Patient has a past medical history of hyperlipidemia, on statin therapy, obstructive sleep apnea on CPAP, morbid obesity, CVA, and paroxysmal atrial fibrillation.  Appears she was first documented to have atrial fibrillation in 2018, again when she presented with her CVA, thought to be cardioembolic.  There appears to have been no specific follow-up.  Patient herself says that she does get feelings of anxiety, and feeling her heart race.  This happens approximately every 1 to 2 weeks.  Yecenia did see Dr. Hillman on 12/3/2019, she was started on Imdur.  Patient now notes dizziness and possible lower extremity edema.  She is very unclear if the edema is new since the medication start.  Could definitely be related.  She does keep her feet elevated when resting at home.      She  denies chest pain,shortness of breath, complains of dizziness, because of her expressive aphasia, it is difficult to get specifics about when the dizziness occurs..    ECG: Personally reviewed 6/23/19. SR 59 bpm.    ECHOCARDIOGRAM 9/25/2018:     When compared to the previous study dated 12/8/2015, no significant change.    Left ventricle ejection fraction is normal. The calculated left ventricular ejection fraction is 67%.    Normal left ventricular size and systolic function.    Normal right ventricular size and systolic function.    No hemodynamically significant valvular heart abnormalities.         Coronary CTA 10/11/2018    The total Agatston calcium score is 1       Physical Examination Review of Systems   Vitals:    12/16/19 1341   BP: 120/80   Pulse: 60   SpO2: 98%     There is no height or weight on file to calculate BMI.  Wt Readings from Last 3 Encounters:   12/05/19 (!) 244 lb (110.7 kg)   12/03/19 (!) 242 lb (109.8 kg)   07/31/19 (!) 237 lb (107.5 kg)       General Appearance:   No  "acute distress, normal body habitus   ENT/Mouth: membranes moist, no oral lesions or bleeding gums.      EYES:  no scleral icterus, normal conjunctivae   Neck: no carotid bruits or thyromegaly   Chest/Lungs:   lungs are clear to auscultation, no rales or wheezing,    Cardiovascular:   Regular Rhythm. Normal first and second heart sounds with no murmurs, rubs, or gallops; the radial and posterior tibial pulses are intact, no JVD, +1 edema bilaterally at ankles only   Abdomen:  no organomegaly, masses, bruits, or tenderness; bowel sounds are present   Extremities: no cyanosis or clubbing   Skin: Dry, warm, intact.    Neurologic: normal  bilateral, + expressive aphasia     Psychiatric: alert and oriented x3, calm     General: WNL  Eyes: WNL  Ears/Nose/Throat: WNL  Lungs: WNL  Heart: Leg Swelling  Stomach: WNL     Muscle/Joints: WNL  Skin: WNL  Nervous System: Dizziness              Medical History  Surgical History Family History Social History   Past Medical History:   Diagnosis Date   ? Anxiety    ? Arthritis    ? Atrial fibrillation (H)    ? Cellulitis of left leg    ? CVA (cerebral vascular accident) (H)    ? GERD (gastroesophageal reflux disease)    ? Head injury    ? Hx of ischemic left MCA stroke 12/7/2015    posterior aspect of the left lobe involving middle frontal gyrus   12/2015    ? Hx of nicotine dependence    ? Hyperlipemia    ? Hypertension    ? Proteinuria     Past Surgical History:   Procedure Laterality Date   ? NH ARTHROTOMY/EXPLORE/TREAT KNEE JOINT      Description: Arthrotomy Of Knee With Joint Exploration;  Recorded: 07/29/2008;  Comments: due to \"accident\" and infection   ? NH CORRJ HALLUX VALGUS W/SESMDC W/DIST METAR OSTEOT      Description: Bunion Correction With Metatarsal Osteotomy;  Recorded: 07/29/2008;    Family History   Problem Relation Age of Onset   ? Diabetes Mother    ? Prostate cancer Father     Social History     Socioeconomic History   ? Marital status: Single     Spouse name: " Not on file   ? Number of children: Not on file   ? Years of education: Not on file   ? Highest education level: Not on file   Occupational History   ? Not on file   Social Needs   ? Financial resource strain: Not on file   ? Food insecurity:     Worry: Not on file     Inability: Not on file   ? Transportation needs:     Medical: Not on file     Non-medical: Not on file   Tobacco Use   ? Smoking status: Never Smoker   ? Smokeless tobacco: Never Used   Substance and Sexual Activity   ? Alcohol use: No   ? Drug use: No   ? Sexual activity: Not on file   Lifestyle   ? Physical activity:     Days per week: Not on file     Minutes per session: Not on file   ? Stress: Not on file   Relationships   ? Social connections:     Talks on phone: Not on file     Gets together: Not on file     Attends Confucianism service: Not on file     Active member of club or organization: Not on file     Attends meetings of clubs or organizations: Not on file     Relationship status: Not on file   ? Intimate partner violence:     Fear of current or ex partner: Not on file     Emotionally abused: Not on file     Physically abused: Not on file     Forced sexual activity: Not on file   Other Topics Concern   ? Not on file   Social History Narrative    Lives with grandson, and is exposed to second hand smoke.          Medications  Allergies   Current Outpatient Medications   Medication Sig Dispense Refill   ? apixaban (ELIQUIS) 5 mg Tab tablet Take 1 tablet (5 mg total) by mouth 2 (two) times a day. 180 tablet 3   ? capsaicin (ZOSTRIX) 0.025 % cream APPLY A SMALL AMOUNT TO SKIN 4 TIMES A DAY AS NEEDED TO THE KNEES  2   ? diclofenac sodium (VOLTAREN) 1 % Gel Lower extremities: 4 g to the affected area 4 times daily using dosing card.  Do not exceed 32 g total dose per day. (Patient taking differently: Apply 4 g topically 4 (four) times a day as needed (pain). Lower extremities: 4 g to the affected area 4 times daily using dosing card.  Do not exceed  32 g total dose per day.      ) 100 g 3   ? fluticasone (ALLERGY RELIEF, FLUTICASONE,) 50 mcg/actuation nasal spray 2 sprays into each nostril daily as needed for rhinitis. 16 g 12   ? HYDROcodone-acetaminophen (NORCO )  mg per tablet Take 1 tablet by mouth every 6 (six) hours as needed for pain. 90 tablet 0   ? isosorbide mononitrate (IMDUR) 30 MG 24 hr tablet Take 1 tablet (30 mg total) by mouth daily. 90 tablet 3   ? metoprolol succinate (TOPROL-XL) 50 MG 24 hr tablet Take 1 tablet (50 mg total) by mouth daily. 30 tablet 2   ? miscellaneous medical supply (BLOOD PRESSURE CUFF) Misc Please take blood pressure readings two times daily and continue to log daily. 1 each 0   ? olmesartan (BENICAR) 40 MG tablet TAKE 1 TABLET(40 MG) BY MOUTH DAILY 90 tablet 2   ? omeprazole (PRILOSEC) 40 MG capsule TAKE 1 CAPSULE(40 MG) BY MOUTH DAILY BEFORE BREAKFAST 90 capsule 2   ? oxyCODONE (ROXICODONE) 5 MG immediate release tablet Take 5 mg by mouth daily as needed.  0   ? polyethylene glycol (MIRALAX) 17 gram packet Take 17 g by mouth daily as needed.     ? simvastatin (ZOCOR) 40 MG tablet Take 1 tablet (40 mg total) by mouth every evening. (Patient taking differently: Take 40 mg by mouth daily.       ) 90 tablet 3   ? triamcinolone (KENALOG) 0.1 % cream Apply topically 2 (two) times a day. As needed for rash (Patient taking differently: Apply 1 application topically 2 (two) times a day as needed. As needed for rash      ) 30 g 3     No current facility-administered medications for this visit.     Allergies   Allergen Reactions   ? Clonidine Swelling   ? Hydralazine Itching         Lab Results    Chemistry/lipid CBC Cardiac Enzymes/BNP/TSH/INR   Lab Results   Component Value Date    CHOL 244 (H) 12/05/2019    HDL 65 12/05/2019    LDLCALC 166 (H) 12/05/2019    TRIG 65 12/05/2019    CREATININE 0.60 12/05/2019    BUN 17 12/05/2019    K 4.5 12/05/2019     12/05/2019     12/05/2019    CO2 23 12/05/2019    Lab  Results   Component Value Date    WBC 6.8 06/23/2019    HGB 10.8 (L) 06/23/2019    HCT 35.4 06/23/2019    MCV 84 06/23/2019     06/23/2019    Lab Results   Component Value Date    CKTOTAL 131 02/22/2016    CKMB 13 (HH) 09/25/2018    TROPONINI 0.01 06/23/2019    TSH 2.50 10/26/2015    INR 1.01 09/24/2018

## 2021-06-28 NOTE — PROGRESS NOTES
Progress Notes by Balbina Hillman MD at 12/3/2019  1:50 PM     Author: Balbina Hillman MD Service: -- Author Type: Physician    Filed: 12/3/2019  2:47 PM Encounter Date: 12/3/2019 Status: Signed    : Balbina Hillman MD (Physician)             [facility] Note    Thank you, Dr. Grijalva, for asking the [facility] team to see Yecenia Kwan to establish care.    Assessment:    Yecenia Kwan is a 74 y.o. old female with a PMhx significant for Htn on olmesartan, CVA (? Cardio-embolic) on Eliquis, HLP on statin therapy, BELA on CPAP, morbid obesity and paroxysmal afib on Eliquis and metoprolol XL who presents to cardiology clinic to establish care.      ECG: Personally reviewed. sinus bradycardia.    ECHO (personnaly Reviewed):    When compared to the previous study dated 12/8/2015, no significant change.    Left ventricle ejection fraction is normal. The calculated left ventricular ejection fraction is 67%.    Normal left ventricular size and systolic function.    Normal right ventricular size and systolic function.    No hemodynamically significant valvular heart abnormalities.    Plan:  - Patient reports side effects of itching with hydralazine which caused her to only take once daily. Will stop hydralazine and start Imdur 30 mg daily. Of note, patient reports that she no longer takes diltiazem  - Will place referral to afib clinic  - Patient will follow-up with me in 1 week     ______________________________________________________________________    Subjective:  CC: Today, Mrs. Kwan and daughter (patient has some expressive aphagia) report that she has been doing relatively. OK. BP noted to be elevated in clinic today, but both patient and daughter report that BP is much better controlled at home and ranges from 130s - 140s systolic at home. They report itching with hydralazine which has improved since reducing the frequency to one sven daily, but has  "not completely resolved. Patient denies any lightheadedness, chest pain. Does report some intermittent palpitations which she believes is her afib. She denies any HF symptoms of orthopnea, PND or fluid retention/edema.     ______________________________________________________________________      Review of Systems:   A complete 10 systems ROS was reviewed  And is negative except what is listed in the HPI.     Problem List:  Patient Active Problem List   Diagnosis   ? Primary Hypersomnia With Sleep Apnea   ? Hyperlipidemia LDL goal less than 70   ? Major Depression, Recurrent   ? Anxiety   ? Esophageal reflux   ? Vitamin D Deficiency   ? Prediabetes   ? Expressive aphasia   ? Osteoarthritis, knee   ? Salivary duct stone   ? Gait disturbance, post-stroke   ? Benign essential hypertension   ? Morbid obesity with BMI of 45.0-49.9, adult (H)   ? Paroxysmal atrial fibrillation (H)   ? Cerebrovascular accident (CVA) due to occlusion of precerebral artery (H)   ? Open angle with borderline findings, low risk   ? BELA (obstructive sleep apnea)     Medical History:  Past Medical History:   Diagnosis Date   ? Anxiety    ? Arthritis    ? Atrial fibrillation (H)    ? Cellulitis of left leg    ? CVA (cerebral vascular accident) (H)    ? GERD (gastroesophageal reflux disease)    ? Head injury    ? Hx of ischemic left MCA stroke 12/7/2015    posterior aspect of the left lobe involving middle frontal gyrus   12/2015    ? Hx of nicotine dependence    ? Hyperlipemia    ? Hypertension    ? Proteinuria      Surgical History:  Past Surgical History:   Procedure Laterality Date   ? NE ARTHROTOMY/EXPLORE/TREAT KNEE JOINT      Description: Arthrotomy Of Knee With Joint Exploration;  Recorded: 07/29/2008;  Comments: due to \"accident\" and infection   ? NE CORRJ HALLUX VALGUS W/SESMDC W/DIST METAR OSTEOT      Description: Bunion Correction With Metatarsal Osteotomy;  Recorded: 07/29/2008;     Social History:  Social History     Socioeconomic " History   ? Marital status: Single     Spouse name: Not on file   ? Number of children: Not on file   ? Years of education: Not on file   ? Highest education level: Not on file   Occupational History   ? Not on file   Social Needs   ? Financial resource strain: Not on file   ? Food insecurity:     Worry: Not on file     Inability: Not on file   ? Transportation needs:     Medical: Not on file     Non-medical: Not on file   Tobacco Use   ? Smoking status: Never Smoker   ? Smokeless tobacco: Never Used   Substance and Sexual Activity   ? Alcohol use: No   ? Drug use: No   ? Sexual activity: Not on file   Lifestyle   ? Physical activity:     Days per week: Not on file     Minutes per session: Not on file   ? Stress: Not on file   Relationships   ? Social connections:     Talks on phone: Not on file     Gets together: Not on file     Attends Pentecostalism service: Not on file     Active member of club or organization: Not on file     Attends meetings of clubs or organizations: Not on file     Relationship status: Not on file   ? Intimate partner violence:     Fear of current or ex partner: Not on file     Emotionally abused: Not on file     Physically abused: Not on file     Forced sexual activity: Not on file   Other Topics Concern   ? Not on file   Social History Narrative    Lives with grandson, and is exposed to second hand smoke.           Family History:  Family History   Problem Relation Age of Onset   ? Diabetes Mother    ? Prostate cancer Father          Allergies:  No Known Allergies    Medications:  Current Outpatient Medications   Medication Sig Dispense Refill   ? apixaban (ELIQUIS) 5 mg Tab tablet Take 1 tablet (5 mg total) by mouth 2 (two) times a day. 180 tablet 3   ? diclofenac sodium (VOLTAREN) 1 % Gel Lower extremities: 4 g to the affected area 4 times daily using dosing card.  Do not exceed 32 g total dose per day. (Patient taking differently: Apply 4 g topically 4 (four) times a day as needed (pain).  "Lower extremities: 4 g to the affected area 4 times daily using dosing card.  Do not exceed 32 g total dose per day.      ) 100 g 3   ? diltiazem (CARDIZEM CD) 120 MG 24 hr capsule Take 1 capsule (120 mg total) by mouth daily. 30 capsule 3   ? fluticasone (ALLERGY RELIEF, FLUTICASONE,) 50 mcg/actuation nasal spray 2 sprays into each nostril daily as needed for rhinitis. 16 g 12   ? HYDROcodone-acetaminophen (NORCO )  mg per tablet Take 1 tablet by mouth every 6 (six) hours as needed for pain. 90 tablet 0   ? metoprolol succinate (TOPROL XL) 100 MG 24 hr tablet Take 1 tablet (100 mg total) by mouth daily. 90 tablet 3   ? olmesartan (BENICAR) 40 MG tablet TAKE 1 TABLET(40 MG) BY MOUTH DAILY 90 tablet 2   ? omeprazole (PRILOSEC) 40 MG capsule TAKE 1 CAPSULE(40 MG) BY MOUTH DAILY BEFORE BREAKFAST 90 capsule 2   ? polyethylene glycol (MIRALAX) 17 gram packet Take 17 g by mouth daily as needed.     ? simvastatin (ZOCOR) 40 MG tablet Take 1 tablet (40 mg total) by mouth every evening. (Patient taking differently: Take 40 mg by mouth daily.       ) 90 tablet 3   ? triamcinolone (KENALOG) 0.1 % cream Apply topically 2 (two) times a day. As needed for rash (Patient taking differently: Apply 1 application topically 2 (two) times a day as needed. As needed for rash      ) 30 g 3     No current facility-administered medications for this visit.        Objective:   Vital signs:  /88 (Patient Site: Left Arm, Patient Position: Sitting, Cuff Size: Adult Large)   Pulse 60   Resp 16   Ht 5' 1.25\" (1.556 m)   Wt (!) 242 lb (109.8 kg)   BMI 45.35 kg/m        Physical Exam:    GENERAL APPEARANCE: Alert, cooperative and in no acute distress.   HEENT: No scleral icterus. Oral mucuos membranes pink and moist.   NECK:  No JVD. No Hepatojugular reflux. Thyroid not visualized. No lymphadenopathy   CHEST: clear to auscultation   CARDIOVASCULAR: S1, S2 without murmur ,clicks or rubs. Brachial, radial and posterior tibial " pulses are intact and symetric. No carotid bruits noted. No edema  ABDOMEN: Nontender. BS+. No bruits.   SKIN: No Xanthelasma   Musculoskeletal: No cyanosis, clubbing or swelling.      Lab Results:  LIPIDS:  Lab Results   Component Value Date    CHOL 182 07/24/2019    CHOL 230 (H) 09/25/2018    CHOL 159 12/08/2015     Lab Results   Component Value Date    HDL 64 07/24/2019    HDL 50 09/25/2018    HDL 49 (L) 12/08/2015     Lab Results   Component Value Date    LDLCALC 105 07/24/2019    LDLCALC 168 (H) 09/25/2018    LDLCALC 95 12/08/2015     Lab Results   Component Value Date    TRIG 67 07/24/2019    TRIG 62 09/25/2018    TRIG 73 12/08/2015     No components found for: CHOLHDL    BMP:  Lab Results   Component Value Date    CREATININE 0.63 07/24/2019    BUN 11 07/24/2019     07/24/2019    K 4.0 07/24/2019     07/24/2019    CO2 28 07/24/2019         Yan Hillman MD., S  [facility]

## 2021-06-29 NOTE — PROGRESS NOTES
Progress Notes by Marysol Simmons CNP at 9/30/2020  2:50 PM     Author: Marysol Simmons CNP Service: -- Author Type: Nurse Practitioner    Filed: 9/30/2020  4:04 PM Encounter Date: 9/30/2020 Status: Signed    : Marysol Simmons CNP (Nurse Practitioner)            Thank you, Dr. Grijalva, for asking the [facility] team to see Ms. Yecenia Kwan to evaluate paroxysmal atrial fibrillation.    Assessment/Recommendations     Assessment/Plan:    Diagnoses and all orders for this visit:    Paroxysmal atrial fibrillation (H) and reporting good control on flecainide 50 mg p.o. twice daily.  If Yecenia has increased A. fib burden I would switch her from flecainide to amiodarone as cannot increase antiarrhythmic further given tendency towards lower heart rates.    Benign essential hypertension and systolic and diastolic hypertension today.  She is on high-dose Benicar and metoprolol succinate 50 mg orally every day and dose was not increased to 75 mg as previously recommended.  I recommend to increase metoprolol succinate to 75 mg orally every day.  To call if lightheaded or dizzy.  She may do better on a calcium channel blocker given race.  If her blood pressure is not well controlled on 75 mg or becomes bradycardic I would try switching her to diltiazem 120 mg orally every day.  To see Dr. Grijalva within the month for reevaluation of hypertension.  With mild peripheral edema I would be in favor of adding Dyazide for blood pressure control as next agent.  -     metoprolol succinate (TOPROL-XL) 50 MG 24 hr tablet; Take 1.5 tablets (75 mg total) by mouth daily.  Dispense: 135 tablet; Refill: 1    BELA (obstructive sleep apnea) and on CPAP.    [id number] score of 5 and soon will be 6 and on Eliquis.  Her PCA sets up her medications and assures me she is taking her Eliquis routinely.  She has some bruising on both lower legs.  I discussed the watchman as an alternative to Eliquis.  I have given  her a brochure.  I recommended she also asked Dr. Girjalva for input on whether he would recommend the watchman device for her since he knows her better.  I discussed procedure briefly and what it would entail.  Follow up in clinic with me in 6 months.     History of Present Illness/Subjective     Yecenia Kwan is a very pleasant 75 y.o. female who comes in today for EP follow-up for paroxysmal atrial fibrillation.  Yecenia Kwan has a known history of paroxysmal atrial fib which was documented when she presented with CVA in 2018.  CVA was thought to be cardioembolic.  She feels heart racing, anxiety and complaining of frequent urination with A. fib episodes.  She went into A. fib on the way to the visit today.  She tells me that she knows she is in A. fib today.  Her medical history is also significant for tendency for lower heart rates, hyperlipidemia, BELA on CPAP and morbid obesity.  She states that she has had a second stroke.  She has expressive aphasia from her stroke.  This is made communication difficult with her.  During the visit we had to call her daughter Estrellita to help her express some things that she was trying to get out but could not find the words.  She is also accompanied by a male family member who is her PCA.  He reports that he is only giving her metoprolol succinate 50 mg every day.    Yecenia reports that she has A. fib  episodes sometimes 2 or 3 times a week for short periods of time and then will go several weeks where she has no atrial fib.  She has no side effects on flecainide.  Her biggest issue is that her blood pressure is labile.  Her blood pressure had been running high and she was using PRN hydralazine fairly often.  She then called in with low blood pressures and lightheaded and dizzy.    Cardiographics (reviewed):  Results for orders placed during the hospital encounter of 09/24/18   Echo Complete [ECH10] 09/25/2018    Narrative   When compared to the previous study dated  12/8/2015, no significant   change.    Left ventricle ejection fraction is normal. The calculated left   ventricular ejection fraction is 67%.    Normal left ventricular size and systolic function.    Normal right ventricular size and systolic function.    No hemodynamically significant valvular heart abnormalities.          Cardiac testing personally reviewed:    Results for orders placed or performed during the hospital encounter of 09/24/18   ECG 12 lead with MUSE   Result Value Ref Range    SYSTOLIC BLOOD PRESSURE  mmHg    DIASTOLIC BLOOD PRESSURE  mmHg    VENTRICULAR RATE 66 BPM    ATRIAL RATE 66 BPM    P-R INTERVAL 166 ms    QRS DURATION 76 ms    Q-T INTERVAL 418 ms    QTC CALCULATION (BEZET) 438 ms    P Axis 56 degrees    R AXIS 6 degrees    T AXIS 19 degrees    MUSE DIAGNOSIS       Normal sinus rhythm  Normal ECG  When compared with ECG of 24-SEP-2018 16:44,  No significant change was found  Confirmed by BURKE KRUGER, VIDAL LOC:JN ([id number]) on 9/25/2018 3:07:23 PM            Problem List:  Patient Active Problem List   Diagnosis   ? Primary Hypersomnia With Sleep Apnea   ? Hyperlipidemia LDL goal less than 70   ? Major Depression, Recurrent   ? Anxiety   ? Esophageal reflux   ? Vitamin D Deficiency   ? Dizziness   ? Prediabetes   ? Expressive aphasia   ? Osteoarthritis, knee   ? Salivary duct stone   ? Gait disturbance, post-stroke   ? Benign essential hypertension   ? Morbid obesity with BMI of 45.0-49.9, adult (H)   ? Paroxysmal atrial fibrillation (H)   ? Cerebrovascular accident (CVA) due to occlusion of precerebral artery (H)   ? Open angle with borderline findings, low risk   ? BELA (obstructive sleep apnea)     Revi  e  Physical Examination Review of Systems   w [facility]  Vitals:    09/30/20 1451   BP: (!) 168/94   Pulse: 92   Resp: 16     Body mass index is 45.73 kg/m .  Wt Readings from Last 3 Encounters:   09/30/20 (!) 242 lb (109.8 kg)   01/13/20 (!) 239 lb (108.4 kg)   12/05/19 (!) 244 lb (110.7 kg)  "    General Appearance:   Alert, well-appearing and in no acute distress.   HEENT: Atraumatic, normocephalic.  No scleral icterus, normal conjunctivae; mucous membranes pink and moist.     Chest: Chest symmetric, spine straight.   Lungs:   Respirations unlabored: Lungs are clear to auscultation.   Cardiovascular:   Normal first and second heart sounds with no murmurs, rubs, or gallops.  Irregular, irregular.  Radial and posterior tibial pulses are intact.  Normal JVD, 1 + bilat ankle edema.       Extremities: No cyanosis or clubbing   Musculoskeletal: Moves all extremities   Skin: Warm, dry, intact.    Neurologic: Mood and affect are appropriate, alert and oriented to person, place, time, and situation    General: WNL  Eyes: WNL  Ears/Nose/Throat: WNL  Lungs: WNL  Heart: WNL  Stomach: WNL  Bladder: Frequent Urination at Night  Muscle/Joints: Muscle Weakness, Muscle Pain  Skin: WNL  Nervous System: Dizziness  Mental Health: WNL     Blood: WNL       Medical History  Surgical History Family History Social History     Past Medical History:   Diagnosis Date   ? Anxiety    ? Arthritis    ? Atrial fibrillation (H)    ? Cellulitis of left leg    ? CVA (cerebral vascular accident) (H)    ? GERD (gastroesophageal reflux disease)    ? Head injury    ? Hx of ischemic left MCA stroke 12/7/2015    posterior aspect of the left lobe involving middle frontal gyrus   12/2015    ? Hx of nicotine dependence    ? Hyperlipemia    ? Hypertension    ? Proteinuria     Past Surgical History:   Procedure Laterality Date   ? IN ARTHROTOMY/EXPLORE/TREAT KNEE JOINT      Description: Arthrotomy Of Knee With Joint Exploration;  Recorded: 07/29/2008;  Comments: due to \"accident\" and infection   ? IN CORRJ HALLUX VALGUS W/SESMDC W/DIST METAR OSTEOT      Description: Bunion Correction With Metatarsal Osteotomy;  Recorded: 07/29/2008;    Family History   Problem Relation Age of Onset   ? Diabetes Mother    ? Prostate cancer Father     Social History "     Tobacco Use   Smoking Status Never Smoker   Smokeless Tobacco Never Used     Social History     Substance and Sexual Activity   Alcohol Use No        Medications  Allergies     Current Outpatient Medications   Medication Sig Dispense Refill   ? apixaban ANTICOAGULANT (ELIQUIS) 5 mg Tab tablet Take 1 tablet (5 mg total) by mouth 2 (two) times a day. 180 tablet 3   ? capsaicin (ZOSTRIX) 0.025 % cream APPLY A SMALL AMOUNT TO SKIN 4 TIMES A DAY AS NEEDED TO THE KNEES  2   ? cyclobenzaprine (FLEXERIL) 5 MG tablet 1/2 to 1 tab po three times a day prn for back pain 30 tablet 0   ? diclofenac sodium (VOLTAREN) 1 % Gel Lower extremities: 4 g to the affected area 4 times daily using dosing card.  Do not exceed 32 g total dose per day. (Patient taking differently: Apply 4 g topically 4 (four) times a day as needed (pain). Lower extremities: 4 g to the affected area 4 times daily using dosing card.  Do not exceed 32 g total dose per day.      ) 100 g 3   ? flecainide (TAMBOCOR) 50 MG tablet Take 1 tablet (50 mg total) by mouth 2 (two) times a day. 180 tablet 2   ? fluticasone (ALLERGY RELIEF, FLUTICASONE,) 50 mcg/actuation nasal spray 2 sprays into each nostril daily as needed for rhinitis. 16 g 12   ? metoprolol succinate (TOPROL-XL) 50 MG 24 hr tablet Take 1.5 tablets (75 mg total) by mouth daily. 135 tablet 1   ? miscellaneous medical supply (BLOOD PRESSURE CUFF) Misc Please take blood pressure readings two times daily and continue to log daily. 1 each 0   ? olmesartan (BENICAR) 40 MG tablet Take 1 tablet (40 mg total) by mouth daily. 90 tablet 3   ? omeprazole (PRILOSEC) 40 MG capsule TAKE 1 CAPSULE(40 MG) BY MOUTH DAILY BEFORE BREAKFAST 90 capsule 2   ? polyethylene glycol (MIRALAX) 17 gram packet Take 17 g by mouth daily as needed.     ? TiZANidine (ZANAFLEX) 4 MG capsule Take 1 capsule (4 mg total) by mouth 3 (three) times a day as needed for muscle spasms. 45 capsule 0   ? triamcinolone (KENALOG) 0.1 % cream Apply  topically 2 (two) times a day. As needed for rash (Patient taking differently: Apply 1 application topically 2 (two) times a day as needed. As needed for rash      ) 30 g 3   ? hydrALAZINE (APRESOLINE) 25 MG tablet Take 1 tablet as needed for hypertension, up to 4 times daily to control blood pressure 30 tablet 3     No current facility-administered medications for this visit.       Allergies   Allergen Reactions   ? Clonidine Swelling   ? Hydralazine Itching   ? Coreg [Carvedilol] Nausea Only      Medical, surgical, family, social history, and medications were all reviewed and updated as necessary.   Lab Results    Chemistry/lipid CBC Cardiac Enzymes/BNP/TSH/INR     Lab Results   Component Value Date    CREATININE 0.60 12/05/2019    BUN 17 12/05/2019     12/05/2019    K 4.5 12/05/2019     12/05/2019    CO2 23 12/05/2019     Creatinine (mg/dL)   Date Value   12/05/2019 0.60   07/24/2019 0.63   06/23/2019 0.64   09/27/2018 0.64     No results found for: BNP Lab Results   Component Value Date    WBC 6.8 06/23/2019    HGB 10.8 (L) 06/23/2019    HCT 35.4 06/23/2019    MCV 84 06/23/2019     06/23/2019     Lab Results   Component Value Date    INR 1.01 09/24/2018      Lab Results   Component Value Date    CHOL 244 (H) 12/05/2019    HDL 65 12/05/2019    LDLCALC 166 (H) 12/05/2019    TRIG 65 12/05/2019          Total Time- 45 minutes with greater than 50% spent talking to patient and family regarding patient's relevant diagnoses.  This note has been dictated using voice recognition software. Any grammatical, typographical, or context distortions are unintentional and inherent to the software.

## 2021-06-30 NOTE — PROGRESS NOTES
Progress Notes by Caitlin Madsen CNP at 3/12/2021  3:30 PM     Author: Caitlin Madsen CNP Service: -- Author Type: Nurse Practitioner    Filed: 3/12/2021  4:35 PM Encounter Date: 3/12/2021 Status: Signed    : Caitlin Madsen CNP (Nurse Practitioner)             Assessment/Recommendations   Assessment/Plan:    1.  Paroxysmal Atrial Fibrillation: Symptomatic episode of AF with RVR in the 140s that occurred during visit with primary care on 3/9/2021, but ended shortly thereafter.  We reviewed the physiology and natural progression of atrial fibrillation and treatment options including rate control versus rhythm control depending upon the presence of symptoms.  We discussed avoiding possible triggers.  She was encouraged to maintain adequate daily fluid intake and to increase her daily activity.  Antiarrhythmic medication options are limited due to medication intolerances and tendency for sinus bradycardia.  Continue flecainide 50 mg twice daily and metoprolol tartrate 25 mg twice daily.  Take an extra metoprolol 25 mg tablet at onset of A. fib.  She was instructed to keep a log of AF episodes including frequency, duration, and symptoms.    She was reassured that atrial fibrillation is not life-threatening, but carries an increased risk for stroke.  She was also reassured that atrial fibrillation should not negatively impact her quality of life or her ability to be active and if it does, then we need to alter our treatment plan.  She has a [id number] score of 5 for age 65-74, female gender, hypertension, and CVA.  Continue Eliquis 5 mg twice daily for stroke prophylaxis.  A. fib also does not interfere with her plans for tooth extraction.  Okay to hold Eliquis for 1 day for tooth extraction without bridging.     2.  Hypertension: Difficult to control due to multiple drug intolerances.  Blood pressure at target today.  Continue current regimen with olmesartan and metoprolol.  Management per PMD.    3.  Obstructive  sleep apnea: No longer using CPAP.  We discussed the correlation between untreated sleep apnea and atrial fibrillation.  If she continues to have episodes of A. fib, will reevaluate for sleep apnea and encourage treatment if indicated.    Follow up in 6-8 weeks       History of Present Illness/Subjective    Ms. Yecenia Kwan is a 75 y.o. female who comes in today for urgent EP follow-up of atrial fibrillation.  She is accompanied by her son and her daughter is on speaker phone.  She has a history of paroxysmal atrial fibrillation, hypertension, hyperlipidemia, obstructive sleep apnea on CPAP, dizziness, and CVA thought to have been cardioembolic.  She was diagnosed with atrial fibrillation when she presented for CVA in 2018.  She had a second stroke with residual expressive aphasia.  AF symptoms consist of tachypalpitations, anxiety, and frequent urination.  Event monitor showed an AF burden of 16% and she was started on flecainide 50 mg twice daily.  She has had minor breakthrough on low-dose of flecainide.  She has had difficulty tolerating medications.  She had severe reflux from diltiazem which was discontinued.  She was seen at her primary care office on 3/9/2020 and was noted to be in A. fib with RVR.  She was started on metoprolol tartrate 25 mg twice daily.  She remains on Eliquis for stroke prophylaxis.    Yecenia reports that she had frequent episodes of A. fib after the diltiazem was stopped, but has not had another episode since starting metoprolol.  She denies chest discomfort, palpitations, abdominal fullness/bloating or peripheral edema, shortness of breath, paroxysmal nocturnal dyspnea, orthopnea, lightheadedness, dizziness, pre-syncope, or syncope.    Cardiographics (EKG personally reviewed):  EKG done 3/9/2021 shows atrial fibrillation with rapid ventricular response at 146 bpm  EKG done 6/23/2019 shows sinus rhythm at 59 bpm, QT/QTc interval measures 432/427 ms    Event monitor worn 1/13/2020 to  "1/27/2020 node sinus rhythm with rates in the 60s, normal electrocardiographic intervals, PACs estimated at 3%.  Episodes of atrial fibrillation with rapid ventricular response, 2 associated with symptoms of \"heart racing\", but others appear to be asymptomatic.  AF burden of 16%.  No significant bradycardia or pauses.  No significant ventricular ectopy.    ECHO done 9/25/2018:    When compared to the previous study dated 12/8/2015, no significant change.    Left ventricle ejection fraction is normal. The calculated left ventricular ejection fraction is 67%.    Normal left ventricular size and systolic function.    Normal right ventricular size and systolic function.    No hemodynamically significant valvular heart abnormalities.    CTA coronary with calcium score done 10/11/2018:    The total Agatston calcium score is 1. A calcium score in this range places the individual in the 25th percentile when compared to an age and gender matched control group and implies a low risk of cardiac events in the next ten years (less than 1% per year).    Distal portions of the right coronary artery and left anterior descending artery were not well visualized. No significant coronary artery disease detected in the proximal to mid portions of the coronary artery vessels.    I have reviewed and updated the patient's Past Medical History, Social History, Family History and Medication List.     Physical Examination Review of Systems   Vitals:    03/12/21 1516   BP: 150/72   Pulse: 64   Resp: 16     Body mass index is 41.43 kg/m .  Wt Readings from Last 3 Encounters:   03/12/21 (!) 228 lb (103.4 kg)   03/09/21 222 lb (100.7 kg)   03/01/21 (!) 228 lb (103.4 kg)       General Appearance:   Alert, well-appearing and in no acute distress.   HEENT: Atraumatic, normocephalic.  No scleral icterus, normal conjunctivae, EOMs intact, PERRL.  Wearing a mask.  No obvious thyromegaly.   Chest/Lungs:   Chest symmetric, spine straight.  Respirations " "unlabored.  Lungs are clear to auscultation.   Cardiovascular:   Regular rate and rhythm.  Normal first and second heart sounds with no murmurs, rubs, or gallops; radial and posterior tibial pulses are intact, No JVD, no edema.   Abdomen:  Soft, nondistended, bowel sounds present.   Extremities: No cyanosis or clubbing.   Musculoskeletal: Moves all extremities.  Gait unsteady, ambulates with rolling walker.   Skin: Warm, dry, intact.    Neurologic: Mood and affect are appropriate.  Alert and oriented to person, place, time, and situation.    General: WNL  Eyes: Visual Distubance  Ears/Nose/Throat: WNL  Lungs: WNL  Heart: Irregular Heartbeat  Stomach: WNL  Bladder: Frequent Urination at Night  Muscle/Joints: Joint Pain  Skin: WNL  Nervous System: Falls, Daytime Sleepiness  Mental Health: Anxiety     Blood: WNL       Medical History  Surgical History Family History Social History   Past Medical History:   Diagnosis Date   ? Anxiety    ? Arthritis    ? Atrial fibrillation (H)    ? Cellulitis of left leg    ? CVA (cerebral vascular accident) (H)    ? GERD (gastroesophageal reflux disease)    ? Head injury    ? Hx of ischemic left MCA stroke 12/7/2015    posterior aspect of the left lobe involving middle frontal gyrus   12/2015    ? Hx of nicotine dependence    ? Hyperlipemia    ? Hypertension    ? Proteinuria     Past Surgical History:   Procedure Laterality Date   ? [place] ARTHROTOMY/EXPLORE/TREAT KNEE JOINT      Description: Arthrotomy Of Knee With Joint Exploration;  Recorded: 07/29/2008;  Comments: due to \"accident\" and infection   ? [place] CORRJ HALLUX VALGUS W/SESMDC W/DIST METAR OSTEOT      Description: Bunion Correction With Metatarsal Osteotomy;  Recorded: 07/29/2008;    Family History   Problem Relation Age of Onset   ? Diabetes Mother    ? Prostate cancer Father     Social History     Tobacco Use   ? Smoking status: Never Smoker   ? Smokeless tobacco: Never Used   Substance Use Topics   ? Alcohol use: No   ? Drug " use: No          Medications  Allergies   Current Outpatient Medications   Medication Sig Dispense Refill   ? apixaban ANTICOAGULANT (ELIQUIS) 5 mg Tab tablet Take 1 tablet (5 mg total) by mouth 2 (two) times a day. 180 tablet 3   ? bisacodyL (DULCOLAX) 5 mg EC tablet Take 1 tablet (5 mg total) by mouth daily as needed for constipation. 90 tablet 3   ? capsaicin (ZOSTRIX) 0.025 % cream APPLY A SMALL AMOUNT TO SKIN 4 TIMES A DAY AS NEEDED TO THE KNEES  2   ? diclofenac sodium (VOLTAREN) 1 % Gel Lower extremities: 4 g to the affected area 4 times daily using dosing card.  Do not exceed 32 g total dose per day. (Patient taking differently: Apply 4 g topically 4 (four) times a day as needed (pain). Lower extremities: 4 g to the affected area 4 times daily using dosing card.  Do not exceed 32 g total dose per day.      ) 100 g 3   ? flecainide (TAMBOCOR) 50 MG tablet Take 1 tablet (50 mg total) by mouth 2 (two) times a day. 180 tablet 3   ? fluticasone propionate (ALLERGY RELIEF, FLUTICASONE,) 50 mcg/actuation nasal spray 2 sprays into each nostril daily as needed for rhinitis. 16 g 12   ? hydrocortisone 2.5 % cream Apply to affected area BID for no more than 2 weeks 30 g 0   ? LORazepam (ATIVAN) 0.5 MG tablet Take 1/2 tab po 30 min prior to procedure, repeat x1 prn 4 tablet 01   ? metoprolol tartrate (LOPRESSOR) 25 MG tablet Take 1 tablet (25 mg total) by mouth 2 (two) times a day. 60 tablet 11   ? miscellaneous medical supply (BLOOD PRESSURE CUFF) Misc Please take blood pressure readings two times daily and continue to log daily. 1 each 0   ? olmesartan (BENICAR) 40 MG tablet Take 1 tablet (40 mg total) by mouth daily. 90 tablet 3   ? pantoprazole (PROTONIX) 40 MG tablet Take 1 tablet (40 mg total) by mouth daily. 30 tablet 1   ? polyethylene glycol (MIRALAX) 17 gram packet Take 17 g by mouth daily as needed.     ? rosuvastatin (CRESTOR) 5 MG tablet Take 1/2 tab (2.5 mg) po daily 45 tablet 4   ? TiZANidine (ZANAFLEX) 4  MG capsule Take 1 capsule (4 mg total) by mouth 3 (three) times a day as needed for muscle spasms. 45 capsule 0   ? triamcinolone (KENALOG) 0.1 % cream Apply topically 2 (two) times a day. As needed for rash (Patient taking differently: Apply 1 application topically 2 (two) times a day as needed. As needed for rash      ) 30 g 3     No current facility-administered medications for this visit.     Allergies   Allergen Reactions   ? Clonidine Swelling   ? Hydralazine Itching   ? Coreg [Carvedilol] Nausea Only         Lab Results    Chemistry/lipid CBC Other   Lab Results   Component Value Date    CREATININE 0.58 (L) 12/08/2020    BUN 13 12/08/2020     12/08/2020    K 3.6 12/08/2020     12/08/2020    CO2 26 12/08/2020     Creatinine (mg/dL)   Date Value   12/08/2020 0.58 (L)   12/05/2019 0.60   07/24/2019 0.63   06/23/2019 0.64    Lab Results   Component Value Date    WBC 5.1 12/08/2020    HGB 13.0 12/08/2020    HCT 41.8 12/08/2020    MCV 84 12/08/2020     12/08/2020    Lab Results   Component Value Date    CKTOTAL 131 02/22/2016    CKMB 13 (HH) 09/25/2018    TROPONINI 0.01 06/23/2019    TSH 2.50 10/26/2015     Lab Results   Component Value Date    INR 1.01 09/24/2018    INR 1.03 12/07/2015    INR 1.08 08/07/2011      71 minutes were spent on the date of encounter performing chart review, history and exam, documentation, and further activities as noted above.    This note has been dictated using voice recognition software. Any grammatical, typographical, or context distortions are unintentional and inherent to the software.

## 2021-06-30 NOTE — PROGRESS NOTES
Progress Notes by Balbina Hillman MD at 12/1/2020  2:30 PM     Author: Balbina Hillman MD Service: -- Author Type: Physician    Filed: 12/1/2020  3:14 PM Encounter Date: 12/1/2020 Status: Signed    : Balbina Hillman MD (Physician)         HEART CARE NOTE          Assessment/Recommendations     1. HTN  Assessment / Plan    Difficult to control given multiple drug in tolerances    Currently on hydralazine, olmesartan and diltiazem - BP well controlled on current regimen given limited options and how high it tends to run --> no changes today; Mrs. Kwan reports that this is due to the addition of diltiazem which she has been tolerating    2. Paroxysmal Afib  Assessment / Plan    Currently on flecainide    Follows in afib clnic    Patient is medically optimized from a cardiac standpoint for her upcoming surgical procedure (Ob-Gyn D&C).     History of Present Illness/Subjective    Yecenia Kwan is a 74 y.o. old female with a PMhx significant for Htn on olmesartan, CVA (? Cardio-embolic) on Eliquis, HLP on statin therapy, BELA on CPAP, morbid obesity and paroxysmal afib on flecainide who presents to cardiology clinic for follow-up care.    Today, Mrs. Kwan denies any acute events or complaints. She reports that she has been tolerating the relatively new diltiazem regimen      ECG: Personally reviewed. sinus bradycardia.     ECHO (personnaly Reviewed):    When compared to the previous study dated 12/8/2015, no significant change.    Left ventricle ejection fraction is normal. The calculated left ventricular ejection fraction is 67%.    Normal left ventricular size and systolic function.    Normal right ventricular size and systolic function.    No hemodynamically significant valvular heart abnormalities.            Physical Examination Review of Systems   Vitals:    12/01/20 1445   BP: 146/88   Pulse: 70   Resp: 16   SpO2: 100%     Body mass index is 44.02 kg/m .  Wt Readings  "from Last 3 Encounters:   09/30/20 (!) 242 lb (109.8 kg)   01/13/20 (!) 239 lb (108.4 kg)   12/05/19 (!) 244 lb (110.7 kg)     General Appearance:   no distress, normal body habitus   ENT/Mouth: membranes moist, no oral lesions or bleeding gums.      EYES:  no scleral icterus, normal conjunctivae   Neck: no carotid bruits or thyromegaly   Chest/Lungs:   lungs are clear to auscultation, no rales or wheezing, equal chest wall expansion    Cardiovascular:   Regular. Normal first and second heart sounds with no murmurs, rubs, or gallops; the carotid, radial and posterior tibial pulses are intact, no JVD or  edema bilaterally    Abdomen:  no organomegaly, masses, bruits, or tenderness; bowel sounds are present   Extremities: no cyanosis or clubbing   Skin: no xanthelasma, warm.    Neurologic: normal gait, normal  bilateral, no tremors     Psychiatric: alert and oriented x3, calm     A complete 10 systems ROS was reviewed  And is negative except what is listed in the HPI.          Medical History  Surgical History Family History Social History   Past Medical History:   Diagnosis Date   ? Anxiety    ? Arthritis    ? Atrial fibrillation (H)    ? Cellulitis of left leg    ? CVA (cerebral vascular accident) (H)    ? GERD (gastroesophageal reflux disease)    ? Head injury    ? Hx of ischemic left MCA stroke 12/7/2015    posterior aspect of the left lobe involving middle frontal gyrus   12/2015    ? Hx of nicotine dependence    ? Hyperlipemia    ? Hypertension    ? Proteinuria     Past Surgical History:   Procedure Laterality Date   ? DC ARTHROTOMY/EXPLORE/TREAT KNEE JOINT      Description: Arthrotomy Of Knee With Joint Exploration;  Recorded: 07/29/2008;  Comments: due to \"accident\" and infection   ? DC CORRJ HALLUX VALGUS W/SESMDC W/DIST METAR OSTEOT      Description: Bunion Correction With Metatarsal Osteotomy;  Recorded: 07/29/2008;    no family history of premature coronary artery disease Social History "     Socioeconomic History   ? Marital status: Single     Spouse name: Not on file   ? Number of children: Not on file   ? Years of education: Not on file   ? Highest education level: Not on file   Occupational History   ? Not on file   Social Needs   ? Financial resource strain: Not on file   ? Food insecurity     Worry: Not on file     Inability: Not on file   ? Transportation needs     Medical: Not on file     Non-medical: Not on file   Tobacco Use   ? Smoking status: Never Smoker   ? Smokeless tobacco: Never Used   Substance and Sexual Activity   ? Alcohol use: No   ? Drug use: No   ? Sexual activity: Not on file   Lifestyle   ? Physical activity     Days per week: Not on file     Minutes per session: Not on file   ? Stress: Not on file   Relationships   ? Social connections     Talks on phone: Not on file     Gets together: Not on file     Attends Sabianism service: Not on file     Active member of club or organization: Not on file     Attends meetings of clubs or organizations: Not on file     Relationship status: Not on file   ? Intimate partner violence     Fear of current or ex partner: Not on file     Emotionally abused: Not on file     Physically abused: Not on file     Forced sexual activity: Not on file   Other Topics Concern   ? Not on file   Social History Narrative    Lives with grandson, and is exposed to second hand smoke.           Lab Results    Chemistry/lipid CBC Cardiac Enzymes/BNP/TSH/INR   Lab Results   Component Value Date    CHOL 244 (H) 12/05/2019    HDL 65 12/05/2019    LDLCALC 166 (H) 12/05/2019    TRIG 65 12/05/2019    CREATININE 0.60 12/05/2019    BUN 17 12/05/2019    K 4.5 12/05/2019     12/05/2019     12/05/2019    CO2 23 12/05/2019    Lab Results   Component Value Date    WBC 6.8 06/23/2019    HGB 10.8 (L) 06/23/2019    HCT 35.4 06/23/2019    MCV 84 06/23/2019     06/23/2019    Lab Results   Component Value Date    CKTOTAL 131 02/22/2016    CKMB 13 (HH) 09/25/2018  "   TROPONINI 0.01 06/23/2019    TSH 2.50 10/26/2015    INR 1.01 09/24/2018     Lab Results   Component Value Date    CKTOTAL 131 02/22/2016    CKMB 13 () 09/25/2018    TROPONINI 0.01 06/23/2019          Weight:    Wt Readings from Last 3 Encounters:   09/30/20 (!) 242 lb (109.8 kg)   01/13/20 (!) 239 lb (108.4 kg)   12/05/19 (!) 244 lb (110.7 kg)       Allergies  Allergies   Allergen Reactions   ? Clonidine Swelling   ? Hydralazine Itching   ? Coreg [Carvedilol] Nausea Only         Surgical History  Past Surgical History:   Procedure Laterality Date   ? DC ARTHROTOMY/EXPLORE/TREAT KNEE JOINT      Description: Arthrotomy Of Knee With Joint Exploration;  Recorded: 07/29/2008;  Comments: due to \"accident\" and infection   ? DC CORRJ HALLUX VALGUS W/SESMDC W/DIST METAR OSTEOT      Description: Bunion Correction With Metatarsal Osteotomy;  Recorded: 07/29/2008;       Social History  Tobacco:   Social History     Tobacco Use   Smoking Status Never Smoker   Smokeless Tobacco Never Used    Alcohol:   Social History     Substance and Sexual Activity   Alcohol Use No    Illicit Drugs:   Social History     Substance and Sexual Activity   Drug Use No       Family History  Family History   Problem Relation Age of Onset   ? Diabetes Mother    ? Prostate cancer Father           Yan Hillman on 12/1/2020      cc: Zac Grijalva MD,          "

## 2021-06-30 NOTE — PROGRESS NOTES
"Progress Notes by Balbina Hillman MD at 1/22/2021  1:10 PM     Author: Balbina Hillman MD Service: -- Author Type: Physician    Filed: 1/25/2021  3:57 PM Encounter Date: 1/22/2021 Status: Signed    : Balbina Hillman MD (Physician)       The patient has been notified of following:     \"This video visit will be conducted via a call between you and your physician/provider. We have found that certain health care needs can be provided without the need for a physical exam.  This service lets us provide the care you need with a phone conversation.  If a prescription is necessary we can send it directly to your pharmacy.  If lab work is needed we can place an order for that and you can then stop by our lab to have the test done at a later time. If during the course of the call the physician/provider feels a telephone visit is not appropriate, you will not be charged for this service.\" Verbal consent has been obtained for this service by care team member:         HEART CARE PHONE ENCOUNTER        The patient has chosen to have the visit conducted as a video visit, to reduce risk of exposure given the current status of Coronavirus in our community. This telephone visit is being conducted via a call between the patient and physician/provider. Health care needs are being provided without a physical exam.         Assessment/Recommendations       1. HTN  Assessment / Plan    Difficult to control given multiple drug in tolerances    Currently on hydralazine, olmesartan and diltiazem - BP well controlled on current regimen given limited options and how high it tends to run. However, patient does report GI upset/bleching which she believes is related to the diltiazem --> hold dilt for at least and follow-up symptoms and BP next week      2. Paroxysmal Afib  Assessment / Plan    Currently on flecainide    Follows in afib clnic       Follow Up Plan: Follow up in 2 weeks  I have reviewed the " note as documented.  This accurately captures the substance of my conversation with the patient.    Start Time: 1:34 pm   Stop Time: 1:50 pm  Provider location (Distant Site): Place of residence  Patient location (Originating Site): Place of residence  Mode of transmission Gabbie Morrow       History of Present Illness/Subjective    Ms. Yecenia Kwan is a 75 y.o. female who is being evaluated via a billable video visit.      Yecenia Kwan is a 74 y.o. old female with a PMhx significant for Htn on olmesartan, CVA (? Cardio-embolic) on Eliquis, HLP on statin therapy, BELA on CPAP, morbid obesity and paroxysmal afib on flecainide who presents to cardiology clinic for follow-up care.     Today, Mrs. Kwan denies any acute events or complaints with the exception of GI upset/belching which she attributes to diltiazem.     ECG: Personally reviewed. sinus bradycardia.     ECHO (personnaly Reviewed):    When compared to the previous study dated 12/8/2015, no significant change.    Left ventricle ejection fraction is normal. The calculated left ventricular ejection fraction is 67%.    Normal left ventricular size and systolic function.    Normal right ventricular size and systolic function.    No hemodynamically significant valvular heart abnormalities.    I have reviewed and updated the patient's Past Medical History, Social History, Family History and Medication List.       Review of Systems:  A complete 10 systems ROS was reviewed  And is negative except what is listed in the HPI.         Medical History  Surgical History Family History Social History   Past Medical History:   Diagnosis Date   ? Anxiety    ? Arthritis    ? Atrial fibrillation (H)    ? Cellulitis of left leg    ? CVA (cerebral vascular accident) (H)    ? GERD (gastroesophageal reflux disease)    ? Head injury    ? Hx of ischemic left MCA stroke 12/7/2015    posterior aspect of the left lobe involving middle frontal gyrus   12/2015    ? Hx of nicotine  "dependence    ? Hyperlipemia    ? Hypertension    ? Proteinuria     Past Surgical History:   Procedure Laterality Date   ? [place] ARTHROTOMY/EXPLORE/TREAT KNEE JOINT      Description: Arthrotomy Of Knee With Joint Exploration;  Recorded: 07/29/2008;  Comments: due to \"accident\" and infection   ? MN CORRJ HALLUX VALGUS W/SESMDC W/DIST METAR OSTEOT      Description: Bunion Correction With Metatarsal Osteotomy;  Recorded: 07/29/2008;    no family history of premature coronary artery disease Social History     Socioeconomic History   ? Marital status: Single     Spouse name: Not on file   ? Number of children: Not on file   ? Years of education: Not on file   ? Highest education level: Not on file   Occupational History   ? Not on file   Social Needs   ? Financial resource strain: Not on file   ? Food insecurity     Worry: Not on file     Inability: Not on file   ? Transportation needs     Medical: Not on file     Non-medical: Not on file   Tobacco Use   ? Smoking status: Never Smoker   ? Smokeless tobacco: Never Used   Substance and Sexual Activity   ? Alcohol use: No   ? Drug use: No   ? Sexual activity: Not on file   Lifestyle   ? Physical activity     Days per week: Not on file     Minutes per session: Not on file   ? Stress: Not on file   Relationships   ? Social connections     Talks on phone: Not on file     Gets together: Not on file     Attends Yazidi service: Not on file     Active member of club or organization: Not on file     Attends meetings of clubs or organizations: Not on file     Relationship status: Not on file   ? Intimate partner violence     Fear of current or ex partner: Not on file     Emotionally abused: Not on file     Physically abused: Not on file     Forced sexual activity: Not on file   Other Topics Concern   ? Not on file   Social History Narrative    Lives with grandson, and is exposed to second hand smoke.           Lab Results    Chemistry/lipid CBC Cardiac Enzymes/BNP/TSH/INR   Lab " "Results   Component Value Date    CHOL 264 (H) 12/08/2020    HDL 62 12/08/2020    LDLCALC 188 (H) 12/08/2020    TRIG 72 12/08/2020    CREATININE 0.58 (L) 12/08/2020    BUN 13 12/08/2020    K 3.6 12/08/2020     12/08/2020     12/08/2020    CO2 26 12/08/2020    Lab Results   Component Value Date    WBC 5.1 12/08/2020    HGB 13.0 12/08/2020    HCT 41.8 12/08/2020    MCV 84 12/08/2020     12/08/2020    Lab Results   Component Value Date    CKTOTAL 131 02/22/2016    CKMB 13 () 09/25/2018    TROPONINI 0.01 06/23/2019    TSH 2.50 10/26/2015    INR 1.01 09/24/2018     Lab Results   Component Value Date    CKTOTAL 131 02/22/2016    CKMB 13 () 09/25/2018    TROPONINI 0.01 06/23/2019          Weight:    Wt Readings from Last 3 Encounters:   12/07/20 (!) 230 lb (104.3 kg)   12/01/20 (!) 233 lb (105.7 kg)   09/30/20 (!) 242 lb (109.8 kg)       Allergies  Allergies   Allergen Reactions   ? Clonidine Swelling   ? Hydralazine Itching   ? Coreg [Carvedilol] Nausea Only         Surgical History  Past Surgical History:   Procedure Laterality Date   ? IN ARTHROTOMY/EXPLORE/TREAT KNEE JOINT      Description: Arthrotomy Of Knee With Joint Exploration;  Recorded: 07/29/2008;  Comments: due to \"accident\" and infection   ? IN CORRJ HALLUX VALGUS W/SESMDC W/DIST METAR OSTEOT      Description: Bunion Correction With Metatarsal Osteotomy;  Recorded: 07/29/2008;       Social History  Tobacco:   Social History     Tobacco Use   Smoking Status Never Smoker   Smokeless Tobacco Never Used    Alcohol:   Social History     Substance and Sexual Activity   Alcohol Use No    Illicit Drugs:   Social History     Substance and Sexual Activity   Drug Use No       Family History  Family History   Problem Relation Age of Onset   ? Diabetes Mother    ? Prostate cancer Father           Yan Hillman on 1/22/2021      cc: Zac Grijalva MD,          "

## 2021-07-02 ENCOUNTER — TRANSFERRED RECORDS (OUTPATIENT)
Dept: HEALTH INFORMATION MANAGEMENT | Facility: CLINIC | Age: 76
End: 2021-07-02

## 2021-07-03 ENCOUNTER — HEALTH MAINTENANCE LETTER (OUTPATIENT)
Age: 76
End: 2021-07-03

## 2021-07-03 NOTE — ADDENDUM NOTE
Addendum Note by Zac Grijalva MD at 12/31/2019  9:57 AM     Author: Zac Grijalva MD Service: -- Author Type: Physician    Filed: 12/31/2019  9:57 AM Encounter Date: 12/24/2019 Status: Signed    : Zac Grijalva MD (Physician)    Addended by: ZAC GRIJALVA on: 12/31/2019 09:57 AM        Modules accepted: Orders

## 2021-07-03 NOTE — ADDENDUM NOTE
Addendum Note by Delores Cardoso RN at 12/16/2020  1:51 PM     Author: Delores Cardoso RN Service: -- Author Type: Registered Nurse    Filed: 12/16/2020  1:51 PM Encounter Date: 12/14/2020 Status: Signed    : Delores Cardoso RN (Registered Nurse)    Addended by: DELORES CARDOSO on: 12/16/2020 01:51 PM        Modules accepted: Orders

## 2021-07-03 NOTE — ADDENDUM NOTE
Addendum Note by Heather Frey DO at 6/15/2017  6:57 AM     Author: Heather Frey DO Service: -- Author Type: Physician    Filed: 6/15/2017  6:57 AM Encounter Date: 6/14/2017 Status: Signed    : Heather Frey DO (Physician)    Addended by: HEATHER FREY on: 6/15/2017 06:57 AM        Modules accepted: Orders

## 2021-07-03 NOTE — ADDENDUM NOTE
Addendum Note by Delores Cardoso RN at 1/20/2020  3:12 PM     Author: Delores Cardoso RN Service: -- Author Type: Registered Nurse    Filed: 1/20/2020  3:12 PM Encounter Date: 1/17/2020 Status: Signed    : Delores Cardoso RN (Registered Nurse)    Addended by: DELORES CARDOSO on: 1/20/2020 03:12 PM        Modules accepted: Orders

## 2021-07-03 NOTE — ADDENDUM NOTE
Addendum Note by Delores Cardoso RN at 1/23/2020  3:12 PM     Author: Delores Cardoso RN Service: -- Author Type: Registered Nurse    Filed: 1/23/2020  3:12 PM Encounter Date: 1/17/2020 Status: Signed    : Delores Cardoso RN (Registered Nurse)    Addended by: DELORES CARDOSO on: 1/23/2020 03:12 PM        Modules accepted: Orders

## 2021-07-06 ENCOUNTER — COMMUNICATION - HEALTHEAST (OUTPATIENT)
Dept: SCHEDULING | Facility: CLINIC | Age: 76
End: 2021-07-06

## 2021-07-06 NOTE — TELEPHONE ENCOUNTER
Telephone Encounter by Tiki Carreno CMA at 7/6/2021  6:07 PM     Author: Tiki Carreno CMA Service: -- Author Type: Certified Medical Assistant    Filed: 7/6/2021  6:11 PM Encounter Date: 7/6/2021 Status: Signed    : Tiki Carreno CMA (Certified Medical Assistant)       Spoke with joseline Oconnor and took some time to help schedule appts for pt.   Daughter received a phone call to reschedule pt's appt with Dr. Gonzalez on Monday 07/12 for leg swelling and daughter requested for Dr. Carmona since Dr. Gonzalez will be unavailable. Made an OV appt with Dr. Carmona on Fri 07/09/20021.  Daughter also wanted to schedule another appt for pt. Pt is having a procedure in August and I helped schedule a pre op appt with Dr. Gonzalez on 07/27/2021 at 1:50 PM.

## 2021-07-06 NOTE — TELEPHONE ENCOUNTER
Telephone Encounter by Hamzah Sutton CMA at 7/6/2021  3:29 PM     Author: Hamzah Sutton CMA Service: -- Author Type: Certified Medical Assistant    Filed: 7/6/2021  3:31 PM Encounter Date: 7/6/2021 Status: Signed    : Hamzah Sutton CMA (Certified Medical Assistant)       Maybe you can take a look at this Tiki.  I am unsure how much time you want with her (establish care plus pre-op = 60 minutes?)    Plus with the new scheduling I was not sure. Maybe you can look and decide how much time and maybe we can get scheduled in mid to late July with Dr Gonzalez. Thank you

## 2021-07-06 NOTE — TELEPHONE ENCOUNTER
Telephone Encounter by Jenna Serrano at 7/6/2021  3:19 PM     Author: Jenna Serrano Service: -- Author Type: Patient Access    Filed: 7/6/2021  3:27 PM Encounter Date: 7/6/2021 Status: Signed    : Jenna Serrano (Patient Access)       New Appointment Needed  What is the reason for the visit:    Pre-Op Appt Request  When is the surgery? :  8/7  Where is the surgery?:   [facility]   Who is the surgeon? :  Dr. Kothari  What type of surgery is being done?: Oncology  Provider Preference: Dr. Gonzalez -- see below  How soon do you need to be seen?: asap  Waitlist offered?: No  Okay to leave a detailed message:  Yes    Patient and family had a discussion with Dr. Gonzalez at the beginning of COVID regarding taking patient on as an establish care patient. Dr. Gonzalez agreed.     Patient needs to have a pre op. Would like to have this appointment with Dr. Gonzalez and have it as an establish care appointment.     Please call and assist with scheduling appointment with Dr. Gonzalez. Call center is only allowed to schedule pre op's with the PCP listed in the chart.

## 2021-07-09 ENCOUNTER — HOSPITAL ENCOUNTER (OUTPATIENT)
Facility: HOSPITAL | Age: 76
End: 2021-07-09
Attending: OBSTETRICS & GYNECOLOGY | Admitting: OBSTETRICS & GYNECOLOGY
Payer: COMMERCIAL

## 2021-07-09 DIAGNOSIS — Z11.59 ENCOUNTER FOR SCREENING FOR OTHER VIRAL DISEASES: ICD-10-CM

## 2021-07-14 PROBLEM — I16.1 HYPERTENSIVE EMERGENCY: Status: RESOLVED | Noted: 2018-09-24 | Resolved: 2019-03-31

## 2021-07-20 ENCOUNTER — NURSE TRIAGE (OUTPATIENT)
Dept: NURSING | Facility: CLINIC | Age: 76
End: 2021-07-20

## 2021-07-20 ENCOUNTER — ANCILLARY PROCEDURE (OUTPATIENT)
Dept: GENERAL RADIOLOGY | Facility: CLINIC | Age: 76
End: 2021-07-20
Attending: NURSE PRACTITIONER
Payer: COMMERCIAL

## 2021-07-20 ENCOUNTER — OFFICE VISIT (OUTPATIENT)
Dept: FAMILY MEDICINE | Facility: CLINIC | Age: 76
End: 2021-07-20
Payer: COMMERCIAL

## 2021-07-20 VITALS — DIASTOLIC BLOOD PRESSURE: 78 MMHG | OXYGEN SATURATION: 97 % | HEART RATE: 88 BPM | SYSTOLIC BLOOD PRESSURE: 138 MMHG

## 2021-07-20 DIAGNOSIS — F41.1 GENERALIZED ANXIETY DISORDER: ICD-10-CM

## 2021-07-20 DIAGNOSIS — R06.2 WHEEZING: ICD-10-CM

## 2021-07-20 DIAGNOSIS — R06.02 SOB (SHORTNESS OF BREATH): ICD-10-CM

## 2021-07-20 DIAGNOSIS — R60.0 LOWER EXTREMITY EDEMA: Primary | ICD-10-CM

## 2021-07-20 DIAGNOSIS — I48.0 PAROXYSMAL ATRIAL FIBRILLATION (H): ICD-10-CM

## 2021-07-20 PROBLEM — F03.90 DEMENTIA WITHOUT BEHAVIORAL DISTURBANCE, UNSPECIFIED DEMENTIA TYPE: Status: ACTIVE | Noted: 2021-07-20

## 2021-07-20 LAB
ANION GAP SERPL CALCULATED.3IONS-SCNC: 10 MMOL/L (ref 5–18)
BUN SERPL-MCNC: 24 MG/DL (ref 8–28)
CALCIUM SERPL-MCNC: 9.4 MG/DL (ref 8.5–10.5)
CHLORIDE BLD-SCNC: 107 MMOL/L (ref 98–107)
CO2 SERPL-SCNC: 23 MMOL/L (ref 22–31)
CREAT SERPL-MCNC: 0.71 MG/DL (ref 0.6–1.1)
GFR SERPL CREATININE-BSD FRML MDRD: 84 ML/MIN/1.73M2
GLUCOSE BLD-MCNC: 109 MG/DL (ref 70–125)
POTASSIUM BLD-SCNC: 4.6 MMOL/L (ref 3.5–5)
SODIUM SERPL-SCNC: 140 MMOL/L (ref 136–145)

## 2021-07-20 PROCEDURE — 83880 ASSAY OF NATRIURETIC PEPTIDE: CPT | Performed by: NURSE PRACTITIONER

## 2021-07-20 PROCEDURE — 99214 OFFICE O/P EST MOD 30 MIN: CPT | Performed by: NURSE PRACTITIONER

## 2021-07-20 PROCEDURE — 71046 X-RAY EXAM CHEST 2 VIEWS: CPT | Mod: TC | Performed by: NURSE PRACTITIONER

## 2021-07-20 PROCEDURE — 36415 COLL VENOUS BLD VENIPUNCTURE: CPT | Performed by: NURSE PRACTITIONER

## 2021-07-20 PROCEDURE — 80048 BASIC METABOLIC PNL TOTAL CA: CPT | Performed by: NURSE PRACTITIONER

## 2021-07-20 RX ORDER — CITALOPRAM HYDROBROMIDE 10 MG/1
10 TABLET ORAL DAILY
Qty: 30 TABLET | Refills: 3 | Status: SHIPPED | OUTPATIENT
Start: 2021-07-20 | End: 2021-11-09

## 2021-07-20 RX ORDER — ALBUTEROL SULFATE 90 UG/1
2 AEROSOL, METERED RESPIRATORY (INHALATION) EVERY 6 HOURS PRN
Qty: 6.7 G | Refills: 0 | Status: SHIPPED | OUTPATIENT
Start: 2021-07-20 | End: 2021-11-09

## 2021-07-20 RX ORDER — ROSUVASTATIN CALCIUM 5 MG/1
TABLET, COATED ORAL
COMMUNITY
Start: 2020-12-14 | End: 2022-03-11

## 2021-07-20 RX ORDER — POLYETHYLENE GLYCOL 3350 17 G/17G
17 POWDER, FOR SOLUTION ORAL
COMMUNITY
End: 2021-11-09

## 2021-07-20 RX ORDER — BISACODYL 5 MG
TABLET, DELAYED RELEASE (ENTERIC COATED) ORAL
COMMUNITY
Start: 2021-07-13 | End: 2021-11-09

## 2021-07-20 RX ORDER — OLMESARTAN MEDOXOMIL 40 MG/1
40 TABLET ORAL
COMMUNITY
Start: 2021-05-17 | End: 2021-11-09

## 2021-07-20 RX ORDER — NEBULIZER AND COMPRESSOR
EACH MISCELLANEOUS
COMMUNITY
Start: 2019-12-03 | End: 2021-11-09

## 2021-07-20 RX ORDER — METOPROLOL TARTRATE 50 MG
50 TABLET ORAL
COMMUNITY
Start: 2021-05-17 | End: 2021-11-09

## 2021-07-20 RX ORDER — BISACODYL 5 MG/1
5 TABLET, DELAYED RELEASE ORAL
COMMUNITY
Start: 2020-10-27 | End: 2021-11-09

## 2021-07-20 RX ORDER — FLECAINIDE ACETATE 50 MG/1
50 TABLET ORAL DAILY
COMMUNITY
Start: 2021-06-08 | End: 2021-08-19

## 2021-07-20 RX ORDER — HYDROCORTISONE 2.5 %
CREAM (GRAM) TOPICAL
COMMUNITY
Start: 2021-06-15 | End: 2021-11-09

## 2021-07-20 RX ORDER — FUROSEMIDE 20 MG
20 TABLET ORAL DAILY
Qty: 5 TABLET | Refills: 0 | Status: SHIPPED | OUTPATIENT
Start: 2021-07-20 | End: 2021-07-28

## 2021-07-20 RX ORDER — FLUTICASONE PROPIONATE 50 MCG
2 SPRAY, SUSPENSION (ML) NASAL
COMMUNITY
Start: 2020-12-07 | End: 2021-11-09

## 2021-07-20 NOTE — PROGRESS NOTES
Assessment & Plan     Lower extremity edema  SOB (shortness of breath)  Lower extremity swelling and shortness of breath over the last month or longer.  Chest x-ray completed today and is without any obvious findings of congestion or edema, will have radiology confirm.  Will check BMP along with metabolic panel today.  She had significant relief with diuretic previously.  Will prescribe furosemide 20 mg daily for 5 days.  She has a follow-up scheduled already with Dr. Gonzalez within the next 1 to 2 weeks.  Follow-up at that time.  She is encouraged to notify us with any persisting or worsening symptoms in the meantime.  Reviewed red flag symptoms that would warrant immediate evaluation.  - furosemide (LASIX) 20 MG tablet  Dispense: 5 tablet; Refill: 0  - Basic metabolic panel  (Ca, Cl, CO2, Creat, Gluc, K, Na, BUN)  - Basic metabolic panel  (Ca, Cl, CO2, Creat, Gluc, K, Na, BUN)  - BNP-N terminal pro  - BNP-N terminal pro    Paroxysmal atrial fibrillation (H)  Lower extremity swelling thought to be in part due to atrial fibrillation.  She has not been taking metoprolol twice daily as prescribed.  I advised her to start doing this.  She will monitor heart rate intermittently and will determine if further medication adjustments indicated at her follow-up visit.    Wheezing  Chest x-ray without acute findings, some suggestion of asthma which is consistent with patient's history.  I suspect allergens and mold in her current home are contributing to wheezing.  Will provide albuterol inhaler to use as needed.  She is advised to monitor this.  - XR Chest 2 Views  - albuterol (PROAIR HFA/PROVENTIL HFA/VENTOLIN HFA) 108 (90 Base) MCG/ACT inhaler  Dispense: 6.7 g; Refill: 0    Generalized anxiety disorder  Patient has a taking citalopram only as needed and rarely.  We discussed the nature of SSRIs and I advised her to start taking medication daily.  She should notify us of any side effects.  Plan to follow-up on this in  "about 1 month.  - citalopram (CELEXA) 10 MG tablet  Dispense: 30 tablet; Refill: 3       BMI:   Estimated body mass index is 41.43 kg/m  as calculated from the following:    Height as of 3/12/21: 1.58 m (5' 2.21\").    Weight as of 3/12/21: 103.4 kg (228 lb).   See Patient Instructions    No follow-ups on file.    MERON Humphrey CNP  [facility] STACY Keene is a 76 year old who presents for the following health issues  accompanied by her daugher:    [facility]   Patient here today with concerns of breathing difficulty.  Medical history is significant for dementia, obesity, type 2 diabetes, hypertension.  Patient has had swelling in her ankles and feet as well as shortness of breath for a little over a month now.  She was seen for virtual visit in May 2021.  At that time had diuretic prescribed and was told to increase metoprolol to 50 mg twice a day.  Patient recalls improvement of symptoms while taking the diuretic.  It is not clear if she is taking the metoprolol twice daily.  Patient feels that she is still only taking this once a day.  She feels herself going into atrial fibrillation on occasion.  Patient's daughter checks her heart rate a couple of times a week.  Last time she checked her heart rate it was averaging around 135.  She denies heart palpitations or chest pain.  Patient also describes some wheezing and difficulty with nasal congestion and smell.  She reports that the basement of her home has water issues which has led to mold and other allergens.  Her daughter can hear wheezing particularly when she is lying down at night.  She is not using an allergy medication regularly.      Review of Systems   Review of Systems - pertinent positives noted in HPI, otherwise ROS is negative.        Objective    /78 (BP Location: Right arm, Patient Position: Sitting, Cuff Size: Adult Regular)   Pulse 88   SpO2 97%   There is no height or weight on file to calculate " BMI.  Physical Exam   GENERAL: Overweight female, she is alert and no distress  RESP: lungs clear to auscultation - no crackles, rales, rhonchi or wheezes  CV: regular rate and rhythm, normal S1 S2, no S3 or S4, no murmur, click or rub, no peripheral edema and peripheral pulses strong  MS: +1 pitting edema bilateral lower extremities.  There is no defects noted.     CXR - Reviewed and interpreted by me. No acute findings of edema or consolidation.

## 2021-07-20 NOTE — CONSULTS
GYNECOLOGIC ONCOLOGY CONSULT    Patient Name: GURVINDER DE DIOS Patient : 1945   Patient MRN: [id number]    Referring Physician: Kathia Barriga MD (OB/GYN), FAX   Primary GYNOncologist: Kenyatta Kothari (Gynecological/Oncology)  Date of Service: 2021     Reason for Consult:  Consultation and treatment recommendations.    History of Present Illness (Gyn Oncology):  Gurvinder De Dios is a very sweet 76-year-old female with a history of an abnormal uterine ultrasound and post-menopausal bleeding who had an endometrial biopsy on 21, but this was incomplete due to insufficient specimen samples. Per report from the family the patient was diagnosed with complex atypical hyperplasia. The patient presents today for a consultation and treatment recommendations.     Genetic Testing (Gyn Oncology):  N/A    Interval History (Gyn Oncology):  We are still seeking the definitive results with the diagnosis. Gurvinder reports minimal to no bleeding since the endometrial biopsy. She does describe a very minimal discharge. She denies any issues with headaches, dizziness. She does have underlying cognitive disabilities due to her history of stroke x2. She does have a .  She presents today with her daughter who is her caregiver primarily. She denies issues with bowel or bladder habits. She denies abdominal pain.      Review of Systems:  A complete 14-point review of systems is negative except as noted in the above history of present illness.    Past Medical History:  Complex atypical hyperplasia  Surgical History:  Cholecystectomy  Knee surgery  Oophorectomy  Tubal ligation  OB/Gyn History:  , menarche at age 10, menopause at age 53. Last colonoscopy in , last DEXA scan in , last mammogram in .     Allergies  No known medication allergies    Medications:  Flecainide Oral 50 mg tablet  Bisacodyl Oral Delayed Release 5 mg tablet,delayed release (DR/EC)  Clotrimazole-Betamethasone Topical Cream  1 %-0.05 %  Olmesartan Oral 40 mg tablet  Eliquis (Apixaban Oral) 2.5 mg tablet  Hydrocodone-Acetaminophen Oral 10 mg-325 mg  Rosuvastatin Calcium Oral 5 mg tablet  Family History:  No significant family history of malignancy on record.     Social History:  Never smoker.   Drinks 1 cup of caffeinated beverages per day.   Does not drink alcohol.   Health Maintenance:  Up-to-date    Vital Signs:  Blood pressure: 128/68, Pulse: 155, Temperature: 98 F, Respirations: 12, O2 sat: 98%, Pain Scale: 5, Height: 64 in, Weight: 233 lb, BSA: 2.09, BMI: 39.99 kg/m2     Physical Exam (Gyn Oncology):  General: Alert and oriented x3, no acute distress; obese  HEENT: Normocephalic, atraumatic  Lymph node exam: No supraclavicular, submandibular or cervical lymphadenopathy  CV: Regular rate and rhythm, no murmurs, rubs or gallops  Resp: Clear to auscultation bilaterally, no wheezes, rales or rhonchi  Abdomen: Soft, non-tender to palpation, no rebound or guarding; no palpable abdominal mass  Lower Extremity Exam: No lower extremity edema, no palpable cords  Neuro: Cranial Nerves 2-12 intact, gross motor skills intact  Genitourinary exam: Deferred    Laboratory Data:      Imaging:    Problems:         Assessment & Plan (Gyn Oncology):  Yecenia Kwan is a very sweet 76-year-old female with a history of an abnormal uterine ultrasound and post-menopausal bleeding who had an endometrial biopsy on 1/28/21, but this was incomplete due to insufficient specimen samples. Per report from the family the patient was diagnosed with complex atypical hyperplasia. The patient presents today for a consultation and treatment recommendations.     Yecenia, her daughter and I reviewed her recent history of postmenopausal bleeding. This has been occurring over the last 6 to 8 months. They state they were told that they had an endometrial biopsy that revealed complex atypical hyperplasia. We reviewed that the general standard of care for this diagnosis is a  robotic assisted total laparoscopic hysterectomy with bilateral salpingo-oophorectomy and possible sentinel lymph node biopsies depending on intraoperative frozen section analysis. We discussed alternatives that which can include radiation or possibly hormonal therapy. At this point, Yecenia and her daughter are not interested in pursuing surgical management or radiation therapy. I do feel this is very reasonable considering her history of stroke x2 and substantial risks associated with surgery within the setting of the potential need to hold her anticoagulation. We discussed an alternative including hormonal therapy. We discussed tamoxifen in combination with Megace versus placement of a Mirena IUD. Yecenia's daughter states she herself has a Mirena IUD and having the ability to avoid more pills for Yecenia did take may be in her best interest. She is concerned that placing the IUD in the office is to burdensome for her quality of life and asks if we can proceed to the operating room for this procedure. We then discussed the possibility to perform a diagnostic hysteroscopy and hysteroscopic guided curettage with placement of the IUD at the same time. We discussed the risks of surgery which do still include include risk of bleeding or infection and potential damage to surrounding structures. Although the risks of this procedure are substantially lower than hysterectomy. Yecenia and her daughter were in agreement and would like to proceed with this plan. We discussed the need for preoperative history and physical and COVID-19 test prior to surgery. They were very understanding. I feel answered all questions to the best of my ability. We did discuss long-term management will include surveillance with ultrasounds and endometrial biopsies depending on Yecenia status.    Thank you for allowing me to participate in the care of this patient.    A total of 45 minutes were spent with 3 minutes in reviewing records, 40 minutes in  discussion and exam, 2 minutes ordering labs and sending prescriptions.    Pain Care Management:  Pain Scale: 5    Patient Care needs:  Depressions Status: Not recorded on visit  Smoking Status: Not recorded    Documentation assistance provided by judith Ramirez for Dr. Kenyatta Ambrosio, on 07/02/2021. The patient and family/friends if present were apprised of the use of a scribe through remote viewing and all parties consented to conducting the visit in this manner.    Kenyatta Kothari MD  Phone: [phone]  Fax: [phone]

## 2021-07-20 NOTE — TELEPHONE ENCOUNTER
Having shortness of breath and wheezing when laying down  -has had for about 2 weeks  -tonight is way worse    168/102  HR 98     History of stroke    No weakness/numbness  No fever  No chest pain   No confusion     Triaged to a disposition of Go to ED Now. Patient and caller are agreeable.     COVID 19 Nurse Triage Plan/Patient Instructions    Please be aware that novel coronavirus (COVID-19) may be circulating in the community. If you develop symptoms such as fever, cough, or SOB or if you have concerns about the presence of another infection including coronavirus (COVID-19), please contact your health care provider or visit [email or web address].     Disposition/Instructions    ED Visit recommended. Follow protocol based instructions.     Bring Your Own Device:  Please also bring your smart device(s) (smart phones, tablets, laptops) and their charging cables for your personal use and to communicate with your care team during your visit.    Thank you for taking steps to prevent the spread of this virus.  o Limit your contact with others.  o Wear a simple mask to cover your cough.  o Wash your hands well and often.    Resources    [facility]: About COVID-19: [email or web address]    CDC: What to Do If You're Sick: www.cdc.gov/coronavirus/2019-ncov/about/steps-when-sick.html    CDC: Ending Home Isolation: www.cdc.gov/coronavirus/2019-ncov/hcp/disposition-in-home-patients.html     CDC: Caring for Someone: www.cdc.gov/coronavirus/2019-ncov/if-you-are-sick/care-for-someone.html     [facility]: Interim Guidance for Hospital Discharge to Home: www.health.[facility].mn.us/diseases/coronavirus/hcp/hospdischarge.pdf    [facility] clinical trials (COVID-19 research studies): [email or web address]     Below are the COVID-19 hotlines at the [facility] ([facility]). Interpreters are available.   o For health questions: Call 573-693-4427 or 1-862.855.2002 (7 a.m. to 7  p.m.)  o For questions about schools and childcare: Call 328-063-6976 or 1-276.312.8485 (7 a.m. to 7 p.m.)     Reason for Disposition    [1] Systolic BP  >= 160 OR Diastolic >= 100 AND [2] cardiac or neurologic symptoms (e.g., chest pain, difficulty breathing, unsteady gait, blurred vision)    Additional Information    Negative: Difficult to awaken or acting confused (e.g., disoriented, slurred speech)    Negative: Severe difficulty breathing (e.g., struggling for each breath, speaks in single words)    Negative: [1] Weakness of the face, arm or leg on one side of the body AND [2] new onset    Negative: [1] Numbness (i.e., loss of sensation) of the face, arm or leg on one side of the body AND [2] new onset    Negative: [1] Chest pain lasts > 5 minutes AND [2] history of heart disease  (i.e., heart attack, bypass surgery, angina, angioplasty, CHF)    Negative: [1] Chest pain AND [2] took nitrogylcerin AND [3] pain was not relieved    Negative: Sounds like a life-threatening emergency to the triager    Negative: Symptom is main concern  (e.g., headache, chest pain)    Negative: Low blood pressure is main concern    Protocols used: HIGH BLOOD PRESSURE--    Denise Hwang RN on 7/20/2021 at 1:43 AM

## 2021-07-21 ENCOUNTER — PATIENT OUTREACH (OUTPATIENT)
Dept: GERIATRIC MEDICINE | Facility: CLINIC | Age: 76
End: 2021-07-21

## 2021-07-21 ENCOUNTER — RECORDS - HEALTHEAST (OUTPATIENT)
Dept: ADMINISTRATIVE | Facility: CLINIC | Age: 76
End: 2021-07-21

## 2021-07-21 LAB — NT-PROBNP SERPL-MCNC: 5679 PG/ML (ref 0–450)

## 2021-07-21 NOTE — PROGRESS NOTES
[facility] Care Coordination Contact     W,spoke w/mbr daughter Estrellita to remind mbr is due for annual wellness and diabetic eye exam. Estrellita noted mbr scheduled for  annual wellness exam on 7/27 and she will schedule eye exam.        SAIMA Barbosa  [facility]  [phone]

## 2021-07-22 ENCOUNTER — TELEPHONE (OUTPATIENT)
Dept: FAMILY MEDICINE | Facility: CLINIC | Age: 76
End: 2021-07-22

## 2021-07-22 NOTE — TELEPHONE ENCOUNTER
----- Message from MERON Danielson CNP sent at 7/21/2021  9:22 PM CDT -----  Please notify patient that her BNP is significantly elevated. (This is the lab we use to help determine if heart failure could be causing your symptoms.) I recommend closely monitoring for relief of symptoms with the diuretic that I prescribed. It would be okay to increase the furosemide to 40 mg daily until hervisit with Fercho Gonzalez next week. If there is any worsening swelling or difficulty breathing I would recommend going the ER for hospital management. Your kidney function and electrolytes are normal. Please let me know if you have any questions.

## 2021-07-22 NOTE — TELEPHONE ENCOUNTER
Left message for patient to call back.  Please notify patient of the below message upon return phone call.

## 2021-07-27 ENCOUNTER — OFFICE VISIT (OUTPATIENT)
Dept: FAMILY MEDICINE | Facility: CLINIC | Age: 76
End: 2021-07-27
Payer: COMMERCIAL

## 2021-07-27 ENCOUNTER — TELEPHONE (OUTPATIENT)
Dept: CARDIOLOGY | Facility: CLINIC | Age: 76
End: 2021-07-27

## 2021-07-27 VITALS
HEART RATE: 61 BPM | BODY MASS INDEX: 40.34 KG/M2 | HEIGHT: 63 IN | OXYGEN SATURATION: 97 % | WEIGHT: 227.7 LBS | SYSTOLIC BLOOD PRESSURE: 154 MMHG | DIASTOLIC BLOOD PRESSURE: 110 MMHG

## 2021-07-27 DIAGNOSIS — N85.02 COMPLEX ATYPICAL ENDOMETRIAL HYPERPLASIA: Primary | ICD-10-CM

## 2021-07-27 DIAGNOSIS — I48.0 PAROXYSMAL ATRIAL FIBRILLATION (H): ICD-10-CM

## 2021-07-27 DIAGNOSIS — E66.01 MORBID OBESITY (H): ICD-10-CM

## 2021-07-27 DIAGNOSIS — I50.9 ACUTE CONGESTIVE HEART FAILURE, UNSPECIFIED HEART FAILURE TYPE (H): ICD-10-CM

## 2021-07-27 DIAGNOSIS — R79.89 ELEVATED BRAIN NATRIURETIC PEPTIDE (BNP) LEVEL: ICD-10-CM

## 2021-07-27 DIAGNOSIS — Z86.73 HX OF ISCHEMIC LEFT MCA STROKE: ICD-10-CM

## 2021-07-27 DIAGNOSIS — I10 ESSENTIAL HYPERTENSION, BENIGN: ICD-10-CM

## 2021-07-27 DIAGNOSIS — Z11.59 ENCOUNTER FOR SCREENING FOR OTHER VIRAL DISEASES: ICD-10-CM

## 2021-07-27 DIAGNOSIS — E11.69 TYPE 2 DIABETES MELLITUS WITH OTHER SPECIFIED COMPLICATION, WITHOUT LONG-TERM CURRENT USE OF INSULIN (H): ICD-10-CM

## 2021-07-27 LAB
ALBUMIN SERPL-MCNC: 3.8 G/DL (ref 3.5–5)
ALP SERPL-CCNC: 104 U/L (ref 45–120)
ALT SERPL W P-5'-P-CCNC: 39 U/L (ref 0–45)
ANION GAP SERPL CALCULATED.3IONS-SCNC: 15 MMOL/L (ref 5–18)
AST SERPL W P-5'-P-CCNC: 21 U/L (ref 0–40)
BILIRUB SERPL-MCNC: 0.8 MG/DL (ref 0–1)
BNP SERPL-MCNC: 833 PG/ML (ref 0–137)
BUN SERPL-MCNC: 23 MG/DL (ref 8–28)
CALCIUM SERPL-MCNC: 9.5 MG/DL (ref 8.5–10.5)
CHLORIDE BLD-SCNC: 108 MMOL/L (ref 98–107)
CO2 SERPL-SCNC: 20 MMOL/L (ref 22–31)
CREAT SERPL-MCNC: 0.74 MG/DL (ref 0.6–1.1)
ERYTHROCYTE [DISTWIDTH] IN BLOOD BY AUTOMATED COUNT: 16.4 % (ref 10–15)
GFR SERPL CREATININE-BSD FRML MDRD: 80 ML/MIN/1.73M2
GLUCOSE BLD-MCNC: 122 MG/DL (ref 70–125)
HBA1C MFR BLD: 6.1 % (ref 0–5.6)
HCT VFR BLD AUTO: 41.4 % (ref 35–47)
HGB BLD-MCNC: 12.9 G/DL (ref 11.7–15.7)
MCH RBC QN AUTO: 24.9 PG (ref 26.5–33)
MCHC RBC AUTO-ENTMCNC: 31.2 G/DL (ref 31.5–36.5)
MCV RBC AUTO: 80 FL (ref 78–100)
PLAT MORPH BLD: NORMAL
PLATELET # BLD AUTO: 185 10E3/UL (ref 150–450)
POTASSIUM BLD-SCNC: 4.2 MMOL/L (ref 3.5–5)
PROT SERPL-MCNC: 6.4 G/DL (ref 6–8)
RBC # BLD AUTO: 5.18 10E6/UL (ref 3.8–5.2)
RBC MORPH BLD: NORMAL
SODIUM SERPL-SCNC: 143 MMOL/L (ref 136–145)
WBC # BLD AUTO: 7 10E3/UL (ref 4–11)

## 2021-07-27 PROCEDURE — 80053 COMPREHEN METABOLIC PANEL: CPT | Performed by: FAMILY MEDICINE

## 2021-07-27 PROCEDURE — 85027 COMPLETE CBC AUTOMATED: CPT | Performed by: FAMILY MEDICINE

## 2021-07-27 PROCEDURE — 36415 COLL VENOUS BLD VENIPUNCTURE: CPT | Performed by: FAMILY MEDICINE

## 2021-07-27 PROCEDURE — 83880 ASSAY OF NATRIURETIC PEPTIDE: CPT | Performed by: FAMILY MEDICINE

## 2021-07-27 PROCEDURE — 99214 OFFICE O/P EST MOD 30 MIN: CPT | Performed by: FAMILY MEDICINE

## 2021-07-27 PROCEDURE — 83036 HEMOGLOBIN GLYCOSYLATED A1C: CPT | Performed by: FAMILY MEDICINE

## 2021-07-27 ASSESSMENT — MIFFLIN-ST. JEOR: SCORE: 1490.58

## 2021-07-27 NOTE — TELEPHONE ENCOUNTER
----- Message from Luis Aricky WALDEN Robbin sent at 7/27/2021  8:48 AM CDT -----  Regarding: [facility] PT  General phone call:  Caller: Yecenia  Primary cardiologist: SERGIO  Detailed reason for call: Please give daughter a call regarding pt appt with PCP and concerns on breathing issues.   Best phone number: [phone]  Best time to contact: any  Ok to leave a detailed message? yes  Additional Info:      Returned call to pt and her daughter. Yecenia has been following up with Dr. Mcmullen for elevated BNP associated with difficulty laying flat due to SOB and leg swelling. She was advised to increase her lasix to 40 mg daily. She has an appointment with her PCP today for reassessment. She is also having an oncology procedure done and will be seen today additionally for pre-op H&P. She states her weights have been stable - she weighs herself a few times a week. Pt was encouraged to start weighing herself more frequently and record these numbers to assess outcome of increased diuresis. She last saw Dr. Hillman on 1/22/21 and was advised to follow-up in 2 weeks. She is overdo to be seen.     Additionally, they share frustrations with the a-fib clinic. They were expecting follow-up calls to see how Yecenia is doing. Informed them that when atrial fibrillation is stable and well controlled follow-up may not be as frequent. Pt denies issues with heart palpitations. However pt is overdo to be seen in a fib clinic-  pt saw Caitlin 3/12 and was advised to follow-up in 6-8 weeks.    They are agreeable to scheduling follow up appointments. msg sent to scheduling to arrange.

## 2021-07-28 DIAGNOSIS — R60.0 LOWER EXTREMITY EDEMA: ICD-10-CM

## 2021-07-28 RX ORDER — FUROSEMIDE 20 MG
40 TABLET ORAL DAILY
Qty: 60 TABLET | Refills: 3 | Status: SHIPPED | OUTPATIENT
Start: 2021-07-28 | End: 2022-01-18

## 2021-07-28 NOTE — TELEPHONE ENCOUNTER
Patients daughter called requesting a refill of this prescription and stated it was suppose to increased to 40mg. Can this be adjusted if appropriate.     Patients daughter also stated that she would call back to provide the information to a place to help with Pt pain management. The facility is called [facility] in [place] fax number is [phone] esteban Milian and fax over most recent office notes

## 2021-07-29 ENCOUNTER — NURSE TRIAGE (OUTPATIENT)
Dept: NURSING | Facility: CLINIC | Age: 76
End: 2021-07-29

## 2021-07-29 NOTE — TELEPHONE ENCOUNTER
Please notify daughter Estrellita that I looked at the clinic. They provide only medical marijuana certification, they do not prescribe or dispense.  I can provide the certification myself. They do not need to go to this clinic.   I will send it over to the MD, I just need an email for Yecenia to use for the referral (they will send information to this email address) and I need the answer to these 3 questions:  1) average pain daily (1-10)  2) worse pain rating (1-10)  3) how much does it interfere with daily activities

## 2021-07-29 NOTE — TELEPHONE ENCOUNTER
"RN Triage:    Daughter, Estrellita is calling regarding two things:    1.  Second request:  Request for medical marijuana for Yecenia's chronic pain in shoulder and other joints.  Estrellita talked to \"someone\" yesterday who said they would send the request to Dr. Gonzalez.  Please see note titled \"refill\" from 7/28/21 from Vicki Varner    2.  Echocardiogram order.  Writer found order for echo in EHR, but no one from scheduling has called her.  Writer transferred caller to scheduling.    Request:  Estrellita is awaiting a callback from clinic regarding request for medical marijuana.  Estrellita: [phone]    Shawna Swain RN 07/29/21 10:57 AM  [facility] Nurse Advisor  "

## 2021-07-30 NOTE — TELEPHONE ENCOUNTER
Spoke with patients daughter and notified her of the below message.     Email: [email or web address]    6/10  6/10  7/10

## 2021-07-30 NOTE — TELEPHONE ENCOUNTER
Please notify pt's daughter I have submitted the request to [employer] for the medical cannabis program and they should hear from them over the next week

## 2021-08-02 ENCOUNTER — TELEPHONE (OUTPATIENT)
Dept: FAMILY MEDICINE | Facility: CLINIC | Age: 76
End: 2021-08-02

## 2021-08-02 ENCOUNTER — HOSPITAL ENCOUNTER (OUTPATIENT)
Dept: CARDIOLOGY | Facility: CLINIC | Age: 76
Discharge: HOME | End: 2021-08-02
Attending: FAMILY MEDICINE | Admitting: FAMILY MEDICINE
Payer: COMMERCIAL

## 2021-08-02 DIAGNOSIS — I48.0 PAROXYSMAL ATRIAL FIBRILLATION (H): ICD-10-CM

## 2021-08-02 DIAGNOSIS — R79.89 ELEVATED BRAIN NATRIURETIC PEPTIDE (BNP) LEVEL: ICD-10-CM

## 2021-08-02 LAB — LVEF ECHO: NORMAL

## 2021-08-02 PROCEDURE — 93306 TTE W/DOPPLER COMPLETE: CPT | Mod: 26 | Performed by: INTERNAL MEDICINE

## 2021-08-02 PROCEDURE — 93306 TTE W/DOPPLER COMPLETE: CPT

## 2021-08-02 NOTE — TELEPHONE ENCOUNTER
Patients daughter called very upset wanting to talk to Dr. Gonzalez regarding echo that Patient had done. Daughter said she was so upset with someone over at [facility] (cardiologist) telling the patient that should stop taking the flecainide immediately because it is not working and that she has dementia which she doesn't have.     Patients daughter Missy Haro was also denied the information regarding the results when she is on the consent to communicate and is the power of  which was very upsetting to both daughter and patient.     They really would like to address this with Dr.Lockhart phelps if possible.

## 2021-08-03 NOTE — TELEPHONE ENCOUNTER
Patients daughter calling back and states Dr. Gonzalez was supposed to call her first thing this morning and she has yet to hear back.     Patients daughter expressed frustration that she has not heard from Dr. Gonzalez or her assistant yet.       Spoke with Tiki who said she would call her.  Please contact the patients daughter.   Phone number: [phone]

## 2021-08-03 NOTE — TELEPHONE ENCOUNTER
I spoke with patient's daughter Estrellita.  She is wanting to transfer cardiology care to [facility].  We are concerned about her ongoing uncontrolled atrial fibrillation with RVR.  She now has an ejection fraction of 20 to 25% and a dilated left atrium.  She is advised to stop her flecainide and continue with her Lasix 40 mg twice daily.  I spoke directly with the cardiologist at [facility] and they were looking to get her in as soon as possible, hopefully within the week.

## 2021-08-04 ENCOUNTER — TRANSFERRED RECORDS (OUTPATIENT)
Dept: HEALTH INFORMATION MANAGEMENT | Facility: CLINIC | Age: 76
End: 2021-08-04

## 2021-08-05 ENCOUNTER — TRANSFERRED RECORDS (OUTPATIENT)
Dept: HEALTH INFORMATION MANAGEMENT | Facility: CLINIC | Age: 76
End: 2021-08-05

## 2021-08-06 ENCOUNTER — TRANSFERRED RECORDS (OUTPATIENT)
Dept: HEALTH INFORMATION MANAGEMENT | Facility: CLINIC | Age: 76
End: 2021-08-06

## 2021-08-06 NOTE — PATIENT INSTRUCTIONS - HE
Patient Instructions by Martha Gonzalez MD at 12/7/2020  2:00 PM     Author: Martha Gonzalez MD Service: -- Author Type: Physician    Filed: 12/12/2020  8:33 AM Encounter Date: 12/7/2020 Status: Signed    : Martha Gonzalez MD (Physician)         Patient Education     Your Health Risk Assessment indicates you feel you are not in good physical health.    A healthy lifestyle helps keep the body fit and the mind alert. It helps protect you from disease, helps you fight disease, and helps prevent chronic disease (disease that doesn't go away) from getting worse. This is important as you get older and begin to notice twinges in muscles and joints and a decline in the strength and stamina you once took for granted. A healthy lifestyle includes good healthcare, good nutrition, weight control, recreation, and regular exercise. Avoid harmful substances and do what you can to keep safe. Another part of a healthy lifestyle is stay mentally active and socially involved.    Good healthcare     Have a wellness visit every year.     If you have new symptoms, let us know right away. Don't wait until the next checkup.     Take medicines exactly as prescribed and keep your medicines in a safe place. Tell us if your medicine causes problems.   Healthy diet and weight control     Eat 3 or 4 small, nutritious, low-fat, high-fiber meals a day. Include a variety of fruits, vegetables, and whole-grain foods.     Make sure you get enough calcium in your diet. Calcium, vitamin D, and exercise help prevent osteoporosis (bone thinning).     If you live alone, try eating with others when you can. That way you get a good meal and have company while you eat it.     Try to keep a healthy weight. If you eat more calories than your body uses for energy, it will be stored as fat and you will gain weight.     Recreation   Recreation is not limited to sports and team events. It includes any activity that provides relaxation,  interest, enjoyment, and exercise. Recreation provides an outlet for physical, mental, and social energy. It can give a sense of worth and achievement. It can help you stay healthy.       Patient Education    Home Fire Safety  Each year, thousands of people, including children, are injured and killed in home fires. Children are often curious about fire, and may not understand the dangers. This makes home fire safety practices especially important. Three important things you can do to keep your home safe from fire are:    Install smoke alarms in your home and make sure they work properly.    Teach children not to play with matches, lighters, and other materials that can be used to start fires. And keep these materials out of childrens reach.    Teach children what to do in case of fire. Create a fire safety action plan and practice it.  Read on for more details about keeping your family and home safe from fire.        Being Prepared for a Fire  A home fire can happen at any time. The following can help you be prepared:    Install smoke alarms on every level of your home, including the basement and outside all sleeping areas. This simple step cuts your familys risk of dying in a fire nearly in half.    Test smoke alarms monthly, and change the batteries once a year or when the alarm chirps.    Dont disable smoke alarms, even for a short time.    Ask your local fire department for tips on where to place smoke alarms in your home.    Replace all smoke alarms every 10 years.    Consider using voice smoke alarms. These alarms allow you to substitute your own voice for the alarm sound. They are helpful because many children dont wake up to the sound of a regular smoke alarm.    Install carbon monoxide detectors near sleeping areas.    Be aware that carbon monoxide is a byproduct of smoke that can be deadly. Its a gas that you cant see, smell, or taste.    Consider buying a combination smoke alarm / carbon monoxide  detector.    Keep fire extinguishers in the home.    Keep them in accessible locations, especially in the kitchen.    Check usage dates to make sure they are not .    Use fire extinguishers only when the fire is in a contained area and is not spreading. (Otherwise, you should focus on getting out of the home.)    Train adults to use fire extinguishers. (Children should focus on getting out of the home during a fire.)    If you live in an apartment, talk to your landlord about where smoke alarms are and how often they are tested. Also ask about fire extinguisher locations and emergency exit routes.  Indoor Fire Safety  Many things in your home are potential fire hazards. Follow these steps to help keep your home safe.    Be careful in the kitchen.    Never leave food thats cooking unattended.    If a fire breaks out in a cooking pan, put a lid on it to smother it. And never throw water on a grease fire. It will make the fire worse.    Conduct a home safety inspection. Look for anything, such as frayed wires and cords, that can cause a fire. Fix or remove any fire safety hazards you find.    Keep all matches and lighters in a secured drawer or cabinet out of the reach of children. Use childproof lighters.    Check to make sure all appliances, including the stove, are turned off before leaving the home.    Know where the gas main shut-off is located.    Make sure space heaters are stable and have protective covers. Keep them at least 3 feet from anything that can burn, such as curtains. Dont use space heaters in areas where young kids spend time alone.    Keep flammable liquids such as kerosene and gasoline locked up and safely stored away from kids and heat.    Keep all smoking materials out of reach of children. And never smoke in bed. If possible, smoke outdoors only.  Outdoor Fire Safety  Fire can be a hazard outdoors as well as indoors. When outdoors, be sure to do the following:    Always supervise kids  near a barbecue grill, campfire, or portable stove.    Dont use fire pits around children. Kids can fall into them, and pits can be hot even after the fire goes out.    Keep a garden hose or fire extinguisher handy when cooking outdoors in case of fire.  Teaching Your Child About Fire Safety  One of the best ways to keep your home safe from fire is education. Make sure everyone in your family knows fire safety rules, including children.    Teach your children the dangers of matches, lighters, and other dangerous items.    Teach them to never touch these and other objects that are hot, such as candles.    Have them tell you right away whenever they find matches or lighters. Explain that these items are tools for grown-ups, not toys. And never amuse children with matches or lighters.    Round up all matches and lighters and store them safely. In case you missed some, ask your children to tell you where any are located throughout your home.    Never leave a child alone in a room with a lit candle. Dont allow teens to have candles in their rooms.    Show children what to do in case of fire.    Be sure your kids know what the fire alarm sounds like and what to do if it goes off.    Teach kids what to do if their clothes catch fire: Stop, Drop to the ground, and Roll until the fire is put out. They should also cover their face with their hands. Practice these steps with your children. Make sure they understand that running will make the fire burn faster.    Show children how to crawl below smoke during a fire.    Make sure kids know at least two escape routes from each room in the home. These escape routes can be windows.    Teach kids to test doors for nearby fire by feeling for heat with the back of their hand. If the door is warm or hot, they should try their second exit.    Explain to children that they cant hide from a fire. Hiding in a closet or under a bed wont make them safe. Instead, they should try to escape the  home. And if they cant escape, they should let others know they are trapped. They can do this by shutting the door to the room, opening a window, and turning on the lights.    Talk to your local fire department.    Introduce your children to a . Let them know that firefighters will look different when in full protective gear. Tell them to never hide from firefighters, and to follow all directions from firefighters during a fire.    Find out if the fire department has a fire safety program for kids.      Create a Fire Safety Plan  Create a plan for your family to follow in case of a fire. Try making it a family project. Important steps for the plan include leaving the home right away and having a designated meeting place.    Make sure your child understands to get out and stay out. He or she should get out of the home immediately and not go back in, even if family members or pets are still inside.    Decide on a safe meeting place away from the home for everyone to gather.    Teach children to call 911 or emergency services from a cell phone or neighbors phone. Make sure they know to do this only after they are safely out of the home.    Teach your children the fire safety plan. Practice it and make sure they understand it.    Have fire drills twice a year to keep your children prepared in case of fire.    Visit the National Fire Protection Association web site at www.nfpa.org for more information.      0462-6344 [employer]. [address] [place] [address]. All rights reserved. This information is not intended as a substitute for professional medical care. Always follow your healthcare professional's instructions.         Patient Education   Activities of Daily Living  Your Health Risk Assessment indicates you have difficulties with activities of daily living such as eating, getting dressed, grooming, bathing, walking, or using the toilet. Please make a follow up appointment for us  to address this issue in more detail.     Patient Education   Instrumental Activities of Daily Living  Your Health Risk Assessment indicates you have difficulties with instrumental activities of daily living which include laundry, housekeeping, banking, shopping, using the telephone, food preparation, transportation, or taking your own medications. Please make a follow up appointment for us to address this issue in more detail.    [facility] has resources available on the following website: [email or web address]     Also, here is a local agency that provides help with meals and other assistance:   [facility] Line: [phone]     Patient Education   Your Health Risk Assessment indicates you feel you are not in good emotional health.    Recreation   Recreation is not limited to sports and team events. It includes any activity that provides relaxation, interest, enjoyment, and exercise. Recreation provides an outlet for physical, mental, and social energy. It can give a sense of worth and achievement. It can help you stay healthy.    Mental Exercise and Social Involvement  Mental and emotional health is as important as physical health. Keep in touch with friends and family. Stay as active as possible. Continue to learn and challenge yourself.   Things you can do to stay mentally active are:    Learn something new, like a foreign language or musical instrument.     Play SCRABBLE or do crossword puzzles. If you cannot find people to play these games with you at home, you can play them with others on your computer through the Internet.     Join a games club--anything from card games to chess or checkers or lawn bowling.     Start a new hobby.     Go back to school.     Volunteer.     Read.     Keep up with world events.       Patient Education   Depression and Suicide in Older Adults  Nearly 2 million older Americans have some type of depression. Sadly, some of them even take their own lives.  Yet depression among older adults is often ignored. Learn the warning signs. You may help spare a loved one needless pain. You may also save a life.       What Is Depression?  Depression is a mood disorder that affects the way you think and feel. The most common symptom is a feeling of deep sadness. People who are depressed also may seem tired and listless. And nothing seems to give them pleasure. Its normal to grieve or be sad sometimes. But sadness lessens or passes with time. Depression rarely goes away or improves on its own. Other symptoms of depression are:    Sleeping more or less than normal    Eating more or less than normal    Having headaches, stomachaches, or other pains that dont go away    Feeling nervous, empty, or worthless    Crying a great deal    Thinking or talking about suicide or death    Feeling confused or forgetful  What Causes It?  The causes of depression arent fully known. Certain chemicals in the brain play a role. Depression does run in families. And life stresses can also trigger depression in some people. That may be the case with older adults. They often face great burdens, such as the death of friends or a spouse. They may have failing health. And they are more likely to be alone, lonely, or poor.  How You Can Help  Often, depressed people may not want to ask for help. When they do, they may be ignored. Or, they may receive the wrong treatment. You can help by showing parents and older friends love and support. If they seem depressed, help them find the right treatment. Talk to your doctor. Or contact a local mental health center, social service agency, or hospital. With modern treatment, no one has to suffer from depression.  Resources:    National [place] of Mental Health  151.550.1549  www.nimh.nih.gov    National [place] on Mental Illness  194.656.6671  www.brennen.org    [facility]  136.907.7352  www.[facility].org    National Suicide Hotline  979.783.5787 (800-SUICIDE)       6273-2974 [employer]. [address] [place] [address], [place] [address]. All rights reserved. This information is not intended as a substitute for professional medical care. Always follow your healthcare professional's instructions.           Advance Directive  Patients advance directive was discussed and I am comfortable with the patients wishes.  Patient Education   Personalized Prevention Plan  You are due for the preventive services outlined below.  Your care team is available to assist you in scheduling these services.  If you have already completed any of these items, please share that information with your care team to update in your medical record.  Health Maintenance   Topic Date Due   ? DEPRESSION ACTION PLAN  1945   ? DEXA SCAN  1945   ? ZOSTER VACCINES (2 of 2) 02/15/2016   ? Pneumococcal Vaccine: 65+ Years (2 of 2 - PPSV23) 04/02/2016   ? MAMMOGRAM  10/26/2019   ? INFLUENZA VACCINE RULE BASED (1) 08/01/2020   ? MEDICARE ANNUAL WELLNESS VISIT  12/07/2021   ? FALL RISK ASSESSMENT  12/07/2021   ? TD 18+ HE  04/26/2022   ? COLORECTAL CANCER SCREENING  10/18/2023   ? ADVANCE CARE PLANNING  12/07/2025   ? LIPID  12/08/2025   ? HEPATITIS C SCREENING  Completed   ? Pneumococcal Vaccine: Pediatrics (0 to 5 Years) and At-Risk Patients (6 to 64 Years)  Aged Out   ? HEPATITIS B VACCINES  Aged Out

## 2021-08-07 ENCOUNTER — TRANSFERRED RECORDS (OUTPATIENT)
Dept: HEALTH INFORMATION MANAGEMENT | Facility: CLINIC | Age: 76
End: 2021-08-07

## 2021-08-08 PROBLEM — I50.9 CHF (CONGESTIVE HEART FAILURE) (H): Status: ACTIVE | Noted: 2021-08-08

## 2021-08-08 LAB
CREATININE (EXTERNAL): 0.73 MG/DL (ref 0.57–1.11)
GFR ESTIMATED (EXTERNAL): >60 ML/MIN/1.73M2
GFR ESTIMATED (IF AFRICAN AMERICAN) (EXTERNAL): >60 ML/MIN/1.73M2
GLUCOSE (EXTERNAL): 112 MG/DL (ref 65–100)
POTASSIUM (EXTERNAL): 4.5 MMOL/L (ref 3.5–5)

## 2021-08-08 NOTE — PROGRESS NOTES
Assessment & Plan     Complex atypical endometrial hyperplasia  Canceled planned surgery due to acute CHF, will reschedule when medically stable    Paroxysmal atrial fibrillation (H)  PAF in RVR- plan echo for further evaluation, metoprolol dose adjustment, referral to Cardiology.  Patient and family request changing cardiology to [facility].  Referral placed. Personally discussed with Cardiologist with plans for more urgent evaluation  - Comprehensive metabolic panel (BMP + Alb, Alk Phos, ALT, AST, Total. Bili, TP); Future  - B-Type Natriuretic Peptide ( [facility] Only); Future  - Echocardiogram Complete; Future  - Comprehensive metabolic panel (BMP + Alb, Alk Phos, ALT, AST, Total. Bili, TP)  - B-Type Natriuretic Peptide ( [facility] Only)    Essential hypertension, benign  BP elevated, plan to increase metoprolol after review of echo  - CBC with platelets; Future  - Comprehensive metabolic panel (BMP + Alb, Alk Phos, ALT, AST, Total. Bili, TP); Future  - CBC with platelets  - Comprehensive metabolic panel (BMP + Alb, Alk Phos, ALT, AST, Total. Bili, TP)  - RBC and Platelet Morphology    Type 2 diabetes mellitus with other specified complication, without long-term current use of insulin (H)  Well controlled- A1c 6.1  - Hemoglobin A1c; Future  - Hemoglobin A1c    Morbid obesity (H)      Hx of ischemic left MCA stroke  Speech difficulties, no known dementia    Elevated brain natriuretic peptide (BNP) level  Echo shows EF 20-25% which is a significant change from 2018 EF of 67%. Suspect due to Afib in RVR. Continue with furosemide BID, Cards to manage AFib  - Echocardiogram Complete; Future    Acute congestive heart failure, unspecified heart failure type (H)  As above    Martha Gonzalez MD  [facility]     Yecenia Kwan is a 76 year old female who presents to clinic today for the following health issues accompanied by her Grandson with her daughter calling into the  appointment:    HPI   77 yo female with multiple medical problems including A Fib, hx of CVA, HTN, T2DM, and new CHF with volume overload and elevated BNP.  She presented originally for preop exam due for hysteroscopy and possible IUD placement due to postmenopausal bleeding.  Given the recent change in her healthy status, we planned to cancel the surgery and further evaluate and treat her CHF.  She has been taking her furosemide twice daily now and her swelling is improved.She is concerned that this dose is high.  She is not feeling SOB but does feel quite fatigued.  She is sometimes inconsistent with her medications but has been taking her eliquis regularly.  Her family is concerned with her health.    Diabetes Follow-up    How often are you checking your blood sugar? One time daily  What time of day are you checking your blood sugars (select all that apply)?  Before meals  Have you had any blood sugars above 200?  No  Have you had any blood sugars below 70?  No    What symptoms do you notice when your blood sugar is low?  None and Not applicable    What concerns do you have today about your diabetes? None     Do you have any of these symptoms? (Select all that apply)  No numbness or tingling in feet.  No redness, sores or blisters on feet.  No complaints of excessive thirst.  No reports of blurry vision.  No significant changes to weight.                    Hyperlipidemia Follow-Up      Are you regularly taking any medication or supplement to lower your cholesterol?   Yes- crestor    Are you having muscle aches or other side effects that you think could be caused by your cholesterol lowering medication?  No    Hypertension Follow-up      Do you check your blood pressure regularly outside of the clinic? No     Are you following a low salt diet? Yes    Are your blood pressures ever more than 140 on the top number (systolic) OR more   than 90 on the bottom number (diastolic), for example 140/90? Yes    BP Readings  "from Last 2 Encounters:   07/27/21 (!) 154/110   07/20/21 138/78     Hemoglobin A1C (%)   Date Value   07/27/2021 6.1 (H)   12/08/2020 5.7 (H)   02/20/2009 6.2 (H)   03/27/2008 6.0     LDL Cholesterol Calculated (mg/dL)   Date Value   12/08/2020 188 (H)   12/05/2019 166 (H)   02/20/2009 145 (H)   07/11/2007 142 (H)       Cerebrovascular Follow-up      Patient history: ischemic cerebrovascular incident    Residual symptoms: Difficulty speaking    Worsened or new symptoms since last visit: No    Daily aspirin use: No- eliquis    Hypertension controlled: No has had difficult tolerating medications    Heart Failure Follow-up  Are you experiencing any shortness of breath? Yes, with activity only   How would you describe your shortness of breath?  Improved        Are you experiencing any swelling in your legs or feet?  Better than usual    Are you using more pillows than usual? No    Do you cough at night?  No    Do you check your weight daily?  No    Have you had a weight change recently?  No    Are you having any of the following side effects from your medications? (Select all that apply)  Fatigue and Swelling    Since your last visit, how many times have you gone to the cardiologist, urgent care, emergency room, or hospital because of your heart failure?   None      How many days per week do you miss taking your medication? 2    What makes it hard for you to take your medications?  remembering to take      Review of Systems   Constitutional, HEENT, cardiovascular, pulmonary, gi and gu systems are negative, except as otherwise noted.      Objective    BP (!) 154/110 (BP Location: Right arm, Patient Position: Sitting, Cuff Size: Adult Large)   Pulse 61   Ht 1.59 m (5' 2.6\")   Wt 103.3 kg (227 lb 11.2 oz)   SpO2 97%   BMI 40.85 kg/m    Body mass index is 40.85 kg/m .  Physical Exam   GENERAL: healthy, alert and no distress  NECK: no adenopathy, no asymmetry, masses, or scars and thyroid normal to palpation  RESP: " lungs clear to auscultation - no rales, rhonchi or wheezes  CV: tachycardia, irregularly irregular rhythm and 2+ bilateral lower extremity pitting edema to knees    ABDOMEN: soft, nontender, no hepatosplenomegaly, no masses and bowel sounds normal      Office Visit on 07/20/2021   Component Date Value Ref Range Status     Sodium 07/20/2021 140  136 - 145 mmol/L Final     Potassium 07/20/2021 4.6  3.5 - 5.0 mmol/L Final     Chloride 07/20/2021 107  98 - 107 mmol/L Final     Carbon Dioxide (CO2) 07/20/2021 23  22 - 31 mmol/L Final     Anion Gap 07/20/2021 10  5 - 18 mmol/L Final     Urea Nitrogen 07/20/2021 24  8 - 28 mg/dL Final     Creatinine 07/20/2021 0.71  0.60 - 1.10 mg/dL Final     Calcium 07/20/2021 9.4  8.5 - 10.5 mg/dL Final     Glucose 07/20/2021 109  70 - 125 mg/dL Final     GFR Estimate 07/20/2021 84  >60 mL/min/1.73m2 Final    As of July 11, 2021, eGFR is calculated by the CKD-EPI creatinine equation, without race adjustment. eGFR can be influenced by muscle mass, exercise, and diet. The reported eGFR is an estimation only and is only applicable if the renal function is stable.     N Terminal Pro BNP Outpatient 07/20/2021 5,679* 0 - 450 pg/mL Final    Reference range shown and results flagged as abnormal are for the outpatient, non acute settings. Establishing a baseline value for each individual patient is useful for follow-up.    Suggested inpatient cut points for confirming diagnosis of CHF in an acute setting are:  >450 pg/mL (age 18 to less than 50)  >900 pg/mL (age 50 to less than 75)  >1800 pg/mL (75 yrs and older)    An inpatient or emergency department NT-proPBNP <300 pg/mL effectively rules out acute CHF, with 99% negative predictive value.           Results for orders placed or performed in visit on 07/27/21   CBC with platelets     Status: Abnormal   Result Value Ref Range    WBC Count 7.0 4.0 - 11.0 10e3/uL    RBC Count 5.18 3.80 - 5.20 10e6/uL    Hemoglobin 12.9 11.7 - 15.7 g/dL     Hematocrit 41.4 35.0 - 47.0 %    MCV 80 78 - 100 fL    MCH 24.9 (L) 26.5 - 33.0 pg    MCHC 31.2 (L) 31.5 - 36.5 g/dL    RDW 16.4 (H) 10.0 - 15.0 %    Platelet Count 185 150 - 450 10e3/uL   Comprehensive metabolic panel (BMP + Alb, Alk Phos, ALT, AST, Total. Bili, TP)     Status: Abnormal   Result Value Ref Range    Sodium 143 136 - 145 mmol/L    Potassium 4.2 3.5 - 5.0 mmol/L    Chloride 108 (H) 98 - 107 mmol/L    Carbon Dioxide (CO2) 20 (L) 22 - 31 mmol/L    Anion Gap 15 5 - 18 mmol/L    Urea Nitrogen 23 8 - 28 mg/dL    Creatinine 0.74 0.60 - 1.10 mg/dL    Calcium 9.5 8.5 - 10.5 mg/dL    Glucose 122 70 - 125 mg/dL    Alkaline Phosphatase 104 45 - 120 U/L    AST 21 0 - 40 U/L    ALT 39 0 - 45 U/L    Protein Total 6.4 6.0 - 8.0 g/dL    Albumin 3.8 3.5 - 5.0 g/dL    Bilirubin Total 0.8 0.0 - 1.0 mg/dL    GFR Estimate 80 >60 mL/min/1.73m2   Hemoglobin A1c     Status: Abnormal   Result Value Ref Range    Hemoglobin A1C 6.1 (H) 0.0 - 5.6 %   B-Type Natriuretic Peptide ([facility] Only)     Status: Abnormal   Result Value Ref Range     (H) 0 - 137 pg/mL   RBC and Platelet Morphology     Status: None   Result Value Ref Range    Platelet Assessment  Automated Count Confirmed. Platelet morphology is normal.     Automated Count Confirmed. Platelet morphology is normal.    RBC Morphology Confirmed RBC Indices      Echocardiogram Complete    Result Date: 2021  [id number]^SHARON^MAISHA^[place] [address]  Name: GURVINDER DE DIOS MRN: [id number] : 1945 Study Date: 2021 03:13 PM Age: 76 yrs Gender: Female Patient Location: [facility] Reason For Study: Paroxysmal atrial fibrillation (H), Elevated brain natriuretic p Ordering Physician: MAISHA HERRERA Referring Physician: MAISHA HERRERA Performed By: BURTON  BSA: 2.0 m2 Height: 63 in Weight: 227 lb HR: 125 BP: 185/89 mmHg  ______________________________________________________________________________ ______________________________________________________________________________ Interpretation Summary  Normal left ventricular sie, mild eccentric LVH and severe global hypokinesis. Left ventricular filling pressure is elevated. The estimated left ventricular systolic function is 20-25%. Normal right ventricle size and systolic function. Moderate to severely enlarged left atrium. No obvious valvular disease.  The patient is atrial fib with rapid ventricular response 130-155 BPM.  No previous study for comparison.  ______________________________________________________________________________ I      WMSI = 2.00     % Normal = 0  X - Cannot   0 -                      (2) - Mildly 2 -          Segments  Size Interpret    Hyperkinetic 1 - Normal  Hypokinetic  Hypokinetic  1-2     small                                                    7 -          3-5    moderate 3 - Akinetic 4 -          5 -         6 - Akinetic Dyskinetic   6-14    large              Dyskinetic   Aneurysmal  w/scar       w/scar       15-16   diffuse  Left Ventricle The left ventricle is normal in size. There is mild eccentric left ventricular hypertrophy. The visual ejection fraction is 20-25%. Diastolic Doppler findings (E/E' ratio and/or other parameters) suggest left ventricular filling pressures are increased. There is severe global hypokinesia of the left ventricle.  Right Ventricle Normal right ventricle size and systolic function.  Atria The left atrium is moderate to severely dilated. The right atrium is mildly dilated.  Mitral Valve The mitral valve leaflets appear thickened, but open well. There is mild (1+) mitral regurgitation. There is no mitral valve stenosis.  Tricuspid Valve Tricuspid leaflets are thickened. There is trace to mild tricuspid regurgitation. There is no tricuspid stenosis.  Aortic Valve The aortic valve is trileaflet with aortic valve  sclerosis. No aortic regurgitation is present. No aortic stenosis is present.  Pulmonic Valve The pulmonic valve is not well seen, but is grossly normal.  Vessels The aorta root is normal. Inferior vena cava not well visualized for estimation of right atrial pressure.  Pericardium There is no pericardial effusion.  Rhythm The rhythm was rapid atrial fibrillation.  ______________________________________________________________________________ MMode/2D Measurements & Calculations IVSd: 1.5 cm LVIDd: 4.6 cm LVIDs: 3.9 cm LVPWd: 0.94 cm FS: 14.9 % LV mass(C)d: 211.7 grams LV mass(C)dI: 103.7 grams/m2  Ao root diam: 2.0 cm LA dimension: 3.5 cm asc Aorta Diam: 2.7 cm LA/Ao: 1.8 LVOT diam: 2.0 cm LVOT area: 3.1 cm2 LA Volume Indexed (AL/bp): 63.9 ml/m2  Time Measurements MM HR: 146.0 BPM  Doppler Measurements & Calculations MV E max galina: 111.3 cm/sec  MV dec time: 0.17 sec Ao V2 max: 79.7 cm/sec Ao max PG: 3.0 mmHg Ao V2 mean: 56.0 cm/sec Ao mean P.8 mmHg Ao V2 VTI: 11.9 cm JONI(I,D): 2.2 cm2 JONI(V,D): 2.1 cm2 LV V1 max P.2 mmHg LV V1 max: 54.5 cm/sec LV V1 VTI: 8.2 cm SV(LVOT): 25.7 ml SI(LVOT): 12.6 ml/m2 TR max galina: 289.0 cm/sec TR max P.4 mmHg JONI Index (cm2/m2): 1.1 E/E' av.4 Lateral E/e': 15.4 Medial E/e': 25.4  ______________________________________________________________________________ Report approved by: Sinan Gracia 2021 04:56 PM

## 2021-08-10 ENCOUNTER — PATIENT OUTREACH (OUTPATIENT)
Dept: GERIATRIC MEDICINE | Facility: CLINIC | Age: 76
End: 2021-08-10

## 2021-08-10 NOTE — PROGRESS NOTES
TRANSITIONS OF CARE (AMARILYS) LOG   AMARILYS tasks should be completed by the CC within one (1) business day of notification of each transition. Follow up contact with member is required after return to their usual care setting.  Note:  If CC finds out about the transitions fifteen (15) days or more after the member has returned to their usual care setting, no AMARILYS log is needed. However, the CC should check in with the member to discuss the transition process, any changes needed to the care plan and document it in a case note.    Member Name:  Yecenia Kwan [facility] Name:  [employer] Member ID#: [id number]   Product: [facility] Care Coordinator Contact:  Selena De La Cruz [facility] Agency/County/Care System: [employer]   Transition Communication Actions from Care Management Contact   Transition #1   Notification Date: 08/10/2021 Transition Date:   08/07/2021 Transition From: Home     Is this the member s usual care setting?               yes Transition To: Hospital, [facility]   Transition Type:  Unplanned  Reason for Admission/Comments:  CC contacted Hospital /discharge planner  (Care Management Team for Name and Phone Number) and left a message with this CC contact information, reviewed community POC as well requested to be notified of concerns, care conferences and discharge planning.  CC reached out to adult daughter Estrellita regarding transition and left a message requesting a return call.  Reviewed and update care plan as needed.  Notified community service providers and placed services None on hold as needed.  Transition log initiated.   PCP notified of hospitalization via EMR.    Admission reason:   Atrial fibrillation with rapid ventricular response (HC) (Primary Dx);   Weakness of right upper extremity;   Facial paresthesia;   On apixaban therapy;   Cardiomyopathy, unspecified type (HC);   Hypocalcemia;   HYPERTENSION, ESSENTIAL NOS;   Hypokalemia;   acute left Thalamic stroke (HC)     Shared CC  contact info, care plan/services with receiving setting--Date completed: 08/10/2021    Notified PCP of transition--Date completed:  08/10/2021     via  EMR   Transition #2   Transition #3  (if applicable)   Notification Date: 08/10/2021        Transition To:  Home  Transition Date: 08/09/2021     Transition Type:    Planned  Notified PCP -- Date completed: 08/09/2021              Shared CC contact info, care plan/services with receiving setting or, if applicable, home care agency--Date completed:  08/10/2021  *Complete additional tasks below, if this transition is a return to usual care setting.      Comments:      CC contacted adult daughter Veronica and left a message requesting a return call.  Member has a follow-up appointment with PCP in 7 days: No: Offered Assistance with setting up a follow up appointment   Member has had a change in condition: No  Home visit needed: No  Care plan reviewed and updated.  The following home based services None were resumed.  New referrals placed: No  Transition log completed.   PCP notified of transition back to home via EMR.      Notification Date:          Transition To:    Transition Date:              Transition Type:      Notified PCP--Date completed:          Shared CC contact info, care plan/services with receiving setting or, if applicable, home care agency--Date completed:       *Complete additional tasks below, if this transition is a return to usual care setting.      Comments:       *Complete tasks below when the member is discharging TO their usual care setting within one (1) business day of notification.  For situations where the Care Coordinator is notified of the discharge prior to the date of discharge, the Care Coordinator must follow up with the member or designated representative to confirm that discharge actually occurred and discuss required AMARILYS tasks as outlined in the AMARILYS Instructions.  (This includes situations where it may be a  new  usual care setting  for the member. (i.e., a community member who decides upon permanent nursing home placement following hospitalization and rehab).    Date completed: 08/10/2021  Communicated with member or their designated representative about the following:  care transition process; about changes to the member s health status; plan of care updates; education about transitions and how to prevent unplanned transitions/readmissions  Four Pillars for Optimal Transition:    Check  Yes  - if the member, family member and/or SNF/facility staff manages the following:    If  No  provide explanation in the comments section.          [x]  Yes     []  No     Does the member have a follow-up appointment scheduled with primary care or specialist? (Mental health hospitalizations--the appt. should be w/in 7 days)   [x]  Yes     []  No     Can the member manage their medications or is there a system in place to manage medications (e.g. home care set-up)?         [x]  Yes     []  No     Can the member verbalize warning signs and symptoms to watch for and how to respond?         [x]  Yes     []  No     Does the member use a Personal Health Care Record?  Check  Yes  if visit summary, discharge summary, and/or healthcare summary are being used as a PHR.                                                                                                                                                                                    [x] Yes      [] No      Have you updated the member s care plan?  If  No  provide explanation in comments.   Comments:      Care Coordinator received notification of hospital admit after discharge.     RYAN Yanez  [facility]  Phone: [phone]

## 2021-08-17 ENCOUNTER — MEDICAL CORRESPONDENCE (OUTPATIENT)
Dept: HEALTH INFORMATION MANAGEMENT | Facility: CLINIC | Age: 76
End: 2021-08-17

## 2021-08-19 ENCOUNTER — TELEPHONE (OUTPATIENT)
Dept: FAMILY MEDICINE | Facility: CLINIC | Age: 76
End: 2021-08-19

## 2021-08-19 DIAGNOSIS — Z53.9 DIAGNOSIS NOT YET DEFINED: Primary | ICD-10-CM

## 2021-08-19 DIAGNOSIS — I10 ESSENTIAL HYPERTENSION, BENIGN: Primary | ICD-10-CM

## 2021-08-19 PROCEDURE — G0180 MD CERTIFICATION HHA PATIENT: HCPCS | Performed by: FAMILY MEDICINE

## 2021-08-19 NOTE — TELEPHONE ENCOUNTER
Patients daughter is calling with a message and prescription refill for her mom.    She wanted Dr Gonzalez to know that Yecenia has a f/up appointment with cardiology [facility] and they have been checking blood pressures three times a day.    They are requesting a larger/xl size blood pressure cuff.    I have pended it for you for a prescription. They are stating insurance will cover it.    Thank you

## 2021-08-20 ENCOUNTER — APPOINTMENT (OUTPATIENT)
Dept: RADIOLOGY | Facility: CLINIC | Age: 76
End: 2021-08-20
Attending: EMERGENCY MEDICINE
Payer: COMMERCIAL

## 2021-08-20 ENCOUNTER — HOSPITAL ENCOUNTER (EMERGENCY)
Facility: CLINIC | Age: 76
Discharge: HOME | End: 2021-08-20
Attending: EMERGENCY MEDICINE | Admitting: EMERGENCY MEDICINE
Payer: COMMERCIAL

## 2021-08-20 VITALS
SYSTOLIC BLOOD PRESSURE: 161 MMHG | WEIGHT: 220 LBS | OXYGEN SATURATION: 98 % | HEART RATE: 79 BPM | TEMPERATURE: 97.7 F | DIASTOLIC BLOOD PRESSURE: 72 MMHG | RESPIRATION RATE: 22 BRPM | BODY MASS INDEX: 39.47 KG/M2

## 2021-08-20 DIAGNOSIS — I48.91 ATRIAL FIBRILLATION WITH RAPID VENTRICULAR RESPONSE (H): ICD-10-CM

## 2021-08-20 DIAGNOSIS — I10 HYPERTENSION, UNSPECIFIED TYPE: ICD-10-CM

## 2021-08-20 LAB
ANION GAP SERPL CALCULATED.3IONS-SCNC: 10 MMOL/L (ref 5–18)
BNP SERPL-MCNC: 126 PG/ML (ref 0–137)
BUN SERPL-MCNC: 18 MG/DL (ref 8–28)
CALCIUM SERPL-MCNC: 9.1 MG/DL (ref 8.5–10.5)
CHLORIDE BLD-SCNC: 106 MMOL/L (ref 98–107)
CO2 SERPL-SCNC: 25 MMOL/L (ref 22–31)
CREAT SERPL-MCNC: 0.68 MG/DL (ref 0.6–1.1)
ERYTHROCYTE [DISTWIDTH] IN BLOOD BY AUTOMATED COUNT: 17.1 % (ref 10–15)
GFR SERPL CREATININE-BSD FRML MDRD: 86 ML/MIN/1.73M2
GLUCOSE BLD-MCNC: 101 MG/DL (ref 70–125)
HCT VFR BLD AUTO: 43.6 % (ref 35–47)
HGB BLD-MCNC: 13.3 G/DL (ref 11.7–15.7)
INR PPP: 1.15 (ref 0.85–1.15)
MAGNESIUM SERPL-MCNC: 2.1 MG/DL (ref 1.8–2.6)
MCH RBC QN AUTO: 24.2 PG (ref 26.5–33)
MCHC RBC AUTO-ENTMCNC: 30.5 G/DL (ref 31.5–36.5)
MCV RBC AUTO: 79 FL (ref 78–100)
PLATELET # BLD AUTO: 142 10E3/UL (ref 150–450)
POTASSIUM BLD-SCNC: 4 MMOL/L (ref 3.5–5)
RBC # BLD AUTO: 5.5 10E6/UL (ref 3.8–5.2)
SODIUM SERPL-SCNC: 141 MMOL/L (ref 136–145)
TROPONIN I SERPL-MCNC: <0.01 NG/ML (ref 0–0.29)
TSH SERPL DL<=0.005 MIU/L-ACNC: 4.89 UIU/ML (ref 0.3–5)
WBC # BLD AUTO: 5.8 10E3/UL (ref 4–11)

## 2021-08-20 PROCEDURE — 250N000009 HC RX 250: Performed by: EMERGENCY MEDICINE

## 2021-08-20 PROCEDURE — 83735 ASSAY OF MAGNESIUM: CPT | Performed by: EMERGENCY MEDICINE

## 2021-08-20 PROCEDURE — 96374 THER/PROPH/DIAG INJ IV PUSH: CPT

## 2021-08-20 PROCEDURE — 36415 COLL VENOUS BLD VENIPUNCTURE: CPT | Performed by: EMERGENCY MEDICINE

## 2021-08-20 PROCEDURE — 85610 PROTHROMBIN TIME: CPT | Performed by: EMERGENCY MEDICINE

## 2021-08-20 PROCEDURE — 85027 COMPLETE CBC AUTOMATED: CPT | Performed by: EMERGENCY MEDICINE

## 2021-08-20 PROCEDURE — 80048 BASIC METABOLIC PNL TOTAL CA: CPT | Performed by: EMERGENCY MEDICINE

## 2021-08-20 PROCEDURE — 84484 ASSAY OF TROPONIN QUANT: CPT | Performed by: EMERGENCY MEDICINE

## 2021-08-20 PROCEDURE — 71045 X-RAY EXAM CHEST 1 VIEW: CPT

## 2021-08-20 PROCEDURE — 99284 EMERGENCY DEPT VISIT MOD MDM: CPT | Mod: 25

## 2021-08-20 PROCEDURE — 84443 ASSAY THYROID STIM HORMONE: CPT | Performed by: EMERGENCY MEDICINE

## 2021-08-20 PROCEDURE — 83880 ASSAY OF NATRIURETIC PEPTIDE: CPT | Performed by: EMERGENCY MEDICINE

## 2021-08-20 RX ORDER — METOPROLOL TARTRATE 1 MG/ML
5 INJECTION, SOLUTION INTRAVENOUS
Status: DISPENSED | OUTPATIENT
Start: 2021-08-20 | End: 2021-08-20

## 2021-08-20 RX ORDER — METOPROLOL SUCCINATE 25 MG/1
25 TABLET, EXTENDED RELEASE ORAL 2 TIMES DAILY
Qty: 60 TABLET | Refills: 0 | Status: SHIPPED | OUTPATIENT
Start: 2021-08-20 | End: 2022-06-07

## 2021-08-20 RX ORDER — METOPROLOL TARTRATE 1 MG/ML
5 INJECTION, SOLUTION INTRAVENOUS
Status: ACTIVE | OUTPATIENT
Start: 2021-08-20 | End: 2021-08-20

## 2021-08-20 RX ADMIN — METOPROLOL TARTRATE 5 MG: 1 INJECTION, SOLUTION INTRAVENOUS at 21:50

## 2021-08-20 ASSESSMENT — ENCOUNTER SYMPTOMS
NECK PAIN: 0
NUMBNESS: 1
APPETITE CHANGE: 0
WEAKNESS: 1
HEADACHES: 0

## 2021-08-20 NOTE — ED TRIAGE NOTES
Patient here with relative and daughter on phone telling that pt doctor wants pt checked for stroke, patient having R arm numbness since she was released from [facility] on the 7th after having a stroke.  Patient noted to by tachy in triage, ECG performed in triage, hx of Afib and on thinners.

## 2021-08-21 NOTE — DISCHARGE INSTRUCTIONS
Your heart rate was very fast you came in, and it has fluctuated over the past week, likely bringing your blood pressure up and down with it.  This is likely because you had stopped your Toprol.  I sent a prescription for Toprol at half of your normal dose that you can start on Saturday morning.  Please call your cardiology office on Monday, let them know that this change was made, they can schedule a follow-up appointment to make sure that your heart rate and blood pressure are being adequately controlled moving forwards.

## 2021-08-21 NOTE — ED PROVIDER NOTES
EMERGENCY DEPARTMENT ENCOUNTER      NAME: Yecenia Kwan  AGE: 76 year old female  YOB: 1945  MRN: [id number]  EVALUATION DATE & TIME: 8/20/2021  7:06 PM    PCP: Zac Grijalva    ED PROVIDER: Gilberto Camarillo M.D.      Chief Complaint   Patient presents with     Numbness     Atrial Fib         FINAL IMPRESSION:  1. Atrial fibrillation with rapid ventricular response (H)    2. Hypertension, unspecified type          ED COURSE & MEDICAL DECISION MAKING:    Pertinent Labs & Imaging studies reviewed. (See chart for details)  ED Course as of Aug 21 0155   Fri Aug 20, 2021   2200 The patient is a 76-year-old woman with history of stroke, A. fib, sent here from cardiology clinic today concern for patient's blood pressure, possible acute on chronic neurologic deficits.  She has been off her blood pressure medications recently due to difficulty maintaining high enough blood pressure at home.  On arrival here she in A. fib with RVR, rate of 149 and she has been hypertensive since arrival.  Her initial blood pressure was 136 systolic it is stayed about 180-190 systolic since then.  Her heart rate came down to about 100 on its own and she was given 5 mg of IV metoprolol which brought down to the 80s.  She is remains in A. fib.  Her neurologic exam does not show any evidence of acute stroke based on her previous deficits.  She has paresthesias and numbness in the right arm but no new weakness or coordination deficits.  Nothing she needs new MRI at this time.  I believe her blood pressure has likely been labile at home due to difficulty with rate control.  I plan on reintroducing half dose beta-blocker at time of discharge.         7:33 PM I met with the patient, obtained history, performed an initial exam, and discussed options and plan for diagnostics and treatment here in the ED.  9:08 PM I checked up on patient.   10:05 PM I rechecked patient.     At the conclusion of the encounter I discussed the results of all  of the tests and the disposition. The questions were answered. The patient or family acknowledged understanding and was agreeable with the care plan.       MEDICATIONS GIVEN IN THE EMERGENCY:  Medications   metoprolol (LOPRESSOR) injection 5 mg (has no administration in time range)   metoprolol (LOPRESSOR) injection 5 mg (5 mg Intravenous Given 8/20/21 2150)         NEW PRESCRIPTIONS STARTED AT TODAY'S ER VISIT  Discharge Medication List as of 8/20/2021 10:29 PM      START taking these medications    Details   metoprolol succinate ER (TOPROL-XL) 25 MG 24 hr tablet Take 1 tablet (25 mg) by mouth 2 times daily, Disp-60 tablet, R-0, E-Prescribe                =================================================================    HPI    Patient information was obtained from: Patient    Use of : N/A       Yecenia Kwan is a 76 year old female with a pertinent history of cardiomyopathy, CVA, hyperlipidemia, esophageal reflux, HTN, morbid obesity, paroxysmal A-fib, and CHF, who presents to this ED via walk-in for evaluation of numbness and atrial fibrillation.    Patient reports continued right arm numbness and weakness since her stroke on 8/7/21. Patient was at heart clinic earlier today where there was a concern for her low blood pressure due to recent stroke. During evaluation, patient reports that she currently feels ok. Patient denies any chest pain, headache, neck pain, leg pain or swelling, and change in appetite.     Per family, patient has been having low blood pressures last week. Patient was seen by cardiologist where patient was recommended to stop taking Toprol and a second blood pressure medication, and a diuretic. Patient's family reports that this morning in clinic, patient had a blood pressure of 137/82 with a pulse of 85. Family states that blood pressure was taken twice today, once in the morning, and once prior to clinic visit, which were 111/50 with a pulse of 76 around 1015 today, and  "106/43 with a pulse of 50 around 1200 today, respectively.    No other complaints at this time.     REVIEW OF SYSTEMS   Review of Systems   Constitutional: Negative for appetite change.   Cardiovascular: Negative for chest pain and leg swelling.   Musculoskeletal: Negative for neck pain.   Neurological: Positive for weakness (Right arm) and numbness (Right arm). Negative for headaches.   All other systems reviewed and are negative.       PAST MEDICAL HISTORY:  Past Medical History:   Diagnosis Date     Anxiety      Arthritis      Atrial fibrillation (H)      Cellulitis of left leg      CVA (cerebral vascular accident) (H)      Displacement of intervertebral disc, site unspecified, without myelopathy      GERD (gastroesophageal reflux disease)      Head injury      Hidradenitis      Hx of ischemic left MCA stroke 12/7/2015    posterior aspect of the left lobe involving middle frontal gyrus   12/2015      Hx of nicotine dependence      Hyperlipemia      Hypertension      Obesity, unspecified      Proteinuria      Unspecified essential hypertension        PAST SURGICAL HISTORY:  Past Surgical History:   Procedure Laterality Date     HC ARTHROTOMY/EXPLORE/TREAT KNEE JOINT      Description: Arthrotomy Of Knee With Joint Exploration;  Recorded: 07/29/2008;  Comments: due to \"accident\" and infection      CORRECT BUNION,METATARSAL OSTEOTOMY      Description: Bunion Correction With Metatarsal Osteotomy;  Recorded: 07/29/2008;     [facility] NONSPECIFIC PROCEDURE      Knee surgeries ((R) scope)     [facility] NONSPECIFIC PROCEDURE      tubal ligation           CURRENT MEDICATIONS:    No current facility-administered medications for this encounter.     Current Outpatient Medications   Medication     metoprolol succinate ER (TOPROL-XL) 25 MG 24 hr tablet     albuterol (PROAIR HFA/PROVENTIL HFA/VENTOLIN HFA) 108 (90 Base) MCG/ACT inhaler     apixaban ANTICOAGULANT (ELIQUIS) 5 MG tablet     ASCENSIA CONTOUR MONITOR     bisacodyl (DULCOLAX) 5 " MG EC tablet     bisacodyl (DULCOLAX) 5 MG EC tablet     Blood Pressure Monitoring (ADULT BLOOD PRESSURE CUFF LG) KIT     Blood Pressure Monitoring KIT     citalopram (CELEXA) 10 MG tablet     fluticasone (FLONASE) 50 MCG/ACT nasal spray     furosemide (LASIX) 20 MG tablet     hydrocortisone 2.5 % cream     metoprolol tartrate (LOPRESSOR) 50 MG tablet     olmesartan (BENICAR) 40 MG tablet     OMEPRAZOLE 40 MG OR CPDR     ONE TOUCH II SYSTEM KIT     ONE TOUCH ULTRA TEST VI STRP     polyethylene glycol (MIRALAX) 17 GM/Dose powder     rosuvastatin (CRESTOR) 5 MG tablet       ALLERGIES:  Allergies   Allergen Reactions     Clonidine Swelling     Hydralazine Itching     No Known Allergies      Carvedilol Nausea       FAMILY HISTORY:  Family History   Problem Relation Age of Onset     Prostate Cancer Father      C.A.D. Mother          at 80 years of age.     Diabetes Mother        SOCIAL HISTORY:   Social History     Socioeconomic History     Marital status: Single     Spouse name: Not on file     Number of children: Not on file     Years of education: Not on file     Highest education level: Not on file   Occupational History     Not on file   Tobacco Use     Smoking status: Never Smoker     Smokeless tobacco: Never Used   Substance and Sexual Activity     Alcohol use: No     Drug use: No     Sexual activity: Not on file   Other Topics Concern     Not on file   Social History Narrative    Lives with grandson, and is exposed to second hand smoke.     Social Determinants of Health     Financial Resource Strain:      Difficulty of Paying Living Expenses:    Food Insecurity:      Worried About Running Out of Food in the Last Year:      Ran Out of Food in the Last Year:    Transportation Needs:      Lack of Transportation (Medical):      Lack of Transportation (Non-Medical):    Physical Activity:      Days of Exercise per Week:      Minutes of Exercise per Session:    Stress:      Feeling of Stress :    Social  Connections:      Frequency of Communication with Friends and Family:      Frequency of Social Gatherings with Friends and Family:      Attends Yazidism Services:      Active Member of Clubs or Organizations:      Attends Club or Organization Meetings:      Marital Status:    Intimate Partner Violence:      Fear of Current or Ex-Partner:      Emotionally Abused:      Physically Abused:      Sexually Abused:        VITALS:  BP (!) 161/72   Pulse 79   Temp 97.7  F (36.5  C) (Temporal)   Resp 22   Wt 99.8 kg (220 lb)   SpO2 98%   BMI 39.47 kg/m      PHYSICAL EXAM    Constitutional: Well developed, well nourished. Comfortable appearing.  HENT: Normocephalic, atraumatic, mucous membranes moist, nose normal. Neck- Supple, gross ROM intact.   Eyes: Pupils mid-range, conjunctiva without injection, no discharge.   Respiratory: Clear to auscultation bilaterally, no respiratory distress, no wheezing, speaks full sentences easily. No cough.  Cardiovascular: Tachycardic heart rate, irregular rhythm, no murmurs. No pedal edema, 2+ DP pulses.   GI: Soft, no tenderness to deep palpation in all quadrants, no masses.  Musculoskeletal: Moving all 4 extremities intentionally and without pain. No obvious deformity.  Skin: Warm, dry, no rash.  Neurologic: CN II-XII intact. 5/5 strength in upper and lower extremities. No dysarthria or dysphagia. Sensation to light touch diminished in right arm, but intact in remaining 3 extremities. No pronator drift. Normal rapid alternating movements bilaterally.   Psychiatric: Affect normal, cooperative.       LAB:  All pertinent labs reviewed and interpreted.  Labs Ordered and Resulted from Time of ED Arrival Up to the Time of Departure from the ED   CBC WITH PLATELETS - Abnormal; Notable for the following components:       Result Value    RBC Count 5.50 (*)     MCH 24.2 (*)     MCHC 30.5 (*)     RDW 17.1 (*)     Platelet Count 142 (*)     All other components within normal limits   TSH WITH  FREE T4 REFLEX - Normal   INR - Normal   MAGNESIUM - Normal   TROPONIN I - Normal   B-TYPE NATRIURETIC PEPTIDE ([facility] ONLY) - Normal   BASIC METABOLIC PANEL - Normal   PERIPHERAL IV CATHETER   CARDIAC CONTINUOUS MONITORING       RADIOLOGY:  Reviewed all pertinent imaging. Please see official radiology report.  XR Chest Port 1 View   Final Result   IMPRESSION: No acute abnormality.          EKG:    All EKG interpretations will be found in ED course above.    PROCEDURES:       I, Kenyatta Lu am serving as a scribe to document services personally performed by Dr. Gilberto Camarillo based on my observation and the provider's statements to me. I, Gilberto Camarillo MD attest that Kenyatta Lu is acting in a scribe capacity, has observed my performance of the services and has documented them in accordance with my direction.    Gilberto Camarillo M.D.  Emergency Medicine  [facility] EMERGENCY ROOM  [address] [address]  [phone]  Dept: [phone]     Gilberto Camarillo MD  08/21/21 0157

## 2021-08-23 ENCOUNTER — TELEPHONE (OUTPATIENT)
Dept: FAMILY MEDICINE | Facility: CLINIC | Age: 76
End: 2021-08-23

## 2021-08-23 NOTE — TELEPHONE ENCOUNTER
Can this prescription for the BP Cuff be sent to [facility] [place]note 2[facility] instead of the pharmacy since her insurance wont cover it through [facility], only [facility] [place]note 2[facility].

## 2021-08-23 NOTE — TELEPHONE ENCOUNTER
Finally spoke with daughter Estrellita and relayed message about the referral for medical cannabis. I informed Estrellita that Dr. Gonzalez had submitted the request to [facility] and she should check her email. Estrellita states that she did not see anything in her email.   I helped give MD's contact number so that she can call [facility] for an update. Estrellita said she will call MD.

## 2021-08-23 NOTE — TELEPHONE ENCOUNTER
Patients daughter calling about a referral that was suppose to be placed for cannabis and no one has reached out to them yet and wanting to know if that was indeed placed. I don't see anything in the chart regarding a cannabis referral unless im missing something. Can you take a look at this further can contact daughter to give an update on this

## 2021-08-24 ENCOUNTER — MEDICAL CORRESPONDENCE (OUTPATIENT)
Dept: HEALTH INFORMATION MANAGEMENT | Facility: CLINIC | Age: 76
End: 2021-08-24

## 2021-08-24 ENCOUNTER — PATIENT OUTREACH (OUTPATIENT)
Dept: GERIATRIC MEDICINE | Facility: CLINIC | Age: 76
End: 2021-08-24

## 2021-08-24 NOTE — PROGRESS NOTES
[facility] Care Coordination Contact  CC received notification of Emergency Room visit.  ER visit occurred on 08/20/21 at [facility] with Dx of Afib.    CC contacted adult daughter Estrellita and reviewed discharge summary.  Member has a follow-up appointment with PCP: Yes: scheduled on Week of 08/30/21  Member has had a change in condition: No  New referrals placed: No  Home Visit Needed: No  Care plan reviewed and updated.  PCP notified of ED visit via EMR.    RYAN Yanez  [facility]  Phone: [phone]

## 2021-08-31 ENCOUNTER — TELEPHONE (OUTPATIENT)
Dept: FAMILY MEDICINE | Facility: CLINIC | Age: 76
End: 2021-08-31

## 2021-08-31 DIAGNOSIS — L60.9 NAIL ABNORMALITY: Primary | ICD-10-CM

## 2021-08-31 NOTE — TELEPHONE ENCOUNTER
I reached daughter Estrellita and we spoke. I relayed message below from Dr. Peña and gave Estrellita the contact info to: [facility] [phone].

## 2021-08-31 NOTE — TELEPHONE ENCOUNTER
Patients daughter called stating that patient needs to a referral to podiatry to get her toe nails cut. Please place a referral if appropriate.

## 2021-09-08 ENCOUNTER — PATIENT OUTREACH (OUTPATIENT)
Dept: GERIATRIC MEDICINE | Facility: CLINIC | Age: 76
End: 2021-09-08

## 2021-09-08 NOTE — PROGRESS NOTES
[facility] Care Coordination Contact    Called adult daughter Estrellita to schedule annual HRA home visit. Left a message requesting a return call to schedule HRA.     RYAN Yanez  [facility]  Phone: [phone]

## 2021-09-09 ENCOUNTER — OFFICE VISIT (OUTPATIENT)
Dept: PODIATRY | Facility: CLINIC | Age: 76
End: 2021-09-09
Payer: COMMERCIAL

## 2021-09-09 VITALS — HEIGHT: 60 IN | WEIGHT: 218 LBS | HEART RATE: 116 BPM | OXYGEN SATURATION: 98 % | BODY MASS INDEX: 42.8 KG/M2

## 2021-09-09 DIAGNOSIS — B35.1 NAIL FUNGUS: Primary | ICD-10-CM

## 2021-09-09 DIAGNOSIS — L60.2 ONYCHAUXIS: ICD-10-CM

## 2021-09-09 PROCEDURE — 99202 OFFICE O/P NEW SF 15 MIN: CPT | Mod: 25 | Performed by: PODIATRIST

## 2021-09-09 PROCEDURE — 11721 DEBRIDE NAIL 6 OR MORE: CPT | Performed by: PODIATRIST

## 2021-09-09 RX ORDER — ROSUVASTATIN CALCIUM 5 MG/1
2.5 TABLET, COATED ORAL
COMMUNITY
End: 2021-11-09

## 2021-09-09 ASSESSMENT — PAIN SCALES - GENERAL: PAINLEVEL: MODERATE PAIN (5)

## 2021-09-09 ASSESSMENT — MIFFLIN-ST. JEOR: SCORE: 1408.52

## 2021-09-09 NOTE — PROGRESS NOTES
FOOT AND ANKLE SURGERY/PODIATRY CONSULT NOTE          ASSESSMENT:   Onychomycosis, onychauxis      TREATMENT:  Debrided nails 1 through 5 bilateral feet.  I recommended the patient return to the clinic every 2 months for continued foot care.        HPI: I was asked to see Yecenia CHAO Kwan today to evaluate and treat long thick painful nails on both feet.  The patient was accompanied by her PCA.  PCA indicated that at times he attempts to trim these toenails but he does not have the right instrumentation.  The patient stated that when the nails get extremely long they are quite painful.  The nails can be aggravated by her shoe gear.  She has not had any redness, swelling, drainage or bleeding surrounding the nails..  The patient was seen in consultation at the request of Leslie Pñea MD for evaluation treatment of long thick painful nails both feet.     Past Medical History:   Diagnosis Date     Anxiety      Arthritis      Atrial fibrillation (H)      Cellulitis of left leg      CVA (cerebral vascular accident) (H)      Displacement of intervertebral disc, site unspecified, without myelopathy      GERD (gastroesophageal reflux disease)      Head injury      Hidradenitis      Hx of ischemic left MCA stroke 12/7/2015    posterior aspect of the left lobe involving middle frontal gyrus   12/2015      Hx of nicotine dependence      Hyperlipemia      Hypertension      Obesity, unspecified      Proteinuria      Unspecified essential hypertension        Social History     Socioeconomic History     Marital status: Single     Spouse name: Not on file     Number of children: Not on file     Years of education: Not on file     Highest education level: Not on file   Occupational History     Not on file   Tobacco Use     Smoking status: Never Smoker     Smokeless tobacco: Never Used   Substance and Sexual Activity     Alcohol use: No     Drug use: No     Sexual activity: Not on file   Other Topics Concern     Not on file   Social  History Narrative    Lives with grandson, and is exposed to second hand smoke.     Social Determinants of Health     Financial Resource Strain:      Difficulty of Paying Living Expenses:    Food Insecurity:      Worried About Running Out of Food in the Last Year:      Ran Out of Food in the Last Year:    Transportation Needs:      Lack of Transportation (Medical):      Lack of Transportation (Non-Medical):    Physical Activity:      Days of Exercise per Week:      Minutes of Exercise per Session:    Stress:      Feeling of Stress :    Social Connections:      Frequency of Communication with Friends and Family:      Frequency of Social Gatherings with Friends and Family:      Attends Spiritism Services:      Active Member of Clubs or Organizations:      Attends Club or Organization Meetings:      Marital Status:    Intimate Partner Violence:      Fear of Current or Ex-Partner:      Emotionally Abused:      Physically Abused:      Sexually Abused:           Allergies   Allergen Reactions     Clonidine Swelling     Hydralazine Itching     No Known Allergies      Carvedilol Nausea          Current Outpatient Medications:      albuterol (PROAIR HFA/PROVENTIL HFA/VENTOLIN HFA) 108 (90 Base) MCG/ACT inhaler, Inhale 2 puffs into the lungs every 6 hours as needed for shortness of breath / dyspnea or wheezing, Disp: 6.7 g, Rfl: 0     apixaban ANTICOAGULANT (ELIQUIS) 5 MG tablet, Take 5 mg by mouth daily , Disp: , Rfl:      ASCENSIA CONTOUR MONITOR, None Entered, Disp: , Rfl:      bisacodyl (DULCOLAX) 5 MG EC tablet, Take 5 mg by mouth, Disp: , Rfl:      bisacodyl (DULCOLAX) 5 MG EC tablet, , Disp: , Rfl:      Blood Pressure Monitoring (ADULT BLOOD PRESSURE CUFF LG) KIT, Please take blood pressure readings two times daily and continue to log daily., Disp: , Rfl:      Blood Pressure Monitoring KIT, Check blood pressures as directed., Disp: 1 kit, Rfl: 0     citalopram (CELEXA) 10 MG tablet, Take 1 tablet (10 mg) by mouth daily,  Disp: 30 tablet, Rfl: 3     fluticasone (FLONASE) 50 MCG/ACT nasal spray, Spray 2 sprays in nostril, Disp: , Rfl:      furosemide (LASIX) 20 MG tablet, Take 2 tablets (40 mg) by mouth daily, Disp: 60 tablet, Rfl: 3     hydrocortisone 2.5 % cream, Apply to affected area BID for no more than 2 weeks, Disp: , Rfl:      metoprolol succinate ER (TOPROL-XL) 25 MG 24 hr tablet, Take 1 tablet (25 mg) by mouth 2 times daily, Disp: 60 tablet, Rfl: 0     metoprolol tartrate (LOPRESSOR) 50 MG tablet, Take 50 mg by mouth 2 times daily, Disp: , Rfl:      olmesartan (BENICAR) 40 MG tablet, Take 40 mg by mouth, Disp: , Rfl:      OMEPRAZOLE 40 MG OR CPDR, ONE DAILY, Disp: 1 mo, Rfl: 3     ONE TOUCH II SYSTEM KIT, bid, Disp: 1, Rfl: 0     ONE TOUCH ULTRA TEST VI STRP, bid , Disp: 3 MONTHS, Rfl: 1 YEAR     pantoprazole (PROTONIX) 40 MG EC tablet, TAKE 1 TABLET(40 MG) BY MOUTH DAILY, Disp: 90 tablet, Rfl: 4     polyethylene glycol (MIRALAX) 17 GM/Dose powder, Take 17 g by mouth, Disp: , Rfl:      rosuvastatin (CRESTOR) 5 MG tablet, Take 1/2 tab (2.5 mg) po daily, Disp: , Rfl:      Family History   Problem Relation Age of Onset     Prostate Cancer Father      C.A.D. Mother          at 80 years of age.     Diabetes Mother         Social History     Socioeconomic History     Marital status: Single     Spouse name: Not on file     Number of children: Not on file     Years of education: Not on file     Highest education level: Not on file   Occupational History     Not on file   Tobacco Use     Smoking status: Never Smoker     Smokeless tobacco: Never Used   Substance and Sexual Activity     Alcohol use: No     Drug use: No     Sexual activity: Not on file   Other Topics Concern     Not on file   Social History Narrative    Lives with grandson, and is exposed to second hand smoke.     Social Determinants of Health     Financial Resource Strain:      Difficulty of Paying Living Expenses:    Food Insecurity:      Worried About Running  Out of Food in the Last Year:      Ran Out of Food in the Last Year:    Transportation Needs:      Lack of Transportation (Medical):      Lack of Transportation (Non-Medical):    Physical Activity:      Days of Exercise per Week:      Minutes of Exercise per Session:    Stress:      Feeling of Stress :    Social Connections:      Frequency of Communication with Friends and Family:      Frequency of Social Gatherings with Friends and Family:      Attends [employer] Services:      Active Member of Clubs or Organizations:      Attends Club or Organization Meetings:      Marital Status:    Intimate Partner Violence:      Fear of Current or Ex-Partner:      Emotionally Abused:      Physically Abused:      Sexually Abused:         Review of Systems - Patient denies fever, chills, rash, wound, stiffness, limping, numbness, weakness, heart burn, blood in stool, chest pain with activity, calf pain when walking, shortness of breath with activity, chronic cough, easy bleeding/bruising, swelling of ankles, excessive thirst, fatigue, depression, anxiety.  Patient admits to long thick nails both feet.      OBJECTIVE:  Appearance: alert, well appearing, and in no distress and oriented to person, place, and time.    There were no vitals taken for this visit.     There is no height or weight on file to calculate BMI.     General appearance: Patient is alert and fully cooperative with history & exam.  No sign of distress is noted during the visit.  Psychiatric: Affect is pleasant & appropriate.  Patient appears motivated to improve health.  Respiratory: Breathing is regular & unlabored while sitting.  HEENT: Hearing is intact to spoken word.  Speech is clear.  No gross evidence of visual impairment that would impact ambulation.    Vascular: Dorsalis pedis and posterior tibial pulses are palpable. There is no pedal hair growth bilaterally.  CFT < 3 sec from anterior tibial surface to distal digits bilaterally. There is no appreciable  edema noted.  Dermatologic: Nails bilaterally are elongated and 2 times normal thickness.  Turgor and texture are within normal limits. No coloration or temperature changes. No primary or secondary lesions noted.  Neurologic: All epicritic and proprioceptive sensations are grossly intact bilaterally.  Musculoskeletal: All active and passive ankle, subtalar, midtarsal, and 1st MPJ range of motion are grossly intact without pain or crepitus, with the exception of none. Manual muscle strength is within normal limits bilaterally. All dorsiflexors, plantarflexors, invertors, evertors are intact bilaterally.  No tenderness present to bilateral feet or ankles on palpation.  No tenderness to bilateral feet or ankles with range of motion. Calf is soft/non-tender without warmth/induration    Imaging:       No images are attached to the encounter or orders placed in the encounter.     XR Chest Port 1 View    Result Date: 8/20/2021  EXAM: XR CHEST PORT 1 VIEW LOCATION: [facility] DATE/TIME: 8/20/2021 10:11 PM INDICATION: Congestive heart failure. COMPARISON: 7/20/2021. FINDINGS: The heart size is normal. There is no pulmonary edema. The lungs are clear. No pneumothorax. Degenerative disease in the spine and shoulders.     IMPRESSION: No acute abnormality.     XR Chest Port 1 View    Result Date: 8/20/2021  EXAM: XR CHEST PORT 1 VIEW LOCATION: [facility] DATE/TIME: 8/20/2021 10:11 PM INDICATION: Congestive heart failure. COMPARISON: 7/20/2021. FINDINGS: The heart size is normal. There is no pulmonary edema. The lungs are clear. No pneumothorax. Degenerative disease in the spine and shoulders.     IMPRESSION: No acute abnormality.         Russell Mercedes DPM  [facility] Foot & Ankle Surgery/Podiatry

## 2021-09-09 NOTE — LETTER
9/9/2021         RE: Yecenia Kwan  [address]  [place] [address]        Dear Colleague,    Thank you for referring your patient, Yecenia Kwan, to the [facility]. Please see a copy of my visit note below.    FOOT AND ANKLE SURGERY/PODIATRY CONSULT NOTE          ASSESSMENT:   Onychomycosis, onychauxis      TREATMENT:  Debrided nails 1 through 5 bilateral feet.  I recommended the patient return to the clinic every 2 months for continued foot care.        HPI: I was asked to see Yecenia Kwan today to evaluate and treat long thick painful nails on both feet.  The patient was accompanied by her PCA.  PCA indicated that at times he attempts to trim these toenails but he does not have the right instrumentation.  The patient stated that when the nails get extremely long they are quite painful.  The nails can be aggravated by her shoe gear.  She has not had any redness, swelling, drainage or bleeding surrounding the nails..  The patient was seen in consultation at the request of Leslie Peña MD for evaluation treatment of long thick painful nails both feet.     Past Medical History:   Diagnosis Date     Anxiety      Arthritis      Atrial fibrillation (H)      Cellulitis of left leg      CVA (cerebral vascular accident) (H)      Displacement of intervertebral disc, site unspecified, without myelopathy      GERD (gastroesophageal reflux disease)      Head injury      Hidradenitis      Hx of ischemic left MCA stroke 12/7/2015    posterior aspect of the left lobe involving middle frontal gyrus   12/2015      Hx of nicotine dependence      Hyperlipemia      Hypertension      Obesity, unspecified      Proteinuria      Unspecified essential hypertension        Social History     Socioeconomic History     Marital status: Single     Spouse name: Not on file     Number of children: Not on file     Years of education: Not on file     Highest education level: Not on file   Occupational  History     Not on file   Tobacco Use     Smoking status: Never Smoker     Smokeless tobacco: Never Used   Substance and Sexual Activity     Alcohol use: No     Drug use: No     Sexual activity: Not on file   Other Topics Concern     Not on file   Social History Narrative    Lives with grandson, and is exposed to second hand smoke.     Social Determinants of Health     Financial Resource Strain:      Difficulty of Paying Living Expenses:    Food Insecurity:      Worried About Running Out of Food in the Last Year:      Ran Out of Food in the Last Year:    Transportation Needs:      Lack of Transportation (Medical):      Lack of Transportation (Non-Medical):    Physical Activity:      Days of Exercise per Week:      Minutes of Exercise per Session:    Stress:      Feeling of Stress :    Social Connections:      Frequency of Communication with Friends and Family:      Frequency of Social Gatherings with Friends and Family:      Attends Jewish Services:      Active Member of Clubs or Organizations:      Attends Club or Organization Meetings:      Marital Status:    Intimate Partner Violence:      Fear of Current or Ex-Partner:      Emotionally Abused:      Physically Abused:      Sexually Abused:           Allergies   Allergen Reactions     Clonidine Swelling     Hydralazine Itching     No Known Allergies      Carvedilol Nausea          Current Outpatient Medications:      albuterol (PROAIR HFA/PROVENTIL HFA/VENTOLIN HFA) 108 (90 Base) MCG/ACT inhaler, Inhale 2 puffs into the lungs every 6 hours as needed for shortness of breath / dyspnea or wheezing, Disp: 6.7 g, Rfl: 0     apixaban ANTICOAGULANT (ELIQUIS) 5 MG tablet, Take 5 mg by mouth daily , Disp: , Rfl:      ASCENSIA CONTOUR MONITOR, None Entered, Disp: , Rfl:      bisacodyl (DULCOLAX) 5 MG EC tablet, Take 5 mg by mouth, Disp: , Rfl:      bisacodyl (DULCOLAX) 5 MG EC tablet, , Disp: , Rfl:      Blood Pressure Monitoring (ADULT BLOOD PRESSURE CUFF LG) KIT,  Please take blood pressure readings two times daily and continue to log daily., Disp: , Rfl:      Blood Pressure Monitoring KIT, Check blood pressures as directed., Disp: 1 kit, Rfl: 0     citalopram (CELEXA) 10 MG tablet, Take 1 tablet (10 mg) by mouth daily, Disp: 30 tablet, Rfl: 3     fluticasone (FLONASE) 50 MCG/ACT nasal spray, Spray 2 sprays in nostril, Disp: , Rfl:      furosemide (LASIX) 20 MG tablet, Take 2 tablets (40 mg) by mouth daily, Disp: 60 tablet, Rfl: 3     hydrocortisone 2.5 % cream, Apply to affected area BID for no more than 2 weeks, Disp: , Rfl:      metoprolol succinate ER (TOPROL-XL) 25 MG 24 hr tablet, Take 1 tablet (25 mg) by mouth 2 times daily, Disp: 60 tablet, Rfl: 0     metoprolol tartrate (LOPRESSOR) 50 MG tablet, Take 50 mg by mouth 2 times daily, Disp: , Rfl:      olmesartan (BENICAR) 40 MG tablet, Take 40 mg by mouth, Disp: , Rfl:      OMEPRAZOLE 40 MG OR CPDR, ONE DAILY, Disp: 1 mo, Rfl: 3     ONE TOUCH II SYSTEM KIT, bid, Disp: 1, Rfl: 0     ONE TOUCH ULTRA TEST VI STRP, bid , Disp: 3 MONTHS, Rfl: 1 YEAR     pantoprazole (PROTONIX) 40 MG EC tablet, TAKE 1 TABLET(40 MG) BY MOUTH DAILY, Disp: 90 tablet, Rfl: 4     polyethylene glycol (MIRALAX) 17 GM/Dose powder, Take 17 g by mouth, Disp: , Rfl:      rosuvastatin (CRESTOR) 5 MG tablet, Take 1/2 tab (2.5 mg) po daily, Disp: , Rfl:      Family History   Problem Relation Age of Onset     Prostate Cancer Father      C.A.D. Mother          at 80 years of age.     Diabetes Mother         Social History     Socioeconomic History     Marital status: Single     Spouse name: Not on file     Number of children: Not on file     Years of education: Not on file     Highest education level: Not on file   Occupational History     Not on file   Tobacco Use     Smoking status: Never Smoker     Smokeless tobacco: Never Used   Substance and Sexual Activity     Alcohol use: No     Drug use: No     Sexual activity: Not on file   Other Topics  Concern     Not on file   Social History Narrative    Lives with grandson, and is exposed to second hand smoke.     Social Determinants of Health     Financial Resource Strain:      Difficulty of Paying Living Expenses:    Food Insecurity:      Worried About Running Out of Food in the Last Year:      Ran Out of Food in the Last Year:    Transportation Needs:      Lack of Transportation (Medical):      Lack of Transportation (Non-Medical):    Physical Activity:      Days of Exercise per Week:      Minutes of Exercise per Session:    Stress:      Feeling of Stress :    Social Connections:      Frequency of Communication with Friends and Family:      Frequency of Social Gatherings with Friends and Family:      Attends Latter-day Services:      Active Member of Clubs or Organizations:      Attends Club or Organization Meetings:      Marital Status:    Intimate Partner Violence:      Fear of Current or Ex-Partner:      Emotionally Abused:      Physically Abused:      Sexually Abused:         Review of Systems - Patient denies fever, chills, rash, wound, stiffness, limping, numbness, weakness, heart burn, blood in stool, chest pain with activity, calf pain when walking, shortness of breath with activity, chronic cough, easy bleeding/bruising, swelling of ankles, excessive thirst, fatigue, depression, anxiety.  Patient admits to long thick nails both feet.      OBJECTIVE:  Appearance: alert, well appearing, and in no distress and oriented to person, place, and time.    There were no vitals taken for this visit.     There is no height or weight on file to calculate BMI.     General appearance: Patient is alert and fully cooperative with history & exam.  No sign of distress is noted during the visit.  Psychiatric: Affect is pleasant & appropriate.  Patient appears motivated to improve health.  Respiratory: Breathing is regular & unlabored while sitting.  HEENT: Hearing is intact to spoken word.  Speech is clear.  No gross  evidence of visual impairment that would impact ambulation.    Vascular: Dorsalis pedis and posterior tibial pulses are palpable. There is no pedal hair growth bilaterally.  CFT < 3 sec from anterior tibial surface to distal digits bilaterally. There is no appreciable edema noted.  Dermatologic: Nails bilaterally are elongated and 2 times normal thickness.  Turgor and texture are within normal limits. No coloration or temperature changes. No primary or secondary lesions noted.  Neurologic: All epicritic and proprioceptive sensations are grossly intact bilaterally.  Musculoskeletal: All active and passive ankle, subtalar, midtarsal, and 1st MPJ range of motion are grossly intact without pain or crepitus, with the exception of none. Manual muscle strength is within normal limits bilaterally. All dorsiflexors, plantarflexors, invertors, evertors are intact bilaterally.  No tenderness present to bilateral feet or ankles on palpation.  No tenderness to bilateral feet or ankles with range of motion. Calf is soft/non-tender without warmth/induration    Imaging:       No images are attached to the encounter or orders placed in the encounter.     XR Chest Port 1 View    Result Date: 8/20/2021  EXAM: XR CHEST PORT 1 VIEW LOCATION: [facility] DATE/TIME: 8/20/2021 10:11 PM INDICATION: Congestive heart failure. COMPARISON: 7/20/2021. FINDINGS: The heart size is normal. There is no pulmonary edema. The lungs are clear. No pneumothorax. Degenerative disease in the spine and shoulders.     IMPRESSION: No acute abnormality.     XR Chest Port 1 View    Result Date: 8/20/2021  EXAM: XR CHEST PORT 1 VIEW LOCATION: [facility] DATE/TIME: 8/20/2021 10:11 PM INDICATION: Congestive heart failure. COMPARISON: 7/20/2021. FINDINGS: The heart size is normal. There is no pulmonary edema. The lungs are clear. No pneumothorax. Degenerative disease in the spine and shoulders.     IMPRESSION: No  acute abnormality.         Russell Mercedes DPM  [facility] Foot & Ankle Surgery/Podiatry         Again, thank you for allowing me to participate in the care of your patient.        Sincerely,        Russell Gonzalez DPM

## 2021-09-13 ENCOUNTER — TELEPHONE (OUTPATIENT)
Dept: FAMILY MEDICINE | Facility: CLINIC | Age: 76
End: 2021-09-13

## 2021-09-13 DIAGNOSIS — I10 ESSENTIAL HYPERTENSION, BENIGN: Primary | ICD-10-CM

## 2021-09-13 NOTE — TELEPHONE ENCOUNTER
Dr. Gonzalez was able to re order request and I faxed the order to [facility] today. Daughter Estrellita notified today as well.

## 2021-09-13 NOTE — TELEPHONE ENCOUNTER
Pt daughter called stating that [facility] has not received anything regarding the BP cuff.     (note from 8/19 Tiki confirmed that the prescription for the BP cuff was faxed over on 8/23.)     Please resend another prescription for BP cuff.   [phone]    Thank you

## 2021-09-16 ENCOUNTER — TRANSFERRED RECORDS (OUTPATIENT)
Dept: HEALTH INFORMATION MANAGEMENT | Facility: CLINIC | Age: 76
End: 2021-09-16

## 2021-09-20 ENCOUNTER — MEDICAL CORRESPONDENCE (OUTPATIENT)
Dept: HEALTH INFORMATION MANAGEMENT | Facility: CLINIC | Age: 76
End: 2021-09-20

## 2021-09-21 ENCOUNTER — TRANSFERRED RECORDS (OUTPATIENT)
Dept: HEALTH INFORMATION MANAGEMENT | Facility: CLINIC | Age: 76
End: 2021-09-21

## 2021-09-21 ENCOUNTER — MEDICAL CORRESPONDENCE (OUTPATIENT)
Dept: HEALTH INFORMATION MANAGEMENT | Facility: CLINIC | Age: 76
End: 2021-09-21

## 2021-09-22 ENCOUNTER — PATIENT OUTREACH (OUTPATIENT)
Dept: GERIATRIC MEDICINE | Facility: CLINIC | Age: 76
End: 2021-09-22

## 2021-09-22 NOTE — PROGRESS NOTES
[facility] Care Coordination Contact    Called adult daughter Estrellita to schedule annual HRA home visit. Left a message requesting a return call to schedule HRA.     RYAN Yanez  [facility]  Phone: [phone]

## 2021-09-23 ENCOUNTER — PATIENT OUTREACH (OUTPATIENT)
Dept: GERIATRIC MEDICINE | Facility: CLINIC | Age: 76
End: 2021-09-23

## 2021-09-23 NOTE — LETTER
September 23, 2021    Important [employer] Information    GURVINDER DE DIOS  [address] [place] [address]  I've Tried to Contact You  Dear Gurvinder,  My name is RYAN Yanez, and I am your Care Coordinator. I have been trying to contact you, but have not been able to reach you.  As a Care Coordinator, I am here to help. My role is to make sure that your health plan is working for you. I am available to:     Review your health care needs with you over the phone or in-person     Provide support for and information about covered services or supplies to help keep you safe and healthy in your home    Answer questions about your insurance     Help you find a provider, such as a doctor or dentist, to meet your unique needs  I can also help you schedule a free physical at your clinic. To schedule an appointment, please call me at   Monday-Friday between 8am-5pm TTY: 711.    Questions?  Please call me at the phone number listed above. If you d like, a friend or family member may call for you.  For general questions, call [employer] Customer Service at [phone] or [phone] (toll free) from 8 a.m. - 8 p.m. Central, seven days a week. Access to representatives may be limited at times. TTY: 711.  Sincerely,    RYAN Yanez  [phone]  [email or web address]    cc: member records                                                                                            CB5 ([facility]) (5-2020)    Civil Rights Notice  Discrimination is against the law. [employer] does not discriminate on the basis of any of the following:    Race    Color    National Origin    Creed    Rastafarian    Age    Public Assistance Status    Receipt of Health Care Services    Disability (including physical or mental impairment)    Sex (including sex stereotypes and gender identity)    Marital Status    Political Beliefs    Medical Condition    Genetic Information    Sexual Orientation    Claims Experience    Medical History    Health  Status    Auxiliary Aids and Services:  [employer] provides auxiliary aids and services, like qualified interpreters or information in accessible formats, free of charge and in a timely manner, to ensure an equal opportunity to participate in our health care programs. Contact [employer] at [employer]contact medicaid or call [phone] (toll free); TTY:717 or at [employer]contactmedicaid.    Language Assistance Services:  [employer] provides translated documents and spoken language interpreting, free of charge and in a timely manner, when language assistance services are necessary to ensure limited English speakers have meaningful access to our information and services. Contact [employer] at [phone] (toll free); TTY: 718 or [employer]contactSignal Innovations Groupcaid.     Civil Rights Complaints  You have the right to file a discrimination complaint if you believe you were treated in a discriminatory way by [employer]. You may contact any of the following four agencies directly to file a discrimination complaint.        U.S. Department of Health and Human Services  Office for Civil Rights (OCR)  You have the right to file a complaint with the OCR, a federal agency, if you believe you have been discriminated against because of any of the following:    Race    Disability    Color    Sex    National Origin    Age    Shinto (in some cases)    Contact the OCR directly to file a complaint:         Director         U.S. Department of Health and Human Services  Office for Civil Rights         [address] [place] [address]         Customer Response Center: Toll-free: [phone]          TDD: [phone]         Email: [email or web address]    [place] Department of Human Rights (MDHR)  In [place], you have the right to file a complaint with the MD if you believe you have been discriminated against because of any of the following:      Race    Color    National  Origin    Evangelical    Creed    Sex    Sexual Orientation    Marital Status    Public Assistance Status    Disability    Contact the MDHR directly to file a complaint:         [place] Department of Human Rights         [address] [address]         [phone] (voice)          [phone] (toll free)         711 or [phone] ([place] Relay)         [phone] (Fax)         [email or web address]. (Email)     [place] Department of Human Services (DHS)  You have the right to file a complaint with [facility] if you believe you have been discriminated against in our health care programs because of any of the following:    Race    Color    National Origin    Creed    Evangelical    Age    Public Assistance Status    Receipt of Health Care Services    Disability (including physical or mental impairment)    Sex (including sex stereotypes and gender identity)    Marital Status    Political Beliefs    Medical Condition    Genetic Information    Sexual Orientation    Claims Experience    Medical History    Health Status    Complaints must be in writing and filed within 180 days of the date you discovered the alleged discrimination. The complaint must contain your name and address and describe the discrimination you are complaining about. After we get your complaint, we will review it and notify you in writing about whether we have authority to investigate. If we do, we will investigate the complaint.      [facility] will notify you in writing of the investigation s outcome. You have a right to appeal the outcome if you disagree with the decision. To appeal, you must send a written request to have [facility] review the investigation outcome. Be brief and state why you disagree with the decision. Include additional information you think is important.      If you file a complaint in this way, the people who work for the agency named in the complaint cannot retaliate against you. This means they cannot  punish you in any way for filing a complaint. Filing a complaint in this way does not stop you from seeking out other legal or administration actions.     Contact DHS directly to file a discrimination complaint:        Civil Rights Coordinator        [place] Department of Human Services        Equal Opportunity and Access Division        [address] [address]        [place] [address] [phone] (voice) or use your preferred relay service     [employer] Complaint Notice   You have the right to file a complaint with [employer] if you believe you have been discriminated against because of any of the following:       Medical condition    Health status    Receipt of health care services    Claims experience    Medical history    Genetic information    Disability (including mental or physical impairment)    Marital status    Age    Sex (including sex stereotypes and gender identity)    Sexual orientation    National origin    Race    Color    Zoroastrianism    Creed    Public assistance status    Political beliefs    You can file a complaint and ask for help in filing a complaint in person or by mail, phone, fax, or email at:     [employer] Civil Rights Coordinator  [facility] [employer]  [address], [place] [address] [phone] (voice and fax) or TTL:584  Email: [email or web address]    American Indians can begin or continue to use [place] and  Health Services (IHS) clinics. We will not require prior approval or impose any conditions for you to get services at these clinics. For elders age 65 years and older this includes Elderly Waiver (EW) services accessed through the [place]. If a doctor or other provider in a [place] or IHS clinic refers you to a provider in our network, we will not require you to see your primary care provider prior to the referral.

## 2021-09-23 NOTE — PROGRESS NOTES
[facility] Care Coordination Contact    Called adult daughter Estrellita to schedule annual HRA home visit. Left a message requesting a return call to schedule HRA.     Care Coordinator tasked CMS to mail UTR letter.    RYNA Yanez  [facility]  Phone: [phone]

## 2021-09-28 NOTE — PROGRESS NOTES
"[facility] Care Coordination Contact    Per CC, mailed client an \"Unable to Contact\" letter.    Helen Siegel  Care Management Specialist  [facility]  [phone]    "

## 2021-09-29 ENCOUNTER — PATIENT OUTREACH (OUTPATIENT)
Dept: GERIATRIC MEDICINE | Facility: CLINIC | Age: 76
End: 2021-09-29

## 2021-09-29 NOTE — PROGRESS NOTES
[facility] Care Coordination Contact    Called adult daughter Estrellita to schedule annual HRA home visit. HRA has been scheduled for Oct 5th at 2 PM over the phone.     RYAN Yanez  [facility]  Phone: [phone]

## 2021-10-05 ENCOUNTER — PATIENT OUTREACH (OUTPATIENT)
Dept: GERIATRIC MEDICINE | Facility: CLINIC | Age: 76
End: 2021-10-05

## 2021-10-05 DIAGNOSIS — I63.9 CEREBROVASCULAR ACCIDENT (CVA), UNSPECIFIED MECHANISM (H): Primary | ICD-10-CM

## 2021-10-05 RX ORDER — ASPIRIN 81 MG/1
81 TABLET, CHEWABLE ORAL DAILY
COMMUNITY
End: 2024-05-28

## 2021-10-05 NOTE — TELEPHONE ENCOUNTER
Daughter calling stating that she waited to long to get Pt registered for medical cannabis and needs the referral to be resent. Please resend referral if appropriate.

## 2021-10-07 ENCOUNTER — MEDICAL CORRESPONDENCE (OUTPATIENT)
Dept: HEALTH INFORMATION MANAGEMENT | Facility: CLINIC | Age: 76
End: 2021-10-07
Payer: COMMERCIAL

## 2021-10-13 ENCOUNTER — DOCUMENTATION ONLY (OUTPATIENT)
Dept: OTHER | Facility: CLINIC | Age: 76
End: 2021-10-13

## 2021-10-14 ENCOUNTER — MEDICAL CORRESPONDENCE (OUTPATIENT)
Dept: HEALTH INFORMATION MANAGEMENT | Facility: CLINIC | Age: 76
End: 2021-10-14

## 2021-10-14 ENCOUNTER — HOSPITAL ENCOUNTER (OUTPATIENT)
Dept: CARDIOLOGY | Facility: CLINIC | Age: 76
Discharge: HOME | End: 2021-10-14
Attending: INTERNAL MEDICINE | Admitting: INTERNAL MEDICINE
Payer: COMMERCIAL

## 2021-10-14 DIAGNOSIS — I42.0 DILATED CARDIOMYOPATHY (H): ICD-10-CM

## 2021-10-14 DIAGNOSIS — I50.21 ACUTE SYSTOLIC HEART FAILURE (H): ICD-10-CM

## 2021-10-14 DIAGNOSIS — I48.91 ATRIAL FIBRILLATION WITH RAPID VENTRICULAR RESPONSE (H): ICD-10-CM

## 2021-10-14 PROCEDURE — 93306 TTE W/DOPPLER COMPLETE: CPT

## 2021-10-14 PROCEDURE — 93306 TTE W/DOPPLER COMPLETE: CPT | Mod: 26 | Performed by: INTERNAL MEDICINE

## 2021-10-22 ENCOUNTER — PATIENT OUTREACH (OUTPATIENT)
Dept: GERIATRIC MEDICINE | Facility: CLINIC | Age: 76
End: 2021-10-22

## 2021-10-22 NOTE — LETTER
[address], [address]  [place] [address]  Phone:  [phone]  Fax:  [phone]      October 22, 2021    GURVINDER DE DIOS  [address] [place] [address]    Dear Gurvinder,    Enclosed is a copy of your completed PCA Assessment and Service Plan.  This is for your records and no action is required by you.  If you have additional questions regarding your assessment please contact me at [phone]. If you feel that your needs are not being met, please contact the Clinical Supervisor at [phone].    Sincerely,    RYAN Yanez  [phone]  [email or web address]            Enclosure:  Completed PCA assessment

## 2021-10-22 NOTE — TELEPHONE ENCOUNTER
[facility] Care Coordination Contact    [employer]:  Faxed completed PCA assessment to PCA Agency and mailed copies to member.  Faxed MD Communication to PCP.  Emailed referral form for auth to [employer].    Member was mailed a copy of the PCA signature sheet to sign and return mail with a SASE.  This assessment was completed telephonically due to Covid-19.      Sue Rdz  Care Management Specialist  [facility]  [phone]

## 2021-10-22 NOTE — LETTER
October 29, 2021    Important [employer] Information    GURVINDER DE DIOS  [address] [place] [address]  Your Care Plan  Dear Gurvinder,  When we spoke recently, I promised to send you a Care Plan. The plan enclosed is a summary of our discussion. It includes the steps we agreed would help you meet your health goals. In addition, I can help you with:  [id number]  This program is available to members who need a ride to medical and dental visits. To schedule a ride, call [phone] or [phone] (toll free). TTY: 711. You can call 8 a.m. to 8 p.m. Seven days a week. Access to a representative may be limited at times.    [employer]   The [employer] program empowers you to improve your health through education and exercise. To learn more, visit [employer]AK[employer] ????, or call [employer] at [phone] (toll free) (TTY:711) from 7 a.m. - 7 p.m. Central Time, Monday-Friday.  Health Care Directive   This form helps you outline your health care wishes. You can request a form from me and I will answer any questions you have before you discuss it with your doctor.   Annual Physical  Take a key step on your path to good health and set up an annual physical at your clinic.  Questions?  Call me at [phone] Monday-Friday between 8am and 5pm.  TTY: 711. As we discussed, I plan to be in touch with you again in 6 months to follow up via phone.  Sincerely,    RYAN Yanez  [phone]  [email or web address]    cc: member records                                                                                             CB[facility] ([facility]) (5-2020)    Civil Rights Notice  Discrimination is against the law. [employer] does not discriminate on the basis of any of the following:    Race    Color    National Origin    Creed    Jewish    Age    Public Assistance Status    Receipt of Health Care Services    Disability (including physical or mental impairment)    Sex (including sex  stereotypes and gender identity)    Marital Status    Political Beliefs    Medical Condition    Genetic Information    Sexual Orientation    Claims Experience    Medical History    Health Status    Auxiliary Aids and Services:  [employer] provides auxiliary aids and services, like qualified interpreters or information in accessible formats, free of charge and in a timely manner, to ensure an equal opportunity to participate in our health care programs. Contact [employer] at [employer]/contact medicaid or call [phone] (toll free); TTY:719 or at [employer]/contactWhitepagescaid.    Language Assistance Services:  [employer] provides translated documents and spoken language interpreting, free of charge and in a timely manner, when language assistance services are necessary to ensure limited English speakers have meaningful access to our information and services. Contact [employer] at [phone] (toll free); TTY: 461 or [employer]/contactmedicaid.     Civil Rights Complaints  You have the right to file a discrimination complaint if you believe you were treated in a discriminatory way by [employer]. You may contact any of the following four agencies directly to file a discrimination complaint.    U.S. Department of Health and Human Services  Office for Civil Rights (OCR)  You have the right to file a complaint with the OCR, a federal agency, if you believe you have been discriminated against because of any of the following:    Race    Disability    Color    Sex    National Origin    Age    Hinduism (in some cases)    Contact the OCR directly to file a complaint:         Director         U.S. Department of Health and Human Services  Office for Civil Rights         [address] [place] [address]         Customer Response Center: Toll-free: [phone]          TDD: [phone]         Email: [email or web address]    [place] Department of Human Rights ([facility])  In [place], you  have the right to file a complaint with the MDHR if you believe you have been discriminated against because of any of the following:      Race    Color    National Origin    Episcopalian    Creed    Sex    Sexual Orientation    Marital Status    Public Assistance Status    Disability    Contact the MDHR directly to file a complaint:         [facility] of Human Rights         [address] [address]         [phone] (voice)          [phone] (toll free)         711 or [phone] ([place] Relay)         [phone] (Fax)         [email or web address] (Email)     [place] Department of Human Services (DHS)  You have the right to file a complaint with [facility] if you believe you have been discriminated against in our health care programs because of any of the following:    Race    Color    National Origin    Creed    Episcopalian    Age    Public Assistance Status    Receipt of Health Care Services    Disability (including physical or mental impairment)    Sex (including sex stereotypes and gender identity)    Marital Status    Political Beliefs    Medical Condition    Genetic Information    Sexual Orientation    Claims Experience    Medical History    Health Status    Complaints must be in writing and filed within 180 days of the date you discovered the alleged discrimination. The complaint must contain your name and address and describe the discrimination you are complaining about. After we get your complaint, we will review it and notify you in writing about whether we have authority to investigate. If we do, we will investigate the complaint.      [facility] will notify you in writing of the investigation s outcome. You have a right to appeal the outcome if you disagree with the decision. To appeal, you must send a written request to have [facility] review the investigation outcome. Be brief and state why you disagree with the decision. Include additional information you think is  important.      If you file a complaint in this way, the people who work for the agency named in the complaint cannot retaliate against you. This means they cannot punish you in any way for filing a complaint. Filing a complaint in this way does not stop you from seeking out other legal or administration actions.     Contact DHS directly to file a discrimination complaint:        Civil Rights Coordinator        [facility] of Human Services        Equal Opportunity and Access Division        [address] [address]        [place] [address] [phone] (voice) or use your preferred relay service     [employer] Complaint Notice   You have the right to file a complaint with [employer] if you believe you have been discriminated against because of any of the following:       Medical condition    Health status    Receipt of health care services    Claims experience    Medical history    Genetic information    Disability (including mental or physical impairment)    Marital status    Age    Sex (including sex stereotypes and gender identity)    Sexual orientation    National origin    Race    Color    Sabianist    Creed    Public assistance status    Political beliefs    You can file a complaint and ask for help in filing a complaint in person or by mail, phone, fax, or email at:     [facility] Civil Rights Coordinator  [facility] [employer]-r-T [facility]  [address], [place] [address] [phone] (voice and fax) or TTR:063  Email: [email or web address]    American Indians can begin or continue to use [facility] and  Health Services (IHS) clinics. We will not require prior approval or impose any conditions for you to get services at these clinics. For elders age 65 years and older this includes Elderly Waiver (EW) services accessed through the [facility]. If a doctor or other provider in a [facility] or IHS clinic refers you to a provider in our network, we will not require you to see your primary care  provider prior to the referral.

## 2021-10-23 ENCOUNTER — HEALTH MAINTENANCE LETTER (OUTPATIENT)
Age: 76
End: 2021-10-23

## 2021-10-25 ENCOUNTER — TELEPHONE (OUTPATIENT)
Dept: FAMILY MEDICINE | Facility: CLINIC | Age: 76
End: 2021-10-25

## 2021-10-25 NOTE — TELEPHONE ENCOUNTER
Estrellita calls to request an electric scooter for Yecenia. She specifies that it cannot be a wheelchair but a scooter. This can be sent to [facility].     Estrellita also states that the deadline to register for cannabis has . They are requesting the application be sent again.     email for registration: [email or web address]    Please advise. Estrellita would like a call back once these 2 things are taken care of. She reports lack of communication back.

## 2021-10-26 ASSESSMENT — PATIENT HEALTH QUESTIONNAIRE - PHQ9: SUM OF ALL RESPONSES TO PHQ QUESTIONS 1-9: 0

## 2021-10-27 NOTE — TELEPHONE ENCOUNTER
Please notify Estrellita I have sent a referral for evaluation for a motorized scooter to [facility] OT rehab team and have updated the referral to the medical cannabis program

## 2021-10-27 NOTE — TELEPHONE ENCOUNTER
Spoke to Estrellita. She understands the plan of care. I gave her the OT scheduling number if she has not heard from them in 2 business days.

## 2021-10-27 NOTE — PROGRESS NOTES
[facility] Care Coordination Contact    [facility] Home Visit Assessment     Home visit for Health Risk Assessment with Yecenia Kwan completed on 10/05/21    Type of residence:: Private home - stairs  Current living arrangement:: I live in a private home with family     Assessment completed with:: Patient, Children    Current Care Plan  Member currently receiving the following home care services:     Member currently receiving the following community resources: None    Phone assessment due to COVID-19    Medication Review  Medication reconciliation completed in Epic: If no, please explain No face to face reconcile due to COVID-19.  Medication set-up & administration: Family/informal caregiver sets up daily.  Family caregiver administers medications.  Medication Risk Assessment Medication (1 or more, place referral to MTM): N/A: No risk factors identified  MTM Referral Placed: No: No risk factors idenified    Mental/Behavioral Health   Depression Screening:      PHQ-9 Total Score: 0    Mental health DX:: No   Mental health DX how managed:: None    Falls Assessment:   Fallen 2 or more times in the past year?: No   Any fall with injury in the past year?: No    ADL/IADL Dependencies:   Dependent ADLs:: Ambulation-walker, Bathing, Dressing, Grooming, Incontinence, Positioning, Transfers, Wheelchair-with assist, Toileting  Dependent IADLs:: Cleaning, Medication Management, Money Management, Cooking, Laundry, Shopping, Meal Preparation, Incontinence, Transportation    [facility] Plan sponsored benefits: Shared information re: Silver Sneakers/gym memberships, ASA, Calcium +D.    PCA Assessment completed at visit: Yes Annual PCA assessment indicated 40 units per day of PCA. This is the same as the previous assessment.     Elderly Waiver Eligibility: Yes-will continue on EW    Care Plan & Recommendations: Yecenia continues to be eligible for Elderly Waiver Services.    Yecenia will continue to receive PCA  hours and wipes.    Yecenia is asking for a referral from her PCP for an OT Eval for a motorized scooter. Care Coordinator sent message to PCP.      See CC for detailed assessment information.    Follow-Up Plan: Member informed of future contact, plan to f/u with member with a 6 month telephone assessment.  Contact information shared with member and family, encouraged member to call with any questions or concerns at any time.    [facility] care continuum providers: Please refer to Health Care Home on the Epic Problem List to view this patient's [facility] Care Plan Summary.    RYAN Yanez  [facility]  Phone: [phone]

## 2021-11-01 ENCOUNTER — TRANSFERRED RECORDS (OUTPATIENT)
Dept: HEALTH INFORMATION MANAGEMENT | Facility: CLINIC | Age: 76
End: 2021-11-01
Payer: COMMERCIAL

## 2021-11-01 LAB
CREATININE (EXTERNAL): 0.68 MG/DL (ref 0.57–1.11)
GFR ESTIMATED (EXTERNAL): >60 ML/MIN/1.73M2
GFR ESTIMATED (IF AFRICAN AMERICAN) (EXTERNAL): >60 ML/MIN/1.73M2
GLUCOSE (EXTERNAL): 110 MG/DL (ref 65–100)
POTASSIUM (EXTERNAL): 4.5 MMOL/L (ref 3.5–5)

## 2021-11-02 ENCOUNTER — MEDICAL CORRESPONDENCE (OUTPATIENT)
Dept: HEALTH INFORMATION MANAGEMENT | Facility: CLINIC | Age: 76
End: 2021-11-02
Payer: COMMERCIAL

## 2021-11-03 ENCOUNTER — PATIENT OUTREACH (OUTPATIENT)
Dept: GERIATRIC MEDICINE | Facility: CLINIC | Age: 76
End: 2021-11-03

## 2021-11-03 NOTE — PROGRESS NOTES
[facility] Care Coordination Contact    Care Coordinator spoke to Daughter Estrellita.  She reported that Yecenia had her Pacemaker procedure on Monday and spent the night and was discharged yesterday.  She needs additional PCA support from her surgery for the next 45 days.      Care Coordinator agreed to approve from 10 hours daily to 15 hours daily for 45 days.   Care Coordinator faxed request to [facility] and PCA Provider.     RYAN Yanez  [facility]  Phone: [phone]

## 2021-11-04 DIAGNOSIS — I42.0 DILATED CARDIOMYOPATHY (H): Primary | ICD-10-CM

## 2021-11-06 NOTE — PROGRESS NOTES
[facility] Care Coordination Contact    Received after visit chart from care coordinator.  Completed following tasks: Mailed copy of care plan to client, Updated services in access and Submitted referrals/auths for [facility].     Provider Signature - No POC Shared:  Member indicates that they do not want their POC shared with any EW providers.    Mailed POC Signature page to member w/ self-stamped envelope for return.    Helen Siegel  Care Management Specialist  [facility]  [phone]

## 2021-11-09 ENCOUNTER — OFFICE VISIT (OUTPATIENT)
Dept: FAMILY MEDICINE | Facility: CLINIC | Age: 76
End: 2021-11-09
Payer: COMMERCIAL

## 2021-11-09 VITALS
BODY MASS INDEX: 43.53 KG/M2 | DIASTOLIC BLOOD PRESSURE: 90 MMHG | OXYGEN SATURATION: 98 % | WEIGHT: 224.4 LBS | HEART RATE: 81 BPM | SYSTOLIC BLOOD PRESSURE: 150 MMHG

## 2021-11-09 DIAGNOSIS — R60.0 BILATERAL LOWER EXTREMITY EDEMA: ICD-10-CM

## 2021-11-09 DIAGNOSIS — I48.0 PAROXYSMAL ATRIAL FIBRILLATION (H): ICD-10-CM

## 2021-11-09 DIAGNOSIS — I10 ESSENTIAL HYPERTENSION, BENIGN: ICD-10-CM

## 2021-11-09 DIAGNOSIS — R21 RASH: ICD-10-CM

## 2021-11-09 DIAGNOSIS — E11.69 TYPE 2 DIABETES MELLITUS WITH OTHER SPECIFIED COMPLICATION, WITHOUT LONG-TERM CURRENT USE OF INSULIN (H): Primary | ICD-10-CM

## 2021-11-09 DIAGNOSIS — I42.0 DILATED CARDIOMYOPATHY (H): ICD-10-CM

## 2021-11-09 DIAGNOSIS — Z86.73 HX OF ISCHEMIC LEFT MCA STROKE: ICD-10-CM

## 2021-11-09 DIAGNOSIS — Z95.0 CARDIAC PACEMAKER IN SITU: ICD-10-CM

## 2021-11-09 LAB
ANION GAP SERPL CALCULATED.3IONS-SCNC: 13 MMOL/L (ref 5–18)
BUN SERPL-MCNC: 20 MG/DL (ref 8–28)
CALCIUM SERPL-MCNC: 9.4 MG/DL (ref 8.5–10.5)
CHLORIDE BLD-SCNC: 106 MMOL/L (ref 98–107)
CO2 SERPL-SCNC: 22 MMOL/L (ref 22–31)
CREAT SERPL-MCNC: 0.64 MG/DL (ref 0.6–1.1)
GFR SERPL CREATININE-BSD FRML MDRD: 88 ML/MIN/1.73M2
GLUCOSE BLD-MCNC: 85 MG/DL (ref 70–125)
HBA1C MFR BLD: 5.8 % (ref 0–5.6)
POTASSIUM BLD-SCNC: 4.8 MMOL/L (ref 3.5–5)
SODIUM SERPL-SCNC: 141 MMOL/L (ref 136–145)

## 2021-11-09 PROCEDURE — 36415 COLL VENOUS BLD VENIPUNCTURE: CPT | Performed by: FAMILY MEDICINE

## 2021-11-09 PROCEDURE — 80048 BASIC METABOLIC PNL TOTAL CA: CPT | Performed by: FAMILY MEDICINE

## 2021-11-09 PROCEDURE — 99214 OFFICE O/P EST MOD 30 MIN: CPT | Performed by: FAMILY MEDICINE

## 2021-11-09 PROCEDURE — 83036 HEMOGLOBIN GLYCOSYLATED A1C: CPT | Performed by: FAMILY MEDICINE

## 2021-11-09 RX ORDER — LOSARTAN POTASSIUM 25 MG/1
25 TABLET ORAL
COMMUNITY
Start: 2021-11-02 | End: 2021-11-09

## 2021-11-09 RX ORDER — LOSARTAN POTASSIUM 50 MG/1
50 TABLET ORAL DAILY
Qty: 90 TABLET | Refills: 3 | Status: SHIPPED | OUTPATIENT
Start: 2021-11-09 | End: 2022-11-28

## 2021-11-09 RX ORDER — HYDROCODONE BITARTRATE AND ACETAMINOPHEN 5; 325 MG/1; MG/1
1 TABLET ORAL
COMMUNITY
Start: 2021-11-03 | End: 2022-05-12

## 2021-11-09 RX ORDER — ACETAMINOPHEN 325 MG/1
650 TABLET ORAL
COMMUNITY
Start: 2021-11-02

## 2021-11-09 RX ORDER — TRIAMCINOLONE ACETONIDE 1 MG/G
OINTMENT TOPICAL 2 TIMES DAILY
Qty: 30 G | Refills: 3 | Status: SHIPPED | OUTPATIENT
Start: 2021-11-09 | End: 2022-04-05

## 2021-11-09 ASSESSMENT — ANXIETY QUESTIONNAIRES
GAD7 TOTAL SCORE: 1
5. BEING SO RESTLESS THAT IT IS HARD TO SIT STILL: NOT AT ALL
8. IF YOU CHECKED OFF ANY PROBLEMS, HOW DIFFICULT HAVE THESE MADE IT FOR YOU TO DO YOUR WORK, TAKE CARE OF THINGS AT HOME, OR GET ALONG WITH OTHER PEOPLE?: NOT DIFFICULT AT ALL
7. FEELING AFRAID AS IF SOMETHING AWFUL MIGHT HAPPEN: NOT AT ALL
4. TROUBLE RELAXING: NOT AT ALL
3. WORRYING TOO MUCH ABOUT DIFFERENT THINGS: NOT AT ALL
6. BECOMING EASILY ANNOYED OR IRRITABLE: NOT AT ALL
GAD7 TOTAL SCORE: 1
2. NOT BEING ABLE TO STOP OR CONTROL WORRYING: NOT AT ALL
GAD7 TOTAL SCORE: 1
1. FEELING NERVOUS, ANXIOUS, OR ON EDGE: SEVERAL DAYS
7. FEELING AFRAID AS IF SOMETHING AWFUL MIGHT HAPPEN: NOT AT ALL

## 2021-11-09 NOTE — PATIENT INSTRUCTIONS
DEVICE DATA    Medtronic:   Model Percepta Quad CRT-P MRI [id number]   Implant Date 11/1/2021     LEAD DATA   RV Lead:  Medtronic:   Model 3830 - 69 cm   Implant Date 11/1/2021   LV Lead:  Medtronic:   Model [id number] - 88 cm   Implant Date 11/1/2021

## 2021-11-09 NOTE — PROGRESS NOTES
"  Assessment & Plan     Type 2 diabetes mellitus with other specified complication, without long-term current use of insulin (H)  Well controlled, not currently checking blood sugars  - Hemoglobin A1c  - Hemoglobin A1c    Essential hypertension, benign  Increase losartan to 50 mg if cr normal  - Basic metabolic panel  (Ca, Cl, CO2, Creat, Gluc, K, Na, BUN)  - losartan (COZAAR) 50 MG tablet  Dispense: 90 tablet; Refill: 3  - Basic metabolic panel  (Ca, Cl, CO2, Creat, Gluc, K, Na, BUN)    Paroxysmal atrial fibrillation (H)  S/p pacer placement    Hx of ischemic left MCA stroke  With persistent weakness and word finding difficulty    Bilateral lower extremity edema  - Compression Sleeve/Stocking Order for DME - ONLY FOR DME    Rash  - triamcinolone (KENALOG) 0.1 % external ointment  Dispense: 30 g; Refill: 3    Cardiac pacemaker in situ  Site is healing well    Dilated cardiomyopathy (H)  Answers for HPI/ROS submitted by the patient on 11/9/2021  TONI 7 TOTAL SCORE: 1         BMI:   Estimated body mass index is 43.53 kg/m  as calculated from the following:    Height as of 9/9/21: 1.529 m (5' 0.2\").    Weight as of this encounter: 101.8 kg (224 lb 6.4 oz).       Martha Gonzalez MD  [facility]EN[facility] Keene is a 76 year old who presents for the following health issues  accompanied by her daughter.    HPI     Swelling since the procedure- taking prn diuretics. Wanting get compression stockings  Also needs size G compression for right arm- send to [facility] medical  Has a knot in the back right leg  Medical cannabis    Review of Systems   CONSTITUTIONAL: NEGATIVE for fever, chills, change in weight  ENT/MOUTH: NEGATIVE for ear, mouth and throat problems  RESP: NEGATIVE for significant cough or SOB  CV: POSITIVE for lower extremity edema      Objective    BP (!) 150/90 (BP Location: Left arm, Patient Position: Sitting, Cuff Size: Adult Large)   Pulse 81   Wt 101.8 kg (224 lb 6.4 oz)   SpO2 " 98%   BMI 43.53 kg/m    Body mass index is 43.53 kg/m .  Physical Exam   GENERAL: healthy, alert and no distress  NECK: no adenopathy, no asymmetry, masses, or scars and thyroid normal to palpation  RESP: lungs clear to auscultation - no rales, rhonchi or wheezes  CV: regular rate and rhythm, normal S1 S2, no S3 or S4, no murmur, click or rub, no peripheral edema and peripheral pulses strong  ABDOMEN: soft, nontender, no hepatosplenomegaly, no masses and bowel sounds normal  MS: 1+ edema to knee    Results for orders placed or performed in visit on 11/09/21   Basic metabolic panel  (Ca, Cl, CO2, Creat, Gluc, K, Na, BUN)     Status: Normal   Result Value Ref Range    Sodium 141 136 - 145 mmol/L    Potassium 4.8 3.5 - 5.0 mmol/L    Chloride 106 98 - 107 mmol/L    Carbon Dioxide (CO2) 22 22 - 31 mmol/L    Anion Gap 13 5 - 18 mmol/L    Urea Nitrogen 20 8 - 28 mg/dL    Creatinine 0.64 0.60 - 1.10 mg/dL    Calcium 9.4 8.5 - 10.5 mg/dL    Glucose 85 70 - 125 mg/dL    GFR Estimate 88 >60 mL/min/1.73m2   Hemoglobin A1c     Status: Abnormal   Result Value Ref Range    Hemoglobin A1C 5.8 (H) 0.0 - 5.6 %

## 2021-11-10 ASSESSMENT — ANXIETY QUESTIONNAIRES: GAD7 TOTAL SCORE: 1

## 2021-11-18 ENCOUNTER — PATIENT OUTREACH (OUTPATIENT)
Dept: GERIATRIC MEDICINE | Facility: CLINIC | Age: 76
End: 2021-11-18
Payer: COMMERCIAL

## 2021-11-18 NOTE — PROGRESS NOTES
[facility] Care Coordination Contact    Care Coordinator spoke to Veronica.   Care Coordinator advised her that auth was sent to [facility] for Tubi  Socks and should be delivered no later than next week.      Veronica also asked why PCA Agency did not get Auth and PCA Assessment.  Care Coordinator faxed both items again and asked for a return call for confirmation.        Care Coordinator will call Veronica once confirmation is received back.     RYAN Yanez  [facility]  Phone: [phone]

## 2021-11-22 ENCOUNTER — PATIENT OUTREACH (OUTPATIENT)
Dept: GERIATRIC MEDICINE | Facility: CLINIC | Age: 76
End: 2021-11-22
Payer: COMMERCIAL

## 2021-11-22 NOTE — PROGRESS NOTES
[facility] Care Coordination Contact    CHW,Elva to remind mbr to schedule diabetic eye and wellness exams. Left contact info if questions.    SAIMA Barbosa  [facility]  [phone]

## 2021-12-07 ENCOUNTER — TELEPHONE (OUTPATIENT)
Dept: FAMILY MEDICINE | Facility: CLINIC | Age: 76
End: 2021-12-07
Payer: COMMERCIAL

## 2021-12-07 NOTE — TELEPHONE ENCOUNTER
Daughter Daina calling, Dr Gonzalez placed a referral for cannabis 7-30-21 dead line was missed and needs a new referral placed. Call Estrellita with questions

## 2021-12-08 ENCOUNTER — NURSE TRIAGE (OUTPATIENT)
Dept: NURSING | Facility: CLINIC | Age: 76
End: 2021-12-08
Payer: COMMERCIAL

## 2021-12-08 NOTE — TELEPHONE ENCOUNTER
Clinic Action Needed:please call  Anastasia daughter  [phone]  Reason for Call:requesting orders for cannabis/pain has been getting worse in the last couple of weeks  Patient Recommendations/Teaching:  Routed to:harleen

## 2021-12-08 NOTE — TELEPHONE ENCOUNTER
Spoke to Estrellita patient's contact. She states she reached out to the MD. Per [place] Dept of Health- a new referral needs to sent due to expiration of previous application.     Please advise.

## 2021-12-08 NOTE — TELEPHONE ENCOUNTER
Please notify pt's daughter I have already placed the referral with [facility] for medical cannabis.  She will need to contact [facility] for further direction. Thanks

## 2022-01-03 ENCOUNTER — TELEPHONE (OUTPATIENT)
Dept: FAMILY MEDICINE | Facility: CLINIC | Age: 77
End: 2022-01-03
Payer: COMMERCIAL

## 2022-01-03 NOTE — TELEPHONE ENCOUNTER
Daughter is calling to advise  and Tiki that she is dropping off an [employer] form for  to sign. Daughter states that  completes this form each year.

## 2022-01-04 NOTE — TELEPHONE ENCOUNTER
Has anyone seen this form? I have been checking Dr. Gonzalez's inbox by the  and have not seen anything. Pls lmk if you seen it or have it. Thank you.

## 2022-01-06 ENCOUNTER — TELEPHONE (OUTPATIENT)
Dept: FAMILY MEDICINE | Facility: CLINIC | Age: 77
End: 2022-01-06
Payer: COMMERCIAL

## 2022-01-06 NOTE — TELEPHONE ENCOUNTER
Reason for Call:  Form, our goal is to have forms completed with 72 hours, however, some forms may require a visit or additional information.    Type of letter, form or note:  [employer] and Medical Emergency    Who is the form from?: Patient    Where did the form come from: Patient or family brought in       What clinic location was the form placed at?: [facility]    Where the form was placed: Dr Gonzalez Box/Folder    What number is listed as a contact on the form?: [phone]       Additional comments: Recvd form from patient, pls contact her when form has been completed and faxed to number on form (fasx #[phone])    Call taken on 1/6/2022 at 7:50 AM by Joanie Felipe

## 2022-01-18 ENCOUNTER — TELEPHONE (OUTPATIENT)
Dept: FAMILY MEDICINE | Facility: CLINIC | Age: 77
End: 2022-01-18
Payer: COMMERCIAL

## 2022-01-18 DIAGNOSIS — U07.1 INFECTION DUE TO 2019 NOVEL CORONAVIRUS: Primary | ICD-10-CM

## 2022-01-18 RX ORDER — BENZONATATE 100 MG/1
CAPSULE ORAL
Qty: 30 CAPSULE | Refills: 1 | Status: SHIPPED | OUTPATIENT
Start: 2022-01-18 | End: 2022-07-08

## 2022-01-18 NOTE — TELEPHONE ENCOUNTER
Please notify pt's daughter that I have sent tessalmoira chavezes to the pharmacy for the patient to try for coughing symptoms. Unfortunately the cough may last another 2-3 weeks. This will hopefully help with symptoms

## 2022-01-18 NOTE — TELEPHONE ENCOUNTER
Reason for Call:  Other call back    Detailed comments: Recvd call from daughter/Anastasia, pt/Yecenia tested positive for COVID 2 1/2 wks now. COVID test was completed at [facility]. Yecenia has had a lingering dry cough and would like to know if there is something she can get for this.    Phone Number Patient can be reached at: Cell number on file:    Telephone Information:   Mobile [phone]       Best Time: anytime    Can we leave a detailed message on this number? YES    Call taken on 1/18/2022 at 8:34 AM by Joanie Felipe

## 2022-02-08 ENCOUNTER — PATIENT OUTREACH (OUTPATIENT)
Dept: GERIATRIC MEDICINE | Facility: CLINIC | Age: 77
End: 2022-02-08
Payer: COMMERCIAL

## 2022-02-08 NOTE — PROGRESS NOTES
[facility] Care Coordination Contact     CHW, attempted to reach mbr on preventative care vm not available.      SAIMA Barbosa  [facility]  [phone]

## 2022-02-12 ENCOUNTER — HEALTH MAINTENANCE LETTER (OUTPATIENT)
Age: 77
End: 2022-02-12

## 2022-03-02 ENCOUNTER — PATIENT OUTREACH (OUTPATIENT)
Dept: GERIATRIC MEDICINE | Facility: CLINIC | Age: 77
End: 2022-03-02
Payer: COMMERCIAL

## 2022-03-02 NOTE — PROGRESS NOTES
[facility] Care Coordination Contact    Care Coordinator received call from Daughter Estrellita.   Care Coordinator left message for her to call back.     RYAN Yanez  [facility]  Phone: [phone]

## 2022-03-15 ENCOUNTER — TELEPHONE (OUTPATIENT)
Dept: FAMILY MEDICINE | Facility: CLINIC | Age: 77
End: 2022-03-15

## 2022-03-15 NOTE — TELEPHONE ENCOUNTER
Reason for Call:  Other CANIBIS PROGRAM    Detailed comments: daughter Estrellita called and said she was told by the states that the wrong paperwork/application and it needs to be redone and the application has .    She needs a new one from Dr. Gonzalez to be sent in.  Please call Estrellita [phone] with questions    Phone Number Patient can be reached at: Home number on file [phone] (home)    Best Time: anytime    Can we leave a detailed message on this number? YES    Call taken on 3/15/2022 at 11:34 AM by John Restrepo

## 2022-03-16 NOTE — TELEPHONE ENCOUNTER
I reached out to pt and daughter Estrellita. Daughter states that after submitting all requested paperwork, she was told that the recent referral was  and was told to get another referral.    Pt is scheduled for a telephone visit with Dr. Gonzalez, if needed, will keep visit. If not, will cancel appt.

## 2022-03-20 NOTE — PROGRESS NOTES
[facility] Follow-up Note    Assessment & Plan        1. Benign essential hypertension -not under good control despite metoprolol 100 mg as well as on losartan 40 mg.  Patient does not want to start clonidine, states she has had side effects from this, does not like metoprolol since it lowers her heartbeat, and daughter does not really want nor does the patient start an additional medication.  In addition, when started diltiazem per primary physician she felt extremely nauseous and lightheaded.  They claim that hydralazine is not working well.  I suspect this -American she may have an element of high renin hypertension and will try hydralazine 50 mg every 8 hours and titrate up to 100 mg every 8 hours.  I would then try to back off of metoprolol her request to 50 mg but eventually we need to back off on Benicar and consider Aldactone.   2. Cerebrovascular accident (CVA) due to occlusion of precerebral artery (H)  -felt to be small foci of acute ischemia involving the lateral left temporal lobe, possibly embolic based on transient atrial fibrillation.  Anticoagulation as above.   3. Mixed hyperlipidemia -LDL markedly elevated 168.  Only on simvastatin 40 mg with LDL now 105.  With strongly recommend switching back to atorvastatin but not at this juncture.   4. Morbid obesity with BMI of 45.0-49.9, adult (H)    5. BELA (obstructive sleep apnea) -CPAP and defer to primary.   6. Paroxysmal atrial fibrillation (H) -minimally seen on telemetry but given her age as well as female as well as hypertension as well as CVA advanced CHADS 2 VASC score of 4 and Eliquis.     Plan  1.  Try hydralazine 50 mg p.o. 3 times daily and slowly titrate up to 100 mg p.o. 3 times daily.  2.  At patient's request in a week decrease metoprolol to 50 mg.  3.  Blood pressure still doing well we will consider discontinuing or decreasing on losartan secondary to concern about high renin hypertension.  4.  Consider adding  Aldactone 25 mg p.o. twice daily.  5.  Follow-up with me in about 3 months or sooner if needed.  6.  24-hour urine for metanephrines to rule out pheochromocytoma as etiology.    Subjective  CC: 74-year-old  female here for follow-up, she is here with 2 sons, a daughter called and on my phone and discussed things.  Patient states diltiazem made her extremely lightheaded and nauseous.  She states clonidine caused her to have peripheral edema.  She is still aphasic but seems to live at home independently with a son, having no significant chest discomfort, palpitations, PND, orthopnea or significant peripheral edema.    Medications  Current Outpatient Medications   Medication Sig Note     apixaban (ELIQUIS) 5 mg Tab tablet Take 1 tablet (5 mg total) by mouth 2 (two) times a day.      diclofenac sodium (VOLTAREN) 1 % Gel Lower extremities: 4 g to the affected area 4 times daily using dosing card.  Do not exceed 32 g total dose per day. (Patient taking differently: Apply 4 g topically 4 (four) times a day as needed (pain). Lower extremities: 4 g to the affected area 4 times daily using dosing card.  Do not exceed 32 g total dose per day.      )      diltiazem (CARDIZEM CD) 120 MG 24 hr capsule Take 1 capsule (120 mg total) by mouth daily.      fluticasone (ALLERGY RELIEF, FLUTICASONE,) 50 mcg/actuation nasal spray 2 sprays into each nostril daily as needed for rhinitis. 10/3/2018: As needed      HYDROcodone-acetaminophen (NORCO )  mg per tablet Take 1 tablet by mouth every 6 (six) hours as needed for pain.      metoprolol succinate (TOPROL XL) 100 MG 24 hr tablet Take 1 tablet (100 mg total) by mouth daily.      olmesartan (BENICAR) 40 MG tablet Take 1 tablet (40 mg total) by mouth daily.      omeprazole (PRILOSEC) 40 MG capsule TAKE 1 CAPSULE(40 MG) BY MOUTH DAILY BEFORE BREAKFAST      polyethylene glycol (MIRALAX) 17 gram packet Take 17 g by mouth daily as needed.      simvastatin (ZOCOR) 40  "MG tablet Take 1 tablet (40 mg total) by mouth every evening. (Patient taking differently: Take 40 mg by mouth daily.       )      triamcinolone (KENALOG) 0.1 % cream Apply topically 2 (two) times a day. As needed for rash (Patient taking differently: Apply 1 application topically 2 (two) times a day as needed. As needed for rash      )        Objective  BP (!) 170/100 (Patient Site: Left Arm, Patient Position: Sitting, Cuff Size: Adult Large)   Pulse 62   Resp 16   Ht 5' 1.25\" (1.556 m)   Wt (!) 237 lb (107.5 kg)   BMI 44.42 kg/m      General Appearance:    Alert, cooperative, no distress, appears stated age   Head:    Normocephalic, without obvious abnormality, atraumatic   Throat:   Lips, mucosa, and tongue normal; teeth and gums normal   Neck:   Supple, symmetrical, trachea midline, no adenopathy;        thyroid:  No enlargement/tenderness/nodules; no carotid    bruit or JVD   Back:     Symmetric, no curvature, ROM normal, no CVA tenderness   Lungs:     Clear to auscultation bilaterally, respirations unlabored   Chest wall:    No tenderness or deformity   Heart:    Regular rate and rhythm, S1 and S2 normal, no murmur, rub   or gallop   Abdomen:     Soft, non-tender, bowel sounds active all four quadrants,     no masses, no organomegaly   Extremities:   Normal, atraumatic, no cyanosis or edema   Pulses:   2+ and symmetric all extremities   Skin:   Skin color, texture, turgor normal, no rashes or lesions     Results    Lab Results personally reviewed   Lab Results   Component Value Date    CHOL 182 07/24/2019    CHOL 230 (H) 09/25/2018     Lab Results   Component Value Date    HDL 64 07/24/2019    HDL 50 09/25/2018     Lab Results   Component Value Date    LDLCALC 105 07/24/2019    LDLCALC 168 (H) 09/25/2018     Lab Results   Component Value Date    TRIG 67 07/24/2019    TRIG 62 09/25/2018     Lab Results   Component Value Date    WBC 6.8 06/23/2019    HGB 10.8 (L) 06/23/2019    HCT 35.4 06/23/2019     " 06/23/2019     Lab Results   Component Value Date    CREATININE 0.63 07/24/2019    BUN 11 07/24/2019     07/24/2019    K 4.0 07/24/2019    CO2 28 07/24/2019     Review of Systems:   General: WNL  Eyes: WNL  Ears/Nose/Throat: WNL  Lungs: WNL  Heart: Shortness of Breath with activity  Stomach: WNL  Bladder: WNL  Muscle/Joints: Joint Pain  Skin: WNL  Nervous System: Daytime Sleepiness, Dizziness  Mental Health: Anxiety     Blood: Easy Bleeding         20-Mar-2022 05:58

## 2022-03-30 NOTE — TELEPHONE ENCOUNTER
Please notify daughter I have placed the recertification order. She should be able to proceed now.   normal mood with appropriate affect

## 2022-04-05 ENCOUNTER — TELEPHONE (OUTPATIENT)
Dept: FAMILY MEDICINE | Facility: CLINIC | Age: 77
End: 2022-04-05

## 2022-04-05 ENCOUNTER — VIRTUAL VISIT (OUTPATIENT)
Dept: FAMILY MEDICINE | Facility: CLINIC | Age: 77
End: 2022-04-05
Payer: COMMERCIAL

## 2022-04-05 DIAGNOSIS — M17.0 PRIMARY OSTEOARTHRITIS OF BOTH KNEES: ICD-10-CM

## 2022-04-05 DIAGNOSIS — R09.81 CHRONIC NASAL CONGESTION: ICD-10-CM

## 2022-04-05 DIAGNOSIS — L30.9 DERMATITIS: ICD-10-CM

## 2022-04-05 DIAGNOSIS — K21.9 GASTROESOPHAGEAL REFLUX DISEASE WITHOUT ESOPHAGITIS: Primary | ICD-10-CM

## 2022-04-05 PROCEDURE — 99214 OFFICE O/P EST MOD 30 MIN: CPT | Mod: 95 | Performed by: FAMILY MEDICINE

## 2022-04-05 RX ORDER — PANTOPRAZOLE SODIUM 40 MG/1
1 FOR SUSPENSION ORAL DAILY
COMMUNITY
End: 2022-04-05

## 2022-04-05 RX ORDER — FUROSEMIDE 20 MG
20 TABLET ORAL
COMMUNITY
Start: 2022-02-02 | End: 2023-01-23

## 2022-04-05 RX ORDER — TRIAMCINOLONE ACETONIDE 1 MG/G
CREAM TOPICAL 2 TIMES DAILY
Qty: 80 G | Refills: 1 | Status: SHIPPED | OUTPATIENT
Start: 2022-04-05 | End: 2022-09-29

## 2022-04-05 RX ORDER — PANTOPRAZOLE SODIUM 40 MG/1
1 FOR SUSPENSION ORAL DAILY
Qty: 30 EACH | Refills: 11 | Status: SHIPPED | OUTPATIENT
Start: 2022-04-05

## 2022-04-05 NOTE — PROGRESS NOTES
Yecenia is a 76 year old who is being evaluated via a billable telephone visit.      What phone number would you like to be contacted at? [phone]  How would you like to obtain your AVS? MyChart    Assessment & Plan     Gastroesophageal reflux disease without esophagitis  - pantoprazole sodium (PROTONIX) 40 MG packet  Dispense: 30 each; Refill: 11    Chronic nasal congestion  - Otolaryngology Referral    Dermatitis  - triamcinolone (KENALOG) 0.1 % external cream  Dispense: 80 g; Refill: 1    Primary osteoarthritis of both knees  requestiing renewal of referral to medical cannabis program      Martha Gonzalez MD  [facility]    Subjective   Yecenia is a 76 year old who presents for the following health issues  accompanied by her daughter.    HPI     Retinal detachment - following with ophthalmology- working on returning vision to left eye. Swelling in right eye with cataracts.    Seeing Cardiology at [facility].  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  Tried flonase- not helping    Needs another referral for medical Cannabis-     Review of Systems   CONSTITUTIONAL: NEGATIVE for fever, chills, change in weight  ENT/MOUTH: NEGATIVE for ear, mouth and throat problems  RESP: NEGATIVE for significant cough or SOB  CV: NEGATIVE for chest pain, palpitations or peripheral edema      Objective           Vitals:  No vitals were obtained today due to virtual visit.    Physical Exam   healthy, alert and no distress  PSYCH: Alert and oriented times 3; coherent speech, normal   rate and volume, able to articulate logical thoughts, able   to abstract reason, no tangential thoughts, no hallucinations   or delusions  Her affect is normal  RESP: No cough, no audible wheezing, able to talk in full sentences  Remainder of exam unable to be completed due to telephone visits           Phone call duration: 13 minutes

## 2022-04-05 NOTE — TELEPHONE ENCOUNTER
PA Initiation    Medication: pantoprazole sodium (PROTONIX) 40 MG packet  Insurance Company: [employer] [facility] NO*   Pharmacy Filling the Rx [facility]     Filling Pharmacy Phone:  [phone]  Filling Pharmacy Fax:  [phone]  Start Date:  04/05/22

## 2022-04-07 NOTE — TELEPHONE ENCOUNTER
Central Prior Authorization Team   Phone: [phone]      PA Initiation    Medication: pantoprazole sodium (PROTONIX) 40 MG packet  Insurance Company: [employer] - Phone [phone] Fax [phone]  Pharmacy Filling the Rx: [id number] - [place], [place] - [address] [facility] [address]  Filling Pharmacy Phone: [phone]  Filling Pharmacy Fax:    Start Date: 4/7/2022

## 2022-04-07 NOTE — TELEPHONE ENCOUNTER
Prior Authorization Approval    Authorization Effective Date: 3/8/2022  Authorization Expiration Date: 4/7/2023  Medication: pantoprazole sodium (PROTONIX) 40 MG packet-APPROVED  Approved Dose/Quantity:   Reference #:     Insurance Company: [employer] - Phone [phone] Fax [phone]  Expected CoPay:       CoPay Card Available:      Foundation Assistance Needed:    Which Pharmacy is filling the prescription (Not needed for infusion/clinic administered): [id number] - [place], [place] - [address] AT [facility] [address]  Pharmacy Notified: Yes  Patient Notified: No    **Instructed pharmacy to notify patient when script is ready to /ship.**

## 2022-04-10 PROBLEM — G47.33 OSA (OBSTRUCTIVE SLEEP APNEA): Status: ACTIVE | Noted: 2019-03-31

## 2022-04-10 PROBLEM — I48.0 PAROXYSMAL ATRIAL FIBRILLATION (H): Status: ACTIVE | Noted: 2018-09-26

## 2022-04-22 ENCOUNTER — HOSPITAL ENCOUNTER (OUTPATIENT)
Dept: ULTRASOUND IMAGING | Facility: CLINIC | Age: 77
Discharge: HOME | End: 2022-04-22
Attending: OBSTETRICS & GYNECOLOGY | Admitting: OBSTETRICS & GYNECOLOGY
Payer: COMMERCIAL

## 2022-04-22 DIAGNOSIS — N95.0 POSTMENOPAUSAL BLEEDING: ICD-10-CM

## 2022-04-22 LAB — RADIOLOGIST FLAGS: ABNORMAL

## 2022-04-22 PROCEDURE — 76856 US EXAM PELVIC COMPLETE: CPT

## 2022-05-03 ENCOUNTER — HOSPITAL ENCOUNTER (OUTPATIENT)
Facility: HOSPITAL | Age: 77
End: 2022-05-03
Attending: OBSTETRICS & GYNECOLOGY | Admitting: OBSTETRICS & GYNECOLOGY
Payer: COMMERCIAL

## 2022-05-03 ENCOUNTER — PATIENT OUTREACH (OUTPATIENT)
Dept: GERIATRIC MEDICINE | Facility: CLINIC | Age: 77
End: 2022-05-03
Payer: COMMERCIAL

## 2022-05-03 NOTE — PROGRESS NOTES
[facility] Care Coordination Contact  CC received notification of Emergency Room visit.  ER visit occurred on 05/02/2022 at [facility]  with Dx of HTN.    CC contacted adult daughter Estrellita and reviewed discharge summary.  Member has a follow-up appointment with PCP: No: Offered Assistance with setting up a follow up appointment  Member has had a change in condition: No  New referrals placed: No  Home Visit Needed: No  Care plan reviewed and updated.  PCP notified of ED visit via EMR.      [facility] Six-Month Telephone Assessment    6 month telephone assessment completed on 05/03/22.    ER visits: Yes -  [facility]   Hospitalizations: Yes -  [facility]   TCU stays: No  Significant health status changes: None  Falls/Injuries: No  ADL/IADL changes: No  Changes in services: No    Caregiver Assessment follow up:  NA    Goals: See POC in chart for goal progress documentation.  Care Coordinator spoke to Claudette Haro.   She reported that Yecenia is doing good.   She has no new needs at this time.     Will see member in 6 months for an annual health risk assessment.   Encouraged member to call CC with any questions or concerns in the meantime.     RYAN Yanez  [facility]  Phone: [phone]

## 2022-05-03 NOTE — PROGRESS NOTES
GYNECOLOGIC ONCOLOGY FOLLOW-UP VISIT    Patient Name: GURVINDER KWAN : 1945  Date of Visit: 2022  Referring Provider: Kathia Barriga MD (OB/GYN), FAX  Attending: Kenyatta Kothari (Gynecological/Oncology)    Chief Complaint (Gyn Oncology):  Follow-up, treatment recommendations.    History of Present Illness (Gyn Oncology):  Gurvinder Kwan is a very sweet 76-year-old female with at least complex atypical hyperplasia diagnosed on an endometrial biopsy in 2021. She was scheduled for a hysterectomy, which was delayed due to a stroke. She now presents for a return follow-up with a repeat pelvic ultrasound.    CLINICAL COURSE:  2022: The patient presented with the complaints of intermittent vaginal bleeding without pelvic pain. An ultrasound was obtained, which showed that the uterus is anteverted. The contour of the uterus appears smooth. The uterus appears to have lelomyoma(s) that measure 23 x 38 x 28 mm. The leiyomoa(s) are subserosal The endometrium appears thickened, and for a post menopausal woman The endometrium measures 16.14 mm. The cervix is normal appearing. The right adnexa could not be visualized. The left adnexa could not be visualized. The bladder was poorly filled. There was  no free fluid seen, and 3D rendering was not ran  2021: The patient had an endometrial polyp biopsy, which showed scant minute fragments of benign endocervix admixed with mucus.   2021: The patient underwent an endometrial biopsy, which showed at least complex atypical hyperplasia.   2022: Ultrasound of the pelvis showed abnormal appearance to the left fundal endometrium with loss of the junctional zone and ill-defined hyperechoic mass, invading the myometrium. If an intervention is not performed, a pelvic MRI can better assess this. Right-sided myometrial fibroid. Neither ovary is well seen.  Genetic Testing (Gyn Oncology):  N/A    Interval History (Gyn Oncology)  Gurvinder tobar  today with her daughter who is her caregiver primarily. She does have underlying cognitive disabilities due to her history of stroke x 2. She does have a . She is doing well. She is getting around okay. She had her pacemaker placed approximately 6 months ago. She is not on Megace and reports she was never on it to begin with. She denies any vaginal bleeding. She denies any swelling in her lower extremities.    Review of Systems:  A complete 14-point review of systems is negative except as noted in the above history of present illness.    Past Medical History:  Complex atypical hyperplasia  Surgical History:  Cholecystectomy  Knee surgery  Oophorectomy  Tubal ligation  OB/Gyn History:  , menarche at age 10, menopause at age 53. Last colonoscopy in , last DEXA scan in , last mammogram in .     Allergies:  No known medication allergies    Medications:  Hydrocodone-Acetaminophen Oral 10 mg-325 mg  Rosuvastatin Calcium Oral 5 mg tablet  Bisacodyl Oral Delayed Release 5 mg tablet,delayed release (DR/EC)  Flecainide Oral 50 mg tablet  Eliquis (Apixaban Oral) 2.5 mg tablet  Clotrimazole-Betamethasone Topical Cream 1 %-0.05 %  Olmesartan Oral 40 mg tablet  Family History:  No significant family history of malignancy on record.     Social History:  Never smoker.   Drinks 1 cup of caffeinated beverages per day.   Does not drink alcohol.   Health Maintenance:  Up-to-date    Vital Signs:  Blood pressure: 161/85, Pulse: 99, Temperature: 97.7 F, Respirations: 16, O2 sat: 99%, Pain Scale: , Height: 64 in, Weight: 232 lb, BSA: 2.08, BMI: 39.82 kg/m2    Physical Exam (Gyn Oncology):  General: Alert and oriented x3, no acute distress; morbidly obese.  HEENT: Normocephalic, atraumatic  Lymph node exam: No supraclavicular, submandibular or cervical lymphadenopathy  CV: Irregularly irregular rate and rhythm with underlying pacemaker rhythm. No murmurs, rubs, or gallops  Resp: Clear to auscultation  bilaterally, no wheezes, rales or rhonchi  Abdomen: Soft, non-tender to palpation, no rebound or guarding; no palpable abdominal mass  Lower Extremity Exam: No lower extremity edema, no palpable cords  Neuro: Cranial Nerves 2-12 intact, gross motor skills intact. Some cognitive delay.   Genitourinary exam: Deferred.    Laboratory Data:      Imagin2022: Ultrasound of the pelvis  IMPRESSION: Abnormal appearance to the left fundal endometrium with loss of the junctional zone and ill-defined hyperechoic mass, invading the myometrium. If an intervention is not performed, a pelvic MRI can better assess this. Right-sided myometrial fibroid. Neither ovary is well seen.    Problems:  Endometrial hyperplasia  Pelvic mass  Postmenopausal bleeding (finding)  Assessment & Plan (Gyn Oncology):  Yecenia Kwan is a very sweet 76-year-old female with at least complex atypical hyperplasia diagnosed on an endometrial biopsy in 2021. She was scheduled for a hysterectomy, which was delayed due to a stroke. She now presents for a return follow-up with a repeat pelvic ultrasound.    Yecenia, her daughter and I reviewed her recent pelvic ultrasound, which demonstrates progression of an endometrial mass and review of the technology from last year does in fact represent at least complex atypical hyperplasia with possible underlying papillary serious changes. Due to these two changes and lack of treatment since last spring due to unforeseen medical comorbidities and hospitalizations, I recommend we proceed with a diagnostic hysteroscopy and dilation & curettage. We reviewed that this procedure can typically be safely performed with minimal associated risk. However, Yecenia does need to see her primary care provider for a preoperative history and physical. We additionally discussed a COVID-19 test prior to surgery and reviewed the risks of surgery that do include perforating through the fundus of the uterus or associated  bleeding. We reviewed that definitive treatment may include a hysterectomy or potentially radiation therapy. However, we need improved sampling and a true diagnosis. Yecenia and her daughter were both very understanding. In addition, due to the prolonged period of time with new development of a pelvic mass related to the uterus. I would like to proceed with a CT scan of the chest, abdomen, and pelvis. We will schedule her procedure for May, and I will see her for a post-operative visit thereafter.    Thank you very much for allowing me to take part in the care of this very sweet patient.    3 minutes in reviewing records, 21 minutes in discussion, 2 minutes ordering labs.     Pain Care Management:  Pain Scale: Not recorded on visit    Patient Care needs:  Depressions Status: Was screened; Outcome positive: No; Screening Date: 04/28/2022; Screening Tool: PRIME MD-PHQ2; Total depression score: 0  Psycho-Social PHQ-9 Follow-up Plan (if applicable):  Smoking Status: Never smoker  Documentation assistance provided by Jeanne Camacho, scribing for Dr. Kenyatta Kothari, on 04/28/2022.  I, Dr. Kenyatta Kothari, personally performed the services described in this documentation, and it is both accurate and complete.     Kenyatta Kothari MD  Phone: [phone]   Fax: [phone]

## 2022-05-06 ENCOUNTER — TELEPHONE (OUTPATIENT)
Dept: FAMILY MEDICINE | Facility: CLINIC | Age: 77
End: 2022-05-06
Payer: COMMERCIAL

## 2022-05-06 NOTE — TELEPHONE ENCOUNTER
Reason for Call: Request for a re-cert cannabis     Date needed: at your convenience    Has the patient been seen by the PCP for this problem? YES    Additional comments: Patient needs to be re-certified. Please complete re- certification using the e-mail below.     [email or web address]    Please call daughter after re-certification is complete.     Phone number Patient can be reached at:  Cell number on file:    Telephone Information:   Mobile [phone]       Best Time: any    Can we leave a detailed message on this number?  YES    Call taken on 5/6/2022 at 11:18 AM by Priya Muñoz

## 2022-05-09 DIAGNOSIS — Z11.59 ENCOUNTER FOR SCREENING FOR OTHER VIRAL DISEASES: Primary | ICD-10-CM

## 2022-05-11 ENCOUNTER — HOSPITAL ENCOUNTER (OUTPATIENT)
Dept: CT IMAGING | Facility: HOSPITAL | Age: 77
Discharge: HOME | End: 2022-05-11
Attending: OBSTETRICS & GYNECOLOGY | Admitting: OBSTETRICS & GYNECOLOGY
Payer: COMMERCIAL

## 2022-05-11 DIAGNOSIS — R19.07 GENERALIZED ABDOMINAL OR PELVIC SWELLING OR MASS OR LUMP: ICD-10-CM

## 2022-05-11 LAB
CREAT BLD-MCNC: 0.6 MG/DL (ref 0.6–1.1)
GFR SERPL CREATININE-BSD FRML MDRD: >60 ML/MIN/1.73M2
RADIOLOGIST FLAGS: ABNORMAL

## 2022-05-11 PROCEDURE — 74177 CT ABD & PELVIS W/CONTRAST: CPT

## 2022-05-11 PROCEDURE — 250N000011 HC RX IP 250 OP 636: Performed by: OBSTETRICS & GYNECOLOGY

## 2022-05-11 PROCEDURE — 82565 ASSAY OF CREATININE: CPT

## 2022-05-11 RX ORDER — IOPAMIDOL 755 MG/ML
100 INJECTION, SOLUTION INTRAVASCULAR ONCE
Status: COMPLETED | OUTPATIENT
Start: 2022-05-11 | End: 2022-05-11

## 2022-05-11 RX ADMIN — IOPAMIDOL 100 ML: 755 INJECTION, SOLUTION INTRAVENOUS at 15:19

## 2022-05-12 ENCOUNTER — TELEPHONE (OUTPATIENT)
Dept: FAMILY MEDICINE | Facility: CLINIC | Age: 77
End: 2022-05-12

## 2022-05-12 ENCOUNTER — OFFICE VISIT (OUTPATIENT)
Dept: FAMILY MEDICINE | Facility: CLINIC | Age: 77
End: 2022-05-12
Payer: COMMERCIAL

## 2022-05-12 VITALS — TEMPERATURE: 97.2 F | HEART RATE: 94 BPM | OXYGEN SATURATION: 99 %

## 2022-05-12 DIAGNOSIS — J18.9 COMMUNITY ACQUIRED PNEUMONIA, UNSPECIFIED LATERALITY: Primary | ICD-10-CM

## 2022-05-12 DIAGNOSIS — D25.9 UTERINE LEIOMYOMA, UNSPECIFIED LOCATION: ICD-10-CM

## 2022-05-12 DIAGNOSIS — Z86.73 HX OF ISCHEMIC LEFT MCA STROKE: ICD-10-CM

## 2022-05-12 PROCEDURE — 99214 OFFICE O/P EST MOD 30 MIN: CPT | Performed by: FAMILY MEDICINE

## 2022-05-12 RX ORDER — AZITHROMYCIN 250 MG/1
TABLET, FILM COATED ORAL
Qty: 6 TABLET | Refills: 0 | Status: SHIPPED | OUTPATIENT
Start: 2022-05-12 | End: 2022-05-17

## 2022-05-12 RX ORDER — SPIRONOLACTONE 25 MG/1
TABLET ORAL
COMMUNITY
Start: 2022-05-02 | End: 2022-07-08 | Stop reason: ALTCHOICE

## 2022-05-12 RX ORDER — AMOXICILLIN 500 MG/1
1000 CAPSULE ORAL 3 TIMES DAILY
Qty: 60 CAPSULE | Refills: 0 | Status: SHIPPED | OUTPATIENT
Start: 2022-05-12 | End: 2022-05-22

## 2022-05-12 NOTE — TELEPHONE ENCOUNTER
Reason for Call:  Request for results:    Name of test or procedure: Oncology results     Dr. salazar to call to possibly address mild ammonia that they found, daughter is wondering who will be treating patient?    Pt is short of breath, declined nurse triage, daughter already called and left a message this morning with oncology nurse to get advice as well.         Date of test of procedure: 5/11/2022    Location of the test or procedure: [facility] to leave the result message on voice mail or with a family member? YES    Phone number Patient can be reached at:  Other phone number:      Estrellita miller daughter   [phone]        Call taken on 5/12/2022 at 9:06 AM by Oanh Villafana

## 2022-05-12 NOTE — PROGRESS NOTES
"Assessment/Plan:    Community acquired pneumonia, unspecified laterality  Discussed with patient's daughter by phone as well as patient's son who is present at today's office visit.  Community-acquired pneumonia without respiratory distress or noted hypoxia.  Noted on chest CT 5/11/22 - \"Some patchy interstitial pulmonary infiltrates greatest in the right upper lobe with minimal involvement of the superior segments of both lower lobes. Most likely pneumonia.\".  Amoxicillin 500 mg use 2 capsules 3 times daily x10 days and azithromycin 250 mg use 2 tablets today then 1 tablet daily days 2 through 5.  Does have scheduled follow-up with PCP on Thursday, May 19, 2022.  - amoxicillin (AMOXIL) 500 MG capsule  Dispense: 60 capsule; Refill: 0  - azithromycin (ZITHROMAX) 250 MG tablet  Dispense: 6 tablet; Refill: 0    Hx of ischemic left MCA stroke  Requesting refill on Eliquis 5 mg for twice daily use with history of CVA as noted.  - apixaban ANTICOAGULANT (ELIQUIS) 5 MG tablet  Dispense: 60 tablet; Refill: 5    Uterine leiomyoma, unspecified location  Follows with Kenyatta Kothari, oncology regarding uterine fibroid finding with upcoming diagnostic hysteroscopy with D&C procedure May 24, 2022.        Subjective:    Yecenia Kwan is seen today for evaluation of recent abnormal CT chest abdomen pelvis with described infiltrates consistent with pneumonia.  Patient remains relatively asymptomatic at this point without fever or shortness of breath.  Did have significant cough following COVID infection in January 2022 lasting about a month with subsequent improvement.  Evaluation with Dr. Kenyatta Kothari oncology regarding pelvic mass and postmenopausal bleeding question uterine fibroid versus other etiology.  Is scheduled for diagnostic hysteroscopy with D&C procedure May 24, 2022.  Comprehensive review of systems as above otherwise all negative.  Patient did have prior history of left MCA/CVA and does request refill on Eliquis 5 " "mg twice daily.     Past Surgical History:   Procedure Laterality Date      ARTHROTOMY/EXPLORE/TREAT KNEE JOINT      Description: Arthrotomy Of Knee With Joint Exploration;  Recorded: 2008;  Comments: due to \"accident\" and infection      CORRECT BUNION,METATARSAL OSTEOTOMY      Description: Bunion Correction With Metatarsal Osteotomy;  Recorded: 2008;     [facility] NONSPECIFIC PROCEDURE      Knee surgeries ((R) scope)     [facility] NONSPECIFIC PROCEDURE      tubal ligation        Family History   Problem Relation Age of Onset     Prostate Cancer Father      C.A.D. Mother          at 80 years of age.     Diabetes Mother         Past Medical History:   Diagnosis Date     Anxiety      Arthritis      Atrial fibrillation (H)      Cellulitis of left leg      CVA (cerebral vascular accident) (H)      Displacement of intervertebral disc, site unspecified, without myelopathy      GERD (gastroesophageal reflux disease)      Head injury      Hidradenitis      Hx of ischemic left MCA stroke 2015    posterior aspect of the left lobe involving middle frontal gyrus   2015      Hx of nicotine dependence      Hyperlipemia      Hypertension      Obesity, unspecified      Proteinuria      Unspecified essential hypertension         Social History     Tobacco Use     Smoking status: Never Smoker     Smokeless tobacco: Never Used   Substance Use Topics     Alcohol use: No     Drug use: No        Current Outpatient Medications   Medication Sig Dispense Refill     acetaminophen (TYLENOL) 325 MG tablet Take 650 mg by mouth       amoxicillin (AMOXIL) 500 MG capsule Take 2 capsules (1,000 mg) by mouth 3 times daily for 10 days 60 capsule 0     apixaban ANTICOAGULANT (ELIQUIS) 5 MG tablet Take 1 tablet (5 mg) by mouth 2 times daily 60 tablet 5     aspirin (ASA) 81 MG chewable tablet Take 81 mg by mouth daily       azithromycin (ZITHROMAX) 250 MG tablet Take 2 tablets (500 mg) by mouth daily for 1 day, THEN 1 tablet (250 mg) " daily for 4 days. 6 tablet 0     benzonatate (TESSALON) 100 MG capsule Take 1-2 tabs po every 8 hours as needed for cough 30 capsule 1     Blood Pressure Monitoring (BLOOD PRESSURE MONITOR/M CUFF) MISC 1 Large electronic blood pressure cuff- to be used as needed for blood pressure monitoring 1 each 0     furosemide (LASIX) 20 MG tablet Take 20 mg by mouth       losartan (COZAAR) 50 MG tablet Take 1 tablet (50 mg) by mouth daily 90 tablet 3     pantoprazole sodium (PROTONIX) 40 MG packet Take 1 packet (40 mg) by mouth daily 30 each 11     rosuvastatin (CRESTOR) 5 MG tablet TAKE 1/2 TABLET(2.5 MG) BY MOUTH DAILY 45 tablet 11     spironolactone (ALDACTONE) 25 MG tablet        triamcinolone (KENALOG) 0.1 % external cream Apply topically 2 times daily 80 g 1     metoprolol succinate ER (TOPROL-XL) 25 MG 24 hr tablet Take 1 tablet (25 mg) by mouth 2 times daily (Patient not taking: Reported on 9/9/2021) 60 tablet 0          Objective:    Vitals:    05/12/22 1523   Pulse: 94   Temp: 97.2  F (36.2  C)   SpO2: 99%      There is no height or weight on file to calculate BMI.    Alert.  No apparent distress.  Quiet affect.  Mild expressive aphasia findings.  Transfer slowly and uses walker to assist with ambulation.  Chest appears relatively clear without inspiratory crackle or expiratory wheeze at this time.  Symmetric expansion without tachypnea.  Cardiac exam regular.      EXAM: CT CHEST/ABDOMEN/PELVIS W CONTRAST  LOCATION: [facility]  DATE/TIME: 5/11/2022 3:18 PM     INDICATION:  Generalized abdominal or pelvic swelling or mass or lump. Atypical hyperplasia diagnosed on endometrial biopsy atrial 2021.  COMPARISON: 04/22/2022 ultrasound pelvis. Limited chest CT 10/11/2018  TECHNIQUE: CT scan of the chest, abdomen, and pelvis was performed following injection of IV contrast. Multiplanar reformats were obtained. Dose reduction techniques were used.   CONTRAST: 100ml isovue 370     FINDINGS:   LUNGS AND  PLEURA: There some mild patchy infiltrates in the right upper lobe and to lesser degree the superior segment of both lower lobes. Most likely mild pneumonia.     MEDIASTINUM/AXILLAE: Some mildly prominent scattered mediastinal lymph nodes. Largest right paratracheal node measures 10 mm. These are most likely reactive.     CORONARY ARTERY CALCIFICATION: Mild.     HEPATOBILIARY: Normal.     PANCREAS: Normal.     SPLEEN: Normal.     ADRENAL GLANDS: Normal.     KIDNEYS/BLADDER: 2 cm benign-appearing cyst mid left kidney. No follow-up needed.     BOWEL: Normal.     LYMPH NODES: No retroperitoneal lymphadenopathy.     VASCULATURE: No aneurysms.     PELVIC ORGANS: Uterine fibroid better seen on prior ultrasound.     MUSCULOSKELETAL: No concerning bone lesion.                                                                      IMPRESSION:  1.  Nothing for metastatic disease in the chest abdomen or pelvis.     2.  Some patchy interstitial pulmonary infiltrates greatest in the right upper lobe with minimal involvement of the superior segments of both lower lobes. Most likely pneumonia.     3.  Some scattered slightly prominent mediastinal nodes most likely reactive.        EXAM: US PELVIC TRANSABDOMINAL AND TRANSVAGINAL  LOCATION: [facility]  DATE/TIME: 4/22/2022 2:54 PM     INDICATION:  Postmenopausal bleeding  COMPARISON: None.  TECHNIQUE: Transabdominal scans were performed. Endovaginal ultrasound was performed to better visualize the adnexa.     FINDINGS:     UTERUS: 9.6 x 5 x 4.5 cm. Normal in size and position. There is a right-sided 1.7 cm myometrial fibroid.     ENDOMETRIUM: Endometrium is abnormal. There is loss of the junctional zone in the fundus with ill-defined hyperechoic area invading the myometrium. On the cine loops, this measures at least 2.4 x 1.8 cm. Inferior to this, there is a small amount of fluid   distending the endometrial canal with tiny cystic changes. Color overlay does  not demonstrate any hypervascularity.      RIGHT OVARY: Not well defined.     LEFT OVARY: Not well-defined.     No significant free fluid.                                                                      IMPRESSION:  1.  Abnormal appearance to the left fundal endometrium with loss of the junctional zone and ill-defined hyperechoic mass  invading the myometrium. If an intervention is not performed, pelvic MRI can better assess this.  2.  Right-sided myometrial fibroid.  3.  Neither ovary is well seen.        This note has been dictated using voice recognition software and as a result may contain minor grammatical errors and unintended word substitutions.

## 2022-05-13 ENCOUNTER — TELEPHONE (OUTPATIENT)
Dept: FAMILY MEDICINE | Facility: CLINIC | Age: 77
End: 2022-05-13
Payer: COMMERCIAL

## 2022-05-13 DIAGNOSIS — J18.9 COMMUNITY ACQUIRED PNEUMONIA, UNSPECIFIED LATERALITY: Primary | ICD-10-CM

## 2022-05-13 NOTE — TELEPHONE ENCOUNTER
Reason for Call:  Medication     Do you use a [facility]? No  Name of the pharmacy and phone number for the current request:  [facility] on [address] and [address]     Other request: Patient is having a difficult time swallowing amoxicillin capsule and azithromycin tablet - would like to know if available in liquid form?    Cindy Martin is calling  she is listed on the Consent to Communicate    Can we leave a detailed message on this number? YES    Phone number patient can be reached at: Cell number on file:    Telephone Information:   Mobile [phone]       Best Time: Any    Call taken on 5/13/2022 at 9:09 AM by Pattie Contreras

## 2022-05-16 RX ORDER — AMOXICILLIN 400 MG/5ML
POWDER, FOR SUSPENSION ORAL
Qty: 250 ML | Refills: 0 | Status: SHIPPED | OUTPATIENT
Start: 2022-05-16 | End: 2022-06-07

## 2022-05-16 RX ORDER — AZITHROMYCIN 200 MG/5ML
POWDER, FOR SUSPENSION ORAL
Qty: 37.5 ML | Refills: 0 | Status: SHIPPED | OUTPATIENT
Start: 2022-05-16 | End: 2022-05-21

## 2022-05-16 NOTE — TELEPHONE ENCOUNTER
Please send over liquid form of Amoxicillin and Azithromycin. Patient is unable to get the medicine down her throat using pill form. Pharmacy on file.

## 2022-05-25 NOTE — TELEPHONE ENCOUNTER
Anastasia is calling back. She voices frustration with the cannabis registration. She is requesting a call from .

## 2022-05-25 NOTE — TELEPHONE ENCOUNTER
Called daughter and explained that I need an up to date assessment of Yecenia's pain to complete the referral. Which I had planned at her up coming appointment.  Daughter requested earlier re-referral so the Peg 3 pain assessment questionnaire was sent to the patient to complete prior to my referral.

## 2022-06-01 ENCOUNTER — TELEPHONE (OUTPATIENT)
Dept: FAMILY MEDICINE | Facility: CLINIC | Age: 77
End: 2022-06-01

## 2022-06-01 NOTE — TELEPHONE ENCOUNTER
Pts daughter is calling stating that she would like to talk to Dr. Gonzalez about the amoxicillin that was prescribed. Pt was complaining that the rx made her feel sick, SOB, and drowsy and is finding it very hard to take 3x a day. Anastasia was stating that she thinks her mother needs an IV drip for her to be able to absorb the medication or if there is something else that would be recommended.     Please advise and contact daughter tomorrow when you have time.

## 2022-06-02 NOTE — TELEPHONE ENCOUNTER
Called daughter- Yecenia is improving, stop amoxicillin and we will evaluate her next week to see if further antibiotics are needed

## 2022-06-04 ENCOUNTER — HEALTH MAINTENANCE LETTER (OUTPATIENT)
Age: 77
End: 2022-06-04

## 2022-06-07 ENCOUNTER — OFFICE VISIT (OUTPATIENT)
Dept: FAMILY MEDICINE | Facility: CLINIC | Age: 77
End: 2022-06-07
Payer: COMMERCIAL

## 2022-06-07 VITALS
OXYGEN SATURATION: 99 % | SYSTOLIC BLOOD PRESSURE: 146 MMHG | BODY MASS INDEX: 45.34 KG/M2 | DIASTOLIC BLOOD PRESSURE: 98 MMHG | HEIGHT: 62 IN | WEIGHT: 246.4 LBS | TEMPERATURE: 96.6 F | HEART RATE: 100 BPM

## 2022-06-07 DIAGNOSIS — Z13.220 SCREENING FOR HYPERLIPIDEMIA: ICD-10-CM

## 2022-06-07 DIAGNOSIS — R60.0 BILATERAL LOWER EXTREMITY EDEMA: ICD-10-CM

## 2022-06-07 DIAGNOSIS — E11.9 TYPE 2 DIABETES, HBA1C GOAL < 7% (H): ICD-10-CM

## 2022-06-07 DIAGNOSIS — I10 BENIGN ESSENTIAL HYPERTENSION: ICD-10-CM

## 2022-06-07 DIAGNOSIS — J18.9 PNEUMONIA DUE TO INFECTIOUS ORGANISM, UNSPECIFIED LATERALITY, UNSPECIFIED PART OF LUNG: Primary | ICD-10-CM

## 2022-06-07 DIAGNOSIS — I48.0 PAROXYSMAL ATRIAL FIBRILLATION (H): ICD-10-CM

## 2022-06-07 LAB
ANION GAP SERPL CALCULATED.3IONS-SCNC: 11 MMOL/L (ref 5–18)
BNP SERPL-MCNC: 891 PG/ML (ref 0–140)
BUN SERPL-MCNC: 19 MG/DL (ref 8–28)
CALCIUM SERPL-MCNC: 9.1 MG/DL (ref 8.5–10.5)
CHLORIDE BLD-SCNC: 106 MMOL/L (ref 98–107)
CHOLEST SERPL-MCNC: 175 MG/DL
CO2 SERPL-SCNC: 24 MMOL/L (ref 22–31)
CREAT SERPL-MCNC: 0.63 MG/DL (ref 0.6–1.1)
FASTING STATUS PATIENT QL REPORTED: NORMAL
GFR SERPL CREATININE-BSD FRML MDRD: >90 ML/MIN/1.73M2
GLUCOSE BLD-MCNC: 95 MG/DL (ref 70–125)
HBA1C MFR BLD: 5.8 % (ref 0–5.6)
HDLC SERPL-MCNC: 53 MG/DL
LDLC SERPL CALC-MCNC: 112 MG/DL
POTASSIUM BLD-SCNC: 4.3 MMOL/L (ref 3.5–5)
SODIUM SERPL-SCNC: 141 MMOL/L (ref 136–145)
TRIGL SERPL-MCNC: 50 MG/DL

## 2022-06-07 PROCEDURE — 83036 HEMOGLOBIN GLYCOSYLATED A1C: CPT | Performed by: FAMILY MEDICINE

## 2022-06-07 PROCEDURE — 80048 BASIC METABOLIC PNL TOTAL CA: CPT | Performed by: FAMILY MEDICINE

## 2022-06-07 PROCEDURE — 83880 ASSAY OF NATRIURETIC PEPTIDE: CPT | Performed by: FAMILY MEDICINE

## 2022-06-07 PROCEDURE — 99214 OFFICE O/P EST MOD 30 MIN: CPT | Performed by: FAMILY MEDICINE

## 2022-06-07 PROCEDURE — 36415 COLL VENOUS BLD VENIPUNCTURE: CPT | Performed by: FAMILY MEDICINE

## 2022-06-07 PROCEDURE — 80061 LIPID PANEL: CPT | Performed by: FAMILY MEDICINE

## 2022-06-07 RX ORDER — MOXIFLOXACIN HYDROCHLORIDE 400 MG/1
400 TABLET ORAL DAILY
Qty: 5 TABLET | Refills: 0 | Status: SHIPPED | OUTPATIENT
Start: 2022-06-07 | End: 2022-07-08

## 2022-06-07 NOTE — PROGRESS NOTES
Assessment & Plan     Pneumonia due to infectious organism, unspecified laterality, unspecified part of lung  77-year-old female who comes in for recheck after being diagnosed with pneumonia.  She completed her course of azithromycin but is having difficulty with amoxicillin.  She still has some residual symptoms.  Would like a medication that will be easier for her to take.  I suggest moxifloxacin as is once a day and only for 5 days.  A prescription is sent to the pharmacy.  - XR Chest 2 Views  - moxifloxacin (AVELOX) 400 MG tablet  Dispense: 5 tablet; Refill: 0    Paroxysmal atrial fibrillation (H)  Follows with the heart clinic at [facility].  She has a pacemaker placed.  She is maintained on Eliquis.    Benign essential hypertension  Blood pressures are mildly elevated today.  She continues on losartan at 50 mg daily, spironolactone  - BASIC METABOLIC PANEL  - BASIC METABOLIC PANEL    TYPE 2 DIABETES, HBA1C GOAL < 7%  Diet controlled with A1c at goal range.  - Hemoglobin A1c  - Hemoglobin A1c    Bilateral lower extremity edema  Last BNP slightly elevated.  She continues to show lower extremity edema.  She has a shortness of breath which want to distinguish from her pneumonia.  We will check a BNP.  - B-Type Natriuretic Peptide ( [place] Only)  - B-Type Natriuretic Peptide ( [place] Only)    Screening for hyperlipidemia  - Lipid panel reflex to direct LDL Fasting  - Lipid panel reflex to direct LDL Fasting    Martha Gonzalez MD  [facility]    Cleo Keene is a 77 year old who presents for the following health issues     77-year-old female with known history of paroxysmal atrial fibrillation, status post CVA, with type 2 diabetes at goal presenting with ongoing symptoms of pneumonia.  She was diagnosed mid May and prescribed azithromycin and amoxicillin.  She did complete the course of azithromycin however has continued to have symptoms.  The amoxicillin she stopped early as she was  "having some nausea and some palpitations along with it and they are looking for an alternative if she still needs it.  Her main symptoms are more shortness of breath with exertion and feeling tired.  No further fevers.  Cough is minimal and there is no shortness of breath.  She just had a recent cardiac evaluation at [facility] and they felt that her atrial fibrillation after doing an interrogation of her monitoring was fine.  She has been having chronic pain in her shoulder and is going for an injection today with [place].           Objective    BP (!) 146/98 (BP Location: Right arm, Patient Position: Sitting, Cuff Size: Adult Large)   Pulse 100   Temp (!) 96.6  F (35.9  C) (Temporal)   Ht 1.58 m (5' 2.21\")   Wt 111.8 kg (246 lb 6.4 oz)   LMP  (LMP Unknown)   SpO2 99%   BMI 44.77 kg/m    Body mass index is 44.77 kg/m .  Physical Exam   GENERAL: healthy, alert and no distress  NECK: no adenopathy, no asymmetry, masses, or scars and thyroid normal to palpation  RESP: lungs clear to auscultation - no rales, rhonchi or wheezes and rhonchi R upper posterior  CV: occasional premature beats, normal S1 S2, no S3 or S4, no murmur, click or rub, peripheral pulses strong and 2+ bilateral lower extremity pitting edema to chin    ABDOMEN: soft, nontender, no hepatosplenomegaly, no masses and bowel sounds normal  MS: extremities normal- no gross deformities noted    Results for orders placed or performed in visit on 06/07/22 (from the past 24 hour(s))   Hemoglobin A1c   Result Value Ref Range    Hemoglobin A1C 5.8 (H) 0.0 - 5.6 %     Chest x-ray shows mild infiltrate in right upper lobe, please see x-ray report            "

## 2022-06-10 ENCOUNTER — TELEPHONE (OUTPATIENT)
Dept: FAMILY MEDICINE | Facility: CLINIC | Age: 77
End: 2022-06-10

## 2022-06-10 NOTE — TELEPHONE ENCOUNTER
I reached out to pt's daughter Estrellita and we spoke. I relayed the message below from Dr. Gonzalez and daughter will have pt start with medication as recommended by Dr. Gonzalez.

## 2022-06-10 NOTE — TELEPHONE ENCOUNTER
----- Message from Martha Gonzalez MD sent at 6/8/2022 10:54 AM CDT -----  Please call patient's daughter Estrellita and let her know that her labs suggest she may have some extra fluid on her lungs contributing to her shortness of breath.  Increase her furosemide to twice daily for 1 week to see if this helps with symptoms as well.

## 2022-06-13 ENCOUNTER — HOSPITAL ENCOUNTER (OUTPATIENT)
Facility: HOSPITAL | Age: 77
End: 2022-06-13
Attending: OBSTETRICS & GYNECOLOGY | Admitting: OBSTETRICS & GYNECOLOGY
Payer: COMMERCIAL

## 2022-06-14 ENCOUNTER — MEDICAL CORRESPONDENCE (OUTPATIENT)
Dept: HEALTH INFORMATION MANAGEMENT | Facility: CLINIC | Age: 77
End: 2022-06-14
Payer: COMMERCIAL

## 2022-06-29 NOTE — PROGRESS NOTES
GYNECOLOGIC ONCOLOGY FOLLOW-UP VISIT    Patient Name: GURVINDER KWAN : 1945  Date of Visit: 2022  Referring Provider: Kathia Barriga MD (OB/GYN), FAX  Attending: Kenyatta Kothari (Gynecological/Oncology)    Chief Complaint (Gyn Oncology):  Follow-up, treatment recommendations.    History of Present Illness (Gyn Oncology):  Gurvinder Kwan is a very sweet 76-year-old female with at least complex atypical hyperplasia diagnosed on an endometrial biopsy in 2021. She was scheduled for a hysterectomy, which was delayed due to a stroke. She now presents for a return follow-up with a repeat pelvic ultrasound.    CLINICAL COURSE:  2022: The patient presented with the complaints of intermittent vaginal bleeding without pelvic pain. An ultrasound was obtained, which showed that the uterus is anteverted. The contour of the uterus appears smooth. The uterus appears to have lelomyoma(s) that measure 23 x 38 x 28 mm. The leiyomoa(s) are subserosal The endometrium appears thickened, and for a post menopausal woman The endometrium measures 16.14 mm. The cervix is normal appearing. The right adnexa could not be visualized. The left adnexa could not be visualized. The bladder was poorly filled. There was  no free fluid seen, and 3D rendering was not ran  2021: The patient had an endometrial polyp biopsy, which showed scant minute fragments of benign endocervix admixed with mucus.   2021: The patient underwent an endometrial biopsy, which showed at least complex atypical hyperplasia.   2022: Ultrasound of the pelvis showed abnormal appearance to the left fundal endometrium with loss of the junctional zone and ill-defined hyperechoic mass, invading the myometrium. If an intervention is not performed, a pelvic MRI can better assess this. Right-sided myometrial fibroid. Neither ovary is well seen.  Genetic Testing (Gyn Oncology):  N/A    Interval History (Gyn Oncology)  Gurvinder tobar  today with her daughter who is her caregiver primarily. She does have underlying cognitive disabilities due to her history of stroke x 2. She does have a . She is doing well. She is getting around okay. She had her pacemaker placed approximately 6 months ago. She is not on Megace and reports she was never on it to begin with. She denies any vaginal bleeding. She denies any swelling in her lower extremities.    Review of Systems:  A complete 14-point review of systems is negative except as noted in the above history of present illness.    Past Medical History:  Complex atypical hyperplasia  Surgical History:  Cholecystectomy  Knee surgery  Oophorectomy  Tubal ligation  OB/Gyn History:  , menarche at age 10, menopause at age 53. Last colonoscopy in , last DEXA scan in , last mammogram in .     Allergies:  No known medication allergies    Medications:  Hydrocodone-Acetaminophen Oral 10 mg-325 mg  Rosuvastatin Calcium Oral 5 mg tablet  Bisacodyl Oral Delayed Release 5 mg tablet,delayed release (DR/EC)  Flecainide Oral 50 mg tablet  Eliquis (Apixaban Oral) 2.5 mg tablet  Clotrimazole-Betamethasone Topical Cream 1 %-0.05 %  Olmesartan Oral 40 mg tablet  Family History:  No significant family history of malignancy on record.     Social History:  Never smoker.   Drinks 1 cup of caffeinated beverages per day.   Does not drink alcohol.   Health Maintenance:  Up-to-date    Vital Signs:  Blood pressure: 161/85, Pulse: 99, Temperature: 97.7 F, Respirations: 16, O2 sat: 99%, Pain Scale: , Height: 64 in, Weight: 232 lb, BSA: 2.08, BMI: 39.82 kg/m2    Physical Exam (Gyn Oncology):  General: Alert and oriented x3, no acute distress; morbidly obese.  HEENT: Normocephalic, atraumatic  Lymph node exam: No supraclavicular, submandibular or cervical lymphadenopathy  CV: Irregularly irregular rate and rhythm with underlying pacemaker rhythm. No murmurs, rubs, or gallops  Resp: Clear to auscultation  bilaterally, no wheezes, rales or rhonchi  Abdomen: Soft, non-tender to palpation, no rebound or guarding; no palpable abdominal mass  Lower Extremity Exam: No lower extremity edema, no palpable cords  Neuro: Cranial Nerves 2-12 intact, gross motor skills intact. Some cognitive delay.   Genitourinary exam: Deferred.    Laboratory Data:      Imagin2022: Ultrasound of the pelvis  IMPRESSION: Abnormal appearance to the left fundal endometrium with loss of the junctional zone and ill-defined hyperechoic mass, invading the myometrium. If an intervention is not performed, a pelvic MRI can better assess this. Right-sided myometrial fibroid. Neither ovary is well seen.    Problems:  Endometrial hyperplasia  Pelvic mass  Postmenopausal bleeding (finding)  Assessment & Plan (Gyn Oncology):  Yecenia Kwan is a very sweet 76-year-old female with at least complex atypical hyperplasia diagnosed on an endometrial biopsy in 2021. She was scheduled for a hysterectomy, which was delayed due to a stroke. She now presents for a return follow-up with a repeat pelvic ultrasound.    Yecenia, her daughter and I reviewed her recent pelvic ultrasound, which demonstrates progression of an endometrial mass and review of the technology from last year does in fact represent at least complex atypical hyperplasia with possible underlying papillary serious changes. Due to these two changes and lack of treatment since last spring due to unforeseen medical comorbidities and hospitalizations, I recommend we proceed with a diagnostic hysteroscopy and dilation & curettage. We reviewed that this procedure can typically be safely performed with minimal associated risk. However, Yecenia does need to see her primary care provider for a preoperative history and physical. We additionally discussed a COVID-19 test prior to surgery and reviewed the risks of surgery that do include perforating through the fundus of the uterus or associated  bleeding. We reviewed that definitive treatment may include a hysterectomy or potentially radiation therapy. However, we need improved sampling and a true diagnosis. Yecenia and her daughter were both very understanding. In addition, due to the prolonged period of time with new development of a pelvic mass related to the uterus. I would like to proceed with a CT scan of the chest, abdomen, and pelvis. We will schedule her procedure for May, and I will see her for a post-operative visit thereafter.    Thank you very much for allowing me to take part in the care of this very sweet patient.    3 minutes in reviewing records, 21 minutes in discussion, 2 minutes ordering labs.     Pain Care Management:  Pain Scale: Not recorded on visit    Patient Care needs:  Depressions Status: Was screened; Outcome positive: No; Screening Date: 04/28/2022; Screening Tool: PRIME MD-PHQ2; Total depression score: 0  Psycho-Social PHQ-9 Follow-up Plan (if applicable):  Smoking Status: Never smoker  Documentation assistance provided by Jeanne Camacho, scribing for Dr. Kenyatta Kothari, on 04/28/2022.  I, Dr. Kenyatta Kothari, personally performed the services described in this documentation, and it is both accurate and complete.     Kenyatta Kothari MD  Phone: [phone]   Fax: [phone]

## 2022-07-05 ENCOUNTER — TELEPHONE (OUTPATIENT)
Dept: FAMILY MEDICINE | Facility: CLINIC | Age: 77
End: 2022-07-05

## 2022-07-05 NOTE — TELEPHONE ENCOUNTER
Daughter nayana sethi was treated in June for pneumonia, she is doing much better but at times continues to have slight breathlessness, tried to make apt but said they are all out so far, wondering if they can get a order for a repeat chest x ray.

## 2022-07-07 ENCOUNTER — TELEPHONE (OUTPATIENT)
Dept: FAMILY MEDICINE | Facility: CLINIC | Age: 77
End: 2022-07-07

## 2022-07-07 NOTE — TELEPHONE ENCOUNTER
I reached out to pt's daughter and we spoke today. Pt is scheduled for tomorrow with Dr. Carmona at 1:20 PM.     Daughter Estrellita stated that when she had called on 7/5/22, she was telling the person who took the call that pt finished medication but still have some sounds in her chest. They were wondering if pt should get another chest xray. At this time pt is doing decent per pt's daughter Estrellita.

## 2022-07-07 NOTE — TELEPHONE ENCOUNTER
I would recommend appointment to be seen urgently either at urgent care or tomorrow in clinic    Taco Harley MD

## 2022-07-07 NOTE — TELEPHONE ENCOUNTER
Estrellita called back expressing frustration that no one had called back regarding her mother's breathlessness. Requesting an order for a repeat chest x ray as soon as possible. Please advise.

## 2022-07-08 ENCOUNTER — TELEPHONE (OUTPATIENT)
Dept: FAMILY MEDICINE | Facility: CLINIC | Age: 77
End: 2022-07-08

## 2022-07-08 ENCOUNTER — OFFICE VISIT (OUTPATIENT)
Dept: FAMILY MEDICINE | Facility: CLINIC | Age: 77
End: 2022-07-08
Payer: COMMERCIAL

## 2022-07-08 VITALS — OXYGEN SATURATION: 97 % | HEART RATE: 91 BPM | DIASTOLIC BLOOD PRESSURE: 80 MMHG | SYSTOLIC BLOOD PRESSURE: 137 MMHG

## 2022-07-08 DIAGNOSIS — R05.9 COUGH: ICD-10-CM

## 2022-07-08 DIAGNOSIS — I48.0 PAROXYSMAL ATRIAL FIBRILLATION (H): ICD-10-CM

## 2022-07-08 DIAGNOSIS — R93.89 ABNORMAL CHEST X-RAY: Primary | ICD-10-CM

## 2022-07-08 DIAGNOSIS — R05.9 COUGH: Primary | ICD-10-CM

## 2022-07-08 DIAGNOSIS — R06.02 SOB (SHORTNESS OF BREATH): Primary | ICD-10-CM

## 2022-07-08 DIAGNOSIS — I25.5 ISCHEMIC CARDIOMYOPATHY: ICD-10-CM

## 2022-07-08 DIAGNOSIS — J18.9 PNEUMONIA DUE TO INFECTIOUS ORGANISM, UNSPECIFIED LATERALITY, UNSPECIFIED PART OF LUNG: ICD-10-CM

## 2022-07-08 DIAGNOSIS — R93.89 ABNORMAL CHEST X-RAY: ICD-10-CM

## 2022-07-08 PROCEDURE — 99214 OFFICE O/P EST MOD 30 MIN: CPT | Performed by: FAMILY MEDICINE

## 2022-07-08 RX ORDER — FUROSEMIDE 40 MG
40 TABLET ORAL DAILY
Qty: 90 TABLET | Refills: 3 | Status: SHIPPED | OUTPATIENT
Start: 2022-07-08

## 2022-07-08 RX ORDER — METOPROLOL SUCCINATE 50 MG/1
50 TABLET, EXTENDED RELEASE ORAL DAILY
COMMUNITY
Start: 2022-06-23 | End: 2024-05-28

## 2022-07-08 NOTE — PROGRESS NOTES
"Assessment/Plan:    Cough  Cough with prior community-acquired pneumonia appears resolved currently with chest x-ray without acute infiltrates or consolidation identified.  Dyspnea on exertion likely associate with underlying history of ischemic cardiomyopathy as noted below.  - XR Chest 2 Views    Pneumonia due to infectious organism, unspecified laterality, unspecified part of lung  As above, resolved concerns following completion of Z-Gagan with subsequent moxifloxacin 400 mg daily x5 days.    Paroxysmal atrial fibrillation (H)  History of paroxysmal atrial fibrillation history.  Pacemaker in place x6 months.    Ischemic cardiomyopathy  Has scheduled follow-up with Dr. SNELL cardiologist Carolyn Thorpe July 29, 2022.  Did recommend utilization of furosemide 40 mg each morning.  Patient and daughter are somewhat hesitant about dosing of this however do agree to utilize until seen by cardiologist with prior EF of 15 to 20% reviewed on echocardiogram as noted below.  - furosemide (LASIX) 40 MG tablet  Dispense: 90 tablet; Refill: 3    Abnormal chest x-ray  Radiologist recommendation to refer patient to pulmonologist for nonemergent evaluation to further evaluate for possible underlying interstitial lung disease.        Subjective:    Yecenia Kwan is seen today for follow-up assessment.  Still has some dyspnea on exertion.  Prior cough with community-acquired pneumonia..  Community-acquired pneumonia without respiratory distress or noted hypoxia.  Noted on chest CT 5/11/22 - \"Some patchy interstitial pulmonary infiltrates greatest in the right upper lobe with minimal involvement of the superior segments of both lower lobes. Most likely pneumonia.\".  Amoxicillin 500 mg use 2 capsules 3 times daily x10 days and azithromycin 250 mg use 2 tablets today then 1 tablet daily days 2 through 5.  Does have scheduled follow-up with PCP on Thursday, May 19, 2022.  Due to difficulty swallowing amoxicillin did switch to " "moxifloxacin 400 mg daily x5 days which she has completed.  Still has some dyspnea with exertion without described chest pain.  Pacemaker apparently x6 months.  Has scheduled follow-up with cardiologist 2022.  Prior BNP elevation greater than 800 on lab exam.  Peripheral edema stable.  No change regarding symptoms of orthopnea, PND etc.          Past Surgical History:   Procedure Laterality Date     HC ARTHROTOMY/EXPLORE/TREAT KNEE JOINT      Description: Arthrotomy Of Knee With Joint Exploration;  Recorded: 2008;  Comments: due to \"accident\" and infection      CORRECT BUNION,METATARSAL OSTEOTOMY      Description: Bunion Correction With Metatarsal Osteotomy;  Recorded: 2008;     [facility] NONSPECIFIC PROCEDURE      Knee surgeries ((R) scope)     [facility] NONSPECIFIC PROCEDURE      tubal ligation        Family History   Problem Relation Age of Onset     Prostate Cancer Father      C.A.D. Mother          at 80 years of age.     Diabetes Mother         Past Medical History:   Diagnosis Date     Anxiety      Arthritis      Atrial fibrillation (H)      Cellulitis of left leg      CVA (cerebral vascular accident) (H)      Displacement of intervertebral disc, site unspecified, without myelopathy      GERD (gastroesophageal reflux disease)      Head injury      Hidradenitis      Hx of ischemic left MCA stroke 2015    posterior aspect of the left lobe involving middle frontal gyrus   2015      Hx of nicotine dependence      Hyperlipemia      Hypertension      Obesity, unspecified      Proteinuria      Unspecified essential hypertension         Social History     Tobacco Use     Smoking status: Never Smoker     Smokeless tobacco: Never Used   Substance Use Topics     Alcohol use: No     Drug use: No        Current Outpatient Medications   Medication Sig Dispense Refill     apixaban ANTICOAGULANT (ELIQUIS) 5 MG tablet Take 1 tablet (5 mg) by mouth 2 times daily 60 tablet 5     aspirin (ASA) 81 MG " chewable tablet Take 81 mg by mouth daily       furosemide (LASIX) 20 MG tablet Take 20 mg by mouth       furosemide (LASIX) 40 MG tablet Take 1 tablet (40 mg) by mouth daily 90 tablet 3     losartan (COZAAR) 50 MG tablet Take 1 tablet (50 mg) by mouth daily 90 tablet 3     metoprolol succinate ER (TOPROL XL) 50 MG 24 hr tablet        pantoprazole sodium (PROTONIX) 40 MG packet Take 1 packet (40 mg) by mouth daily 30 each 11     rosuvastatin (CRESTOR) 5 MG tablet TAKE 1/2 TABLET(2.5 MG) BY MOUTH DAILY 45 tablet 11     acetaminophen (TYLENOL) 325 MG tablet Take 650 mg by mouth       Blood Pressure Monitoring (BLOOD PRESSURE MONITOR/M CUFF) MISC 1 Large electronic blood pressure cuff- to be used as needed for blood pressure monitoring 1 each 0     triamcinolone (KENALOG) 0.1 % external cream Apply topically 2 times daily 80 g 1          Objective:    Vitals:    07/08/22 1129   BP: 137/80   Pulse: 91   SpO2: 97%      There is no height or weight on file to calculate BMI.    Alert.  Cooperative.  Utilizing walker to assist with ambulation.  Chest appears relatively clear on exam without expiratory wheeze or inspiratory crackle.  Cardiac exam appears regular currently with pacemaker in place.  Extremities with 1+ edema lower extremities with light compression wrap in place.        EXAM: XR CHEST 2 VW  LOCATION: [facility]  DATE/TIME: 7/8/2022 11:14 AM     INDICATION:  Cough, dyspnea  COMPARISON: 06/07/2022 and older studies, chest CT 05/11/2022 (not available before), cardiac CT 10/11/2018                                                                   IMPRESSION: Dual-lead left subclavian venous pacer. Leads are intact.     Focal opacities in the right upper lobe are little changed since the June study and are visible on the chest CT done in May 2022 ( new since 2018). Thus, unclear if this reflects a chronic change.       Thus, given her symptoms of cough and dyspnea consider nonemergent  pulmonary consultation to ascertain if there is an underlying interstitial lung disease.     Cardiomegaly is unchanged. No signs of failure or effusions.     [Access Center: ] Suggest nonemergent pulmonary consultation to evaluate for possible underlying lung disease.     This report will be copied to the [facility] to ensure a provider acknowledges the finding. [facility] is available Monday through Friday 8am-3:30 pm.         Complete Echo Adult 10/14/21  Interpretation Summary     1. The left ventricle is normal in size. Left ventricular systolic performance  is severely reduced. The ejection fraction is estimated to be 15-20%.  2. There is severe global reduction in left ventricular systolic performance.  3. There is moderate mitral insufficiency.  4. There is mild to moderate tricuspid insufficiency.  5. Normal right ventricular size with moderately reduced right ventricular  systolic performance.  6. There is severe left atrial enlargement. There is moderate right atrial  enlargement.  7. Right ventricular systolic pressure relative to right atrial pressure is  mildly increased. The pulmonary artery pressure is estimated to be 56-40 mmHg  plus right atrial pressure (the IVC is mildly dilated..     When compared to the prior real-time echocardiogram dated 2 August 2021, the  ejection fraction appears somewhat more reduced on the current examination.  Mild-moderate tricuspid insufficiency and moderate mitral insufficiency is now  appreciated.      This note has been dictated using voice recognition software and as a result may contain minor grammatical errors and unintended word substitutions.

## 2022-07-08 NOTE — TELEPHONE ENCOUNTER
Pt's daughter called and stated that the referral to pulmonology was received  and the soonest she can be seen is September. They said the referral needs to be resent as urgent need so she can be seen sooner.

## 2022-07-12 ENCOUNTER — TRANSFERRED RECORDS (OUTPATIENT)
Dept: HEALTH INFORMATION MANAGEMENT | Facility: CLINIC | Age: 77
End: 2022-07-12

## 2022-07-18 ENCOUNTER — TELEPHONE (OUTPATIENT)
Dept: FAMILY MEDICINE | Facility: CLINIC | Age: 77
End: 2022-07-18

## 2022-07-18 NOTE — OR NURSING
Spoke with Nuris  from Dr. Ambrosio's office x 2 regarding patient not having a pre op done.  She left message for patient/daughter.

## 2022-07-18 NOTE — TELEPHONE ENCOUNTER
I reached out to Emily and relayed the message below from Dr. Gonzalez. Emily understands and will be notifying her provider/ staff on her end as well.

## 2022-07-18 NOTE — TELEPHONE ENCOUNTER
I have not seen her for a preop so cannot clear her for surgery. I have not seen her in clinic since the beginning of June and we did not do a preoperative exam then.

## 2022-07-18 NOTE — TELEPHONE ENCOUNTER
Emily, the surgery coordinator from the [facility] called and states patient has a surgery tomorrow (has been rescheduled 3x) that she has not been cleared for, nor has a pre-op been completed for.     Emily is wondering if patient is cleared for surgery? Verbal clearance would be okay at this time.     She is available typically until 4:30 pm, but is willing to stay late to hear from Dr. Gonzalez. Please call her direct line back at [phone]. OK to leave a detailed message.

## 2022-08-18 ENCOUNTER — PATIENT OUTREACH (OUTPATIENT)
Dept: GERIATRIC MEDICINE | Facility: CLINIC | Age: 77
End: 2022-08-18

## 2022-09-08 ENCOUNTER — TRANSFERRED RECORDS (OUTPATIENT)
Dept: HEALTH INFORMATION MANAGEMENT | Facility: CLINIC | Age: 77
End: 2022-09-08

## 2022-09-17 RX ORDER — FLUTICASONE PROPIONATE 50 MCG
SPRAY, SUSPENSION (ML) NASAL
Qty: 16 G | OUTPATIENT
Start: 2022-09-17

## 2022-09-18 NOTE — TELEPHONE ENCOUNTER
fluticasone (FLONASE) 50 MCG/ACT nasal spray (Discontinued)   12/7/2020 11/9/2021 --   Sig - Route: Spray 2 sprays in nostril - Nasal   Patient not taking: Reported on 9/9/2021        Class: Historical   Reason for Discontinue: Therapy completed   Order: [id number]

## 2022-09-20 ENCOUNTER — TELEPHONE (OUTPATIENT)
Dept: GERIATRIC MEDICINE | Facility: CLINIC | Age: 77
End: 2022-09-20

## 2022-09-20 DIAGNOSIS — I10 BENIGN ESSENTIAL HYPERTENSION: Primary | ICD-10-CM

## 2022-09-21 ENCOUNTER — PATIENT OUTREACH (OUTPATIENT)
Dept: GERIATRIC MEDICINE | Facility: CLINIC | Age: 77
End: 2022-09-21

## 2022-09-21 NOTE — PROGRESS NOTES
[facility] Care Coordination Contact    Care Coordinator sent signed script for BP Cuff to [facility] today.      RYAN Yanez  [facility]  Phone: [phone]

## 2022-09-21 NOTE — TELEPHONE ENCOUNTER
Hi Dr. Gonzalez, can you please sign off on a new BP Cuff for Yecenia.   Her's has broken.   Once you have approved, I can send it to a [employer] store. Thanks!    RYAN Yanez  [facility]  Phone: [phone]

## 2022-09-27 ENCOUNTER — PATIENT OUTREACH (OUTPATIENT)
Dept: GERIATRIC MEDICINE | Facility: CLINIC | Age: 77
End: 2022-09-27

## 2022-09-28 ENCOUNTER — TELEPHONE (OUTPATIENT)
Dept: FAMILY MEDICINE | Facility: CLINIC | Age: 77
End: 2022-09-28

## 2022-09-28 DIAGNOSIS — L30.9 DERMATITIS: ICD-10-CM

## 2022-09-28 NOTE — TELEPHONE ENCOUNTER
Reason for Call:  Other prescription    Detailed comments: Pts daughter is calling wanting to inform Dr. Gonzalez that Yecenia has been complaining of being itchy all over. They thought that it was maybe due to a recent increase in her losartan but they contacted the heart doctor and they stated it was not due to her losartan.     Daughter states that Carlos had prescribed patient something for this same type of thing in the past but not sure what it was.     Please advise patient/daughter further. Please contact daughter and if she does not answer its because she is at work and would like a detailed message to be left.     Phone Number Patient can be reached at: Cell number on file:    Telephone Information:   Mobile [phone]       Best Time: anytime     Can we leave a detailed message on this number? YES    Call taken on 9/28/2022 at 9:38 AM by Vicki Varner

## 2022-09-29 RX ORDER — TRIAMCINOLONE ACETONIDE 1 MG/G
CREAM TOPICAL 2 TIMES DAILY
Qty: 80 G | Refills: 1 | Status: SHIPPED | OUTPATIENT
Start: 2022-09-29 | End: 2023-01-23

## 2022-09-30 ENCOUNTER — TELEPHONE (OUTPATIENT)
Dept: FAMILY MEDICINE | Facility: CLINIC | Age: 77
End: 2022-09-30

## 2022-09-30 NOTE — TELEPHONE ENCOUNTER
Pt requesting an oral medication. States it will be too tiring to use the ointment. Please advise.

## 2022-09-30 NOTE — TELEPHONE ENCOUNTER
Pharmacy request not routed to provider. Please address as high priority as it's been over a week.

## 2022-10-05 ENCOUNTER — TELEPHONE (OUTPATIENT)
Dept: GERIATRIC MEDICINE | Facility: CLINIC | Age: 77
End: 2022-10-05

## 2022-10-05 DIAGNOSIS — N39.46 MIXED STRESS AND URGE URINARY INCONTINENCE: Primary | ICD-10-CM

## 2022-10-05 ASSESSMENT — PATIENT HEALTH QUESTIONNAIRE - PHQ9: SUM OF ALL RESPONSES TO PHQ QUESTIONS 1-9: 2

## 2022-10-06 ENCOUNTER — PATIENT OUTREACH (OUTPATIENT)
Dept: GERIATRIC MEDICINE | Facility: CLINIC | Age: 77
End: 2022-10-06

## 2022-10-06 NOTE — PROGRESS NOTES
[facility] Care Coordination Contact    [facility] Home Visit Assessment     Home visit for Health Risk Assessment with Yecenia Bay completed on 09/27/2022    Type of residence:: Private home - stairs  Current living arrangement:: I live in a private home with family     Assessment completed with:: Patient, Children    Current Care Plan  Member currently receiving the following home care services:   NA  Member currently receiving the following community resources: DME, PCA    Phone assessment due to COVID-19    Medication Review  Medication reconciliation completed in Epic: If no, please explain No face to face reconcile due to COVID-19  Medication set-up & administration: Family/informal caregiver sets up daily.  Family caregiver administers medications.  Medication Risk Assessment Medication (1 or more, place referral to MTM): N/A: No risk factors identified  MTM Referral Placed: No: No risk factors idenified    Mental/Behavioral Health   Depression Screening:      PHQ-9 Total Score: 2    Mental health DX:: Yes   Mental health DX how managed:: None    Falls Assessment:   Fallen 2 or more times in the past year?: No   Any fall with injury in the past year?: No    ADL/IADL Dependencies:   Dependent ADLs:: Ambulation-walker, Ambulation-cane, Bathing, Dressing, Grooming, Incontinence  Dependent IADLs:: Cleaning, Cooking, Laundry, Shopping, Meal Preparation, Medication Management, Money Management, Transportation, Incontinence    [facility] sponsored benefits: Shared information re: Silver Sneakers/gym memberships, ASA, Calcium +D.    PCA Assessment completed at visit: Yes Annual PCA assessment indicated 40 units per day of PCA. This is the same as the previous assessment.     Elderly Waiver Eligibility: Yes-will continue on EW    Care Plan & Recommendations:   Yecenia continues to receive 10 hours of daily PCA hours.  She is requesting 2 packs of XL pull ups monthly.   I will send a DME request  to the PCP to sign and approve.   Yecenia will continue to receive wipes.   Yecenia receives gloves from her PCA Agency.   Yecenia has asked for another order of tubigrip socks, I will help with this.     See [facility] for detailed assessment information.    Follow-Up Plan: Member informed of future contact, plan to f/u with member with a 6 month telephone assessment.  Contact information shared with member and family, encouraged member to call with any questions or concerns at any time.    [facility] care continuum providers: Please see Snapshot and Care Management Flowsheets for Specific details of care plan.    This CC note routed to PCP.    RYAN Yanez  [facility]  Phone: [phone]

## 2022-10-06 NOTE — PROGRESS NOTES
[facility] Care Coordination Contact    [facility]:  Faxed completed PCA assessment to PCA Agency  Faxed MD Communication to PCP.      Yodit Ash  Care Management Specialist  [facility]  [phone]

## 2022-10-10 ENCOUNTER — PATIENT OUTREACH (OUTPATIENT)
Dept: GERIATRIC MEDICINE | Facility: CLINIC | Age: 77
End: 2022-10-10

## 2022-10-10 ENCOUNTER — HEALTH MAINTENANCE LETTER (OUTPATIENT)
Age: 77
End: 2022-10-10

## 2022-10-10 NOTE — PROGRESS NOTES
[facility] Care Coordination Contact    Received after visit chart from care coordinator.  Completed following tasks: Mailed copy of care plan to client, Updated services in Database, Submitted referrals/auths for Tubigrip and WIpes, Mailed copy of POC and PCA signature sheet for member to sign and return in SASE  and Mailed [facility] Safe Medication Disposal   , Provider Signature - No POC Shared:  Member indicates that they do not want their POC shared with any EW providers.     and [facility]:  Faxed completed PCA assessment to PCA Agency and mailed copies to member.  Faxed MD Communication to PCP.  Emailed referral form for auth to [employer].    Yodit Ash  Care Management Specialist  [facility]  [phone]

## 2022-10-10 NOTE — LETTER
October 10, 2022    Important [employer] Information    GURVINDER DE DIOS  [address] [place] [address]  Your Care Plan  Dear Gurvinder,  When we spoke recently, I promised to send you a Care Plan. The plan enclosed is a summary of our discussion. It includes the steps we agreed would help you meet your health goals. In addition, I can help you with:  [id number]  This program is available to members who need a ride to medical and dental visits. To schedule a ride, call [phone] or [phone] (toll free). TTY: 711. You can call 8 a.m. to 8 p.m. Seven days a week. Access to a representative may be limited at times.   One Pass  One Pass is your no-cost, complete, fitness solution for your mind and body. To learn more visit IActive/fitness or call One Pass, toll-free 1 [phone] (TTY: 711) 8 a.m. to 9 p.m. Monday-Friday.  Health Care Directive   This form helps you outline your health care wishes. You can request a form from me and I will answer any questions you have before you discuss it with your doctor.   Annual Physical  Take a key step on your path to good health and set up an annual physical at your clinic.  Questions?  Call me at [phone] Monday-Friday between 8am and 5pm.  TTY: 711. As we discussed, I plan to be in touch with you again in 6 months to follow up via phone.  Sincerely,    RYAN Yanez  [phone]  [email or web address]    cc: member records                                                                                             [facility] ([facility] (5-2020)    Civil Rights Notice  Discrimination is against the law. [employer] does not discriminate on the basis of any of the following:    Race    Color    National Origin    Creed    Druze    Age    Public Assistance Status    Receipt of Health Care Services    Disability (including physical or mental impairment)    Sex (including sex stereotypes and gender identity)    Marital Status    Political  Beliefs    Medical Condition    Genetic Information    Sexual Orientation    Claims Experience    Medical History    Health Status    Auxiliary Aids and Services:  [employer] provides auxiliary aids and services, like qualified interpreters or information in accessible formats, free of charge and in a timely manner, to ensure an equal opportunity to participate in our health care programs. Contact [employer] at [employer]/contact medicaid or call [phone] (toll free); TTY:716 or at [employer]/contactHyasynth Biocaid.    Language Assistance Services:  [employer] provides translated documents and spoken language interpreting, free of charge and in a timely manner, when language assistance services are necessary to ensure limited English speakers have meaningful access to our information and services. Contact [employer] at [phone] (toll free); TTY: 026 or Tap[employer]ame/contactmedicaid.     Civil Rights Complaints  You have the right to file a discrimination complaint if you believe you were treated in a discriminatory way by [facility]. You may contact any of the following four agencies directly to file a discrimination complaint.    U.S. Department of Health and Human Services  Office for Civil Rights (OCR)  You have the right to file a complaint with the OCR, a federal agency, if you believe you have been discriminated against because of any of the following:    Race    Disability    Color    Sex    National Origin    Age    Confucianism (in some cases)    Contact the OCR directly to file a complaint:         Director         U.S. Department of Health and Human Services  Office for Civil Rights         [address] [place] [address]         Customer Response Center: Toll-free: [phone]          TDD: [phone]         Email: [email or web address]    [place] Department of Human Rights (MDHR)  In [place], you have the right to file a complaint with the [facility] if you believe  you have been discriminated against because of any of the following:      Race    Color    National Origin    Temple    Creed    Sex    Sexual Orientation    Marital Status    Public Assistance Status    Disability    Contact the MD directly to file a complaint:         [facility] of Human Rights         [address] [place] [address]         [phone] (voice)          [phone] (toll free)         711 or [phone] ([place] Relay)         [phone] (Fax)         [email or web address] (Email)     [place] Department of Human Services (DHS)  You have the right to file a complaint with [facility] if you believe you have been discriminated against in our health care programs because of any of the following:    Race    Color    National Origin    Creed    Temple    Age    Public Assistance Status    Receipt of Health Care Services    Disability (including physical or mental impairment)    Sex (including sex stereotypes and gender identity)    Marital Status    Political Beliefs    Medical Condition    Genetic Information    Sexual Orientation    Claims Experience    Medical History    Health Status    Complaints must be in writing and filed within 180 days of the date you discovered the alleged discrimination. The complaint must contain your name and address and describe the discrimination you are complaining about. After we get your complaint, we will review it and notify you in writing about whether we have authority to investigate. If we do, we will investigate the complaint.      [facility] will notify you in writing of the investigation s outcome. You have a right to appeal the outcome if you disagree with the decision. To appeal, you must send a written request to have [facility] review the investigation outcome. Be brief and state why you disagree with the decision. Include additional information you think is important.      If you file a complaint in this way, the people  who work for the agency named in the complaint cannot retaliate against you. This means they cannot punish you in any way for filing a complaint. Filing a complaint in this way does not stop you from seeking out other legal or administration actions.     Contact DHS directly to file a discrimination complaint:        Civil Rights Coordinator        [place] Department of Human Services        Equal Opportunity and Access Division        [address] [address]        [place] [address] [phone] (voice) or use your preferred relay service     [facility] Complaint Notice   You have the right to file a complaint with [employer] if you believe you have been discriminated against because of any of the following:       Medical condition    Health status    Receipt of health care services    Claims experience    Medical history    Genetic information    Disability (including mental or physical impairment)    Marital status    Age    Sex (including sex stereotypes and gender identity)    Sexual orientation    National origin    Race    Color    Hinduism    Creed    Public assistance status    Political beliefs    You can file a complaint and ask for help in filing a complaint in person or by mail, phone, fax, or email at:     [facility] Civil Rights Coordinator  [facility]  [address], [place] [address] [phone] (voice and fax) or TTV:624  Email: [email or web address]    American Indians can begin or continue to use [facility] and [facility] ([facility]) clinics. We will not require prior approval or impose any conditions for you to get services at these clinics. For elders age 65 years and older this includes Elderly Waiver (EW) services accessed through the [facility]. If a doctor or other provider in a [facility] or [facility] clinic refers you to a provider in our network, we will not require you to see your primary care provider prior to the referral.

## 2022-10-11 ENCOUNTER — TELEPHONE (OUTPATIENT)
Dept: FAMILY MEDICINE | Facility: CLINIC | Age: 77
End: 2022-10-11

## 2022-10-11 DIAGNOSIS — L29.9 ITCHING: Primary | ICD-10-CM

## 2022-10-11 RX ORDER — HYDROXYZINE PAMOATE 25 MG/1
25 CAPSULE ORAL 3 TIMES DAILY PRN
Qty: 30 CAPSULE | Refills: 1 | Status: SHIPPED | OUTPATIENT
Start: 2022-10-11 | End: 2024-05-28

## 2022-10-11 NOTE — TELEPHONE ENCOUNTER
Reason for call:  Other     Patient called regarding (reason for call): prescription    Additional comments: Daughter calling to find out why topical ointment hasn't been changed to an oral med. Frustrated that it's been over a week since her first request.    Phone number to reach patient:  Cell number on file:    Telephone Information:   Mobile [phone]       Best Time:  Any    Can we leave a detailed message on this number?  YES

## 2022-10-11 NOTE — TELEPHONE ENCOUNTER
I called and spoke with Estrellita.  We decided that as she can change the losartan which seems to be creating some of the itching, that we will try to just use hydroxyzine at night for short-term.  She started on 25 to 50 mg to be used at night as needed for itching.  Advised that this can make her feel sleepy, and to watch giving it to her during the daytime when she is up and moving around.  The let me know if they have any further questions or concerns.

## 2022-10-13 ENCOUNTER — OFFICE VISIT (OUTPATIENT)
Dept: FAMILY MEDICINE | Facility: CLINIC | Age: 77
End: 2022-10-13
Payer: COMMERCIAL

## 2022-10-13 VITALS
SYSTOLIC BLOOD PRESSURE: 152 MMHG | OXYGEN SATURATION: 98 % | DIASTOLIC BLOOD PRESSURE: 100 MMHG | HEART RATE: 93 BPM | BODY MASS INDEX: 42.52 KG/M2 | TEMPERATURE: 96.7 F | WEIGHT: 234 LBS

## 2022-10-13 DIAGNOSIS — L29.9 ITCHING: Primary | ICD-10-CM

## 2022-10-13 LAB
ALBUMIN SERPL BCG-MCNC: 4.2 G/DL (ref 3.5–5.2)
ALP SERPL-CCNC: 99 U/L (ref 35–104)
ALT SERPL W P-5'-P-CCNC: 25 U/L (ref 10–35)
AST SERPL W P-5'-P-CCNC: 20 U/L (ref 10–35)
BASOPHILS # BLD AUTO: 0.1 10E3/UL (ref 0–0.2)
BASOPHILS NFR BLD AUTO: 1 %
BILIRUB DIRECT SERPL-MCNC: <0.2 MG/DL (ref 0–0.3)
BILIRUB SERPL-MCNC: 0.5 MG/DL
EOSINOPHIL # BLD AUTO: 0.1 10E3/UL (ref 0–0.7)
EOSINOPHIL NFR BLD AUTO: 2 %
ERYTHROCYTE [DISTWIDTH] IN BLOOD BY AUTOMATED COUNT: 17.5 % (ref 10–15)
HCT VFR BLD AUTO: 41.6 % (ref 35–47)
HGB BLD-MCNC: 12.9 G/DL (ref 11.7–15.7)
IMM GRANULOCYTES # BLD: 0 10E3/UL
IMM GRANULOCYTES NFR BLD: 0 %
LYMPHOCYTES # BLD AUTO: 2.4 10E3/UL (ref 0.8–5.3)
LYMPHOCYTES NFR BLD AUTO: 32 %
MCH RBC QN AUTO: 25.7 PG (ref 26.5–33)
MCHC RBC AUTO-ENTMCNC: 31 G/DL (ref 31.5–36.5)
MCV RBC AUTO: 83 FL (ref 78–100)
MONOCYTES # BLD AUTO: 0.6 10E3/UL (ref 0–1.3)
MONOCYTES NFR BLD AUTO: 8 %
NEUTROPHILS # BLD AUTO: 4.2 10E3/UL (ref 1.6–8.3)
NEUTROPHILS NFR BLD AUTO: 57 %
NRBC # BLD AUTO: 0 10E3/UL
NRBC BLD AUTO-RTO: 0 /100
PLATELET # BLD AUTO: 143 10E3/UL (ref 150–450)
PROT SERPL-MCNC: 7.4 G/DL (ref 6.4–8.3)
RBC # BLD AUTO: 5.01 10E6/UL (ref 3.8–5.2)
WBC # BLD AUTO: 7.4 10E3/UL (ref 4–11)

## 2022-10-13 PROCEDURE — 99213 OFFICE O/P EST LOW 20 MIN: CPT | Performed by: FAMILY MEDICINE

## 2022-10-13 PROCEDURE — 80076 HEPATIC FUNCTION PANEL: CPT | Performed by: FAMILY MEDICINE

## 2022-10-13 PROCEDURE — 36415 COLL VENOUS BLD VENIPUNCTURE: CPT | Performed by: FAMILY MEDICINE

## 2022-10-13 PROCEDURE — 85025 COMPLETE CBC W/AUTO DIFF WBC: CPT | Performed by: FAMILY MEDICINE

## 2022-10-13 RX ORDER — CETIRIZINE HYDROCHLORIDE 10 MG/1
10 TABLET ORAL DAILY
Qty: 30 TABLET | Refills: 0 | Status: SHIPPED | OUTPATIENT
Start: 2022-10-13 | End: 2022-10-16

## 2022-10-13 RX ORDER — HYDROCORTISONE 25 MG/ML
LOTION TOPICAL 2 TIMES DAILY
Qty: 118 ML | Refills: 1 | Status: SHIPPED | OUTPATIENT
Start: 2022-10-13 | End: 2024-05-28

## 2022-10-13 ASSESSMENT — PAIN SCALES - GENERAL: PAINLEVEL: NO PAIN (0)

## 2022-10-20 NOTE — PROGRESS NOTES
Assessment & Plan     Itching  Unclear etiology- did not do well with hydroxyzine. Recommended trial of cetirizine or loratadine along with topical. - Hepatic panel (Albumin, ALT, AST, Bili, Alk Phos, TP)  - CBC with platelets and differential  - hydrocortisone 2.5 % lotion  Dispense: 118 mL; Refill: 1  - Hepatic panel (Albumin, ALT, AST, Bili, Alk Phos, TP)  - CBC with platelets and differential      Martha Gonzalez MD  [facility] [place] Keene is a 77 year old, presenting for the following health issues:  Med Change Request (Would like oral tablets for itching instead of cream)      78 yo female having significant itching for several weeks now.  Feels as though it started with losartan.  Unfortunately she has had reactions to many hypertensive medications in the past and has difficult to control hypertension in the setting of prior stroke.  Cardiology recommended she stay with losartan and look for alternative causes for her itching.  No fevers, chills or recent infections.  No changes in her soaps, detergents or lotions.         Review of Systems   Constitutional, HEENT, cardiovascular, pulmonary, gi and gu systems are negative, except as otherwise noted.      Objective    BP (!) 152/100 (BP Location: Left arm, Patient Position: Sitting, Cuff Size: Adult Large)   Pulse 93   Temp (!) 96.7  F (35.9  C) (Temporal)   Wt 106.1 kg (234 lb)   LMP  (LMP Unknown)   SpO2 98%   BMI 42.52 kg/m    Body mass index is 42.52 kg/m .  Physical Exam   GENERAL: healthy, alert and no distress  NECK: no adenopathy, no asymmetry, masses, or scars and thyroid normal to palpation  RESP: lungs clear to auscultation - no rales, rhonchi or wheezes  CV: regular rate and rhythm, normal S1 S2, no S3 or S4, no murmur, click or rub, no peripheral edema and peripheral pulses strong  ABDOMEN: soft, nontender, no hepatosplenomegaly, no masses and bowel sounds normal  MS: no gross musculoskeletal defects  noted, no edema  SKIN: no suspicious lesions or rashes    Results for orders placed or performed in visit on 10/13/22   Hepatic panel (Albumin, ALT, AST, Bili, Alk Phos, TP)     Status: Normal   Result Value Ref Range    Protein Total 7.4 6.4 - 8.3 g/dL    Albumin 4.2 3.5 - 5.2 g/dL    Bilirubin Total 0.5 <=1.2 mg/dL    Alkaline Phosphatase 99 35 - 104 U/L    AST 20 10 - 35 U/L    ALT 25 10 - 35 U/L    Bilirubin Direct <0.20 0.00 - 0.30 mg/dL   CBC with platelets and differential     Status: Abnormal   Result Value Ref Range    WBC Count 7.4 4.0 - 11.0 10e3/uL    RBC Count 5.01 3.80 - 5.20 10e6/uL    Hemoglobin 12.9 11.7 - 15.7 g/dL    Hematocrit 41.6 35.0 - 47.0 %    MCV 83 78 - 100 fL    MCH 25.7 (L) 26.5 - 33.0 pg    MCHC 31.0 (L) 31.5 - 36.5 g/dL    RDW 17.5 (H) 10.0 - 15.0 %    Platelet Count 143 (L) 150 - 450 10e3/uL    % Neutrophils 57 %    % Lymphocytes 32 %    % Monocytes 8 %    % Eosinophils 2 %    % Basophils 1 %    % Immature Granulocytes 0 %    NRBCs per 100 WBC 0 <1 /100    Absolute Neutrophils 4.2 1.6 - 8.3 10e3/uL    Absolute Lymphocytes 2.4 0.8 - 5.3 10e3/uL    Absolute Monocytes 0.6 0.0 - 1.3 10e3/uL    Absolute Eosinophils 0.1 0.0 - 0.7 10e3/uL    Absolute Basophils 0.1 0.0 - 0.2 10e3/uL    Absolute Immature Granulocytes 0.0 <=0.4 10e3/uL    Absolute NRBCs 0.0 10e3/uL   CBC with platelets and differential     Status: Abnormal    Narrative    The following orders were created for panel order CBC with platelets and differential.  Procedure                               Abnormality         Status                     ---------                               -----------         ------                     CBC with platelets and d...[[id number]]  Abnormal            Final result                 Please view results for these tests on the individual orders.

## 2022-11-01 ENCOUNTER — TELEPHONE (OUTPATIENT)
Dept: FAMILY MEDICINE | Facility: CLINIC | Age: 77
End: 2022-11-01

## 2022-11-01 DIAGNOSIS — M25.561 ACUTE PAIN OF RIGHT KNEE: Primary | ICD-10-CM

## 2022-11-01 NOTE — TELEPHONE ENCOUNTER
Daughter said pt is requesting a R knee brace. She twisted her knee. No swelling, having a little pain. Hx of cortisone injections in that knee.

## 2022-11-01 NOTE — TELEPHONE ENCOUNTER
I reached out to daughter Anastasia and we talked. I relayed the message below from Dr. Gonzalez below. I told pt that I can email her the order which had the phone numbers & locations to get the supply. Daughter confirmed email and it was completed today.

## 2022-11-01 NOTE — TELEPHONE ENCOUNTER
Please notify patient I placed an order for a knee brace.  They can go [place] [facility] [place] brace.  Please give them numbers to call and ensure order is faxed.  Thank you

## 2022-11-02 NOTE — TELEPHONE ENCOUNTER
Spoke with Anastasia the daughter regarding the brace. She states that she did not receive an email.. Please contact her.  Thank you

## 2022-11-26 DIAGNOSIS — Z86.73 HX OF ISCHEMIC LEFT MCA STROKE: ICD-10-CM

## 2022-11-26 DIAGNOSIS — I10 ESSENTIAL HYPERTENSION, BENIGN: ICD-10-CM

## 2022-11-28 RX ORDER — LOSARTAN POTASSIUM 50 MG/1
50 TABLET ORAL DAILY
Qty: 90 TABLET | Refills: 3 | Status: SHIPPED | OUTPATIENT
Start: 2022-11-28 | End: 2023-06-30

## 2022-11-28 RX ORDER — APIXABAN 5 MG/1
TABLET, FILM COATED ORAL
Qty: 60 TABLET | Refills: 5 | Status: SHIPPED | OUTPATIENT
Start: 2022-11-28 | End: 2023-05-30

## 2022-11-28 NOTE — TELEPHONE ENCOUNTER
Routing refill request to provider for review/approval because:  Drug not on the [facility] [place]     Last Written Prescription Date:  5/12/22  Last Fill Quantity: 60,  # refills: 5   Last office visit provider:  10/13/22     Requested Prescriptions   Pending Prescriptions Disp Refills     ELIQUIS ANTICOAGULANT 5 MG tablet [Pharmacy Med Name: ELIQUIS 5MG TABLETS] 60 tablet 5     Sig: TAKE 1 TABLET(5 MG) BY MOUTH TWICE DAILY       Direct Oral Anticoagulant Agents Failed - 11/26/2022  5:10 PM        Failed - Normal Platelets on file in past 12 months     Recent Labs   Lab Test 10/13/22  1524   *               Passed - Medication is active on med list        Passed - Patient is 18-79 years of age        Passed - Serum creatinine less than or equal to 1.4 on file in past 12 mos     Recent Labs   Lab Test 06/07/22  1453   CR 0.63       Ok to refill medication if creatinine is low          Passed - Weight is greater than 60 kg for the past year     Wt Readings from Last 3 Encounters:   10/13/22 106.1 kg (234 lb)   06/07/22 111.8 kg (246 lb 6.4 oz)   11/09/21 101.8 kg (224 lb 6.4 oz)             Passed - No active pregnancy on record        Passed - No positive pregnancy test within past 12 months        Passed - Recent (6 mo) or future (30 days) visit within the authorizing provider's specialty             Pattie Whatley RN 11/27/22 9:55 PM

## 2022-12-30 ENCOUNTER — TELEPHONE (OUTPATIENT)
Dept: FAMILY MEDICINE | Facility: CLINIC | Age: 77
End: 2022-12-30

## 2023-01-03 NOTE — TELEPHONE ENCOUNTER
Per Celine Jeter clinic manager    I spoke to this patient and told them to address this with the cardiologist.     Thanks,   Celine

## 2023-01-20 ENCOUNTER — TELEPHONE (OUTPATIENT)
Dept: FAMILY MEDICINE | Facility: CLINIC | Age: 78
End: 2023-01-20

## 2023-01-20 NOTE — TELEPHONE ENCOUNTER
Pts daughter is calling wanting to get a chest xray order. Daughter thinks she has walking pneumonia - describes her symptoms stating her ability to talk has been taken away or harder for her to talk, not short of breath but more like breathlessness. Please call daughter back for additional information about symptoms and place orders if appropriate for Xray

## 2023-01-23 ENCOUNTER — OFFICE VISIT (OUTPATIENT)
Dept: FAMILY MEDICINE | Facility: CLINIC | Age: 78
End: 2023-01-23
Payer: COMMERCIAL

## 2023-01-23 VITALS
WEIGHT: 241.31 LBS | OXYGEN SATURATION: 97 % | DIASTOLIC BLOOD PRESSURE: 80 MMHG | HEIGHT: 62 IN | TEMPERATURE: 98.3 F | HEART RATE: 76 BPM | RESPIRATION RATE: 16 BRPM | SYSTOLIC BLOOD PRESSURE: 152 MMHG | BODY MASS INDEX: 44.41 KG/M2

## 2023-01-23 DIAGNOSIS — J01.90 ACUTE SINUSITIS WITH SYMPTOMS > 10 DAYS: Primary | ICD-10-CM

## 2023-01-23 PROCEDURE — 99213 OFFICE O/P EST LOW 20 MIN: CPT | Performed by: FAMILY MEDICINE

## 2023-01-23 RX ORDER — AMOXICILLIN AND CLAVULANATE POTASSIUM 400; 57 MG/5ML; MG/5ML
875 POWDER, FOR SUSPENSION ORAL 2 TIMES DAILY
Qty: 218.8 ML | Refills: 0 | Status: SHIPPED | OUTPATIENT
Start: 2023-01-23 | End: 2023-02-02

## 2023-01-23 ASSESSMENT — ENCOUNTER SYMPTOMS: CONSTITUTIONAL NEGATIVE: 1

## 2023-01-23 NOTE — PROGRESS NOTES
"  Problem List Items Addressed This Visit    None  Visit Diagnoses     Acute sinusitis with symptoms > 10 days    -  Primary: 14 days of sinus congestion, nasal drainage and sore throat with associated symptoms of laryngitis.  Exam is actually quite benign.  Given duration of symptoms, I think it is reasonable to consider antibiotic therapy.  I recommended that they wait an additional 3 or 4 days to see if her symptoms start to subside.  I suspect that they will.  If they are not improving we will proceed with antibiotics.  Augmentin (liquid form) sent to pharmacy.    Relevant Medications    amoxicillin-clavulanate (AUGMENTIN) 400-57 MG/5ML suspension        Cleo Keene is a 77 year old who presents for the following health issues   Chief Complaint   Patient presents with     Hoarse     Voice, raspy for 2 weeks. C/o chills prior to that. H/o pneumonia last year.       Hoarse/sore throat:  - Duration of nasal drainage-2 weeks.  Low energy.  History of pneumonia.  Afebrile.  Palliative: None.  Accompanied by daughter.    History of Present Illness       Reason for visit:  Raspy voice that i had for sveral weeks  Symptom onset:  3-4 weeks ago  Symptom intensity:  Moderate  Symptom progression:  Staying the same  Had these symptoms before:  No  What makes it worse:  No  What makes it better:  No    She eats 2-3 servings of fruits and vegetables daily.She consumes 0 sweetened beverage(s) daily.She exercises with enough effort to increase her heart rate 10 to 19 minutes per day.  She exercises with enough effort to increase her heart rate 4 days per week.   She is taking medications regularly.       Review of Systems   Constitutional: Negative.    All other systems reviewed and are negative.           Objective    BP (!) 152/80 (BP Location: Left arm, Patient Position: Sitting, Cuff Size: Adult Large)   Pulse 76   Temp 98.3  F (36.8  C) (Oral)   Resp 16   Ht 1.575 m (5' 2\")   Wt 109.5 kg (241 lb 5 oz)   LMP  " (LMP Unknown)   SpO2 97%   BMI 44.14 kg/m    Body mass index is 44.14 kg/m .  Physical Exam  Vitals and nursing note reviewed.   Constitutional:       General: She is not in acute distress.     Appearance: Normal appearance. She is not ill-appearing.   HENT:      Head: Normocephalic and atraumatic.      Right Ear: Tympanic membrane, ear canal and external ear normal.      Left Ear: Tympanic membrane, ear canal and external ear normal.      Nose: Nose normal.      Mouth/Throat:      Mouth: Mucous membranes are moist.      Pharynx: No oropharyngeal exudate or posterior oropharyngeal erythema.   Eyes:      General: No scleral icterus.        Right eye: No discharge.         Left eye: No discharge.      Extraocular Movements: Extraocular movements intact.      Conjunctiva/sclera: Conjunctivae normal.   Cardiovascular:      Rate and Rhythm: Normal rate and regular rhythm.      Heart sounds: Normal heart sounds. No murmur heard.    No friction rub. No gallop.   Pulmonary:      Effort: Pulmonary effort is normal. No respiratory distress.      Breath sounds: Normal breath sounds. No wheezing or rales.   Musculoskeletal:      Cervical back: Neck supple.   Lymphadenopathy:      Cervical: No cervical adenopathy.   Skin:     Findings: No rash.   Neurological:      General: No focal deficit present.      Mental Status: She is alert and oriented to person, place, and time.   Psychiatric:         Attention and Perception: Attention normal.         Mood and Affect: Mood normal.         Speech: Speech normal.         Thought Content: Thought content normal.                    This note has been dictated using voice recognition software. Any grammatical or context distortions are unintentional and inherent to the software

## 2023-01-23 NOTE — TELEPHONE ENCOUNTER
Patient has an appointment with Dr. Baumann today at 10:20am. Daughter was advised to keep this appointment so the doctor can do a complete evaluation. Daughter verbalizes understanding.

## 2023-02-09 RX ORDER — FLUTICASONE PROPIONATE 50 MCG
SPRAY, SUSPENSION (ML) NASAL
Qty: 16 G | OUTPATIENT
Start: 2023-02-09

## 2023-02-09 NOTE — TELEPHONE ENCOUNTER
Outpatient Medication Detail     Disp Refills Start End JELANI   fluticasone (FLONASE) 50 MCG/ACT nasal spray (Discontinued)   12/7/2020 11/9/2021 --   Sig - Route: Spray 2 sprays in nostril - Nasal   Patient not taking: Reported on 9/9/2021        Class: Historical   Reason for Discontinue: Therapy completed (No AVS)

## 2023-02-13 ENCOUNTER — TRANSFERRED RECORDS (OUTPATIENT)
Dept: HEALTH INFORMATION MANAGEMENT | Facility: CLINIC | Age: 78
End: 2023-02-13

## 2023-02-28 DIAGNOSIS — R09.81 CHRONIC NASAL CONGESTION: Primary | ICD-10-CM

## 2023-03-01 RX ORDER — FLUTICASONE PROPIONATE 50 MCG
SPRAY, SUSPENSION (ML) NASAL
Qty: 16 G | Refills: 0 | Status: SHIPPED | OUTPATIENT
Start: 2023-03-01 | End: 2023-07-02

## 2023-03-01 NOTE — TELEPHONE ENCOUNTER
"Routing refill request to provider for review/approval because:  Drug not active on patient's medication list  Former patient of Matrha Gonzalez MD & has not established care with another provider.  Please assign refill request to covering provider per clinic standard process.    Last Written Prescription Date:  12/7/2020  Last Fill Quantity: 16 g,  # refills: 12   Last office visit provider:  01/23/2023     Requested Prescriptions   Pending Prescriptions Disp Refills     fluticasone (FLONASE) 50 MCG/ACT nasal spray [Pharmacy Med Name: FLUTICASONE 50MCG NASAL SP (120) RX] 16 g      Sig: SHAKE LIQUID AND USE 2 SPRAYS IN EACH NOSTRIL DAILY AS NEEDED FOR RHINITIS       Nasal Allergy Protocol Failed - 3/1/2023  3:09 PM        Failed - Medication is active on med list        Passed - Patient is age 12 or older        Passed - Recent (12 mo) or future (30 days) visit within the authorizing provider's specialty     Patient has had an office visit with the authorizing provider or a provider within the authorizing providers department within the previous 12 mos or has a future within next 30 days. See \"Patient Info\" tab in inbasket, or \"Choose Columns\" in Meds & Orders section of the refill encounter.                   Niurka Cleary RN 03/01/23 3:10 PM  "

## 2023-03-15 ENCOUNTER — TELEPHONE (OUTPATIENT)
Dept: FAMILY MEDICINE | Facility: CLINIC | Age: 78
End: 2023-03-15
Payer: COMMERCIAL

## 2023-03-15 NOTE — TELEPHONE ENCOUNTER
Left for patient to call let us know if she is following dr fields or if she needs to est care annual wellness with a new provider.

## 2023-03-21 ENCOUNTER — NURSE TRIAGE (OUTPATIENT)
Dept: NURSING | Facility: CLINIC | Age: 78
End: 2023-03-21
Payer: COMMERCIAL

## 2023-03-22 NOTE — TELEPHONE ENCOUNTER
Yecenia calling after falling down 4 steps into a snow bank between 3-4 pm today and now her right foot hurts near the 4th and 5th toes. Ice was applied and Tylenol taken.  No open areas. States the pain is severe and it is difficult to bear weight. Hx of CVA x2,  DM, afib, CHF and pacemaker. Care advice is to see HCP within 4 hours. Yecenia said her son can take her, but she may wait until tomorrow and go to Urgent Care.  hSerly Alva RN on 3/21/2023 at 9:13 PM      Reason for Disposition    Followed a foot injury    [1] SEVERE pain AND [2] not improved 2 hours after pain medicine/ice packs    Additional Information    Negative: Serious injury with multiple fractures (broken bones)    Negative: [1] Major bleeding (e.g., actively dripping or spurting) AND [2] can't be stopped    Negative: Amputation    Negative: Looks like a dislocated joint (very crooked or deformed)    Negative: Sounds like a life-threatening emergency to the triager    Negative: Bullet wound, stabbed by knife, or other serious penetrating wound    Negative: Skin is split open or gaping (or length > 1/2 inch or 12 mm)    Negative: [1] Bleeding AND [2] won't stop after 10 minutes of direct pressure (using correct technique)    Negative: [1] Dirt in the wound AND [2] not removed with 15 minutes of scrubbing    Negative: Can't stand (bear weight) or walk    Negative: [1] Numbness (new loss of sensation) of toe(s) AND [2] present now    Negative: Sounds like a serious injury to the triager    Protocols used: FOOT PAIN-A-AH, ANKLE AND FOOT INJURY-A-AH

## 2023-03-24 ENCOUNTER — HOSPITAL ENCOUNTER (OUTPATIENT)
Dept: ULTRASOUND IMAGING | Facility: HOSPITAL | Age: 78
Discharge: HOME | End: 2023-03-24
Payer: COMMERCIAL

## 2023-03-24 DIAGNOSIS — M79.604 RIGHT LEG PAIN: ICD-10-CM

## 2023-03-24 PROCEDURE — 93971 EXTREMITY STUDY: CPT | Mod: RT

## 2023-03-25 ENCOUNTER — HEALTH MAINTENANCE LETTER (OUTPATIENT)
Age: 78
End: 2023-03-25

## 2023-04-06 ENCOUNTER — PATIENT OUTREACH (OUTPATIENT)
Dept: GERIATRIC MEDICINE | Facility: CLINIC | Age: 78
End: 2023-04-06
Payer: COMMERCIAL

## 2023-04-06 NOTE — PROGRESS NOTES
[facility] Care Coordination Contact      [facility] Six-Month Telephone Assessment    6 month telephone assessment completed on 03/31/23.    ER visits: No  Hospitalizations: No  TCU stays: No  Significant health status changes: NA  Falls/Injuries: No  ADL/IADL changes: No  Changes in services: No    Caregiver Assessment follow up:  NA    Goals: See POC in chart for goal progress documentation.  Daughter Estrellita reported that Yecenia followed Dr. Ashraf to [facility] in [place].    Transfer of coordination will be completed for 05/01/23    Will see member in 6 months for an annual health risk assessment.   Encouraged member to call CC with any questions or concerns in the meantime.     RYAN Yanez  [facility]  Phone: [phone]

## 2023-04-06 NOTE — PROGRESS NOTES
[facility] Care Coordination Contact    Member's chart reviewed for transfer to [facility].  Disenrollment check-list completed, UTF done, chart handed off to CMS to process.       Yecenia followed Dr. Gonzalez to [facility] in [place], [place].       RYAN Yanez  [facility]  Phone: [phone]

## 2023-04-26 DIAGNOSIS — L30.9 DERMATITIS: ICD-10-CM

## 2023-04-27 RX ORDER — TRIAMCINOLONE ACETONIDE 1 MG/G
CREAM TOPICAL
Qty: 80 G | Refills: 1 | OUTPATIENT
Start: 2023-04-27

## 2023-04-27 NOTE — TELEPHONE ENCOUNTER
Refused refill request as medication discontinued medication as therapy completed.   Sagrario Magdaleno RN   04/27/23 12:15 PM  [facility] Nurse Advisor

## 2023-05-28 DIAGNOSIS — Z86.73 HX OF ISCHEMIC LEFT MCA STROKE: ICD-10-CM

## 2023-05-29 NOTE — TELEPHONE ENCOUNTER
Routing refill request to provider for review/approval because:  Anticoagulant medication needs review    Last Written Prescription Date:  11/28/20222  Last Fill Quantity: 60,  # refills: 5   Last office visit provider:  1/23/2023     Requested Prescriptions   Pending Prescriptions Disp Refills     ELIQUIS ANTICOAGULANT 5 MG tablet [Pharmacy Med Name: ELIQUIS 5MG TABLETS] 60 tablet 5     Sig: TAKE 1 TABLET(5 MG) BY MOUTH TWICE DAILY       Direct Oral Anticoagulant Agents Failed - 5/28/2023  4:37 PM        Failed - Normal Platelets on file in past 12 months     Recent Labs   Lab Test 10/13/22  1524   *               Failed - Recent (6 mo) or future (30 days) visit within the authorizing provider's specialty        Passed - Medication is active on med list        Passed - Patient is 18-79 years of age        Passed - Serum creatinine less than or equal to 1.4 on file in past 12 mos     Recent Labs   Lab Test 06/07/22  1453   CR 0.63       Ok to refill medication if creatinine is low          Passed - Weight is greater than 60 kg for the past year     Wt Readings from Last 3 Encounters:   01/23/23 109.5 kg (241 lb 5 oz)   10/13/22 106.1 kg (234 lb)   06/07/22 111.8 kg (246 lb 6.4 oz)             Passed - No active pregnancy on record        Passed - No positive pregnancy test within past 12 months             Joanie Collazo RN 05/29/23 7:39 AM

## 2023-05-31 RX ORDER — APIXABAN 5 MG/1
TABLET, FILM COATED ORAL
Qty: 60 TABLET | Refills: 1 | Status: SHIPPED | OUTPATIENT
Start: 2023-05-31 | End: 2023-09-25

## 2023-06-12 ENCOUNTER — HOSPITAL ENCOUNTER (OUTPATIENT)
Dept: ULTRASOUND IMAGING | Facility: CLINIC | Age: 78
Discharge: HOME | End: 2023-06-12
Attending: OBSTETRICS & GYNECOLOGY | Admitting: OBSTETRICS & GYNECOLOGY
Payer: COMMERCIAL

## 2023-06-12 DIAGNOSIS — N85.02 ENDOMETRIAL HYPERPLASIA WITH ATYPIA: ICD-10-CM

## 2023-06-12 PROCEDURE — 76830 TRANSVAGINAL US NON-OB: CPT

## 2023-07-02 DIAGNOSIS — R09.81 CHRONIC NASAL CONGESTION: ICD-10-CM

## 2023-07-02 RX ORDER — FLUTICASONE PROPIONATE 50 MCG
SPRAY, SUSPENSION (ML) NASAL
Qty: 48 G | Refills: 1 | Status: SHIPPED | OUTPATIENT
Start: 2023-07-02 | End: 2024-05-28

## 2023-07-02 NOTE — TELEPHONE ENCOUNTER
"Last Written Prescription Date: 3/1/2023  Last Fill Quantity: 16 g,  # refills: 0   Last office visit provider: 1/23/2023 with Dr MIGUELINA Bar      Requested Prescriptions   Pending Prescriptions Disp Refills     fluticasone (FLONASE) 50 MCG/ACT nasal spray [Pharmacy Med Name: FLUTICASONE 50MCG NASAL SP (120) RX] 48 g      Sig: SHAKE LIQUID AND USE 2 SPRAYS IN EACH NOSTRIL DAILY AS NEEDED FOR RHINITIS       Nasal Allergy Protocol Passed - 7/2/2023  4:24 PM        Passed - Patient is age 12 or older        Passed - Recent (12 mo) or future (30 days) visit within the authorizing provider's specialty     Patient has had an office visit with the authorizing provider or a provider within the authorizing providers department within the previous 12 mos or has a future within next 30 days. See \"Patient Info\" tab in inbasket, or \"Choose Columns\" in Meds & Orders section of the refill encounter.              Passed - Medication is active on med list             Janet Jain RN 07/02/23 5:25 PM  "

## 2023-07-14 ENCOUNTER — TRANSFERRED RECORDS (OUTPATIENT)
Dept: HEALTH INFORMATION MANAGEMENT | Facility: CLINIC | Age: 78
End: 2023-07-14
Payer: COMMERCIAL

## 2023-09-25 DIAGNOSIS — Z86.73 HX OF ISCHEMIC LEFT MCA STROKE: ICD-10-CM

## 2023-09-25 RX ORDER — APIXABAN 5 MG/1
TABLET, FILM COATED ORAL
Qty: 60 TABLET | Refills: 1 | Status: SHIPPED | OUTPATIENT
Start: 2023-09-25 | End: 2023-11-24

## 2023-10-03 NOTE — TELEPHONE ENCOUNTER
It appears patient now sees Dr. Gonzalez outside of [facility].    Writer called [facility] pharmacy to inform them that patient is no longer seen at this clinic and refill requests need to be sent to providers new [facility].    Pharmacy staff stated that they switch over request to providers new [facility].    MARQUES Sheets, RN  [facility]

## 2023-10-04 RX ORDER — METOPROLOL SUCCINATE 100 MG/1
100 TABLET, EXTENDED RELEASE ORAL DAILY
Qty: 180 TABLET | OUTPATIENT
Start: 2023-10-04

## 2023-10-29 ENCOUNTER — HEALTH MAINTENANCE LETTER (OUTPATIENT)
Age: 78
End: 2023-10-29

## 2023-11-22 ENCOUNTER — MEDICAL CORRESPONDENCE (OUTPATIENT)
Dept: HEALTH INFORMATION MANAGEMENT | Facility: CLINIC | Age: 78
End: 2023-11-22
Payer: COMMERCIAL

## 2023-11-24 DIAGNOSIS — Z86.73 HX OF ISCHEMIC LEFT MCA STROKE: ICD-10-CM

## 2023-11-24 RX ORDER — APIXABAN 5 MG/1
TABLET, FILM COATED ORAL
Qty: 60 TABLET | Refills: 1 | Status: SHIPPED | OUTPATIENT
Start: 2023-11-24

## 2023-12-19 ENCOUNTER — TELEPHONE (OUTPATIENT)
Dept: FAMILY MEDICINE | Facility: CLINIC | Age: 78
End: 2023-12-19
Payer: COMMERCIAL

## 2023-12-19 NOTE — TELEPHONE ENCOUNTER
Patient Quality Outreach    Patient is due for the following:   Diabetes -  A1C, Eye Exam, Microalbumin, and Diabetic Follow-Up Visit  Hypertension -  BP check  Physical Annual Wellness Visit    Next Steps:   Schedule a Annual Wellness Visit    Type of outreach:    Sent [employer] message.    NOTE: This is not Dr. Zavaleta's pt. See Dr. Zavaleta one time for acute care on 1/23/23. xl  Questions for provider review:    None           Jorden Hill MA

## 2024-01-11 ENCOUNTER — TRANSCRIBE ORDERS (OUTPATIENT)
Dept: OTHER | Age: 79
End: 2024-01-11

## 2024-01-11 DIAGNOSIS — I42.9 CARDIOMYOPATHY, UNSPECIFIED TYPE (H): Primary | ICD-10-CM

## 2024-01-15 ENCOUNTER — TRANSCRIBE ORDERS (OUTPATIENT)
Dept: OTHER | Age: 79
End: 2024-01-15

## 2024-03-14 NOTE — TELEPHONE ENCOUNTER
"Prior pt of Dr Gonzalez - received form from [employer] \"critical life-sustaining medical equipment and medical emergency form\" essentially stating that pt has a medical device that requires home electricity and that \"failure to reconnect or continue service will impair or threaten the health or safety of the pt\". It appears that this is being requested for pt's pacemaker, it also appears that Dr Gonzalez has filled this form out in the past.    To my knowledge a pacemaker does not run on electrical energy within the home and instead utilizes its own battery/energy power, therefore I am unable to complete this form. If pt/family feel differently then I would recommend consulting with their cardiologist regarding this form.    Dr Peña  " Anesthesia Pre Eval Note    Anesthesia ROS/Med Hx          Cardiovascular Review:   Exercise tolerance: good (>4 METS)  Positive for hypertension  Positive for hyperlipidemia    GI/HEPATIC/RENAL Review:     Positive for GERD  Positive for renal disease      Relevant Problems   No relevant active problems       Physical Exam     Airway   Mallampati: II  TM Distance: >3 FB  Neck ROM: Full  Neck: Non-tender and Able to place in sniff position  TMJ Mobility: Good    Cardiovascular  Cardiovascular exam normal  Cardio Rhythm: Regular  Cardio Rate: Normal    Head Assessment  Head assessment: Normocephalic and Atraumatic    General Assessment  General Assessment: Alert and oriented and No acute distress    Dental Exam  Dental exam normal    Pulmonary Exam  Pulmonary exam normal  Breath sounds clear to auscultation:  Yes    Abdominal Exam  Abdominal exam normal      Anesthesia Plan:    ASA Status: 3  Anesthesia Type: Spinal      Post-op Pain Management: Per Surgeon      Checklist  Reviewed: NPO Status, Allergies, Medications, Problem list, Past Med History, Patient Summary and Lab Results  Consent/Risks Discussed Statement:  The proposed anesthetic plan, including its risks and benefits, have been discussed with the Patient along with the risks and benefits of alternatives. Questions were encouraged and answered and the patient and/or representative understands and agrees to proceed.        I discussed with the patient (and/or patient's legal representative) the risks and benefits of the proposed anesthesia plan, Spinal, which may include services performed by other anesthesia providers.    Alternative anesthesia plans, if available, were reviewed with the patient (and/or patient's legal representative). Discussion has been held with the patient (and/or patient's legal representative) regarding risks of anesthesia, which include Hypotension, headache, nausea and vomiting and emergent situations that may require change in  anesthesia plan.    The patient (and/or patient's legal representative) has indicated understanding, his/her questions have been answered, and he/she wishes to proceed with the planned anesthetic.    Blood Products: Not Anticipated    Comments  Plan Comments: Risk of spinal including infection, bleeding and block failure discussed.         No

## 2024-04-03 ENCOUNTER — TELEPHONE (OUTPATIENT)
Dept: MRI IMAGING | Facility: HOSPITAL | Age: 79
End: 2024-04-03
Payer: COMMERCIAL

## 2024-04-03 NOTE — TELEPHONE ENCOUNTER
Reason for call: MR Device Safety Clearance    Please create a MR Device Safety order  Patient is reporting patient has Pacemaker  Type of MR exam: MR CARDIAC WO & W CONTRAST

## 2024-04-04 NOTE — TELEPHONE ENCOUNTER
Is the implanted device safe for MRI Exam? Yes  Is this device 3T compatible? Yes  Device Type: Pacemaker      Device Information: Medtronic, CRT-P Percepta      Cardiology Orders for Device Programming     -- Yes -- The patient has a MRI conditional pulse generator and leads from the same     -- Yes -- The pulse generator and leads have been implanted for at least 6 weeks. (Does not apply to [employer] devices)    -- Yes-- The device is implanted in the right or left pectoral region    -- Yes -- There are not any additional active cardiac devices, abandoned leads, lead extenders or adapters (needs xray verification, not MHFV device patient)     -- Yes -- The device lead impedance measurements are within the normal range per  guidelines    -- N/A -- If the patient is pacemaker dependent the thresholds are less than or equal to 2.0V @ 0.4ms.    -- Yes -- RA and RV leads are programmed to bipolar pacing operation or pacing off. If no, CONTACT THE DEVICE REP FOR PROGRAMMING     Date of last In-clinic Device check: 11/12/2023  Results of last Device check:  1.   Right atrium impedance: n/a   2.   Right ventricle impedance: 551 Ohms   3.   Left ventricle impedance: 646 Ohms      4.   Right atrium threshold: n/a   5.   Right ventricle threshold: 0.625V @ 0.4 ms   6.   Left ventricle threshold: 1.375V @ 0.4 ms      Device programming during the scan guidelines   Pacing Mode (check one): Use the recommended pacing mode per Medtronic MRI Access Application  Pacing Rate: n/a  bpm   If remote reprogramming is applicable, please contact the Rep to assist     Brandi Pritchard RN

## 2024-04-18 NOTE — TELEPHONE ENCOUNTER
Estrellita is calling in this morning to report that Yecenia is in the dentist chair for extraction of an infected tooth. They are not willing to do it today as her b/p is 220/86, ( multiple checks reveal it to be elevated over 200 systolic x4.)  She has not been using the Hydralazine, but it appears that due to pain and anxiety, that this may be helpful in this situation. She has held her Eliquis today for procedure.  The Dentist is asking her to return tomorrow for extraction, if her b/p  Is better controlled.     Looking for parameters to use the Hydralazine for systolic b/p above ______.     Dr. Hillman what are your recommendations at this time? Sk/TIANA   
Estrellita is calling to update the clinic on her b/p readings and also the tooth extraction. She reports that they took her b/p at home prior to going into the dental office today. It was 138/55. They took their b/p cuff into the dental office to test her b/p prior to procedure and it was 202/88, so of course the dentist declined to pull the tooth today. They did not have the hydralazine along with them, so were unable to administer a dose at that time. They are scheduled to go in tomorrow for a third attempt and plan to give the Hydralazine prior to leaving for the dentist office.     Estrellita will call me and let me know the outcome. sk/RN  
Telephone Encounter by Delores Doe RN at 12/15/2020  3:13 PM     Author: Delores Doe RN Service: -- Author Type: Registered Nurse    Filed: 12/15/2020  3:15 PM Encounter Date: 12/14/2020 Status: Signed    : Delores Doe RN (Registered Nurse)       Balbina Hillman MD Keune, Shelly A, RN   Caller: Unspecified (Yesterday, 10:17 AM)             Hydralazien fro SBP > 160.     Thanks;   ~ Balbina Haro is wondering about the accuracy of the dental office b/p cuff and plans to take theirs in tomorrow for her procedure. They are looking at having the tooth extracted tomorrow. She will call me if any further concerns. sk/RN       
stairs within home

## 2024-05-20 ENCOUNTER — HOSPITAL ENCOUNTER (OUTPATIENT)
Dept: RADIOLOGY | Facility: HOSPITAL | Age: 79
Discharge: HOME | End: 2024-05-20
Attending: INTERNAL MEDICINE
Payer: COMMERCIAL

## 2024-05-20 DIAGNOSIS — I50.22 CHRONIC SYSTOLIC HEART FAILURE (H): ICD-10-CM

## 2024-05-20 PROCEDURE — 71045 X-RAY EXAM CHEST 1 VIEW: CPT

## 2024-05-26 ENCOUNTER — HEALTH MAINTENANCE LETTER (OUTPATIENT)
Age: 79
End: 2024-05-26

## 2024-05-28 ENCOUNTER — HOSPITAL ENCOUNTER (INPATIENT)
Facility: HOSPITAL | Age: 79
LOS: 2 days | Discharge: HOME | DRG: 092 | End: 2024-05-30
Attending: EMERGENCY MEDICINE | Admitting: HOSPITALIST
Payer: COMMERCIAL

## 2024-05-28 ENCOUNTER — APPOINTMENT (OUTPATIENT)
Dept: CT IMAGING | Facility: HOSPITAL | Age: 79
DRG: 092 | End: 2024-05-28
Attending: EMERGENCY MEDICINE
Payer: COMMERCIAL

## 2024-05-28 DIAGNOSIS — R20.2 FACIAL PARESTHESIA: ICD-10-CM

## 2024-05-28 DIAGNOSIS — R20.2 LEFT LEG PARESTHESIAS: ICD-10-CM

## 2024-05-28 DIAGNOSIS — E78.5 HYPERLIPIDEMIA LDL GOAL <70: Primary | ICD-10-CM

## 2024-05-28 LAB
ANION GAP SERPL CALCULATED.3IONS-SCNC: 8 MMOL/L (ref 7–15)
BASOPHILS # BLD AUTO: 0 10E3/UL (ref 0–0.2)
BASOPHILS NFR BLD AUTO: 1 %
BUN SERPL-MCNC: 26.4 MG/DL (ref 8–23)
CALCIUM SERPL-MCNC: 9 MG/DL (ref 8.8–10.2)
CHLORIDE SERPL-SCNC: 104 MMOL/L (ref 98–107)
CHOLEST SERPL-MCNC: 201 MG/DL
CREAT SERPL-MCNC: 0.76 MG/DL (ref 0.51–0.95)
DEPRECATED HCO3 PLAS-SCNC: 28 MMOL/L (ref 22–29)
EGFRCR SERPLBLD CKD-EPI 2021: 80 ML/MIN/1.73M2
EOSINOPHIL # BLD AUTO: 0.2 10E3/UL (ref 0–0.7)
EOSINOPHIL NFR BLD AUTO: 3 %
ERYTHROCYTE [DISTWIDTH] IN BLOOD BY AUTOMATED COUNT: 15.1 % (ref 10–15)
GLUCOSE BLDC GLUCOMTR-MCNC: 103 MG/DL (ref 70–99)
GLUCOSE SERPL-MCNC: 97 MG/DL (ref 70–99)
HBA1C MFR BLD: 6.4 %
HCT VFR BLD AUTO: 40.4 % (ref 35–47)
HDLC SERPL-MCNC: 49 MG/DL
HGB BLD-MCNC: 12.8 G/DL (ref 11.7–15.7)
HOLD SPECIMEN: NORMAL
HOLD SPECIMEN: NORMAL
IMM GRANULOCYTES # BLD: 0 10E3/UL
IMM GRANULOCYTES NFR BLD: 0 %
LDLC SERPL CALC-MCNC: 136 MG/DL
LYMPHOCYTES # BLD AUTO: 1.9 10E3/UL (ref 0.8–5.3)
LYMPHOCYTES NFR BLD AUTO: 35 %
MAGNESIUM SERPL-MCNC: 2 MG/DL (ref 1.7–2.3)
MCH RBC QN AUTO: 27.1 PG (ref 26.5–33)
MCHC RBC AUTO-ENTMCNC: 31.7 G/DL (ref 31.5–36.5)
MCV RBC AUTO: 85 FL (ref 78–100)
MONOCYTES # BLD AUTO: 0.4 10E3/UL (ref 0–1.3)
MONOCYTES NFR BLD AUTO: 7 %
NEUTROPHILS # BLD AUTO: 2.9 10E3/UL (ref 1.6–8.3)
NEUTROPHILS NFR BLD AUTO: 54 %
NONHDLC SERPL-MCNC: 152 MG/DL
NRBC # BLD AUTO: 0 10E3/UL
NRBC BLD AUTO-RTO: 0 /100
PHOSPHATE SERPL-MCNC: 2.8 MG/DL (ref 2.5–4.5)
PLATELET # BLD AUTO: 126 10E3/UL (ref 150–450)
POTASSIUM SERPL-SCNC: 4 MMOL/L (ref 3.4–5.3)
RBC # BLD AUTO: 4.73 10E6/UL (ref 3.8–5.2)
SODIUM SERPL-SCNC: 140 MMOL/L (ref 135–145)
TRIGL SERPL-MCNC: 80 MG/DL
TROPONIN T SERPL HS-MCNC: 14 NG/L
WBC # BLD AUTO: 5.4 10E3/UL (ref 4–11)

## 2024-05-28 PROCEDURE — 80061 LIPID PANEL: CPT | Performed by: HOSPITALIST

## 2024-05-28 PROCEDURE — 99223 1ST HOSP IP/OBS HIGH 75: CPT | Performed by: HOSPITALIST

## 2024-05-28 PROCEDURE — 250N000011 HC RX IP 250 OP 636: Performed by: EMERGENCY MEDICINE

## 2024-05-28 PROCEDURE — 85025 COMPLETE CBC W/AUTO DIFF WBC: CPT | Performed by: EMERGENCY MEDICINE

## 2024-05-28 PROCEDURE — 83735 ASSAY OF MAGNESIUM: CPT | Performed by: HOSPITALIST

## 2024-05-28 PROCEDURE — 83036 HEMOGLOBIN GLYCOSYLATED A1C: CPT | Performed by: HOSPITALIST

## 2024-05-28 PROCEDURE — 120N000001 HC R&B MED SURG/OB

## 2024-05-28 PROCEDURE — 96374 THER/PROPH/DIAG INJ IV PUSH: CPT | Mod: 59

## 2024-05-28 PROCEDURE — 84100 ASSAY OF PHOSPHORUS: CPT | Performed by: HOSPITALIST

## 2024-05-28 PROCEDURE — 84484 ASSAY OF TROPONIN QUANT: CPT | Performed by: EMERGENCY MEDICINE

## 2024-05-28 PROCEDURE — 93005 ELECTROCARDIOGRAM TRACING: CPT | Performed by: EMERGENCY MEDICINE

## 2024-05-28 PROCEDURE — 80048 BASIC METABOLIC PNL TOTAL CA: CPT | Performed by: EMERGENCY MEDICINE

## 2024-05-28 PROCEDURE — 70496 CT ANGIOGRAPHY HEAD: CPT

## 2024-05-28 PROCEDURE — 99285 EMERGENCY DEPT VISIT HI MDM: CPT | Mod: 25

## 2024-05-28 PROCEDURE — 999N000248 HC STATISTIC IV INSERT WITH US BY RN

## 2024-05-28 PROCEDURE — 36415 COLL VENOUS BLD VENIPUNCTURE: CPT | Performed by: EMERGENCY MEDICINE

## 2024-05-28 RX ORDER — IPRATROPIUM BROMIDE 42 UG/1
2 SPRAY, METERED NASAL 2 TIMES DAILY PRN
COMMUNITY
Start: 2024-04-24

## 2024-05-28 RX ORDER — METOPROLOL SUCCINATE 100 MG/1
1.5 TABLET, EXTENDED RELEASE ORAL DAILY
COMMUNITY
Start: 2024-02-25

## 2024-05-28 RX ORDER — LORAZEPAM 2 MG/ML
0.5 INJECTION INTRAMUSCULAR ONCE
Status: COMPLETED | OUTPATIENT
Start: 2024-05-28 | End: 2024-05-28

## 2024-05-28 RX ORDER — IOPAMIDOL 755 MG/ML
75 INJECTION, SOLUTION INTRAVASCULAR ONCE
Status: COMPLETED | OUTPATIENT
Start: 2024-05-28 | End: 2024-05-28

## 2024-05-28 RX ORDER — ROSUVASTATIN CALCIUM 10 MG/1
10 TABLET, COATED ORAL DAILY
Status: DISCONTINUED | OUTPATIENT
Start: 2024-05-29 | End: 2024-05-29

## 2024-05-28 RX ORDER — PANTOPRAZOLE SODIUM 40 MG/1
40 TABLET, DELAYED RELEASE ORAL
Status: DISCONTINUED | OUTPATIENT
Start: 2024-05-29 | End: 2024-05-30 | Stop reason: HOSPADM

## 2024-05-28 RX ORDER — LORAZEPAM 0.5 MG/1
0.5 TABLET ORAL
Status: COMPLETED | OUTPATIENT
Start: 2024-05-28 | End: 2024-05-30

## 2024-05-28 RX ORDER — LIDOCAINE 40 MG/G
CREAM TOPICAL
Status: DISCONTINUED | OUTPATIENT
Start: 2024-05-28 | End: 2024-05-30 | Stop reason: HOSPADM

## 2024-05-28 RX ADMIN — IOPAMIDOL 75 ML: 755 INJECTION, SOLUTION INTRAVENOUS at 18:20

## 2024-05-28 RX ADMIN — LORAZEPAM 0.5 MG: 2 INJECTION INTRAMUSCULAR; INTRAVENOUS at 17:48

## 2024-05-28 ASSESSMENT — ACTIVITIES OF DAILY LIVING (ADL)
ADLS_ACUITY_SCORE: 37
ADLS_ACUITY_SCORE: 35
ADLS_ACUITY_SCORE: 37

## 2024-05-28 ASSESSMENT — COLUMBIA-SUICIDE SEVERITY RATING SCALE - C-SSRS
1. IN THE PAST MONTH, HAVE YOU WISHED YOU WERE DEAD OR WISHED YOU COULD GO TO SLEEP AND NOT WAKE UP?: NO
6. HAVE YOU EVER DONE ANYTHING, STARTED TO DO ANYTHING, OR PREPARED TO DO ANYTHING TO END YOUR LIFE?: NO
2. HAVE YOU ACTUALLY HAD ANY THOUGHTS OF KILLING YOURSELF IN THE PAST MONTH?: NO

## 2024-05-28 NOTE — ED PROVIDER NOTES
EMERGENCY DEPARTMENT ENCOUNTER      NAME: Yecenia Kwan  AGE: 79 year old female  YOB: 1945  MRN: [id number]  EVALUATION DATE & TIME: 2024  2:08 PM    PCP: Martha Gonzalez    ED PROVIDER: Yosvany Montero D.O.      Chief Complaint   Patient presents with    Numbness    Extremity Weakness       FINAL IMPRESSION:  1. Facial paresthesia    2. Left leg paresthesias        ED COURSE & MEDICAL DECISION MAKIN:05 PM I met with the patient to gather history and to perform my initial exam. I discussed the plan for care while in the Emergency Department.  3:50 PM I rechecked on the patient and updated her and her family.   5:15 PM I rechecked on the patient and updated her and her family.   5:30 PM I rechecked on the patient and updated her and her family.   6:56 PM I paged neurology Dr Haque.  7:00 I paged stroke neuro Dr Avila.  7:02 PM I paged stroke neuro Dr Haque.  7:11 PM I spoke with stroke neuro Dr Haque, who recommend admission.  7:23 PM I paged hospitalist.  7:39 PM I spoke with hospitalist Dr. Soheila Vasquez who will be accepting the patient for admission.              Pertinent Labs & Imaging studies reviewed. (See chart for details)  79 year old female presents to the Emergency Department for evaluation of left-sided facial paresthesias as well as left leg paresthesias.  She does have some right-sided residual dysfunction due to previous strokes.  She did report some weakness in her leg, however it was equal to right side though with her previous stroke I am uncertain if this represents any new weakness in her left leg.  Initial concern was for CVA versus other neurologic cause of her symptoms.  I did not believe this to represent cardiac etiology, and there was no evidence of infectious process.  Patient had a CTA performed that did not show any evidence of definitive occlusion, but there is some evidence of vascular pathology of the posterior circulation and of the IJ.  With this I did  discuss with stroke neurology, who recommended admission for MRI in the morning.  She does have a pacemaker so we are unable to obtain this tonight in the ER.  Discussed with the hospitalist who agreed to admission.    Medical Decision Making  Obtained supplemental history:Supplemental history obtained?: No  Reviewed external records: External records reviewed?: Other: [facility] visit on 5/7/23.   Care impacted by chronic illness:Diabetes, Heart Disease, Hyperlipidemia, Hypertension, and Mental Health  Care significantly affected by social determinants of health:N/A  Did you consider but not order tests?: Work up considered but not performed and documented in chart, if applicable  Did you interpret images independently?: Independent interpretation of ECG and images noted in documentation, when applicable.  Consultation discussion with other provider:Did you involve another provider (consultant, MH, pharmacy, etc.)?: I discussed the care with another health care provider, see documentation for details.  Admit.    At the conclusion of the encounter I discussed the results of all of the tests and the disposition. The questions were answered. The patient or family acknowledged understanding and was agreeable with the care plan.        HPI    Patient information was obtained from: patient.     Use of : N/A     Yecenia Kwan is a 79 year old female who presents to the ED for evaluation of left-sided weakness.     Patient reports history of right-sided stroke. She had 2 strokes in [place]. She has mild weakness in her right-sided body at baseline due to the stroke.     Patient states that she has been feeling numbness in her left-sided face and leg for 1 week. She reports weakness in her bilateral lower extremities at baseline, so she is unsure if the weakness in her left leg is new or not.     Patient has a pacemaker. She has a chronic vision changes due to cataract in both eyes. No new vision  "changes. Denies chest pain, shortness of breath, headache, lightheadedness, dizziness, nausea, vomiting, diarrhea, or any head trauma.         REVIEW OF SYSTEMS  Constitutional:  Denies fever, chills, weight loss or weakness  Eyes:  No pain, discharge, redness  HENT:  Denies sore throat, ear pain, congestion  Respiratory: No SOB, wheeze or cough  Cardiovascular:  No CP, palpitations  GI:  Denies abdominal pain, nausea, vomiting, diarrhea  : Denies dysuria, hematuria  Musculoskeletal:  Denies any new muscle/joint pain, swelling or loss of function.  Skin:  Denies rash, pallor  Neurologic:  Denies headache. Positive for right-sided facial and left leg numbness.   Lymph: Denies swollen nodes    All other systems negative unless noted in HPI.    PAST MEDICAL HISTORY:  Past Medical History:   Diagnosis Date    Anxiety     Arthritis     Atrial fibrillation (H)     Cellulitis of left leg     CVA (cerebral vascular accident) (H)     Displacement of intervertebral disc, site unspecified, without myelopathy     GERD (gastroesophageal reflux disease)     Head injury     Hidradenitis     Hx of ischemic left MCA stroke 12/7/2015    posterior aspect of the left lobe involving middle frontal gyrus   12/2015     Hx of nicotine dependence     Hyperlipemia     Hypertension     Obesity, unspecified     Proteinuria     Unspecified essential hypertension        PAST SURGICAL HISTORY:  Past Surgical History:   Procedure Laterality Date    HC ARTHROTOMY/EXPLORE/TREAT KNEE JOINT      Description: Arthrotomy Of Knee With Joint Exploration;  Recorded: 07/29/2008;  Comments: due to \"accident\" and infection     CORRECT BUNION,METATARSAL OSTEOTOMY      Description: Bunion Correction With Metatarsal Osteotomy;  Recorded: 07/29/2008;    [facility] NONSPECIFIC PROCEDURE      Knee surgeries ((R) scope)    [facility] NONSPECIFIC PROCEDURE      tubal ligation         CURRENT MEDICATIONS:    No current facility-administered medications for this encounter. "     Current Outpatient Medications   Medication Sig Dispense Refill    acetaminophen (TYLENOL) 325 MG tablet Take 650 mg by mouth      aspirin (ASA) 81 MG chewable tablet Take 81 mg by mouth daily (Patient not taking: Reported on 2023)      Blood Pressure Monitoring (BLOOD PRESSURE MONITOR/M CUFF) MISC 1 Large electronic blood pressure cuff- to be used as needed for blood pressure monitoring 1 each 0    cetirizine (ZYRTEC) 10 MG tablet TAKE 1 TABLET(10 MG) BY MOUTH DAILY 90 tablet 1    ELIQUIS ANTICOAGULANT 5 MG tablet TAKE 1 TABLET(5 MG) BY MOUTH TWICE DAILY 60 tablet 1    ELIQUIS ANTICOAGULANT 5 MG tablet TAKE 1 TABLET(5 MG) BY MOUTH TWICE DAILY 60 tablet 1    fluticasone (FLONASE) 50 MCG/ACT nasal spray SHAKE LIQUID AND USE 2 SPRAYS IN EACH NOSTRIL DAILY AS NEEDED FOR RHINITIS 48 g 1    furosemide (LASIX) 40 MG tablet Take 1 tablet (40 mg) by mouth daily (Patient taking differently: Take 40 mg by mouth daily PRN swelling) 90 tablet 3    hydrocortisone 2.5 % lotion Apply topically 2 times daily 118 mL 1    hydrOXYzine (VISTARIL) 25 MG capsule Take 1 capsule (25 mg) by mouth 3 times daily as needed for itching 30 capsule 1    losartan (COZAAR) 50 MG tablet TAKE 1 TABLET(50 MG) BY MOUTH DAILY 90 tablet 0    metoprolol succinate ER (TOPROL XL) 50 MG 24 hr tablet Take 50 mg by mouth daily      pantoprazole sodium (PROTONIX) 40 MG packet Take 1 packet (40 mg) by mouth daily 30 each 11    rosuvastatin (CRESTOR) 5 MG tablet TAKE 1/2 TABLET(2.5 MG) BY MOUTH DAILY (Patient not taking: Reported on 2023) 45 tablet 11         ALLERGIES:  Allergies   Allergen Reactions    Clonidine Swelling    Hydralazine Itching    Sacubitril-Valsartan      Other reaction(s): GI Upset    Amlodipine Dizziness and Other (See Comments)    No Known Allergies     Carvedilol Nausea       FAMILY HISTORY:  Family History   Problem Relation Age of Onset    Prostate Cancer Father     C.A.D. Mother          at 80 years of age.     Diabetes Mother        SOCIAL HISTORY:  Social History     Socioeconomic History    Marital status: Single   Tobacco Use    Smoking status: Never    Smokeless tobacco: Never   Vaping Use    Vaping status: Never Used   Substance and Sexual Activity    Alcohol use: No    Drug use: No   Social History Narrative    Lives with grandson, and is exposed to second hand smoke.     Social Determinants of Health      [facility]    Financial Resource Strain    Received from [facility], [facility]    Social Connections       VITALS:  Patient Vitals for the past 24 hrs:   BP Pulse Resp SpO2   05/28/24 1750 (!) 150/79 86 17 100 %   05/28/24 1551 -- 87 16 97 %   05/28/24 1536 -- 87 28 98 %   05/28/24 1521 -- 87 21 92 %   05/28/24 1506 -- 88 17 --   05/28/24 1451 -- 84 21 --   05/28/24 1436 -- 83 26 --   05/28/24 1405 (!) 166/78 91 20 98 %       PHYSICAL EXAM    VITAL SIGNS: BP (!) 150/79   Pulse 86   Resp 17   LMP  (LMP Unknown)   SpO2 100%     General Appearance: Well-appearing, well-nourished, no acute distress  Head:  Normocephalic, without obvious abnormality, atraumatic  Eyes:  PERRL, conjunctiva/corneas clear, EOM's intact,  ENT:  Lips, mucosa, and tongue normal, membranes are moist without pallor  Neck:  Normal ROM, symmetrical, trachea midline    Cardio:  Regular rate and rhythm, no murmur, rub or gallop, 2+ pulses symmetric in all extremities  Pulm:  Clear to auscultation bilaterally, respirations unlabored,  Musculoskeletal: Full ROM, no edema, no cyanosis, good ROM of major joints  Integument:  Warm, Dry, No erythema, No rash.    Neurologic:  Patient is alert and oriented ×3.  Face is symmetric.  Speech is normal.  Visual fields are full.  Cranial nerves II through XII are grossly intact. Motor tone is 5/5 and equal in both upper and lower extremities.   Bilateral symmetric sensation in the upper extremities, decree sensation in the left lower extremity and left face compared to the right.  Neg finger-nose. Neg heel-shin.     LABS  Results for orders placed or performed during the hospital encounter of 05/28/24 (from the past 24 hour(s))   CBC with platelets + differential    Narrative    The following orders were created for panel order CBC with platelets + differential.  Procedure                               Abnormality         Status                     ---------                               -----------         ------                     CBC with platelets and d...[[id number]]  Abnormal            Final result                 Please view results for these tests on the individual orders.   [facility] Draw    Narrative    The following orders were created for panel order [facility].  Procedure                               Abnormality         Status                     ---------                               -----------         ------                     Extra Blue Top Tube[[id number]]                              Final result               Extra Red Top Tube[[id number]]                               Final result               Extra Green Top (Lithium...[[id number]]                                                 Extra Purple Top Tube[[id number]]                                                         Please view results for these tests on the individual orders.   CBC with platelets and differential   Result Value Ref Range    WBC Count 5.4 4.0 - 11.0 10e3/uL    RBC Count 4.73 3.80 - 5.20 10e6/uL    Hemoglobin 12.8 11.7 - 15.7 g/dL    Hematocrit 40.4 35.0 - 47.0 %    MCV 85 78 - 100 fL    MCH 27.1 26.5 - 33.0 pg    MCHC 31.7 31.5 - 36.5 g/dL    RDW 15.1 (H) 10.0 - 15.0 %    Platelet Count 126 (L) 150 - 450 10e3/uL    % Neutrophils 54 %    % Lymphocytes 35 %    % Monocytes 7 %    % Eosinophils 3 %    % Basophils 1 %    % Immature Granulocytes 0 %    NRBCs per 100 WBC 0  <1 /100    Absolute Neutrophils 2.9 1.6 - 8.3 10e3/uL    Absolute Lymphocytes 1.9 0.8 - 5.3 10e3/uL    Absolute Monocytes 0.4 0.0 - 1.3 10e3/uL    Absolute Eosinophils 0.2 0.0 - 0.7 10e3/uL    Absolute Basophils 0.0 0.0 - 0.2 10e3/uL    Absolute Immature Granulocytes 0.0 <=0.4 10e3/uL    Absolute NRBCs 0.0 10e3/uL   Extra Blue Top Tube   Result Value Ref Range    Hold Specimen JIC    Extra Red Top Tube   Result Value Ref Range    Hold Specimen JIC    Basic metabolic panel   Result Value Ref Range    Sodium 140 135 - 145 mmol/L    Potassium 4.0 3.4 - 5.3 mmol/L    Chloride 104 98 - 107 mmol/L    Carbon Dioxide (CO2) 28 22 - 29 mmol/L    Anion Gap 8 7 - 15 mmol/L    Urea Nitrogen 26.4 (H) 8.0 - 23.0 mg/dL    Creatinine 0.76 0.51 - 0.95 mg/dL    GFR Estimate 80 >60 mL/min/1.73m2    Calcium 9.0 8.8 - 10.2 mg/dL    Glucose 97 70 - 99 mg/dL   Troponin T, High Sensitivity (now)   Result Value Ref Range    Troponin T, High Sensitivity 14 <=14 ng/L   CTA Head Neck with Contrast    Narrative    EXAM: CTA HEAD NECK W CONTRAST  LOCATION: [facility]  DATE: 5/28/2024    INDICATION: Left sided paresthesia of face and leg  COMPARISON: 9/24/2018 CTA.  CONTRAST: 75 mL Isovue 370  TECHNIQUE: Head and neck CT angiogram with IV contrast. Noncontrast head CT followed by axial helical CT images of the head and neck vessels obtained during the arterial phase of intravenous contrast administration. Axial 2D reconstructed images and   multiplanar 3D MIP reconstructed images of the head and neck vessels were performed by the technologist. Dose reduction techniques were used. All stenosis measurements made according to NASCET criteria unless otherwise specified.    FINDINGS:   NONCONTRAST HEAD CT:   INTRACRANIAL CONTENTS: No intracranial hemorrhage, extraaxial collection, or mass effect.  No CT evidence of acute infarct. Moderate presumed chronic small vessel ischemic changes. Focal region of left anterior frontal lobe  and left anterolateral   temporal lobe presumably post infarct encephalomalacia and gliosis. Moderate generalized volume loss. No hydrocephalus.     VISUALIZED ORBITS/SINUSES/MASTOIDS: No intraorbital abnormality. No paranasal sinus mucosal disease. No middle ear or mastoid effusion.    BONES/SOFT TISSUES: No acute abnormality.    HEAD CTA:  ANTERIOR CIRCULATION: No stenosis/occlusion, aneurysm, or high flow vascular malformation. Fetal origin of the right posterior cerebral artery from the anterior circulation.    POSTERIOR CIRCULATION: Multifocal areas of luminal stenosis involving the P1, P2 and P3 segments bilaterally No stenosis/occlusion, aneurysm, or high flow vascular malformation. Balanced vertebral arteries supply a normal basilar artery.     DURAL VENOUS SINUSES: Expected enhancement of the major dural venous sinuses.    NECK CTA:  RIGHT CAROTID: Atherosclerotic plaque results in 70-90% stenosis in the right ICA. No dissection.    LEFT CAROTID: Atherosclerotic plaque results in less than 50% stenosis in the left ICA. No dissection.    VERTEBRAL ARTERIES: No focal stenosis or dissection. Balanced vertebral arteries.    AORTIC ARCH: Classic aortic arch anatomy with no significant stenosis at the origin of the great vessels.    NONVASCULAR STRUCTURES: Unremarkable.      Impression    IMPRESSION:   HEAD CT:  1.  No evidence of acute hemorrhage, hydrocephalus or transcortical infarct.      HEAD CTA:   1.  No proximal large vessel occlusion.  2.  Potentially hemodynamically significant multifocal bilateral posterior cerebral artery stenoses presumably on the basis of intracranial atherosclerotic disease, progressed since 2018.  3.  No aneurysm, or high flow vascular malformation identified.    NECK CTA:  1.  Mostly calcified plaque causes suspected 70-90% stenosis of the right internal carotid artery origin and less than 50% stenosis of the left internal carotid artery origin. Disease on the right has  progressed since 2018.    2.  No evidence for dissection.          RADIOLOGY  CTA Head Neck with Contrast   Final Result   IMPRESSION:    HEAD CT:   1.  No evidence of acute hemorrhage, hydrocephalus or transcortical infarct.        HEAD CTA:    1.  No proximal large vessel occlusion.   2.  Potentially hemodynamically significant multifocal bilateral posterior cerebral artery stenoses presumably on the basis of intracranial atherosclerotic disease, progressed since 2018.   3.  No aneurysm, or high flow vascular malformation identified.      NECK CTA:   1.  Mostly calcified plaque causes suspected 70-90% stenosis of the right internal carotid artery origin and less than 50% stenosis of the left internal carotid artery origin. Disease on the right has progressed since 2018.     2.  No evidence for dissection.            EKG:    Rate: 88 bpm  Rhythm: Ventricular paced rhythm  Axis: Normal  Interval: Normal  Conduction: Normal  QRS: Wide   ST: Normal  T-wave: Normal  QT: Not prolonged  Comparison EKG: Patient was in atrial fibrillation with narrow complex QRS on 20 August 2021  Impression:  No acute ischemic change   I have independently reviewed and interpreted today's EKG, pending Cardiologist read    PROCEDURES:  N/A        MEDICATIONS GIVEN IN THE EMERGENCY:  Medications   LORazepam (ATIVAN) injection 0.5 mg (0.5 mg Intravenous $Given 5/28/24 2298)   iopamidol (ISOVUE-370) solution 75 mL (75 mLs Intravenous $Given 5/28/24 1820)       NEW PRESCRIPTIONS STARTED AT TODAY'S ER VISIT  New Prescriptions    No medications on file        I, Katherine Groves, am serving as a scribe to document services personally performed by Yosvany Montero D.O., based on my observations and the provider's statements to me.  I, Yosvany Montero D.O., attest that Katherine Groves is acting in a scribe capacity, has observed my performance of the services and has documented them in accordance with my direction.     Yosvany Montero D.O.  Emergency Medicine  [facility]  [facility] EMERGENCY DEPARTMENT  [address] [place] [id number]  [phone]  Dept: [phone]     Yosvany Montero DO  05/28/24 6647

## 2024-05-28 NOTE — ED TRIAGE NOTES
Patient arrives with family.  Symptoms present and intermittent x 1 week.  Patient reports left sided facial numbness and numbness/weakness to the left leg.  Per patient, she has had a previous stroke that affected her right side.  Uses walker- always has bilateral lower extremity weakness.

## 2024-05-29 ENCOUNTER — TRANSCRIBE ORDERS (OUTPATIENT)
Dept: OTHER | Age: 79
End: 2024-05-29

## 2024-05-29 ENCOUNTER — APPOINTMENT (OUTPATIENT)
Dept: SPEECH THERAPY | Facility: HOSPITAL | Age: 79
DRG: 092 | End: 2024-05-29
Attending: HOSPITALIST
Payer: COMMERCIAL

## 2024-05-29 ENCOUNTER — DOCUMENTATION ONLY (OUTPATIENT)
Dept: CARDIOLOGY | Facility: CLINIC | Age: 79
End: 2024-05-29
Payer: COMMERCIAL

## 2024-05-29 ENCOUNTER — APPOINTMENT (OUTPATIENT)
Dept: PHYSICAL THERAPY | Facility: HOSPITAL | Age: 79
DRG: 092 | End: 2024-05-29
Attending: HOSPITALIST
Payer: COMMERCIAL

## 2024-05-29 ENCOUNTER — APPOINTMENT (OUTPATIENT)
Dept: ULTRASOUND IMAGING | Facility: HOSPITAL | Age: 79
DRG: 092 | End: 2024-05-29
Attending: STUDENT IN AN ORGANIZED HEALTH CARE EDUCATION/TRAINING PROGRAM
Payer: COMMERCIAL

## 2024-05-29 DIAGNOSIS — I50.22 CHRONIC SYSTOLIC HEART FAILURE (H): Primary | ICD-10-CM

## 2024-05-29 DIAGNOSIS — I50.20 HFREF (HEART FAILURE WITH REDUCED EJECTION FRACTION) (H): ICD-10-CM

## 2024-05-29 DIAGNOSIS — I42.9 CARDIOMYOPATHY, UNSPECIFIED TYPE (H): ICD-10-CM

## 2024-05-29 LAB
ANION GAP SERPL CALCULATED.3IONS-SCNC: 10 MMOL/L (ref 7–15)
BUN SERPL-MCNC: 23 MG/DL (ref 8–23)
CALCIUM SERPL-MCNC: 8.9 MG/DL (ref 8.8–10.2)
CHLORIDE SERPL-SCNC: 108 MMOL/L (ref 98–107)
CREAT SERPL-MCNC: 0.66 MG/DL (ref 0.51–0.95)
DEPRECATED HCO3 PLAS-SCNC: 24 MMOL/L (ref 22–29)
EGFRCR SERPLBLD CKD-EPI 2021: 89 ML/MIN/1.73M2
ERYTHROCYTE [DISTWIDTH] IN BLOOD BY AUTOMATED COUNT: 15.3 % (ref 10–15)
GLUCOSE BLDC GLUCOMTR-MCNC: 113 MG/DL (ref 70–99)
GLUCOSE BLDC GLUCOMTR-MCNC: 125 MG/DL (ref 70–99)
GLUCOSE BLDC GLUCOMTR-MCNC: 97 MG/DL (ref 70–99)
GLUCOSE SERPL-MCNC: 105 MG/DL (ref 70–99)
HCT VFR BLD AUTO: 43.1 % (ref 35–47)
HGB BLD-MCNC: 13.4 G/DL (ref 11.7–15.7)
MAGNESIUM SERPL-MCNC: 2.2 MG/DL (ref 1.7–2.3)
MCH RBC QN AUTO: 26.9 PG (ref 26.5–33)
MCHC RBC AUTO-ENTMCNC: 31.1 G/DL (ref 31.5–36.5)
MCV RBC AUTO: 87 FL (ref 78–100)
PLATELET # BLD AUTO: 128 10E3/UL (ref 150–450)
POTASSIUM SERPL-SCNC: 5.1 MMOL/L (ref 3.4–5.3)
RBC # BLD AUTO: 4.98 10E6/UL (ref 3.8–5.2)
SODIUM SERPL-SCNC: 142 MMOL/L (ref 135–145)
TROPONIN T SERPL HS-MCNC: 15 NG/L
WBC # BLD AUTO: 5.2 10E3/UL (ref 4–11)

## 2024-05-29 PROCEDURE — 83735 ASSAY OF MAGNESIUM: CPT | Performed by: INTERNAL MEDICINE

## 2024-05-29 PROCEDURE — 250N000013 HC RX MED GY IP 250 OP 250 PS 637: Performed by: INTERNAL MEDICINE

## 2024-05-29 PROCEDURE — 36415 COLL VENOUS BLD VENIPUNCTURE: CPT | Performed by: HOSPITALIST

## 2024-05-29 PROCEDURE — 97162 PT EVAL MOD COMPLEX 30 MIN: CPT | Mod: GP

## 2024-05-29 PROCEDURE — 84484 ASSAY OF TROPONIN QUANT: CPT | Performed by: INTERNAL MEDICINE

## 2024-05-29 PROCEDURE — 92610 EVALUATE SWALLOWING FUNCTION: CPT | Mod: GN

## 2024-05-29 PROCEDURE — 99233 SBSQ HOSP IP/OBS HIGH 50: CPT | Performed by: INTERNAL MEDICINE

## 2024-05-29 PROCEDURE — 82374 ASSAY BLOOD CARBON DIOXIDE: CPT | Performed by: HOSPITALIST

## 2024-05-29 PROCEDURE — 99255 IP/OBS CONSLTJ NEW/EST HI 80: CPT | Performed by: PSYCHIATRY & NEUROLOGY

## 2024-05-29 PROCEDURE — 93880 EXTRACRANIAL BILAT STUDY: CPT

## 2024-05-29 PROCEDURE — 250N000013 HC RX MED GY IP 250 OP 250 PS 637: Performed by: HOSPITALIST

## 2024-05-29 PROCEDURE — 85041 AUTOMATED RBC COUNT: CPT | Performed by: HOSPITALIST

## 2024-05-29 PROCEDURE — 120N000001 HC R&B MED SURG/OB

## 2024-05-29 PROCEDURE — 97116 GAIT TRAINING THERAPY: CPT | Mod: GP

## 2024-05-29 PROCEDURE — 97530 THERAPEUTIC ACTIVITIES: CPT | Mod: GP

## 2024-05-29 RX ORDER — ATORVASTATIN CALCIUM 40 MG/1
40 TABLET, FILM COATED ORAL EVERY EVENING
Status: DISCONTINUED | OUTPATIENT
Start: 2024-05-29 | End: 2024-05-30 | Stop reason: HOSPADM

## 2024-05-29 RX ORDER — HYDRALAZINE HYDROCHLORIDE 20 MG/ML
10-20 INJECTION INTRAMUSCULAR; INTRAVENOUS
Status: DISCONTINUED | OUTPATIENT
Start: 2024-05-29 | End: 2024-05-29

## 2024-05-29 RX ORDER — LABETALOL HYDROCHLORIDE 5 MG/ML
10-20 INJECTION, SOLUTION INTRAVENOUS EVERY 10 MIN PRN
Status: DISCONTINUED | OUTPATIENT
Start: 2024-05-29 | End: 2024-05-30 | Stop reason: HOSPADM

## 2024-05-29 RX ORDER — ACETAMINOPHEN 325 MG/1
650 TABLET ORAL EVERY 6 HOURS PRN
Status: DISCONTINUED | OUTPATIENT
Start: 2024-05-29 | End: 2024-05-30 | Stop reason: HOSPADM

## 2024-05-29 RX ADMIN — PANTOPRAZOLE SODIUM 40 MG: 40 TABLET, DELAYED RELEASE ORAL at 08:02

## 2024-05-29 RX ADMIN — APIXABAN 5 MG: 5 TABLET, FILM COATED ORAL at 08:57

## 2024-05-29 RX ADMIN — APIXABAN 5 MG: 5 TABLET, FILM COATED ORAL at 19:56

## 2024-05-29 RX ADMIN — METOPROLOL SUCCINATE 150 MG: 100 TABLET, EXTENDED RELEASE ORAL at 08:56

## 2024-05-29 RX ADMIN — ATORVASTATIN CALCIUM 40 MG: 40 TABLET, FILM COATED ORAL at 20:00

## 2024-05-29 ASSESSMENT — ACTIVITIES OF DAILY LIVING (ADL)
ADLS_ACUITY_SCORE: 27
ADLS_ACUITY_SCORE: 24
ADLS_ACUITY_SCORE: 24
ADLS_ACUITY_SCORE: 27
ADLS_ACUITY_SCORE: 37
DEPENDENT_IADLS:: CLEANING;COOKING;LAUNDRY;SHOPPING;MEAL PREPARATION;MEDICATION MANAGEMENT;MONEY MANAGEMENT;TRANSPORTATION;INCONTINENCE
ADLS_ACUITY_SCORE: 24
ADLS_ACUITY_SCORE: 24
ADLS_ACUITY_SCORE: 27
ADLS_ACUITY_SCORE: 27
ADLS_ACUITY_SCORE: 37
ADLS_ACUITY_SCORE: 27
ADLS_ACUITY_SCORE: 24
ADLS_ACUITY_SCORE: 27
ADLS_ACUITY_SCORE: 27
ADLS_ACUITY_SCORE: 24

## 2024-05-29 NOTE — PHARMACY-CONSULT NOTE
Pharmacy Consult to evaluate for medication related stroke core measures    Yecenia Kwan, 79 year old female admitted for stroke like symptoms on 5/28/2024.    Thrombolytic was not given because of Clinical contraindications    VTE Prophylaxis  apixiban given on 5/29 as appropriate prior to end of hospital day 2.    Antithrombotic: apixaban started on 5/29, as appropriate by end of hospital day 2. Continue antithrombotic therapy on discharge to meet quality measures, unless contraindicated.    Anticoagulation if history of A-fib/flutter: Patient on apixaban (Eliquis); continue anticoagulation on discharge to meet quality measures, unless contraindicated.    LDL Cholesterol Calculated   Date Value Ref Range Status   05/28/2024 136 (H) <=100 mg/dL Final   02/20/2009 145 (H) 0 - 129 mg/dL Final     Comment:     LDL Cholesterol is the primary guide to therapy: LDL-cholesterol goal in high   risk patients is <100 mg/dL and in very high risk patients is <70 mg/dL.       Crestor (rosuvastatin) ordered, continue statin on discharge to meet quality measures, unless contraindicated.    Recommendations: None at this time    Thank you for the consult.    Salome Zimmerman [facility] 5/29/2024 1:29 PM

## 2024-05-29 NOTE — PROGRESS NOTES
[facility] Progress Note - Hospitalist Service    Date of Admission:  5/28/2024    Assessment & Plan   73 old female with past medical history of nonischemic cardiomyopathy EF 35 to 40% per echo on 10/2023, PAF with history of AV node ablation and CRT-P implantation on 11/2021, CVA with residual right-sided weakness, right carotid artery stenosis, HTN who presented to ED for evaluation of intermittent left facial and left extremity numbness.    #Strokelike symptoms with left-sided face numbness, leg weakness/numbness;  #History of CVA with residual right-sided weakness;  #History of intracranial posterior cerebral artery stenosis bilateral;  -- PTA Eliquis.  -- , added atorvastatin 40 mg daily after discussing with neurology  -- Echocardiogram, MRI ordered  -- Stroke protocol  -- Permissive hypertension  -- Telemetry  -- Please refer to my progress note from earlier for details    Right carotid artery stenosis 70 to 90% stenosis;  -- Imaging reported disease progression since 2018  -- Discussed with neurologist and recommended vascular surgery consult    Nonischemic cardiomyopathy with EF 35 to 40% per echo on 10/23;  -- Not on exacerbation.  PTA Toprol-XL, currently permissive hypertension but likely able to resume losartan and Lasix from tomorrow.  Monitor vitals closely    AFiB with AV node ablation and CRT-P implantation on 11/2021;  -- Heart rate fairly controlled.  PTA metoprolol.  PTA Eliquis    Chronic thrombocytopenia, fairly stable          Diet: Combination Diet Moderate Consistent Carb (60 g CHO per Meal) Diet; Mechanical/Dental Soft Diet; 2 gm NA Diet; Low Saturated Fat Na <2400mg Diet    DVT Prophylaxis: DOAC  [place] Catheter: Not present  Lines: None     Cardiac Monitoring: ACTIVE order. Indication: Stroke, acute (48 hours)  Code Status: Full Code      Clinically Significant Risk Factors Present on Admission               # Drug Induced Coagulation Defect:  "home medication list includes an anticoagulant medication  # Thrombocytopenia: Lowest platelets = 126 in last 2 days, will monitor for bleeding   # Hypertension: Noted on problem list      # Severe Obesity: Estimated body mass index is 44.56 kg/m  as calculated from the following:    Height as of this encounter: 1.575 m (5' 2\").    Weight as of this encounter: 110.5 kg (243 lb 9.7 oz).              Disposition Plan     Medically Ready for Discharge: Anticipated Tomorrow             Selvin Freedman MD  Hospitalist Service  [facility]  Securely message with [employer] (more info)  Text page via [facility] Paging/Directory   ______________________________________________________________________    Interval History   Patient seen and examined multiple times today.    Please refer to my progress note from earlier today.    Patient reported she still having intermittent numbness on her face and left extremities but denied worsening.  Patient denied feeling dizzy or lightheaded.  Denied short of breath or chest pain.  Discussed with patient's son at bedside reported who is medical power of .  Discussed with nursing staff multiple times  Discussed with neurologist    Physical Exam   Vital Signs: Temp: 98.3  F (36.8  C) Temp src: Oral BP: 136/71 Pulse: 80   Resp: 17 SpO2: 100 % O2 Device: None (Room air)    Weight: 243 lbs 9.73 oz      General: Not in obvious distress.  HEENT: Normocephalic, supple neck  Chest: Clear to auscultation bilateral anteriorly, no wheezing  Heart: S1S2 normal, irregular  Abdomen: Soft. NT, ND. Bowel sounds- active.  Extremities: No legs swelling  Neuro: alert and awake, some word finding difficulty but seems chronic, moves all extremities      Medical Decision Making       60 MINUTES SPENT BY ME on the date of service doing chart review, history, exam, documentation & further activities per the note.      Data     I have personally reviewed the following data over the past 24 " hrs:    5.2  \   13.4   / 128 (L)     142 108 (H) 23.0 /  125 (H)   5.1 24 0.66 \     Trop: 15 (H) BNP: N/A     TSH: N/A T4: N/A A1C: N/A       Imaging results reviewed over the past 24 hrs:   Recent Results (from the past 24 hour(s))   CTA Head Neck with Contrast    Narrative    EXAM: CTA HEAD NECK W CONTRAST  LOCATION: [facility]  DATE: 5/28/2024    INDICATION: Left sided paresthesia of face and leg  COMPARISON: 9/24/2018 CTA.  CONTRAST: 75 mL Isovue 370  TECHNIQUE: Head and neck CT angiogram with IV contrast. Noncontrast head CT followed by axial helical CT images of the head and neck vessels obtained during the arterial phase of intravenous contrast administration. Axial 2D reconstructed images and   multiplanar 3D MIP reconstructed images of the head and neck vessels were performed by the technologist. Dose reduction techniques were used. All stenosis measurements made according to NASCET criteria unless otherwise specified.    FINDINGS:   NONCONTRAST HEAD CT:   INTRACRANIAL CONTENTS: No intracranial hemorrhage, extraaxial collection, or mass effect.  No CT evidence of acute infarct. Moderate presumed chronic small vessel ischemic changes. Focal region of left anterior frontal lobe and left anterolateral   temporal lobe presumably post infarct encephalomalacia and gliosis. Moderate generalized volume loss. No hydrocephalus.     VISUALIZED ORBITS/SINUSES/MASTOIDS: No intraorbital abnormality. No paranasal sinus mucosal disease. No middle ear or mastoid effusion.    BONES/SOFT TISSUES: No acute abnormality.    HEAD CTA:  ANTERIOR CIRCULATION: No stenosis/occlusion, aneurysm, or high flow vascular malformation. Fetal origin of the right posterior cerebral artery from the anterior circulation.    POSTERIOR CIRCULATION: Multifocal areas of luminal stenosis involving the P1, P2 and P3 segments bilaterally No stenosis/occlusion, aneurysm, or high flow vascular malformation. Balanced vertebral  arteries supply a normal basilar artery.     DURAL VENOUS SINUSES: Expected enhancement of the major dural venous sinuses.    NECK CTA:  RIGHT CAROTID: Atherosclerotic plaque results in 70-90% stenosis in the right ICA. No dissection.    LEFT CAROTID: Atherosclerotic plaque results in less than 50% stenosis in the left ICA. No dissection.    VERTEBRAL ARTERIES: No focal stenosis or dissection. Balanced vertebral arteries.    AORTIC ARCH: Classic aortic arch anatomy with no significant stenosis at the origin of the great vessels.    NONVASCULAR STRUCTURES: Unremarkable.      Impression    IMPRESSION:   HEAD CT:  1.  No evidence of acute hemorrhage, hydrocephalus or transcortical infarct.      HEAD CTA:   1.  No proximal large vessel occlusion.  2.  Potentially hemodynamically significant multifocal bilateral posterior cerebral artery stenoses presumably on the basis of intracranial atherosclerotic disease, progressed since 2018.  3.  No aneurysm, or high flow vascular malformation identified.    NECK CTA:  1.  Mostly calcified plaque causes suspected 70-90% stenosis of the right internal carotid artery origin and less than 50% stenosis of the left internal carotid artery origin. Disease on the right has progressed since 2018.    2.  No evidence for dissection.

## 2024-05-29 NOTE — MEDICATION SCRIBE - ADMISSION MEDICATION HISTORY
Medication Scribe Admission Medication History    Admission medication history is complete. The information provided in this note is only as accurate as the sources available at the time of the update.    Information Source(s): Patient and CareEverywhere/SureScripts via in-person    Pertinent Information: pt states no longer taking rosuvastatin,hydroxyzine,Flonase,zyrtec  Pt daughter provide med rec with help of pCa   Changes made to PTA medication list:  Added: None  Deleted: None  Changed: None    Allergies reviewed with patient and updates made in EHR: yes    Medication History Completed By: MAITE MERCHANT 5/28/2024 9:20 PM    PTA Med List   Medication Sig Last Dose    acetaminophen (TYLENOL) 325 MG tablet Take 650 mg by mouth Unknown at prn    ELIQUIS ANTICOAGULANT 5 MG tablet TAKE 1 TABLET(5 MG) BY MOUTH TWICE DAILY 5/28/2024 at am    furosemide (LASIX) 40 MG tablet Take 1 tablet (40 mg) by mouth daily 5/28/2024 at am    ipratropium (ATROVENT) 0.06 % nasal spray Spray 2 sprays into both nostrils 2 times daily as needed Past Week at per pt prn    losartan (COZAAR) 50 MG tablet TAKE 1 TABLET(50 MG) BY MOUTH DAILY 5/28/2024 at am    melatonin 5 MG tablet Take 5 mg by mouth nightly as needed for sleep 5/27/2024 at pm    metoprolol succinate ER (TOPROL XL) 100 MG 24 hr tablet Take 1.5 tablets by mouth daily 5/28/2024 at am    pantoprazole sodium (PROTONIX) 40 MG packet Take 1 packet (40 mg) by mouth daily 5/28/2024 at am    psyllium (METAMUCIL/KONSYL) capsule Take 1 capsule by mouth daily 5/28/2024 at am

## 2024-05-29 NOTE — PLAN OF CARE
Problem: Skin Injury Risk Increased  Goal: Skin Health and Integrity  Outcome: Progressing  Intervention: Plan: Nurse Driven Intervention: Friction and Shear  Recent Flowsheet Documentation  Taken 5/29/2024 1200 by Lisseth Laboy RN  Friction/Shear Interventions: HOB 30 degrees or less  Taken 5/29/2024 0755 by Lisseth Laboy RN  Friction/Shear Interventions: HOB 30 degrees or less  Intervention: Optimize Skin Protection  Recent Flowsheet Documentation  Taken 5/29/2024 1200 by Lisseth Laboy RN  Activity Management:   activity adjusted per tolerance   sitting, edge of bed  Taken 5/29/2024 0755 by Lisseth Laboy RN  Activity Management:   activity adjusted per tolerance   sitting, edge of bed     Problem: Comorbidity Management  Goal: Blood Pressure in Desired Range  Outcome: Progressing  Intervention: Maintain Blood Pressure Management  Recent Flowsheet Documentation  Taken 5/29/2024 1200 by Lisseth Laboy RN  Medication Review/Management: medications reviewed  Taken 5/29/2024 0755 by Lisseth Laboy RN  Medication Review/Management: medications reviewed   Goal Outcome Evaluation:       Patient alert and oriented x 4; generally denied pain. No neuro changes noted or reported. Patient's son (POA) at bedside and is in constant communication with family about cares and treatment given to patient. Patient and family education provided about why Losartan and water pills are held and why medications like Crestor and metoprolol (in the morning) are in place. Constant education provided to patient and family.       Patient's daughter given an update with patient's ok. Writer was with MD when MD was in room to discuss about transfer to [facility]. After MD talked to Leon, another patient's daughter called stating that she would like MRI and echo done. Patient already decided to go to [facility]. Patient and family requested that PIV be discontinued.  Just before 1500, patient's family requesting to talk to MD. Patient and family  decided that patient will not transfer to [facility]. Writer called MRI and echo tech for ordered imaging to be done. Per both departments, MRI and echo will be done tomorrow morning (MRI only deals with patient with pacemakers until 1600, echo closes at 1600).

## 2024-05-29 NOTE — PROGRESS NOTES
Patient seen along with neurologist this morning.  Patient and family members refusing further workup including MRI, echocardiogram and vascular surgery consult and stating they want to leave and go to [facility].  Reported patient has been getting treatment at [facility] in the past.    I offered patient and family members if they would like me to call [facility] and see if they accept for transfer.      Discussed with [facility] patient placement, discussed with hospitalist Dr. Stu Delcid and they accepted patient and requested 8-hour window for transfer.  Reported they will call us when exactly they can physically accept the patient.    I informed patient and family members.      However, later nursing staffs called me stating patient and family member now want to stay here.      I went to see patient and family member again.  Patient's son Suzy at bedside, reported he is medical power of  for the patient and both patient and him now changed their mind and wants to recommended workup and consultation here.     I requested nursing staff to call echo technician, MRI department to see if they can still able to do the test today.      I informed neurologist.      Note created using dragon voice recognition software.  Errors in spelling or words which seems out of context are unintentional.  Sounds alike errors may have escaped editing.    5/29/2024  AKIN MORLEY MD  [facility]

## 2024-05-29 NOTE — PROGRESS NOTES
Assumed care of patient at 2245 from ED. Pt alert and oriented x 4. Pacemaker interrogation done at bedside, spoke with [employer] rep who said they will fax over report to P1. Telemetry is A-fib with PVC's, blood glucose 103. Pt swallowing liquids without difficulty. Called SWAT RN to obtain SCD pump as there are none available on the unit. Pt denies pain, VSS.

## 2024-05-29 NOTE — H&P
"Admission History and Physical   Yecenia Kwan,  1945, MRN [id number]    [facility]    PCP: Martha Gonzalez, [phone]          Extended Emergency Contact Information  Primary Emergency Contact: Anastasia Kwan  Address:  [place] [address] [facility]  Home Phone: [phone]  Mobile Phone: [phone]  Relation: Daughter  Secondary Emergency Contact: Suzy Kwan  Address:  [place] [address] [facility]  Mobile Phone: [phone]  Relation: Other       Assessment and Plan     79F with HFrEF (15-20%), Afib s/p PPM on Eliquis, CVA (residual R sided weakness), HTN, HLD who presents with an episode of dizziness and several days of intermittent L face numbness and leg weakness/numbness.    #Stroke like symptoms  #Dizzyness  #Intracranial posterior stenoses bilaterally (progressed)  #R carotid 70-90% stenosis, L 50%  -Eliquis, statin  -lipids, A1c  -ECHO, MRI if PPM compatible (ativan for claustrophobia)  -neurology consult, consideration CEA on R  -permissive HTN  -interrogate pacer for episode of dizziness    #HFrEF (15-20%)  #Afib s/p PPM on Eliquis  -continue toprol-xl, held losartan and lasix for tonight for permissive HTN.    #CVA (residual R sided weakness)  #HTN  #HLD    Checklist:  Code Status:  full  Diet:  regular    [place] Catheter: Not present  Lines: None     DVT px:  DOAC        Auto-populated based on system request - if needs to be addressed in treatment plan they will be addressed above:  \"  Clinically Significant Risk Factors Present on Admission                 # Drug Induced Coagulation Defect: home medication list includes an anticoagulant medication  # Drug Induced Platelet Defect: home medication list includes an antiplatelet medication     # Hypertension: Noted on problem list                   \"  Advanced Care Planning  I anticipate the patient will be admitted to the " "hospital for at least 2 midnights for the evaluation and treatment of the conditions discussed above.     Chief Complaint: L leg and face numbness     HPI:    Yecenia Kwan is a 79 year old female HFrEF (15-20%), Afib s/p PPM on Eliquis, NSTEMI, CVA (residual R sided weakness), HTN, HLD who presents with intermittent L face and leg numbness.    Saturday had an episode of dizziness and confusion outside while it was hot outside.  Described as \"Vertigo and woozy\".  Felt like was going to pass out.  Told to sit down.  This then resolved.     For the last few days has also noted L sided sided numbness intermittently, and L left leg weak/numbness.     In the ER: stroke code with CTA without large vessel occlusion.     History is provided by patient and chart review       Physical Exam:  Pulse:  [83-91] 86  Resp:  [16-28] 17  BP: (150-166)/(78-79) 150/79  SpO2:  [92 %-100 %] 100 %  BP (!) 150/79   Pulse 86   Resp 17   LMP  (LMP Unknown)   SpO2 100%      General:  Alert, cooperative, no distress,  Appears stated age  Neurologic:  oriented, facial symmetry preserved, some word finding difficulty. Moves all 4 spontaneously.  Facial and extremity strength grossly symmetric and full.  Some nystagmus with L gaze.  Psych: calm, mood and affect appropriate to situation  HEENT:  Anicteric, MMM, unremarkable dentition  CV: RRR no MRG, normal S1 and S2, no edema  Lungs: CTAB.  Easy respirations  Abd: soft, NT, normoactive BS  Skin: no rashes noted on exposed skin.   Central Lines and Tubes: None (no means, CVC, feeding tubes)         Pertinent Test Findings  Radiology Results (results reviewed):   Results for orders placed or performed during the hospital encounter of 05/28/24   CTA Head Neck with Contrast    Impression    IMPRESSION:   HEAD CT:  1.  No evidence of acute hemorrhage, hydrocephalus or transcortical infarct.      HEAD CTA:   1.  No proximal large vessel occlusion.  2.  Potentially hemodynamically significant " "multifocal bilateral posterior cerebral artery stenoses presumably on the basis of intracranial atherosclerotic disease, progressed since 2018.  3.  No aneurysm, or high flow vascular malformation identified.    NECK CTA:  1.  Mostly calcified plaque causes suspected 70-90% stenosis of the right internal carotid artery origin and less than 50% stenosis of the left internal carotid artery origin. Disease on the right has progressed since 2018.    2.  No evidence for dissection.        EKG (personally reviewed): v-paced with few native beats with IVCD      Medical History  Past Medical History:   Diagnosis Date    Anxiety     Arthritis     Atrial fibrillation (H)     Cellulitis of left leg     CVA (cerebral vascular accident) (H)     Displacement of intervertebral disc, site unspecified, without myelopathy     GERD (gastroesophageal reflux disease)     Head injury     Hidradenitis     Hx of ischemic left MCA stroke 12/7/2015    posterior aspect of the left lobe involving middle frontal gyrus   12/2015     Hx of nicotine dependence     Hyperlipemia     Hypertension     Obesity, unspecified     Proteinuria     Unspecified essential hypertension         Surgical History  Past Surgical History:   Procedure Laterality Date    HC ARTHROTOMY/EXPLORE/TREAT KNEE JOINT      Description: Arthrotomy Of Knee With Joint Exploration;  Recorded: 07/29/2008;  Comments: due to \"accident\" and infection     CORRECT BUNION,METATARSAL OSTEOTOMY      Description: Bunion Correction With Metatarsal Osteotomy;  Recorded: 07/29/2008;    [facility] NONSPECIFIC PROCEDURE      Knee surgeries ((R) scope)    Z NONSPECIFIC PROCEDURE      tubal ligation          Social History  Social History     Tobacco Use    Smoking status: Never    Smokeless tobacco: Never   Vaping Use    Vaping status: Never Used   Substance Use Topics    Alcohol use: No    Drug use: No          Allergies  Allergies   Allergen Reactions    Clonidine Swelling    Hydralazine Itching "    Sacubitril-Valsartan      Other reaction(s): GI Upset    Amlodipine Dizziness and Other (See Comments)    No Known Allergies     Carvedilol Nausea    Family History  I have reviewed this patient's family history and updated it with pertinent information if needed.  Family History   Problem Relation Age of Onset    Prostate Cancer Father     DENIS. Mother          at 80 years of age.    Diabetes Mother                  Prior to Admission Medications   Prior to Admission Medications   Prescriptions Last Dose Informant Patient Reported? Taking?   Blood Pressure Monitoring (BLOOD PRESSURE MONITOR/M CUFF) MISC   No No   Si Large electronic blood pressure cuff- to be used as needed for blood pressure monitoring   ELIQUIS ANTICOAGULANT 5 MG tablet   No No   Sig: TAKE 1 TABLET(5 MG) BY MOUTH TWICE DAILY   ELIQUIS ANTICOAGULANT 5 MG tablet   No No   Sig: TAKE 1 TABLET(5 MG) BY MOUTH TWICE DAILY   acetaminophen (TYLENOL) 325 MG tablet   Yes No   Sig: Take 650 mg by mouth   aspirin (ASA) 81 MG chewable tablet   Yes No   Sig: Take 81 mg by mouth daily   Patient not taking: Reported on 2023   cetirizine (ZYRTEC) 10 MG tablet   No No   Sig: TAKE 1 TABLET(10 MG) BY MOUTH DAILY   fluticasone (FLONASE) 50 MCG/ACT nasal spray   No No   Sig: SHAKE LIQUID AND USE 2 SPRAYS IN EACH NOSTRIL DAILY AS NEEDED FOR RHINITIS   furosemide (LASIX) 40 MG tablet   No No   Sig: Take 1 tablet (40 mg) by mouth daily   Patient taking differently: Take 40 mg by mouth daily PRN swelling   hydrOXYzine (VISTARIL) 25 MG capsule   No No   Sig: Take 1 capsule (25 mg) by mouth 3 times daily as needed for itching   hydrocortisone 2.5 % lotion   No No   Sig: Apply topically 2 times daily   losartan (COZAAR) 50 MG tablet   No No   Sig: TAKE 1 TABLET(50 MG) BY MOUTH DAILY   metoprolol succinate ER (TOPROL XL) 50 MG 24 hr tablet   Yes No   Sig: Take 50 mg by mouth daily   pantoprazole sodium (PROTONIX) 40 MG packet   No No   Sig: Take 1 packet  (40 mg) by mouth daily   rosuvastatin (CRESTOR) 5 MG tablet   No No   Sig: TAKE 1/2 TABLET(2.5 MG) BY MOUTH DAILY   Patient not taking: Reported on 1/23/2023      Facility-Administered Medications: None              Pertinent Labs    Most Recent 3 CBC's:  Recent Labs   Lab Test 05/28/24  1517 10/13/22  1524 08/20/21  2111   WBC 5.4 7.4 5.8   HGB 12.8 12.9 13.3   MCV 85 83 79   * 143* 142*     Most Recent 3 BMP's:  Recent Labs   Lab Test 05/28/24  1617 06/07/22  1453 05/11/22  1508 11/09/21  1052    141  --  141   POTASSIUM 4.0 4.3  --  4.8   CHLORIDE 104 106  --  106   CO2 28 24  --  22   BUN 26.4* 19  --  20   CR 0.76 0.63 0.6 0.64   ANIONGAP 8 11  --  13   MACK 9.0 9.1  --  9.4   GLC 97 95  --  85     Most Recent 2 LFT's:  Recent Labs   Lab Test 10/13/22  1524 07/27/21  1506   AST 20 21   ALT 25 39   ALKPHOS 99 104   BILITOTAL 0.5 0.8     Most Recent 3 INR's:  Recent Labs   Lab Test 08/20/21  2111 09/24/18  1557   INR 1.15 1.01     Most Recent 3 Troponin's:No lab results found.    Medical Decision Making        Medical Decision Making               Soheila Vasquez MD, [facility]  Internal Medicine Hospitalist  5/28/2024

## 2024-05-29 NOTE — PROGRESS NOTES
Is the implanted device safe for MRI Exam? Yes  Is this device 3T compatible? Yes  Device Type: Pacemaker      Device Information: Medtronic, CRT-P Percepta          Cardiology Orders for Device Programming     -- Yes -- The patient has a MRI conditional pulse generator and leads from the same     -- Yes -- The pulse generator and leads have been implanted for at least 6 weeks. (Does not apply to [employer] devices)    -- Yes-- The device is implanted in the right or left pectoral region    -- Yes -- There are not any additional active cardiac devices, abandoned leads, lead extenders or adapters    -- Yes -- The device lead impedance measurements are within the normal range per  guidelines    -- Yes -- If the patient is pacemaker dependent the thresholds are less than or equal to 2.0V @ 0.4ms.    -- Yes -- RA and RV leads are programmed to bipolar pacing operation or pacing off. If no, CONTACT THE DEVICE REP FOR PROGRAMMING     Date of last Remote Device check: 4/9/2024  Results of last Device check:  1.   Right atrium impedance: NA  2.   Right ventricle impedance: 551 ohms  3.   Left ventricle impedance: 685 ohms     4.   Right atrium threshold: NA  5.   Right ventricle threshold: 0.75V@0.4ms  6.   Left ventricle threshold: 1.375V@0.4ms     Device programming during the scan guidelines   Pacing Mode (check one): VOO or Use the recommended pacing mode per Medtronic MRI Access Application  Pacing Rate: 80  bpm or Use the recommended pacing rate per [employer] MRI Access Application  If remote reprogramming is applicable, please contact the Rep to assist         Mike Carreno RN

## 2024-05-29 NOTE — CONSULTS
Care Management Initial Consult    General Information  Assessment completed with: Care Team MemberCHAZ-chart review,    Type of CM/SW Visit: Initial Assessment    Primary Care Provider verified and updated as needed:     Readmission within the last 30 days: no previous admission in last 30 days      Reason for Consult: discharge planning  Advance Care Planning: Advance Care Planning Reviewed: present on chart          Communication Assessment  Patient's communication style: spoken language (English or Bilingual)    Hearing Difficulty or Deaf: no   Wear Glasses or Blind: yes    Cognitive  Cognitive/Neuro/Behavioral: speech              Best Language: 1 - Mild to moderate (baseline)  Speech:  (word finding difficulty)    Living Environment:   People in home: child(rizwana), adult     Current living Arrangements: house      Able to return to prior arrangements: yes       Family/Social Support:  Care provided by: child(rizwana)  Provides care for: no one, unable/limited ability to care for self  Marital Status: Single  Children          Description of Support System: Supportive, Involved         Current Resources:   Patient receiving home care services: No     Community Resources: DME, PCA  Equipment currently used at home: walker, rolling, cane, straight, cane, quad  Supplies currently used at home: Incontinence Supplies, Wipes, Gloves    Employment/Financial:  Employment Status:          Financial Concerns:             Does the patient's insurance plan have a 3 day qualifying hospital stay waiver?  No    Lifestyle & Psychosocial Needs:  Social Determinants of Health     Food Insecurity: Not on file   Depression: Not at risk (1/23/2023)    PHQ-2     PHQ-2 Score: 0   Housing Stability: Not on file   Tobacco Use: Low Risk  (5/14/2024)    [facility]    Patient History     Smoking Tobacco Use: Never     Smokeless Tobacco Use: Never     Passive Exposure: Not on file   Financial  Resource Strain: High Risk (12/22/2021)    [facility]    Financial Resource Strain     Difficulty of Paying Living Expenses: Not on file     Difficulty of Paying Living Expenses: Not on file   Alcohol Use: Not on file   Transportation Needs: Not on file   Physical Activity: Not on file   Interpersonal Safety: Not on file   Stress: Not on file   Social Connections: Unknown (12/22/2021)    Received from [facility]    Social Connections     Frequency of Communication with Friends and Family: Not on file   Health Literacy: Not on file       Functional Status:  Prior to admission patient needed assistance:   Dependent ADLs:: Ambulation-walker, Ambulation-cane, Bathing, Dressing, Grooming, Incontinence  Dependent IADLs:: Cleaning, Cooking, Laundry, Shopping, Meal Preparation, Medication Management, Money Management, Transportation, Incontinence             Additional Information:  Patient's family requested transfer to [facility].   CM collected information from the chart to complete assessment.   Patient lives in a house with her son, who is her PCA from 10-3 daily.  Patient is dependent with I/ADLs.     Patient transferring to [facility].  Nurse is arranging.   No further CM needs as patient is transferring to a different hospital as an IP.     Jossie Stinson RN

## 2024-05-29 NOTE — PROGRESS NOTES
05/29/24 0900   Appointment Info   Signing Clinician's Name / Credentials (PT) Violetta Main PT   Living Environment   People in Home child(rizwana), adult  (sons)   Current Living Arrangements house   Home Accessibility stairs to enter home;stairs within home   Number of Stairs, Main Entrance 5   Stair Railings, Main Entrance railings on both sides of stairs   Number of Stairs, Within Home, Primary   (Pt can stay on one level inside)   Living Environment Comments Pt has a walk in shower with GB.   Self-Care   Current Activity Tolerance moderate   Equipment Currently Used at Home walker, rolling;cane, straight;cane, quad   Activity/Exercise/Self-Care Comment Pt walks with a 4WW and a quad cane indep. Assisted with bed mobility, dressing bathing and home ADLs. Assisted with driving and shopping. Son is PCA from 10 to 3 daily   General Information   Onset of Illness/Injury or Date of Surgery 05/28/24   Referring Physician Soheila Vasquez MD   Patient/Family Therapy Goals Statement (PT) Pt wants to go home and does want home PT   Pertinent History of Current Problem (include personal factors and/or comorbidities that impact the POC) Per the chart, 79F with HFrEF (15-20%), Afib s/p PPM on Eliquis, CVA (residual R sided weakness), HTN, HLD who presents with an episode of dizziness and several days of intermittent L face numbness and leg weakness/numbness.     #Stroke like symptoms  #Dizzyness  #Intracranial posterior stenoses bilaterally (progressed)  #R carotid 70-90% stenosis, L 50%   Weight-Bearing Status - LLE weight-bearing as tolerated   Weight-Bearing Status - RLE weight-bearing as tolerated   Cognition   Orientation Status (Cognition) oriented to;person;place;situation;time   Cognitive Status Comments Pt followed all commands with increased cues.   Pain Assessment   Patient Currently in Pain No  (Pt said she has bad bilat shoulders.)   Integumentary/Edema   Integumentary/Edema Comments Dec left visual field   Range of  Motion (ROM)   Range of Motion ROM is WNL   ROM Comment Only slightly limited by obesity   Strength (Manual Muscle Testing)   Strength Comments Pt was able to WB bilat LEs.  5/5 bilat LEs   Bed Mobility   Comment, (Bed Mobility) Supine>sit indep   Transfers   Comment, (Transfers) Sit>stand with 4WW with supervision.   Gait/Stairs (Locomotion)   [place] Level (Gait)   (SBA)   Assistive Device (Gait) walker, 4-wheeled   Distance in Feet (Gait) 20'   Pattern (Gait) step-through;swing-through   Deviations/Abnormal Patterns (Gait) gait speed decreased   Negotiation (Stairs) stairs independence;stairs assistive device;handrail location;number of steps;ascending technique;descending technique   [place] Level (Stairs) minimum assist (75% patient effort);2 person assist   Assistive Device (Stairs) other (see comments)  (bilat rails)   Handrail Location (Stairs) both sides   Number of Steps (Stairs) 5   Ascending Technique (Stairs) step-to-step   Descending Technique (Stairs) step-to-step   Balance   Balance Comments SBA with the 4WW   Sensory Examination   Sensory Perception Comments Pt c/o some numbness on L side of her face.   Coordination   Coordination Comments bilat HKS tests slow but symmetrical.   Muscle Tone   Muscle Tone no deficits were identified   Muscle Tone Comments bilat LEs   Clinical Impression   Criteria for Skilled Therapeutic Intervention Yes, treatment indicated   PT Diagnosis (PT) Impaired functional mobility.   Influenced by the following impairments dec bal, dec endurance,  weakness.   Functional limitations due to impairments transfers, gait and steps.   Clinical Presentation (PT Evaluation Complexity) stable   Clinical Presentation Rationale Pt presents medically diagnosed.   Clinical Decision Making (Complexity) low complexity   Planned Therapy Interventions (PT) balance training;gait training;home exercise program;stair training;strengthening;transfer training   Risk & Benefits of  therapy have been explained evaluation/treatment results reviewed;care plan/treatment goals reviewed;risks/benefits reviewed;current/potential barriers reviewed;participants voiced agreement with care plan;patient;son  (sons)   PT Total Evaluation Time   PT Eval, Moderate Complexity Minutes (88787) 13   Physical Therapy Goals   PT Frequency Daily   PT Predicted Duration/Target Date for Goal Attainment 06/05/24   PT Goals Transfers;Gait;Stairs;PT Goal 1   PT: Transfers Modified independent;Sit to/from stand;Bed to/from chair;Assistive device  (with 4WW)   PT: Gait Supervision/stand-by assist;Rolling walker;Greater than 200 feet   PT: Stairs Minimal assist;5 stairs;Rail on both sides   PT: Goal 1 Pt to veronica bilat LE ex x 10 tp 20 reps to increase strength for mobility.   Interventions   Interventions Quick Adds Gait Training;Therapeutic Activity   Therapeutic Activity   Therapeutic Activities: dynamic activities to improve functional performance Minutes (91893) 8   Treatment Detail/Skilled Intervention Supine<>sit with rail indep, Sit<>stand x 3 reps with 4WW with cues for hand placement and walker safety with  supervision.  PT did caregiver education on more transfers with the 4WW and body mechanics.   Gait Training   Gait Training Minutes (96555) 13   Symptoms Noted During/After Treatment (Gait Training) dizziness  (slightly dizzy but pt felt ok to walk.)   Treatment Detail/Skilled Intervention Pt walked slowly with the 4WW and she needed some cues to stay closer to the walker.  No LOB.  steps look symmetrical.   Distance in Feet 150' and 30' x2   [place] Level (Gait Training) stand-by assist   Physical Assistance Level (Gait Training) verbal cues;1 person assist   Weight Bearing (Gait Training) weight-bearing as tolerated   Assistive Device (Gait Training) rolling walker  (4WW)   Pattern Analysis (Gait Training) swing-through gait   Gait Analysis Deviations decreased juve   Impairments (Gait  Analysis/Training) balance impaired;vision impaired   Stair Railings present at both sides   Physical Assist/Nonphysical Assist (Stairs) 2 person assist   Level of [place] (Stairs) minimum assist (75% patient effort)   Assistive Device (Stairs)   (step to going up  and down 5 steps with min A x 2 .  Pt moves slowly and must have assist on the steps.  .)   PT Discharge Planning   PT Plan steps with caregivers, gait w 4WW, HEP.   PT Discharge Recommendation (DC Rec) home with home care physical therapy   PT Rationale for DC Rec The pt should be able to dc to home with her family to assist her.  She did walk with the 4WW and needed SBA. MIn A x2 on the steps.  Home PT is recommended to continue with mobility and strengthening.   PT Brief overview of current status PT eval, indep bed mobility, multiple transfers with 4WW with cues, ambulated with 4WW with SBA and up and down 5 steps with bilat rails with min Ax 2.  Caregiver education as well.   PT Equipment Needed at Discharge walker, rolling  (4WW)   Total Session Time   Timed Code Treatment Minutes 21   Total Session Time (sum of timed and untimed services) 34

## 2024-05-29 NOTE — CONSULTS
"  [facility]    Stroke Telephone Note    I was called by Yosvany Montero on 05/28/24 regarding patient Yecenia Kwan. The patient is a 79 year old female with history of paroxysmal A-fib on Eliquis, NSTEMI, prior left temporal lobe infarct, hypertension, hyperlipidemia presents to the ED with left face and left leg numbness with increased weakness in the left leg for the past 1 week.  She has residual right leg weakness from the prior stroke.    Vitals  BP: (!) 166/78   Pulse: 87   Resp: 16            Imaging Findings  CT head: No evidence of acute hemorrhage, hydrocephalus or transcortical infarct.   CTA head/neck: multifocal bilateral posterior cerebral artery stenoses presumably on the basis of intracranial atherosclerotic disease. Mostly calcified plaque causes suspected 70-90% stenosis of the right internal carotid artery origin and less than 50% stenosis of the left internal carotid artery origin     Impression  Possible Acute ischemic stroke of right subcortical area likely due to small-vessel occlusion   Versus cardioembolism    Recommendations  - c/w Eliquis 5 mg BID  - Evaluate for CEA after MRI.  - Use orderset: \"Ischemic Stroke Routine Admission\" or \"Ischemic Stroke No Thrombolytics/No Thrombectomy ICU Admission\"  - Place Neurology IP Stroke Consult order   - Neurochecks and Vital Signs every q4h   - Permissive HTN; goal SBP < 220 mmHg  - Statin: after lipid profile  - MRI Brain with and without contrast    - Telemetry, EKG  - Bedside Glucose Monitoring  - A1c, Lipid Panel, Troponin x 3  - PT/OT/SLP  - Stroke Education  - Euthermia, Euglycemia    My recommendations are based on the information provided over the phone by Yecenia Kwan's in-person providers. They are not intended to replace the clinical judgment of her in-person providers. I was not requested to personally see or examine the patient at this time.     The Stroke Staff is Dr. Avila.    Shanti Haque, " "MD  Vascular Neurology Fellow    To page me or covering stroke neurology team member, click here: AMCOM  Choose \"On Call\" tab at top, then select \"NEUROLOGY/ALL SITES\" from middle drop-down box, press Enter, then look for \"stroke\" or \"telestroke\" for your site.   "

## 2024-05-29 NOTE — PROVIDER NOTIFICATION
"Pt currently has no IV access, refusing to let staff place new IV and on cardiac monitoring. MD notified. Orders to start NIH assessment obtained.     Pt son and grandson at bedside, have many questions about if pt really needs to have MRI completed to get the \"answers they need\" about the next step in regards to potential vascular surgery. Informed them that they will need to speak with Vascular MD about this. Vascular consult pending. Family and patient still not sure if they want MRI to be done, informed them that if it is done it will be sometime tomorrow as patient has pacemaker and things need to be coordinated with device nurse. Echo also to be done tomorrow when staff available. Family verbalized understanding.   "

## 2024-05-29 NOTE — CONSULTS
NEUROLOGY CONSULTATION NOTE     Yecenia Kwan,  1945, MRN [id number] Date: 2024     [facility]   Code status:  Full Code   PCP: Martha Gonzalez, [phone]      ASSESSMENT & PLAN   Diagnosis code: Suspect Rt subcortical stroke    80 yo woman with history of afib on Eliquis presenting with Left maty-paresthesia/weakness    - Not a candidate for thrombolytics due to timeline  - c/w Eliquis 5 mg BID  - Head CT shows nothing acute  - CTA with 70-90% Rt ICA stenosis  - MRI Brain is pending  - Evaluate for CEA after MRI; Vascular consult  - Neurochecks and Vital Signs every q4h   - Permissive HTN; goal SBP < 220 mmHg  - . Crestor started  - Continue Telemetry  - Echo is pending  - A1c: 6.4%. Glucose management per Hospitalist  - PT/OT/SLP  - Stroke Education  - Neurology will follow along. Spent time with patient and son, Dr. CAMARENA about recs       Chief Complaint   Patient presents with    Numbness    Extremity Weakness        HISTORY OF PRESENT ILLNESS     We have been requested by Dr. Vasquez evaluate Yecenia Kwan who is a 79 year old  female for possible stroke. Telestroke team was initially consulted when patient presented on 24 with Left maty-paresthesias and weakness x1 week. History of prior stroke with Right LE weakness. Additional history of Afib on Eliquis, NSTEMI, HTN and HLD.    Yecenia is unsure is she wants to have the work up - son is at bedside.      PAST MEDICAL & SURGICAL HISTORY     Medical History  Past Medical History:   Diagnosis Date    Anxiety     Arthritis     Atrial fibrillation (H)     Cellulitis of left leg     CVA (cerebral vascular accident) (H)     Displacement of intervertebral disc, site unspecified, without myelopathy     GERD (gastroesophageal reflux disease)     Head injury     Hidradenitis     Hx of ischemic left MCA stroke 2015    posterior aspect of the left lobe involving middle frontal gyrus   2015     Hx of nicotine dependence      "Hyperlipemia     Hypertension     Obesity, unspecified     Proteinuria     Unspecified essential hypertension      Surgical History  Past Surgical History:   Procedure Laterality Date     ARTHROTOMY/EXPLORE/TREAT KNEE JOINT      Description: Arthrotomy Of Knee With Joint Exploration;  Recorded: 07/29/2008;  Comments: due to \"accident\" and infection     CORRECT BUNION,METATARSAL OSTEOTOMY      Description: Bunion Correction With Metatarsal Osteotomy;  Recorded: 07/29/2008;    [facility] NONSPECIFIC PROCEDURE      Knee surgeries ((R) scope)    [facility] NONSPECIFIC PROCEDURE      tubal ligation        SOCIAL HISTORY     Social History      FAMILY HISTORY     Reviewed, and family history includes C.A.D. in her mother; Diabetes in her mother; Prostate Cancer in her father.     ALLERGIES     Allergies   Allergen Reactions    Clonidine Swelling    Hydralazine Itching    Sacubitril-Valsartan      Other reaction(s): GI Upset    Amlodipine Dizziness and Other (See Comments)    No Known Allergies     Carvedilol Nausea        REVIEW OF SYSTEMS     Review of systems not obtained due to patient factors.     HOME & HOSPITAL MEDICATIONS     Prior to Admission Medications  Medications Prior to Admission   Medication Sig Dispense Refill Last Dose    acetaminophen (TYLENOL) 325 MG tablet Take 650 mg by mouth   Unknown at prn    ELIQUIS ANTICOAGULANT 5 MG tablet TAKE 1 TABLET(5 MG) BY MOUTH TWICE DAILY 60 tablet 1 5/28/2024 at am    furosemide (LASIX) 40 MG tablet Take 1 tablet (40 mg) by mouth daily 90 tablet 3 5/28/2024 at am    ipratropium (ATROVENT) 0.06 % nasal spray Spray 2 sprays into both nostrils 2 times daily as needed   Past Week at per pt prn    losartan (COZAAR) 50 MG tablet TAKE 1 TABLET(50 MG) BY MOUTH DAILY 90 tablet 0 5/28/2024 at am    melatonin 5 MG tablet Take 5 mg by mouth nightly as needed for sleep   5/27/2024 at pm    metoprolol succinate ER (TOPROL XL) 100 MG 24 hr tablet Take 1.5 tablets by mouth daily   5/28/2024 at " am    pantoprazole sodium (PROTONIX) 40 MG packet Take 1 packet (40 mg) by mouth daily 30 each 11 5/28/2024 at am    psyllium (METAMUCIL/KONSYL) capsule Take 1 capsule by mouth daily   5/28/2024 at am    Blood Pressure Monitoring (BLOOD PRESSURE MONITOR/M CUFF) MISC 1 Large electronic blood pressure cuff- to be used as needed for blood pressure monitoring 1 each 0        Hospital Medications  Current Facility-Administered Medications   Medication Dose Route Frequency Provider Last Rate Last Admin    apixaban ANTICOAGULANT (ELIQUIS) tablet 5 mg  5 mg Oral BID Soheila Vasquez MD        metoprolol succinate ER (TOPROL XL) 24 hr tablet 150 mg  150 mg Oral Daily Soheila Vasquez MD        pantoprazole (PROTONIX) EC tablet 40 mg  40 mg Oral QAM AC Soheila Vasquez MD   40 mg at 05/29/24 0802    rosuvastatin (CRESTOR) tablet 10 mg  10 mg Oral Daily Soheila Vasquez MD        sodium chloride (PF) 0.9% PF flush 3 mL  3 mL Intracatheter Q8H Soheila Vaqsuez MD   3 mL at 05/29/24 0200        PHYSICAL EXAM     Vital signs  Temp:  [97.1  F (36.2  C)-98.3  F (36.8  C)] 98.3  F (36.8  C)  Pulse:  [83-91] 88  Resp:  [16-28] 18  BP: (137-177)/(63-79) 169/75  SpO2:  [92 %-100 %] 99 %    Weight:   [unfilled]    General Physical Exam: Patient is alert and oriented x 3. Vital signs were reviewed and are documented in EMR.   Neurological Exam:  Seems mildly confused. Moves all extremities.     DIAGNOSTIC STUDIES     Pertinent Radiology   Radiology Results: Personally reviewed image/s    CT/CTA  IMPRESSION:   HEAD CT:  1.  No evidence of acute hemorrhage, hydrocephalus or transcortical infarct.       HEAD CTA:   1.  No proximal large vessel occlusion.  2.  Potentially hemodynamically significant multifocal bilateral posterior cerebral artery stenoses presumably on the basis of intracranial atherosclerotic disease, progressed since 2018.  3.  No aneurysm, or high flow vascular malformation identified.     NECK CTA:  1.  Mostly calcified plaque  causes suspected 70-90% stenosis of the right internal carotid artery origin and less than 50% stenosis of the left internal carotid artery origin. Disease on the right has progressed since 2018.    2.  No evidence for dissection.     MRI  pending    Pertinent Labs   Lab Results: personally reviewed.   Recent Results (from the past 24 hour(s))   CBC with platelets and differential    Collection Time: 05/28/24  3:17 PM   Result Value Ref Range    WBC Count 5.4 4.0 - 11.0 10e3/uL    RBC Count 4.73 3.80 - 5.20 10e6/uL    Hemoglobin 12.8 11.7 - 15.7 g/dL    Hematocrit 40.4 35.0 - 47.0 %    MCV 85 78 - 100 fL    MCH 27.1 26.5 - 33.0 pg    MCHC 31.7 31.5 - 36.5 g/dL    RDW 15.1 (H) 10.0 - 15.0 %    Platelet Count 126 (L) 150 - 450 10e3/uL    % Neutrophils 54 %    % Lymphocytes 35 %    % Monocytes 7 %    % Eosinophils 3 %    % Basophils 1 %    % Immature Granulocytes 0 %    NRBCs per 100 WBC 0 <1 /100    Absolute Neutrophils 2.9 1.6 - 8.3 10e3/uL    Absolute Lymphocytes 1.9 0.8 - 5.3 10e3/uL    Absolute Monocytes 0.4 0.0 - 1.3 10e3/uL    Absolute Eosinophils 0.2 0.0 - 0.7 10e3/uL    Absolute Basophils 0.0 0.0 - 0.2 10e3/uL    Absolute Immature Granulocytes 0.0 <=0.4 10e3/uL    Absolute NRBCs 0.0 10e3/uL   Extra Blue Top Tube    Collection Time: 05/28/24  3:17 PM   Result Value Ref Range    Hold Specimen JIC    Extra Red Top Tube    Collection Time: 05/28/24  3:17 PM   Result Value Ref Range    Hold Specimen JIC    Hemoglobin A1c    Collection Time: 05/28/24  3:17 PM   Result Value Ref Range    Hemoglobin A1C 6.4 (H) <5.7 %   Basic metabolic panel    Collection Time: 05/28/24  4:17 PM   Result Value Ref Range    Sodium 140 135 - 145 mmol/L    Potassium 4.0 3.4 - 5.3 mmol/L    Chloride 104 98 - 107 mmol/L    Carbon Dioxide (CO2) 28 22 - 29 mmol/L    Anion Gap 8 7 - 15 mmol/L    Urea Nitrogen 26.4 (H) 8.0 - 23.0 mg/dL    Creatinine 0.76 0.51 - 0.95 mg/dL    GFR Estimate 80 >60 mL/min/1.73m2    Calcium 9.0 8.8 - 10.2 mg/dL     Glucose 97 70 - 99 mg/dL   Troponin T, High Sensitivity (now)    Collection Time: 05/28/24  4:17 PM   Result Value Ref Range    Troponin T, High Sensitivity 14 <=14 ng/L   Lipid panel reflex to direct LDL: Non-fasting    Collection Time: 05/28/24  4:17 PM   Result Value Ref Range    Cholesterol 201 (H) <200 mg/dL    Triglycerides 80 <150 mg/dL    Direct Measure HDL 49 (L) >=50 mg/dL    LDL Cholesterol Calculated 136 (H) <=100 mg/dL    Non HDL Cholesterol 152 (H) <130 mg/dL   Magnesium    Collection Time: 05/28/24  4:17 PM   Result Value Ref Range    Magnesium 2.0 1.7 - 2.3 mg/dL   Phosphorus    Collection Time: 05/28/24  4:17 PM   Result Value Ref Range    Phosphorus 2.8 2.5 - 4.5 mg/dL   Glucose by meter    Collection Time: 05/28/24 10:47 PM   Result Value Ref Range    GLUCOSE BY METER POCT 103 (H) 70 - 99 mg/dL   CBC with platelets    Collection Time: 05/29/24  7:38 AM   Result Value Ref Range    WBC Count 5.2 4.0 - 11.0 10e3/uL    RBC Count 4.98 3.80 - 5.20 10e6/uL    Hemoglobin 13.4 11.7 - 15.7 g/dL    Hematocrit 43.1 35.0 - 47.0 %    MCV 87 78 - 100 fL    MCH 26.9 26.5 - 33.0 pg    MCHC 31.1 (L) 31.5 - 36.5 g/dL    RDW 15.3 (H) 10.0 - 15.0 %    Platelet Count 128 (L) 150 - 450 10e3/uL       Total time spent for face to face visit, reviewing labs/imaging studies, counseling and coordination of care was: 1 Hour 30 Minutes. More than 50% of this time was spent on counseling and coordination of care.    Dragon software used in the dictation of this note.    Shawna Lo MD  [facility] Neurology [facility] - [address], [address]  [place], [place] [id number] [phone]

## 2024-05-29 NOTE — PROGRESS NOTES
Patient's Crestor not given as patient and family were trying to talk about it and decide whether or not to take the medication. Patient and family education provided about the said medication. Family said to talk to the doctor first. MRI not done as family and patient want to talk to the doctors first. Family still undecided after doctor and neurologist talked to family.   Writer tried to provide stroke education with the stroke hand out, but family interrupted saying they already know as patient had stroke before.

## 2024-05-29 NOTE — PROGRESS NOTES
Speech-Language Pathology: Clinical Swallow Evaluation     05/29/24 1000   Appointment Info   Signing Clinician's Name / Credentials (SLP) Caroline Coats MA CCC-SLP   General Information   Onset of Illness/Injury or Date of Surgery 05/28/24   Referring Physician Dr. CAMARENA   Pertinent History of Current Problem Per EMR: 79F with HFrEF (15-20%), Afib s/p PPM on Eliquis, CVA (residual R sided weakness), HTN, HLD who presents with an episode of dizziness and several days of intermittent L face numbness and leg weakness/numbness.     #Stroke like symptoms  #Dizzyness  #Intracranial posterior stenoses bilaterally (progressed)  #R carotid 70-90% stenosis, L 50%   General Observations Alert and cooperative, upright at edge of bed having a snack   Type of Evaluation   Type of Evaluation Swallow Evaluation   Oral Motor   Oral Musculature generally intact   Mucosal Quality good   Dentition (Oral Motor)   Dentition (Oral Motor) adequate dentition   Facial Symmetry (Oral Motor)   Facial Symmetry (Oral Motor) WNL   Lip Function (Oral Motor)   Lip Range of Motion (Oral Motor) WNL   Lip Strength (Oral Motor) WNL   Tongue Function (Oral Motor)   Tongue Strength (Oral Motor) WNL   Tongue Coordination/Speed (Oral Motor) WNL   Tongue ROM (Oral Motor) WNL   Jaw Function (Oral Motor)   Jaw Function (Oral Motor) WNL   Facial Sensation   Facial Sensation other (see comments)  (patient reports mouth numbness and tingling comes and goes)   Vocal Quality/Secretion Management (Oral Motor)   Vocal Quality (Oral Motor) WNL   Secretion Management (Oral Motor) WNL   General Swallowing Observations   Past History of Dysphagia Patient was seen by SLP service 5 years and 2 years ago. No dysphagia reported at those times, regular/thin diet and no s/s aspiration. Patient does have history of halting speech and word finding difficulty but able to communicate wants/needs with time.   Current Diet/Method of Nutritional Intake (General Swallowing  Observations, NIS) thin liquids (level 0)  (mechanical soft)   Swallowing Evaluation Clinical swallow evaluation   Clinical Swallow Evaluation   Clinical Swallow Evaluation Textures Trialed thin liquids;solid foods   Clinical Swallow Eval: Thin Liquid Texture Trial   Mode of Presentation, Thin Liquids cup;straw   Oral Phase of Swallow WFL   Pharyngeal Phase of Swallow intact   Diagnostic Statement No overt s/s aspiration   Clinical Swallow Evaluation: Solid Food Texture Trial   Mode of Presentation self-fed   Oral Phase WFL  (slow and tentative mastication but complete; patient endorses preference and need for soft foods and endorses mechanical soft as appropriate)   Pharyngeal Phase intact   Diagnostic Statement No overt s/s aspiration   Esophageal Phase of Swallow   Patient reports or presents with symptoms of esophageal dysphagia No   Swallowing Recommendations   Diet Consistency Recommendations thin liquids (level 0)  (Mechanical Soft/Easy to Chew)   Recommended Feeding/Eating Techniques (Swallow Eval) maintain upright sitting position for eating   Medication Administration Recommendations, Swallowing (SLP) Per patient preference   Instrumental Assessment Recommendations instrumental evaluation not recommended at this time   Comment, Swallowing Recommendations No s/s aspiration or dysphagia. Low concern for aspiration overall.   Clinical Impression   Criteria for Skilled Therapeutic Interventions Met (SLP Eval) Evaluation only   SLP Diagnosis Dysphagia   Risks & Benefits of therapy have been explained evaluation/treatment results reviewed;care plan/treatment goals reviewed;participants voiced agreement with care plan;participants included;patient   Clinical Impression Comments Patient participated in Clinical Swallow Evaluation. No s/s aspiration with any intake. Oral motor was WFL. Mastication was adequate. Hyolaryngeal movement was present. Recommend diet listed above. Halting speech c/w with previously  documented deficits, unable to rule out acute worsening but patient reports minimal change and typically has fluctuations in communication ability. Son denies overt, sudden changes in communication. Patient may benefit from OP or home care ST evaluation pending any concerns developing but does not appear indicated at this time at this acuity level.   SLP Total Evaluation Time   Eval: oral/pharyngeal swallow function, clinical swallow Minutes (08281) 20   SLP Discharge Planning   SLP Discharge Recommendation other (see comments)   SLP Rationale for DC Rec ST at d/c for ongoing aphasia if concerns arise   SLP Brief overview of current status  Mechanical soft (baseline) and thin liquids. No anticipated SLP needs at this time. Please contact SLP with any questions or concerns. May consider OP or home care ST evaluation if concerns for increased aphasia.   Total Session Time   Total Session Time (sum of timed and untimed services) 20

## 2024-05-29 NOTE — PLAN OF CARE
"Patient and family (two sons and grandson) very concerned about MRI later today. Education was provided regarding how the pacemaker will need to be verified and turned to \"MRI mode\" before the MRI. The family became very concerned about the risks and benefits of completing an MRI. This writer paged the House Officer who was able to come to bedside and answer questions that family and patient had. Family eager to complete ECHO. MRI checklist started and is at bedside as family is unsure if they want to complete the MRI until they can speak with the morning providers. Sticky note left for providers.     Neuros are intact with residual right sided numbness present from prior CVA. Patient does not complain of pain or numbness on left side. However, the LUE was cooler than the RUE.     Patient took her home eliquis dose but after education the family agreed to take her home medications home with them tonight.     Problem: Adult Inpatient Plan of Care  Goal: Absence of Hospital-Acquired Illness or Injury  Outcome: Progressing  Intervention: Identify and Manage Fall Risk  Recent Flowsheet Documentation  Taken 5/29/2024 0020 by Hemalatha Nj RN  Safety Promotion/Fall Prevention:   activity supervised   clutter free environment maintained   patient and family education   nonskid shoes/slippers when out of bed   mobility aid in reach   lighting adjusted  Intervention: Prevent Skin Injury  Recent Flowsheet Documentation  Taken 5/29/2024 0020 by Hemalatha Nj RN  Body Position: supine, legs elevated  Intervention: Prevent and Manage VTE (Venous Thromboembolism) Risk  Recent Flowsheet Documentation  Taken 5/29/2024 0020 by Hemalatha Nj RN  VTE Prevention/Management: (eliquis) other (see comments)     Problem: Risk for Delirium  Goal: Improved Attention and Thought Clarity  Outcome: Progressing     Problem: Risk for Delirium  Goal: Improved Sleep  Outcome: Progressing     "

## 2024-05-29 NOTE — UTILIZATION REVIEW
Admission Status; Secondary Review Determination   Under the authority of the Utilization Management Committee, the utilization review process indicated a secondary review on Yecenia Kwan. The review outcome is based on review of the medical records, discussions with staff, and applying clinical experience noted on the date of the review.   (x) Inpatient Status Appropriate - This patient's medical care is consistent with medical management for inpatient care and reasonable inpatient medical practice.     RATIONALE FOR DETERMINATION   79-year-old female with a history of heart failure with reduced ejection fraction and EF of 15 to 20%, CVA with residual right-sided weakness and atrial fibrillation on Eliquis presents with left hemiparesthesia/weakness.  Neurology consulted and suspecting right subcortical stroke.  MRI ordered which is pending.  Neck CTA showed 70 to 90% right ICA stenosis.  Following MRI we will continue to evaluate for CEA.  Neurochecks ordered every 4 hours with close monitoring of blood pressure.  Echocardiogram ordered and is pending.    At the time of admission with the information available to the attending physician more than 2 nights Hospital complex care was anticipated, based on patient risk of adverse outcome if treated as outpatient and complex care required. Inpatient admission is appropriate based on the Medicare guidelines.   The information on this document is developed by the utilization review team in order for the business office to ensure compliance. This only denotes the appropriateness of proper admission status and does not reflect the quality of care rendered.   The definitions of Inpatient Status and Observation Status used in making the determination above are those provided in the CMS Coverage Manual, Chapter 1 and Chapter 6, section 70.4.   Sincerely,   Shaheed Mendoza MD  Utilization Review  Physician Advisor  [facility]

## 2024-05-29 NOTE — PLAN OF CARE
Problem: Adult Inpatient Plan of Care  Goal: Optimal Comfort and Wellbeing  Outcome: Progressing   Goal Outcome Evaluation:       Pt is alert and oriented x4. Pt's heart rhythm was atrial fibrillation with frequent PVC's. Pacemaker interrogated by ED tech. Pt still needs an MRI checklist completed. Pt denied pain. New intermittent numbness noted on left side of body. Pt has baseline numbness on right side from previous CVA. Pt was anxious about staying overnight without a family member. Son on his way to spend the night in his room. Pt transferred to room 129. Report called to P1 TIANA.   Harshil Hughes RN  5/28/2024  11:06 PM

## 2024-05-30 ENCOUNTER — APPOINTMENT (OUTPATIENT)
Dept: CARDIOLOGY | Facility: HOSPITAL | Age: 79
DRG: 092 | End: 2024-05-30
Attending: HOSPITALIST
Payer: COMMERCIAL

## 2024-05-30 ENCOUNTER — APPOINTMENT (OUTPATIENT)
Dept: MRI IMAGING | Facility: HOSPITAL | Age: 79
DRG: 092 | End: 2024-05-30
Attending: HOSPITALIST
Payer: COMMERCIAL

## 2024-05-30 ENCOUNTER — APPOINTMENT (OUTPATIENT)
Dept: OCCUPATIONAL THERAPY | Facility: HOSPITAL | Age: 79
DRG: 092 | End: 2024-05-30
Attending: HOSPITALIST
Payer: COMMERCIAL

## 2024-05-30 VITALS
DIASTOLIC BLOOD PRESSURE: 77 MMHG | TEMPERATURE: 98.2 F | HEART RATE: 87 BPM | HEIGHT: 62 IN | OXYGEN SATURATION: 95 % | WEIGHT: 243.61 LBS | BODY MASS INDEX: 44.83 KG/M2 | RESPIRATION RATE: 18 BRPM | SYSTOLIC BLOOD PRESSURE: 146 MMHG

## 2024-05-30 LAB
LVEF ECHO: NORMAL
POTASSIUM SERPL-SCNC: 4.3 MMOL/L (ref 3.4–5.3)

## 2024-05-30 PROCEDURE — 70553 MRI BRAIN STEM W/O & W/DYE: CPT

## 2024-05-30 PROCEDURE — 93306 TTE W/DOPPLER COMPLETE: CPT

## 2024-05-30 PROCEDURE — 250N000013 HC RX MED GY IP 250 OP 250 PS 637: Performed by: HOSPITALIST

## 2024-05-30 PROCEDURE — 97165 OT EVAL LOW COMPLEX 30 MIN: CPT | Mod: GO

## 2024-05-30 PROCEDURE — 84132 ASSAY OF SERUM POTASSIUM: CPT | Performed by: INTERNAL MEDICINE

## 2024-05-30 PROCEDURE — 255N000002 HC RX 255 OP 636: Performed by: INTERNAL MEDICINE

## 2024-05-30 PROCEDURE — A9585 GADOBUTROL INJECTION: HCPCS | Performed by: INTERNAL MEDICINE

## 2024-05-30 PROCEDURE — 99221 1ST HOSP IP/OBS SF/LOW 40: CPT | Performed by: PHYSICIAN ASSISTANT

## 2024-05-30 PROCEDURE — 99239 HOSP IP/OBS DSCHRG MGMT >30: CPT | Performed by: INTERNAL MEDICINE

## 2024-05-30 PROCEDURE — 250N000013 HC RX MED GY IP 250 OP 250 PS 637: Performed by: INTERNAL MEDICINE

## 2024-05-30 PROCEDURE — 93306 TTE W/DOPPLER COMPLETE: CPT | Mod: 26 | Performed by: INTERNAL MEDICINE

## 2024-05-30 RX ORDER — FUROSEMIDE 20 MG
40 TABLET ORAL DAILY
Status: DISCONTINUED | OUTPATIENT
Start: 2024-05-30 | End: 2024-05-30 | Stop reason: HOSPADM

## 2024-05-30 RX ORDER — ATORVASTATIN CALCIUM 40 MG/1
40 TABLET, FILM COATED ORAL EVERY EVENING
Qty: 30 TABLET | Refills: 0 | Status: SHIPPED | OUTPATIENT
Start: 2024-05-30

## 2024-05-30 RX ORDER — LOSARTAN POTASSIUM 50 MG/1
50 TABLET ORAL DAILY
Status: DISCONTINUED | OUTPATIENT
Start: 2024-05-30 | End: 2024-05-30 | Stop reason: HOSPADM

## 2024-05-30 RX ORDER — GADOBUTROL 604.72 MG/ML
10 INJECTION INTRAVENOUS ONCE
Status: COMPLETED | OUTPATIENT
Start: 2024-05-30 | End: 2024-05-30

## 2024-05-30 RX ADMIN — LORAZEPAM 0.5 MG: 0.5 TABLET ORAL at 13:37

## 2024-05-30 RX ADMIN — LOSARTAN POTASSIUM 50 MG: 50 TABLET, FILM COATED ORAL at 16:17

## 2024-05-30 RX ADMIN — FUROSEMIDE 40 MG: 20 TABLET ORAL at 11:02

## 2024-05-30 RX ADMIN — GADOBUTROL 10 ML: 604.72 INJECTION INTRAVENOUS at 14:42

## 2024-05-30 RX ADMIN — ACETAMINOPHEN 650 MG: 325 TABLET ORAL at 12:37

## 2024-05-30 RX ADMIN — METOPROLOL SUCCINATE 150 MG: 100 TABLET, EXTENDED RELEASE ORAL at 11:03

## 2024-05-30 RX ADMIN — APIXABAN 5 MG: 5 TABLET, FILM COATED ORAL at 11:02

## 2024-05-30 RX ADMIN — PANTOPRAZOLE SODIUM 40 MG: 40 TABLET, DELAYED RELEASE ORAL at 11:04

## 2024-05-30 ASSESSMENT — ACTIVITIES OF DAILY LIVING (ADL)
ADLS_ACUITY_SCORE: 26
ADLS_ACUITY_SCORE: 27
ADLS_ACUITY_SCORE: 26
ADLS_ACUITY_SCORE: 27
ADLS_ACUITY_SCORE: 26
ADLS_ACUITY_SCORE: 27
ADLS_ACUITY_SCORE: 26
ADLS_ACUITY_SCORE: 26
ADLS_ACUITY_SCORE: 27
ADLS_ACUITY_SCORE: 26
ADLS_ACUITY_SCORE: 27
ADLS_ACUITY_SCORE: 26
ADLS_ACUITY_SCORE: 27

## 2024-05-30 NOTE — PROGRESS NOTES
"   05/30/24 0750   Appointment Info   Signing Clinician's Name / Credentials (OT) Rukhsana Sutton, OTR/L   Living Environment   People in Home child(rizwana), adult  (son)   Current Living Arrangements house   Living Environment Comments walk-in shower, tried shower chair but it didn't work well so pt stands while holding GBs   Self-Care   Equipment Currently Used at Home walker, rolling;grab bar, tub/shower   Activity/Exercise/Self-Care Comment assist for all ADLs from son   Instrumental Activities of Daily Living (IADL)   IADL Comments assist for all IADLs from son and med setup completed by \"other PCA\"   General Information   Onset of Illness/Injury or Date of Surgery 05/28/24   Referring Physician Soheila Vasquez MD   Patient/Family Therapy Goal Statement (OT) go home   Additional Occupational Profile Info/Pertinent History of Current Problem presents with L facial and extremity numbness - workup for CVA. PMH nonischemic cardiomyopathy EF 35 to 40% per echo on 10/2023, PAF with history of AV node ablation and CRT-P implantation on 11/2021, CVA with residual right-sided weakness, right carotid artery stenosis, HTN   Existing Precautions/Restrictions fall   Cognitive Status Examination   Orientation Status orientation to person, place and time   Affect/Mental Status (Cognitive) WFL;anxious   Follows Commands follows one-step commands   Cognitive Status Comments anxious/frustrated initially with meeting another clinician in hospital but easily redirectable when reason for OT eval was reviewed.   Sensory   Sensory Comments functional L&R - reports no numbness today   Pain Assessment   Patient Currently in Pain No   Range of Motion Comprehensive   General Range of Motion bilateral upper extremity ROM WFL   Strength Comprehensive (MMT)   Comment, General Manual Muscle Testing (MMT) Assessment BUE WFL   Coordination   Gross Motor Coordination finger to nose WFL - intermittent impaired targeting with L, reports being R handed "   Transfers   Transfers sit-stand transfer;toilet transfer   Sit-Stand Transfer   Sit-Stand [place] (Transfers) supervision   Toilet Transfer   Type (Toilet Transfer) sit-stand;stand-sit   [place] Level (Toilet Transfer) supervision   Balance   Balance Comments supervision with 4WW for mobility in bathroom and from bathroom to bed.   Activities of Daily Living   BADL Assessment/Intervention toileting;grooming   Grooming Assessment/Training   [place] Level (Grooming) set up;supervision;oral care regimen;wash face, hands   Toileting   [place] Level (Toileting) supervision;perform perineal hygiene;adjust/manage clothing   Clinical Impression   Criteria for Skilled Therapeutic Interventions Met (OT) Evaluation only   Clinical Decision Making Complexity (OT) problem focused assessment/low complexity   Risk & Benefits of therapy have been explained evaluation/treatment results reviewed;participants included;patient;son   Clinical Impression Comments Pt presents with no functional deficits. Mild impairments in mobility from baseline per son. Defer further OT intervention to home OT.   OT Total Evaluation Time   OT Eval, Low Complexity Minutes (08816) 20   OT Discharge Planning   OT Plan defer to PT   OT Discharge Recommendation (DC Rec) home with assist;home with home care occupational therapy   OT Rationale for DC Rec lives with son who is his PCA. has assist with all ADLs at baseline.   OT Brief overview of current status Sup/SBA transfers/mobility, light assist ADLs   Total Session Time   Total Session Time (sum of timed and untimed services) 20

## 2024-05-30 NOTE — DISCHARGE SUMMARY
[facility] MEDICINE  DISCHARGE SUMMARY     Primary Care Physician: Martha Gonzalez  Admission Date: 5/28/2024   Discharge Provider: Selvin Freedman MD Discharge Date: 5/30/2024   Diet:   Active Diet and Nourishment Order   Procedures    Room Service    Combination Diet Moderate Consistent Carb (60 g CHO per Meal) Diet; Mechanical/Dental Soft Diet; 2 gm NA Diet; Low Saturated Fat Na <2400mg Diet    Diet       Code Status: Full Code   Activity: DCACTIVITY: Activity as tolerated        Condition at Discharge: Stable     REASON FOR PRESENTATION(See Admission Note for Details)   Left facial numbness and left leg numbness and weakness.  Please refer to H&P for details    PRINCIPAL & ACTIVE DISCHARGE DIAGNOSES     Active Problems:    Left leg paresthesias    Facial paresthesia  Right carotid artery stenosis  Essential hypertension  History of nonischemic cardiomyopathy  History of atrial fibrillation    PENDING LABS     Unresulted Labs Ordered in the Past 30 Days of this Admission       No orders found from 4/28/2024 to 5/29/2024.              PROCEDURES ( this hospitalization only)          RECOMMENDATIONS TO OUTPATIENT PROVIDER FOR F/U VISIT     Follow-up Appointments     Follow-up and recommended labs and tests       Follow up with primary care provider, Martha Gonzalez, within 3-5   days, to evaluate medication change, for hospital follow- up, and referral   to vascular surgery for carotid artery stenosis, refer to neurology for   stroke. The following labs/tests are recommended: Deferred to PCP.                DISPOSITION     Home    SUMMARY OF HOSPITAL COURSE:      73 old female with past medical history of nonischemic cardiomyopathy EF 35 to 40% per echo on 10/2023, PAF with history of AV node ablation and CRT-P implantation on 11/2021, CVA with residual right-sided weakness, right carotid artery stenosis, HTN who presented to ED for evaluation of intermittent left facial and  left extremity numbness.     #Strokelike symptoms with left-sided face numbness, leg weakness/numbness;  #History of CVA with residual right-sided weakness;  #History of intracranial posterior cerebral artery stenosis bilateral;  -- , added atorvastatin 40 mg daily and encourage patient to take medications  -- Echocardiogram results reviewed  -- MRI reported no acute intracranial process  -- Personally discussed with neurologist, continue current home medications  --PTA Eliquis  --Patient and family members declined home care     Right carotid artery stenosis 70 to 90% stenosis per CT imaging;  -- Imaging reported disease progression since 2018  -- Appreciated vascular surgery input, they recommended carotid artery endarterectomy but patient and family declined.  Patient's and family understand risk of future stroke which could be life-threatening.  They reported plan to follow-up with their providers from [facility]     Essential hypertension, uncontrolled; improving.  Multifactorial due to stress, holding PTA medications initially for permissive hypertension  -- Resumed PTA medications and blood pressure improving.  Patient hesitant to add any new medications.  Defer to PCP for blood pressure monitoring and medication adjustment.     Nonischemic cardiomyopathy;  -- Not on exacerbation.    --PTA Toprol-XL, losartan and Lasix.   --  AFiB with AV node ablation and CRT-P implantation on 11/2021;  -- Heart rate fairly controlled.    --PTA metoprolol.  PTA Eliquis     Chronic thrombocytopenia, fairly stable          Discharge Medications with Med changes:     Current Discharge Medication List        START taking these medications    Details   atorvastatin (LIPITOR) 40 MG tablet Take 1 tablet (40 mg) by mouth every evening  Qty: 30 tablet, Refills: 0    Associated Diagnoses: Hyperlipidemia LDL goal <70           CONTINUE these medications which have NOT CHANGED    Details   acetaminophen  (TYLENOL) 325 MG tablet Take 650 mg by mouth      ELIQUIS ANTICOAGULANT 5 MG tablet TAKE 1 TABLET(5 MG) BY MOUTH TWICE DAILY  Qty: 60 tablet, Refills: 1    Associated Diagnoses: Hx of ischemic left MCA stroke      furosemide (LASIX) 40 MG tablet Take 1 tablet (40 mg) by mouth daily  Qty: 90 tablet, Refills: 3    Associated Diagnoses: Ischemic cardiomyopathy      ipratropium (ATROVENT) 0.06 % nasal spray Spray 2 sprays into both nostrils 2 times daily as needed      losartan (COZAAR) 50 MG tablet TAKE 1 TABLET(50 MG) BY MOUTH DAILY  Qty: 90 tablet, Refills: 0    Comments: Schedule a fasting office visit with Dr. Carmona before further refills.  Associated Diagnoses: Essential hypertension, benign      melatonin 5 MG tablet Take 5 mg by mouth nightly as needed for sleep      metoprolol succinate ER (TOPROL XL) 100 MG 24 hr tablet Take 1.5 tablets by mouth daily      pantoprazole sodium (PROTONIX) 40 MG packet Take 1 packet (40 mg) by mouth daily  Qty: 30 each, Refills: 11    Associated Diagnoses: Gastroesophageal reflux disease without esophagitis      psyllium (METAMUCIL/KONSYL) capsule Take 1 capsule by mouth daily      Blood Pressure Monitoring (BLOOD PRESSURE MONITOR/M CUFF) MISC 1 Large electronic blood pressure cuff- to be used as needed for blood pressure monitoring  Qty: 1 each, Refills: 0    Associated Diagnoses: Essential hypertension, benign                   Rationale for medication changes:      Please refer to hospital course        Consults       SPEECH LANGUAGE PATH ADULT IP CONSULT  PHARMACY IP CONSULT  PHARMACY IP CONSULT  PHARMACY IP CONSULT  PHYSICAL THERAPY ADULT IP CONSULT  OCCUPATIONAL THERAPY ADULT IP CONSULT  REHAB ADMISSIONS LIAISON IP CONSULT  CARE MANAGEMENT / SOCIAL WORK IP CONSULT  NEUROLOGY IP CONSULT  VASCULAR SURGERY IP CONSULT  SMOKING CESSATION PROGRAM IP CONSULT    Immunizations given this encounter     Most Recent Immunizations   Administered Date(s) Administered    Flu, Unspecified  10/15/2015    Influenza (High Dose) 3 valent vaccine 10/20/2016    Influenza Vaccine >6 months,quad, PF 01/27/2015    Pneumo Conj 13-V (2010&after) 04/02/2015    Pneumococcal 23 valent 06/12/2009    TDAP (Adacel,Boostrix) 04/26/2012    TDAP Vaccine (Boostrix) 04/26/2012    Td (Adult), Adsorbed 02/14/2000           Anticoagulation Information          SIGNIFICANT IMAGING FINDINGS     Results for orders placed or performed during the hospital encounter of 05/28/24   CTA Head Neck with Contrast    Impression    IMPRESSION:   HEAD CT:  1.  No evidence of acute hemorrhage, hydrocephalus or transcortical infarct.      HEAD CTA:   1.  No proximal large vessel occlusion.  2.  Potentially hemodynamically significant multifocal bilateral posterior cerebral artery stenoses presumably on the basis of intracranial atherosclerotic disease, progressed since 2018.  3.  No aneurysm, or high flow vascular malformation identified.    NECK CTA:  1.  Mostly calcified plaque causes suspected 70-90% stenosis of the right internal carotid artery origin and less than 50% stenosis of the left internal carotid artery origin. Disease on the right has progressed since 2018.    2.  No evidence for dissection.    MR Brain w/o & w Contrast    Impression    IMPRESSION:  1.  No acute intracranial process.  2.  Mild to moderate presumed microvascular ischemic changes as detailed above. Several chronic infarcts involving left frontal lobe, left temporal lobe and chronic lacunar infarct within the left thalamus.   US Carotid Bilateral    Impression    IMPRESSION:  1.  Moderate plaque formation, velocities consistent with 50-69% stenosis in the right internal carotid artery, by ultrasound velocity criteria.  2.  Mild plaque formation, velocities consistent with less than 50% stenosis in the left internal carotid artery, by ultrasound velocity criteria.  3.  Flow within the vertebral arteries is antegrade.   Echocardiogram Complete - For age > 60 yrs    Result Value Ref Range    LVEF  30-35% (moderately reduced)        SIGNIFICANT LABORATORY FINDINGS     Most Recent 3 CBC's:  Recent Labs   Lab Test 05/29/24  0738 05/28/24  1517 10/13/22  1524   WBC 5.2 5.4 7.4   HGB 13.4 12.8 12.9   MCV 87 85 83   * 126* 143*     Most Recent 3 BMP's:  Recent Labs   Lab Test 05/30/24  1157 05/29/24  1824 05/29/24  1203 05/29/24  0831 05/29/24  0738 05/28/24  2247 05/28/24  1617 06/07/22  1453   0000   NA  --   --   --   --  142  --  140 141  --    POTASSIUM 4.3  --   --   --  5.1  --  4.0 4.3  --    CHLORIDE  --   --   --   --  108*  --  104 106  --    CO2  --   --   --   --  24  --  28 24  --    BUN  --   --   --   --  23.0  --  26.4* 19  --    CR  --   --   --   --  0.66  --  0.76 0.63  --    ANIONGAP  --   --   --   --  10  --  8 11  --    MACK  --   --   --   --  8.9  --  9.0 9.1  --    GLC  --  113* 125* 97 105*   < > 97 95   < >    < > = values in this interval not displayed.     Most Recent 2 LFT's:  Recent Labs   Lab Test 10/13/22  1524 07/27/21  1506   AST 20 21   ALT 25 39   ALKPHOS 99 104   BILITOTAL 0.5 0.8       Discharge Orders        Reason for your hospital stay    Patient admitted for TIA     Follow-up and recommended labs and tests     Follow up with primary care provider, Martha Gonzalez, within 3-5 days, to evaluate medication change, for hospital follow- up, and referral to vascular surgery for carotid artery stenosis, refer to neurology for stroke. The following labs/tests are recommended: Deferred to PCP.     Activity    Your activity upon discharge: activity as tolerated     Diet    Follow this diet upon discharge: Orders Placed This Encounter      Room Service      Combination Diet Moderate Consistent Carb (60 g CHO per Meal) Diet; Mechanical/Dental Soft Diet; 2 gm NA Diet; Low Saturated Fat Na <2400mg Diet       Examination   Physical Exam   Temp:  [97.6  F (36.4  C)-98.2  F (36.8  C)] 98.2  F (36.8  C)  Pulse:  [] 87  Resp:  [18] 18  BP:  (146-189)/(75-82) 146/77  SpO2:  [95 %-100 %] 95 %  Wt Readings from Last 1 Encounters:   05/28/24 110.5 kg (243 lb 9.7 oz)       Please refer to my progress note from earlier for details    Detailed plan of care after discharge discussed with patient and patient's daughter at bedside.  Recommended to come to emergency department if any recurrence of strokelike symptoms.      Please see EMR for more detailed significant labs, imaging, consultant notes etc.    I, Selvin Freedman MD, personally saw the patient today and spent greater than 30 minutes discharging this patient.    Selvin Freedman MD  [facility]    CC:Martha Gonzalez

## 2024-05-30 NOTE — CONSULTS
VASCULAR SURGERY INPATIENT CONSULTATION / Initial In-Patient visit    VASCULAR SURGEON: Naeem Hernandez MD    LOCATION: [facility]     Yecenia Kwan  Medical Record #: [id number]  YOB: 1945  Age: 79 year old     Date of Service: 5/28/2024    PRIMARY CARE PROVIDER: Martha Gonzalez    Reason for consultation: left sided weakness, numbness. CTA with right carotid artery stenosis 70-90%    IMPRESSION: 80 YO female with history of afib on Eliquis presenting with intermittent left facial and extremity numbness and weakness. CTA negative for acute intracranial process with 70-90% stenosis of the right ICA. Carotid duplex with moderate stenosis on the right on velocity criteria. MRI and echocardiogram pending. Of note, patient has a history of prior CVA with residual right sided weakness.    RECOMMENDATION: Discussed with attending, Dr. Hernandez. Concern for symptomatic disease, will tentatively plan for carotid intervention this weekend pending further discussion with patient/family.  If family is in agreement to proceed, will need to hold her Eliquis.  Continue statin therapy at this time.  Patient underwent echocardiogram this morning and is scheduled for MRI per neurology later today.  Will follow these results.      Of note, family continues to mention possible transfer to [facility] during our visit this morning.  We discussed that this is always an option and we can assist in arranging transfer if they wish to seek a second opinion.    HPI:  Yecenia Kwan is a 79 year old female with past medical history significant for hypertension, atrial fibrillation on Eliquis, and history of left MCA stroke who presented to the ED with 1 week of intermittent left-sided numbness/weakness.  Patient was worked up with CTA which was negative for acute intracranial process but with 70 to 90% stenosis of the right ICA.  Further workup with carotid ultrasound demonstrates moderate  "stenosis on the right based on velocity criteria.    Today, Mrs. Kwan is evaluated at the bedside. She is accompanied by her 2 sons and daughter.  Patient notes that she has been experiencing occasional left-sided numbness to the face and weakness in her left foot for the past week. She is not currently experiencing any symptoms and feels at her normal baseline. Patient denies any vision changes, slurred or garbled speech, or new right-sided weakness.  She does have residual weakness on the right side from her previous stroke.    Imaging results were discussed as well as likely plans for carotid intervention given concern for symptomatic disease. The family tells me they are aware of her carotid artery stenosis from previous imaging studies in the past.  They tell me she has been seen at [facility] who did not feel like she was a surgical candidate given her reduced ejection fraction and cardiac function.  Of note, her 3 children do not seem to all be in agreement on plans of care at this time and continuously argue during our visit today.    All questions answered to the best my ability.  No other concerns.    PHH:    Past Medical History:   Diagnosis Date    Anxiety     Arthritis     Atrial fibrillation (H)     Cellulitis of left leg     CVA (cerebral vascular accident) (H)     Displacement of intervertebral disc, site unspecified, without myelopathy     GERD (gastroesophageal reflux disease)     Head injury     Hidradenitis     Hx of ischemic left MCA stroke 12/7/2015    posterior aspect of the left lobe involving middle frontal gyrus   12/2015     Hx of nicotine dependence     Hyperlipemia     Hypertension     Obesity, unspecified     Proteinuria     Unspecified essential hypertension        Past Surgical History:   Procedure Laterality Date    HC ARTHROTOMY/EXPLORE/TREAT KNEE JOINT      Description: Arthrotomy Of Knee With Joint Exploration;  Recorded: 07/29/2008;  Comments: due to \"accident\" and " infection    HC CORRECT BUNION,METATARSAL OSTEOTOMY      Description: Bunion Correction With Metatarsal Osteotomy;  Recorded: 07/29/2008;    [facility] NONSPECIFIC PROCEDURE      Knee surgeries ((R) scope)    [facility] NONSPECIFIC PROCEDURE      tubal ligation      ALLERGIES:     Allergies   Allergen Reactions    Clonidine Swelling    Hydralazine Itching    Sacubitril-Valsartan      Other reaction(s): GI Upset    Amlodipine Dizziness and Other (See Comments)    No Known Allergies     Carvedilol Nausea      MEDS:    Current Facility-Administered Medications:     acetaminophen (TYLENOL) tablet 650 mg, 650 mg, Oral, Q6H PRN, Selvin Freedman MD    apixaban ANTICOAGULANT (ELIQUIS) tablet 5 mg, 5 mg, Oral, BID, Soheila Vasquez MD, 5 mg at 05/29/24 1956    atorvastatin (LIPITOR) tablet 40 mg, 40 mg, Oral, QPM, Selvin Freedman MD, 40 mg at 05/29/24 2000    furosemide (LASIX) tablet 40 mg, 40 mg, Oral, Daily, Selvin Freedman MD    labetalol (NORMODYNE/TRANDATE) injection 10-20 mg, 10-20 mg, Intravenous, Q10 Min PRN **OR** [DISCONTINUED] hydrALAZINE (APRESOLINE) injection 10-20 mg, 10-20 mg, Intravenous, Q1H PRN, Selvin Freedman MD    lidocaine (LMX4) cream, , Topical, Q1H PRN, Soheila Vasquez MD    lidocaine 1 % 0.1-1 mL, 0.1-1 mL, Other, Q1H PRN, Soheila Vasquez MD    LORazepam (ATIVAN) tablet 0.5 mg, 0.5 mg, Oral, Once PRN, Soheila Vasquez MD    medication instruction - No oral meds if patient didn't pass dysphagia screen, , Does not apply, Continuous PRN, Soheila Vasquez MD    Medication Instructions - Avoid dextrose in IV solutions., , Intravenous, Continuous PRN, Soheila Vasquez MD    metoprolol succinate ER (TOPROL XL) 24 hr tablet 150 mg, 150 mg, Oral, Daily, Soheila Vasquez MD, 150 mg at 05/29/24 0856    pantoprazole (PROTONIX) EC tablet 40 mg, 40 mg, Oral, QAM AC, Soheila Vasquez MD, 40 mg at 05/29/24 0802    sodium chloride (PF) 0.9% PF flush 3 mL, 3 mL, Intracatheter, Q8H, Soheila Vasquez MD, 3 mL at 05/29/24 0200    sodium chloride (PF) 0.9% PF flush 3  "mL, 3 mL, Intracatheter, q1 min prn, Soheila Vasquez MD     SOCIAL HABITS:    Tobacco Use      Smoking status: Never      Smokeless tobacco: Never     Social History    Substance and Sexual Activity      Alcohol use: No       History   Drug Use No      FAMILY HISTORY:    Family History   Problem Relation Age of Onset    Prostate Cancer Father     C.A.D. Mother          at 80 years of age.    Diabetes Mother      REVIEW OF SYSTEMS:    A 12 point ROS was reviewed and except for what is listed in the HPI above, all others are negative    PE:    Vital signs:  Temp: 97.8  F (36.6  C) Temp src: Oral BP: (!) 178/82 (RN notified) Pulse: 84   Resp: 18 SpO2: 98 % O2 Device: None (Room air)   Height: 157.5 cm (5' 2\") Weight: 110.5 kg (243 lb 9.7 oz)  Estimated body mass index is 44.56 kg/m  as calculated from the following:    Height as of this encounter: 1.575 m (5' 2\").    Weight as of this encounter: 110.5 kg (243 lb 9.7 oz).    Wt Readings from Last 1 Encounters:   24 110.5 kg (243 lb 9.7 oz)     Body mass index is 44.56 kg/m .    EXAM:  GENERAL: well-developed 79 year old female who appears her stated age  CARDIAC:  regular rate and rhythm   CHEST/LUNG: normal respiratory effort   MUSCULOSKELETAL: grossly normal and both lower extremities are intact.  HEME/LYMPH: no lymphedema  NEUROLOGIC: focally intact, moving all extremities, speech clear, face symmetric  PSYCH: appropriate affect    DIAGNOSTIC STUDIES:     Images:    US Carotid Bilateral    Result Date: 2024  INDICATION: Symptomatic ICA stenosis    IMPRESSION:   1.  Moderate plaque formation, velocities consistent with 50-69% stenosis in the right internal carotid artery, by ultrasound velocity criteria.   2.  Mild plaque formation, velocities consistent with less than 50% stenosis in the left internal carotid artery, by ultrasound velocity criteria.   3.  Flow within the vertebral arteries is antegrade.    CTA Head Neck with Contrast    Result Date: " 5/28/2024  INDICATION: Left sided paresthesia of face and leg     IMPRESSION:   HEAD CT:   1.  No evidence of acute hemorrhage, hydrocephalus or transcortical infarct.    HEAD CTA:   1.  No proximal large vessel occlusion.   2.  Potentially hemodynamically significant multifocal bilateral posterior cerebral artery stenoses presumably on the basis of intracranial atherosclerotic disease, progressed since 2018.   3.  No aneurysm, or high flow vascular malformation identified.   NECK CTA:   1.  Mostly calcified plaque causes suspected 70-90% stenosis of the right internal carotid artery origin and less than 50% stenosis of the left internal carotid artery origin. Disease on the right has progressed since 2018.    2.  No evidence for dissection.     LABS:      Sodium   Date Value Ref Range Status   05/29/2024 142 135 - 145 mmol/L Final     Comment:     Reference intervals for this test were updated on 09/26/2023 to more accurately reflect our healthy population. There may be differences in the flagging of prior results with similar values performed with this method. Interpretation of those prior results can be made in the context of the updated reference intervals.    05/28/2024 140 135 - 145 mmol/L Final     Comment:     Reference intervals for this test were updated on 09/26/2023 to more accurately reflect our healthy population. There may be differences in the flagging of prior results with similar values performed with this method. Interpretation of those prior results can be made in the context of the updated reference intervals.    06/07/2022 141 136 - 145 mmol/L Final   03/27/2008 141 133 - 144 mmol/L Final   07/11/2007 138 133 - 144 mmol/L Final   10/24/2006 142 133 - 144 mmol/L Final     Urea Nitrogen   Date Value Ref Range Status   05/29/2024 23.0 8.0 - 23.0 mg/dL Final   05/28/2024 26.4 (H) 8.0 - 23.0 mg/dL Final   06/07/2022 19 8 - 28 mg/dL Final   11/09/2021 20 8 - 28 mg/dL Final   08/20/2021 18 8 - 28  mg/dL Final   03/27/2008 21 7 - 30 mg/dL Final   07/11/2007 18 7 - 30 mg/dL Final   10/24/2006 13 7 - 30 mg/dL Final     Hemoglobin   Date Value Ref Range Status   05/29/2024 13.4 11.7 - 15.7 g/dL Final   05/28/2024 12.8 11.7 - 15.7 g/dL Final   10/13/2022 12.9 11.7 - 15.7 g/dL Final   07/17/2007 11.5 (L) 11.7 - 15.7 g/dL Final   10/24/2006 11.8 11.7 - 15.7 g/dL Final   04/08/2006 10.6 (L) 11.7 - 15.7 g/dL Final     Platelet Count   Date Value Ref Range Status   05/29/2024 128 (L) 150 - 450 10e3/uL Final   05/28/2024 126 (L) 150 - 450 10e3/uL Final   10/13/2022 143 (L) 150 - 450 10e3/uL Final   10/24/2006 178 150 - 450 10e9/L Final   04/08/2006 153 150 - 450 10e9/L Final   01/20/2004 190 150 - 450 10e9/L Final     BNP   Date Value Ref Range Status   06/07/2022 891 (H) 0 - 140 pg/mL Final   08/20/2021 126 0 - 137 pg/mL Final   07/27/2021 833 (H) 0 - 137 pg/mL Final     INR   Date Value Ref Range Status   08/20/2021 1.15 0.85 - 1.15 Final     Comment:     Effective 7/11/2021, the reference range for this assay has changed.   09/24/2018 1.01 0.90 - 1.10 Final     Total time spent 45 minutes face to face with patient with more than 50% time spent in counseling and coordination of care.    Lyn Sharif PA-C  [facility] Vascular Surgery

## 2024-05-30 NOTE — PROVIDER NOTIFICATION
"At 2000, and first assessment pt was asking for her nighttime meds. She had scheduled Eliquis and Lipitor. Family in room, Suzy, refusing Lipitor.  He said \"we don't want her on any cholesterol medication, we've been told she needed this before\" Explained what Lipitor was and how high cholesterol is a risk factor for a CVA. Pt stated \"Oh I need that then. Thanks for explaining it.\" Lipitor was given. Suzy also upset at initial assessment about various things. Discussed in depth blood pressure medications. Educated on permissive hypertension. Educated on monitoring for neuro changes. Discussed we will be doing an NIH assessment and full neuro assessment every 4 hours. Encouraged pt to call immediately if she notices any neuro changes. They were agreeable to all of these things. He continually asked whether or not the patient had a stroke or not. I asked him what neurology said today. I said I don't have a definite answer because we don't have the MRI completed. It was very difficult to navigate the conversation with him.     Yecenia is A&Ox4 but family seems to be making all of her decisions for her. I am concerned family is not making educated decisions for her and decisions that are not in her best interest.     A couple hours later I was in the room of another patient and Suzy pulled me out and stated, \"I don't want her getting anymore Lipitor. We have also decided we do not want her to get the MRI and I want the doctor to be aware we will be checking out tomorrow morning.\" I said I would put a sticky note to physician. I explained to him this would not be happening early in the morning and he was agreeable to wait until the doctors round in the morning. I told him there is not a set time when the doctors will be here.    Around 2300, I received a call from Yecenia's daughter, Anastasia Haro. She stated she would like the neurologist to call her in the morning. She stated there are 6 children total and all " have different views on what treatment they want. She stated she thinks her mom might be in a vulnerable adult situation. She thinks certain family members are delaying care and putting her at risk for more CVAs. Estrellita stated she wants her mom to have the brain MRI and all workup for potential stroke. I discussed with her it has been difficult to navigate all of the different opinions and conversations from different family members. She agreed and she said she is frustrated with her family as well. Encouraged them to have one point of contact.

## 2024-05-30 NOTE — PLAN OF CARE
Goal Outcome Evaluation:   Pt is A/Ox4, O2 stats WNL. Pt communicates appropriately. Pt denies pain. Pt ambulates to the bathroom with a stand-by assist of one. NIHS was 1. Pt and family refused NIHS at 0400. No neuro changes over night. Pt refused Protonix this morning.

## 2024-05-30 NOTE — PLAN OF CARE
Problem: Adult Inpatient Plan of Care  Goal: Plan of Care Review  Description: The Plan of Care Review/Shift note should be completed every shift.  The Outcome Evaluation is a brief statement about your assessment that the patient is improving, declining, or no change.  This information will be displayed automatically on your shift  note.  Outcome: Progressing   Goal Outcome Evaluation:      Plan of care discussed in room with patient, daughter Anastasia, Dr. MORLEY and SNoahW.  questions answered. Agreed to MRI today. Understands the need for this test to gather all of the information we can so the vascular surgeon has data to make a plan for ongoing care. Daughter verbalizes understanding. Yecenia is nervous due to her pacemaker. Dr. MORLEY  alleved her anxiety with his calm and simple explanations.  Problem: Adult Inpatient Plan of Care  Goal: Optimal Comfort and Wellbeing  Intervention: Monitor Pain and Promote Comfort  Recent Flowsheet Documentation  Taken 5/30/2024 1237 by Stacey Escobar, RN  Pain Management Interventions: medication (see MAR)     Denies any pain other than at her new IV site. Site is wnl, tylenol given. Is up ad kian ib room. Daughter present and had lunch with her.  Problem: Stroke, Ischemic (Includes Transient Ischemic Attack)  Goal: Optimal Cerebral Tissue Perfusion  Outcome: Progressing       N.I.H.S.S. 1

## 2024-05-30 NOTE — PLAN OF CARE
"Goal Outcome Evaluation:    Pt denies pain. NIHSS 1 for mild aphasia  Tele- discontinued, PIV removed. Pt to discharge to home w/ family member. Pt left unit in a WC pushed by a volunteer. Family member guiding pts. Personal walker all belongings w/ pt.     BP (!) 146/77 (BP Location: Left arm)   Pulse 87   Temp 98.2  F (36.8  C) (Oral)   Resp 18   Ht 1.575 m (5' 2\")   Wt 110.5 kg (243 lb 9.7 oz)   LMP  (LMP Unknown)   SpO2 95%   BMI 44.56 kg/m                            "

## 2024-05-30 NOTE — PROGRESS NOTES
Care Management Follow Up    Length of Stay (days): 2    Expected Discharge Date: 05/31/2024     Concerns to be Addressed: discharge planning     Patient plan of care discussed at interdisciplinary rounds: Yes    Anticipated Discharge Disposition: Home     Anticipated Discharge Services: None  Anticipated Discharge DME: None    Patient/family educated on Medicare website which has current facility and service quality ratings: no  Education Provided on the Discharge Plan: Yes  Patient/Family in Agreement with the Plan: yes    Referrals Placed by CM/SW:    Private pay costs discussed: Not applicable    Additional Information:  Chart Reviewed. Complex family dynamics appearing to make provision of care and decision-making difficult. Per notes, Pt lives with her son Bran who is her PCA 4 hours daily. Several family members have been involved and are mentioned to be making decisions for Pt even though Pt is A&Ox4. Pt's son Bran is noted that he has been stating he is Pt's medical power of . Per Pt medical record, Pt has a health care directive scanned and validated on 10/13/21. Per Pt's health care directive, Pt's daughter Anastasia Kwan is Pt's primary health care agent. Although, Pt is still deemed capable of making decisions with her care.     9:05 AM  CARLOS called and spoke to Pt's daughter Anastasia who reported she goes by Estrellita. Estrellita reported that she is heading to the hospital shortly. Estrellita reported her concerns about her brother impacting the proper care that Pt needs. Estrellita wants Pt to have the MRI and echo completed. Estrellita hopes her brother will step out for a break today so she has a chance to speak with her mother about what she wants done with her care.    9:50 AM  CARLOS received a phone call from Pt's [employer] Care Coordinator Rukhsana ([phone]) who called for a hospitalization update. CARLOS provided Rukhsana with an update and asked if she has ever come across any concerns when working  with Pt's family. Rukhsana stated that she normally works with Pt's daughter who is very involved but has not had much of any interactions with Pt's son. Rukhsana requested for a update from  when Pt is going to discharge.    11:00 AM  Care Conference completed with Hospitalist, bedside RN, Pt, and Estrellita. Pt agreeable with completing MRI. Pt and Estrellita declined home care services when offered. Pt reportedly already attends outpatient cardiac rehab and will resume appointments after discharge. SW confirmed with Pt that she feels safe in her home and does not report any need for further support at home. Estrellita did not report any concerns about Pt returning home at discharge. SW left  contact [phone] for Pt or family to reach out if any further concerns come up prior to discharge.       RYAN Snell

## 2024-05-30 NOTE — PROVIDER NOTIFICATION
"Patient went to Carotid ultrasound, when she returned son was in the room, upset and on the phone and said to writer \"so my Mom has a 95% chance that she had a stroke? That is what my sister is telling me\". Told patient's son that I did not understand what that question meant--and they should go off of the information that the Neurologist told them when rounding today. Son asked writer to go into chart and tell him if his mother had a stroke--informed son that it was not within my scope of practice to interpret imaging results. Son now on the phone talking with other family members.   "

## 2024-05-30 NOTE — CARE PLAN
Start of my shift pt has no IV access. It was lost on previous shift and pt and family refusing any access . Previous nurse and MD are aware. Approached them about the risks of not having IV access and how it would delay the plan of care especially if there was a medical emergency. Both the patient and 3 family members in the room, including Suzy, in the room aware of risks. Offered to get the SWAT nurse with the doppler to insert the PIV. Refused at this time but would consider later after her granddaughter left. Approached and discussed the risks again around 2300 with a different family member in the room. They said they would talk about it and let me know.

## 2024-05-31 NOTE — PLAN OF CARE
Physical Therapy Discharge Summary    Reason for therapy discharge:    Discharged to home.    Progress towards therapy goal(s). See goals on Care Plan in [facility] electronic health record for goal details.  Goals not met.  Barriers to achieving goals:   discharge from facility.    Therapy recommendation(s):    PT did recommend  home PT. The pt did not want any home PT

## 2024-06-06 ENCOUNTER — TRANSCRIBE ORDERS (OUTPATIENT)
Dept: OTHER | Age: 79
End: 2024-06-06

## 2024-06-10 LAB
ATRIAL RATE - MUSE: 88 BPM
DIASTOLIC BLOOD PRESSURE - MUSE: NORMAL MMHG
INTERPRETATION ECG - MUSE: NORMAL
P AXIS - MUSE: NORMAL DEGREES
PR INTERVAL - MUSE: NORMAL MS
QRS DURATION - MUSE: 150 MS
QT - MUSE: 422 MS
QTC - MUSE: 510 MS
R AXIS - MUSE: 77 DEGREES
SYSTOLIC BLOOD PRESSURE - MUSE: NORMAL MMHG
T AXIS - MUSE: 261 DEGREES
VENTRICULAR RATE- MUSE: 88 BPM

## 2024-06-14 ENCOUNTER — HOSPITAL ENCOUNTER (OUTPATIENT)
Dept: CARDIAC REHAB | Facility: CLINIC | Age: 79
Discharge: HOME | End: 2024-06-14
Attending: INTERNAL MEDICINE
Payer: COMMERCIAL

## 2024-06-14 DIAGNOSIS — I42.9 CARDIOMYOPATHY, UNSPECIFIED TYPE (H): ICD-10-CM

## 2024-06-14 DIAGNOSIS — I50.20 HFREF (HEART FAILURE WITH REDUCED EJECTION FRACTION) (H): ICD-10-CM

## 2024-06-14 DIAGNOSIS — I50.22 CHRONIC SYSTOLIC HEART FAILURE (H): ICD-10-CM

## 2024-06-14 PROCEDURE — 93798 PHYS/QHP OP CAR RHAB W/ECG: CPT

## 2024-06-14 PROCEDURE — 93797 PHYS/QHP OP CAR RHAB WO ECG: CPT

## 2024-06-19 ENCOUNTER — HOSPITAL ENCOUNTER (OUTPATIENT)
Dept: CARDIAC REHAB | Facility: CLINIC | Age: 79
Discharge: HOME | End: 2024-06-19
Attending: INTERNAL MEDICINE
Payer: COMMERCIAL

## 2024-06-19 PROCEDURE — 93798 PHYS/QHP OP CAR RHAB W/ECG: CPT

## 2024-06-20 ENCOUNTER — HOSPITAL ENCOUNTER (OUTPATIENT)
Dept: CARDIAC REHAB | Facility: CLINIC | Age: 79
Discharge: HOME | End: 2024-06-20
Attending: INTERNAL MEDICINE
Payer: COMMERCIAL

## 2024-06-20 PROCEDURE — 93798 PHYS/QHP OP CAR RHAB W/ECG: CPT

## 2024-06-26 ENCOUNTER — HOSPITAL ENCOUNTER (OUTPATIENT)
Dept: CARDIAC REHAB | Facility: CLINIC | Age: 79
Discharge: HOME | End: 2024-06-26
Attending: INTERNAL MEDICINE
Payer: COMMERCIAL

## 2024-06-26 PROCEDURE — 93798 PHYS/QHP OP CAR RHAB W/ECG: CPT

## 2024-07-05 ENCOUNTER — DOCUMENTATION ONLY (OUTPATIENT)
Dept: CARDIOLOGY | Facility: CLINIC | Age: 79
End: 2024-07-05
Payer: COMMERCIAL

## 2024-07-05 NOTE — PROGRESS NOTES
Is the implanted device safe for MRI Exam? Yes  Is this device 3T compatible? Yes  Device Type: Pacemaker        Device Information: Medtronic, CRT-P Percepta             Cardiology Orders for Device Programming      -- Yes -- The patient has a MRI conditional pulse generator and leads from the same      -- Yes -- The pulse generator and leads have been implanted for at least 6 weeks. (Does not apply to [employer] devices)     -- Yes-- The device is implanted in the right or left pectoral region     -- Yes -- There are not any additional active cardiac devices, abandoned leads, lead extenders or adapters     -- Yes -- The device lead impedance measurements are within the normal range per  guidelines     -- Yes -- If the patient is pacemaker dependent the thresholds are less than or equal to 2.0V @ 0.4ms.     -- Yes -- RA and RV leads are programmed to bipolar pacing operation or pacing off. If no, CONTACT THE DEVICE REP FOR PROGRAMMING     Date of last Remote Device check: 4/9/2024  Results of last Device check:  1.   Right atrium impedance: NA  2.   Right ventricle impedance: 551 ohms  3.   Left ventricle impedance: 685 ohms     4.   Right atrium threshold: NA  5.   Right ventricle threshold: 0.75V@0.4ms  6.   Left ventricle threshold: 1.375V@0.4ms     Device programming during the scan guidelines   Pacing Mode (check one): VOO or Use the recommended pacing mode per Medtronic MRI Access Application  Pacing Rate: 80  bpm or Use the recommended pacing rate per Medtronic MRI Access Application  If remote reprogramming is applicable, please contact the Rep to assist      Brandi Pritchard RN

## 2024-07-20 DIAGNOSIS — L30.9 DERMATITIS: ICD-10-CM

## 2024-07-22 RX ORDER — TRIAMCINOLONE ACETONIDE 1 MG/G
CREAM TOPICAL 2 TIMES DAILY
Qty: 80 G | Refills: 1 | OUTPATIENT
Start: 2024-07-22

## 2024-07-22 NOTE — TELEPHONE ENCOUNTER
To Cano RN called Yecenia on 7/22 and left a message to return call to clinic. If patient calls back, please route to [facility].     TC route to RN to discuss need for the refill.     To Cano RN  [facility]

## 2024-07-22 NOTE — TELEPHONE ENCOUNTER
Patient's daughter, Estrellita, CTC on file, called into the clinic to discuss a refill of triamcinolone 0.1% cream for the patient-see refill request from 7/20/24..    Last visit to the [facility] as 10/13/2022, with Dr. Martha Gonzalez, for itching, and was last prescribed triamcinolone cream on 9/29/2022.    Estrellita stated that the patient still continues to see Martha Gonzalez MD, at the [facility] and plans to keep Dr. Gonzalez as her PCP.    Writer relayed the prescription information above to Estrellita, who verbalized understanding.    Estrellita stated she will call the patient and relay to the patient that she will need to contact Dr. Gonzalez for a refill of the triamcinolone cream.    Patient to call back to the clinic for any questions or concerns or to schedule an appointment with a different provider to establish care.    Denies other questions or concerns at this time.    Writer will route the above information to Dr. Lance ta to possibly refuse the refill of triamcinolone cream as the patient's PCP is no longer in the [facility] system.    Barbi Orellana RN, BSN  [facility]

## 2024-08-12 ENCOUNTER — HOSPITAL ENCOUNTER (OUTPATIENT)
Dept: ULTRASOUND IMAGING | Facility: CLINIC | Age: 79
Discharge: HOME | End: 2024-08-12
Attending: OBSTETRICS & GYNECOLOGY | Admitting: OBSTETRICS & GYNECOLOGY
Payer: COMMERCIAL

## 2024-08-12 DIAGNOSIS — N85.02 ENDOMETRIAL HYPERPLASIA WITH ATYPIA: ICD-10-CM

## 2024-08-12 DIAGNOSIS — R19.07 GENERALIZED ABDOMINAL OR PELVIC SWELLING OR MASS OR LUMP: ICD-10-CM

## 2024-08-12 DIAGNOSIS — Z97.5 PRESENCE OF IUD: ICD-10-CM

## 2024-08-12 PROCEDURE — 76830 TRANSVAGINAL US NON-OB: CPT

## 2024-08-12 PROCEDURE — 76856 US EXAM PELVIC COMPLETE: CPT

## 2024-10-11 ENCOUNTER — TRANSFERRED RECORDS (OUTPATIENT)
Dept: HEALTH INFORMATION MANAGEMENT | Facility: CLINIC | Age: 79
End: 2024-10-11
Payer: COMMERCIAL

## 2024-12-21 ENCOUNTER — HEALTH MAINTENANCE LETTER (OUTPATIENT)
Age: 79
End: 2024-12-21

## 2025-01-28 ENCOUNTER — TRANSFERRED RECORDS (OUTPATIENT)
Dept: HEALTH INFORMATION MANAGEMENT | Facility: CLINIC | Age: 80
End: 2025-01-28

## 2025-03-12 DIAGNOSIS — L30.9 DERMATITIS: ICD-10-CM

## 2025-03-12 RX ORDER — TRIAMCINOLONE ACETONIDE 1 MG/G
CREAM TOPICAL 2 TIMES DAILY
Qty: 80 G | Refills: 1 | OUTPATIENT
Start: 2025-03-12

## 2025-03-12 NOTE — TELEPHONE ENCOUNTER
Clinic RN: Please investigate patient's chart or contact patient if the information cannot be found because  Last refilled by Dr. Gonzalez, patient has not established with another PCP in [place]. Please ask patient if she has established elsewhere.

## 2025-03-12 NOTE — TELEPHONE ENCOUNTER
See message from the refill RN, on 3/12/25, regarding patient's refill request for triamcinolone cream.    Writer called the patient and spoke to the patient's daughter, Estrellita, CTC on file, whoi stated that the patient is no longer being seen through [facility] locations and is [facility] [place].    Writer relayed to Estrellita that the patient should request a refill of the triamcinolone cream, through her PCP, at the [facility], and Estrellita verbalized understanding and agrees with the plan.    Estrellita will contact the patient regarding the above recommendations.    Barbi Orellana RN, BSN  [facility]

## 2025-05-05 ENCOUNTER — MEDICAL CORRESPONDENCE (OUTPATIENT)
Dept: HEALTH INFORMATION MANAGEMENT | Facility: CLINIC | Age: 80
End: 2025-05-05
Payer: COMMERCIAL

## 2025-05-05 ENCOUNTER — TRANSCRIBE ORDERS (OUTPATIENT)
Dept: OTHER | Age: 80
End: 2025-05-05

## 2025-05-05 DIAGNOSIS — I73.9 PERIPHERAL VASCULAR DISEASE: Primary | ICD-10-CM

## 2025-05-06 ENCOUNTER — PATIENT OUTREACH (OUTPATIENT)
Dept: CARE COORDINATION | Facility: CLINIC | Age: 80
End: 2025-05-06
Payer: COMMERCIAL

## 2025-05-08 ENCOUNTER — PATIENT OUTREACH (OUTPATIENT)
Dept: CARE COORDINATION | Facility: CLINIC | Age: 80
End: 2025-05-08
Payer: COMMERCIAL

## 2025-06-14 ENCOUNTER — HEALTH MAINTENANCE LETTER (OUTPATIENT)
Age: 80
End: 2025-06-14

## 2025-07-05 ENCOUNTER — HEALTH MAINTENANCE LETTER (OUTPATIENT)
Age: 80
End: 2025-07-05

## 2025-08-18 ENCOUNTER — TRANSFERRED RECORDS (OUTPATIENT)
Dept: HEALTH INFORMATION MANAGEMENT | Facility: CLINIC | Age: 80
End: 2025-08-18
Payer: COMMERCIAL

## 2025-08-28 DIAGNOSIS — E11.69 TYPE 2 DIABETES MELLITUS WITH OTHER SPECIFIED COMPLICATION, WITHOUT LONG-TERM CURRENT USE OF INSULIN (H): ICD-10-CM

## 2025-08-28 DIAGNOSIS — E66.01 MORBID OBESITY (H): Primary | ICD-10-CM

## 2025-08-28 DIAGNOSIS — G47.33 OSA (OBSTRUCTIVE SLEEP APNEA): ICD-10-CM

## 2025-08-28 DIAGNOSIS — I50.9 CHRONIC CONGESTIVE HEART FAILURE, UNSPECIFIED HEART FAILURE TYPE (H): ICD-10-CM

## 2025-08-28 DIAGNOSIS — I48.0 PAROXYSMAL ATRIAL FIBRILLATION (H): ICD-10-CM

## 2025-08-28 DIAGNOSIS — I10 BENIGN ESSENTIAL HYPERTENSION: ICD-10-CM

## 2025-09-01 ENCOUNTER — PATIENT OUTREACH (OUTPATIENT)
Dept: CARE COORDINATION | Facility: CLINIC | Age: 80
End: 2025-09-01
Payer: COMMERCIAL